# Patient Record
Sex: FEMALE | Race: WHITE | NOT HISPANIC OR LATINO | Employment: OTHER | URBAN - METROPOLITAN AREA
[De-identification: names, ages, dates, MRNs, and addresses within clinical notes are randomized per-mention and may not be internally consistent; named-entity substitution may affect disease eponyms.]

---

## 2018-01-01 ENCOUNTER — ANESTHESIA EVENT (OUTPATIENT)
Dept: SURGERY | Facility: HOSPITAL | Age: 58
DRG: 463 | End: 2018-01-01
Payer: MEDICARE

## 2018-01-01 ENCOUNTER — OFFICE VISIT (OUTPATIENT)
Dept: SURGERY | Facility: CLINIC | Age: 58
End: 2018-01-01
Payer: MEDICARE

## 2018-01-01 ENCOUNTER — ANESTHESIA (OUTPATIENT)
Dept: SURGERY | Facility: HOSPITAL | Age: 58
End: 2018-01-01
Payer: MEDICARE

## 2018-01-01 ENCOUNTER — LAB VISIT (OUTPATIENT)
Dept: LAB | Facility: HOSPITAL | Age: 58
End: 2018-01-01
Attending: NURSE PRACTITIONER
Payer: MEDICARE

## 2018-01-01 ENCOUNTER — OFFICE VISIT (OUTPATIENT)
Dept: PODIATRY | Facility: CLINIC | Age: 58
End: 2018-01-01
Payer: MEDICARE

## 2018-01-01 ENCOUNTER — TELEPHONE (OUTPATIENT)
Dept: FAMILY MEDICINE | Facility: CLINIC | Age: 58
End: 2018-01-01

## 2018-01-01 ENCOUNTER — OFFICE VISIT (OUTPATIENT)
Dept: FAMILY MEDICINE | Facility: CLINIC | Age: 58
End: 2018-01-01
Attending: NURSE PRACTITIONER
Payer: MEDICARE

## 2018-01-01 ENCOUNTER — HOSPITAL ENCOUNTER (EMERGENCY)
Facility: HOSPITAL | Age: 58
Discharge: HOME | End: 2018-06-19
Attending: FAMILY MEDICINE
Payer: MEDICARE

## 2018-01-01 ENCOUNTER — HOSPITAL ENCOUNTER (EMERGENCY)
Facility: HOSPITAL | Age: 58
Discharge: HOME | End: 2018-12-07
Attending: EMERGENCY MEDICINE
Payer: MEDICARE

## 2018-01-01 ENCOUNTER — HOSPITAL ENCOUNTER (OUTPATIENT)
Dept: RADIOLOGY | Facility: HOSPITAL | Age: 58
Discharge: HOME | End: 2018-09-10
Attending: NURSE PRACTITIONER
Payer: MEDICARE

## 2018-01-01 ENCOUNTER — HOSPITAL ENCOUNTER (OUTPATIENT)
Facility: HOSPITAL | Age: 58
Discharge: HOME | End: 2018-07-16
Attending: SURGERY | Admitting: SURGERY
Payer: MEDICARE

## 2018-01-01 ENCOUNTER — SURGERY (OUTPATIENT)
Age: 58
End: 2018-01-01

## 2018-01-01 ENCOUNTER — ANESTHESIA (OUTPATIENT)
Dept: SURGERY | Facility: HOSPITAL | Age: 58
DRG: 463 | End: 2018-01-01
Payer: MEDICARE

## 2018-01-01 ENCOUNTER — LAB VISIT (OUTPATIENT)
Dept: LAB | Facility: HOSPITAL | Age: 58
End: 2018-01-01
Attending: SURGERY
Payer: MEDICARE

## 2018-01-01 ENCOUNTER — HOSPITAL ENCOUNTER (EMERGENCY)
Facility: HOSPITAL | Age: 58
Discharge: HOME | End: 2018-12-16
Attending: FAMILY MEDICINE
Payer: MEDICARE

## 2018-01-01 ENCOUNTER — HOSPITAL ENCOUNTER (INPATIENT)
Facility: HOSPITAL | Age: 58
LOS: 17 days | DRG: 463 | End: 2019-01-06
Attending: SURGERY | Admitting: SURGERY
Payer: MEDICARE

## 2018-01-01 ENCOUNTER — TELEPHONE (OUTPATIENT)
Dept: PODIATRY | Facility: CLINIC | Age: 58
End: 2018-01-01

## 2018-01-01 ENCOUNTER — HOSPITAL ENCOUNTER (OUTPATIENT)
Dept: RADIOLOGY | Facility: HOSPITAL | Age: 58
Discharge: HOME | End: 2018-09-25
Attending: SURGERY
Payer: MEDICARE

## 2018-01-01 ENCOUNTER — HOSPITAL ENCOUNTER (OUTPATIENT)
Dept: CARDIOLOGY | Facility: HOSPITAL | Age: 58
Discharge: HOME | End: 2018-07-13
Attending: SURGERY
Payer: MEDICARE

## 2018-01-01 ENCOUNTER — ANESTHESIA EVENT (OUTPATIENT)
Dept: SURGERY | Facility: HOSPITAL | Age: 58
End: 2018-01-01
Payer: MEDICARE

## 2018-01-01 VITALS
HEART RATE: 61 BPM | OXYGEN SATURATION: 97 % | SYSTOLIC BLOOD PRESSURE: 140 MMHG | BODY MASS INDEX: 20.05 KG/M2 | DIASTOLIC BLOOD PRESSURE: 80 MMHG | TEMPERATURE: 98 F | WEIGHT: 128 LBS

## 2018-01-01 VITALS
OXYGEN SATURATION: 94 % | RESPIRATION RATE: 18 BRPM | DIASTOLIC BLOOD PRESSURE: 52 MMHG | WEIGHT: 133 LBS | BODY MASS INDEX: 20.88 KG/M2 | HEIGHT: 67 IN | SYSTOLIC BLOOD PRESSURE: 98 MMHG | TEMPERATURE: 98 F | HEART RATE: 76 BPM

## 2018-01-01 VITALS
SYSTOLIC BLOOD PRESSURE: 115 MMHG | HEIGHT: 66 IN | WEIGHT: 128 LBS | BODY MASS INDEX: 20.57 KG/M2 | OXYGEN SATURATION: 98 % | HEART RATE: 66 BPM | RESPIRATION RATE: 17 BRPM | TEMPERATURE: 98 F | DIASTOLIC BLOOD PRESSURE: 76 MMHG

## 2018-01-01 VITALS
DIASTOLIC BLOOD PRESSURE: 72 MMHG | WEIGHT: 125 LBS | TEMPERATURE: 98 F | BODY MASS INDEX: 20.09 KG/M2 | HEIGHT: 66 IN | SYSTOLIC BLOOD PRESSURE: 129 MMHG | HEART RATE: 82 BPM

## 2018-01-01 VITALS
BODY MASS INDEX: 20.09 KG/M2 | WEIGHT: 125 LBS | RESPIRATION RATE: 16 BRPM | HEIGHT: 66 IN | HEART RATE: 83 BPM | SYSTOLIC BLOOD PRESSURE: 118 MMHG | OXYGEN SATURATION: 100 % | DIASTOLIC BLOOD PRESSURE: 83 MMHG | TEMPERATURE: 98 F

## 2018-01-01 VITALS
TEMPERATURE: 98 F | RESPIRATION RATE: 20 BRPM | SYSTOLIC BLOOD PRESSURE: 135 MMHG | HEIGHT: 67 IN | DIASTOLIC BLOOD PRESSURE: 80 MMHG | WEIGHT: 137 LBS | HEART RATE: 76 BPM | BODY MASS INDEX: 21.5 KG/M2 | OXYGEN SATURATION: 89 %

## 2018-01-01 VITALS
TEMPERATURE: 98 F | BODY MASS INDEX: 20.09 KG/M2 | HEIGHT: 66 IN | DIASTOLIC BLOOD PRESSURE: 88 MMHG | WEIGHT: 125 LBS | RESPIRATION RATE: 26 BRPM | OXYGEN SATURATION: 99 % | SYSTOLIC BLOOD PRESSURE: 179 MMHG | HEART RATE: 79 BPM

## 2018-01-01 VITALS
OXYGEN SATURATION: 94 % | HEIGHT: 67 IN | BODY MASS INDEX: 20.25 KG/M2 | HEART RATE: 63 BPM | BODY MASS INDEX: 20.4 KG/M2 | RESPIRATION RATE: 18 BRPM | SYSTOLIC BLOOD PRESSURE: 112 MMHG | WEIGHT: 130 LBS | DIASTOLIC BLOOD PRESSURE: 62 MMHG | HEIGHT: 67 IN | WEIGHT: 129 LBS

## 2018-01-01 VITALS
TEMPERATURE: 100 F | BODY MASS INDEX: 20.88 KG/M2 | HEART RATE: 62 BPM | HEIGHT: 67 IN | SYSTOLIC BLOOD PRESSURE: 178 MMHG | WEIGHT: 133 LBS | RESPIRATION RATE: 18 BRPM | OXYGEN SATURATION: 95 % | DIASTOLIC BLOOD PRESSURE: 92 MMHG

## 2018-01-01 DIAGNOSIS — R63.4 WEIGHT LOSS: ICD-10-CM

## 2018-01-01 DIAGNOSIS — L08.9 SOFT TISSUE INFECTION: ICD-10-CM

## 2018-01-01 DIAGNOSIS — D64.9 ANEMIA, UNSPECIFIED TYPE: ICD-10-CM

## 2018-01-01 DIAGNOSIS — M00.061 STAPHYLOCOCCAL ARTHRITIS OF RIGHT KNEE: ICD-10-CM

## 2018-01-01 DIAGNOSIS — M06.9 RHEUMATOID ARTHRITIS, INVOLVING UNSPECIFIED SITE, UNSPECIFIED RHEUMATOID FACTOR PRESENCE: ICD-10-CM

## 2018-01-01 DIAGNOSIS — L97.512 SKIN ULCER OF TOE OF RIGHT FOOT WITH FAT LAYER EXPOSED: Primary | ICD-10-CM

## 2018-01-01 DIAGNOSIS — E78.5 HYPERLIPIDEMIA, UNSPECIFIED HYPERLIPIDEMIA TYPE: ICD-10-CM

## 2018-01-01 DIAGNOSIS — I47.10 SVT (SUPRAVENTRICULAR TACHYCARDIA): ICD-10-CM

## 2018-01-01 DIAGNOSIS — I10 HYPERTENSION, UNSPECIFIED TYPE: Primary | ICD-10-CM

## 2018-01-01 DIAGNOSIS — E87.20 METABOLIC ACIDOSIS: ICD-10-CM

## 2018-01-01 DIAGNOSIS — K62.5 HEMORRHAGE OF ANUS AND RECTUM: Primary | ICD-10-CM

## 2018-01-01 DIAGNOSIS — Z01.818 PRE-OP TESTING: ICD-10-CM

## 2018-01-01 DIAGNOSIS — L03.031 CELLULITIS OF THIRD TOE OF RIGHT FOOT: ICD-10-CM

## 2018-01-01 DIAGNOSIS — K92.1 HEMATOCHEZIA: ICD-10-CM

## 2018-01-01 DIAGNOSIS — K92.1 MELENA: ICD-10-CM

## 2018-01-01 DIAGNOSIS — S49.91XA INJURY OF RIGHT SHOULDER, INITIAL ENCOUNTER: Primary | ICD-10-CM

## 2018-01-01 DIAGNOSIS — R10.84 GENERALIZED ABDOMINAL PAIN: Primary | ICD-10-CM

## 2018-01-01 DIAGNOSIS — N17.9 AKI (ACUTE KIDNEY INJURY): Primary | ICD-10-CM

## 2018-01-01 DIAGNOSIS — N17.0 ATN (ACUTE TUBULAR NECROSIS): ICD-10-CM

## 2018-01-01 DIAGNOSIS — K92.1 HEMATOCHEZIA: Primary | ICD-10-CM

## 2018-01-01 DIAGNOSIS — D68.9 COAGULOPATHY: ICD-10-CM

## 2018-01-01 DIAGNOSIS — D50.0 IRON DEFICIENCY ANEMIA DUE TO CHRONIC BLOOD LOSS: Primary | ICD-10-CM

## 2018-01-01 DIAGNOSIS — N83.8 OVARIAN MASS, LEFT: ICD-10-CM

## 2018-01-01 DIAGNOSIS — M25.521 ELBOW PAIN, RIGHT: ICD-10-CM

## 2018-01-01 DIAGNOSIS — I49.9 ARRHYTHMIA: ICD-10-CM

## 2018-01-01 DIAGNOSIS — R92.8 ABNORMAL MAMMOGRAM: ICD-10-CM

## 2018-01-01 DIAGNOSIS — I10 HYPERTENSION, UNSPECIFIED TYPE: ICD-10-CM

## 2018-01-01 DIAGNOSIS — E87.5 HYPERKALEMIA: ICD-10-CM

## 2018-01-01 DIAGNOSIS — A41.9 SEPSIS AFFECTING SKIN: ICD-10-CM

## 2018-01-01 DIAGNOSIS — K62.5 RECTAL BLEEDING: ICD-10-CM

## 2018-01-01 DIAGNOSIS — M79.2 NEURITIS: ICD-10-CM

## 2018-01-01 DIAGNOSIS — M06.9 RHEUMATOID ARTHRITIS INVOLVING MULTIPLE SITES, UNSPECIFIED RHEUMATOID FACTOR PRESENCE: Primary | ICD-10-CM

## 2018-01-01 DIAGNOSIS — I73.9 PERIPHERAL VASCULAR DISEASE: ICD-10-CM

## 2018-01-01 DIAGNOSIS — I38 ENDOCARDITIS: ICD-10-CM

## 2018-01-01 DIAGNOSIS — F41.9 ANXIETY: ICD-10-CM

## 2018-01-01 DIAGNOSIS — R63.4 WEIGHT LOSS: Primary | ICD-10-CM

## 2018-01-01 DIAGNOSIS — R92.8 ABNORMAL MAMMOGRAM: Primary | ICD-10-CM

## 2018-01-01 LAB
ABO + RH BLD: NORMAL
ALBUMIN SERPL BCP-MCNC: 0.8 G/DL
ALBUMIN SERPL BCP-MCNC: 0.9 G/DL
ALBUMIN SERPL BCP-MCNC: 1 G/DL
ALBUMIN SERPL BCP-MCNC: 1.1 G/DL
ALBUMIN SERPL BCP-MCNC: 1.2 G/DL
ALBUMIN SERPL BCP-MCNC: 1.3 G/DL
ALBUMIN SERPL BCP-MCNC: 1.3 G/DL
ALBUMIN SERPL BCP-MCNC: 3 G/DL
ALP SERPL-CCNC: 101 U/L
ALP SERPL-CCNC: 102 U/L
ALP SERPL-CCNC: 103 U/L
ALP SERPL-CCNC: 104 U/L
ALP SERPL-CCNC: 105 U/L
ALP SERPL-CCNC: 123 U/L
ALP SERPL-CCNC: 128 U/L
ALP SERPL-CCNC: 156 U/L
ALP SERPL-CCNC: 157 U/L
ALP SERPL-CCNC: 268 U/L
ALP SERPL-CCNC: 308 U/L
ALP SERPL-CCNC: 91 U/L
ALP SERPL-CCNC: 91 U/L
ALP SERPL-CCNC: 94 U/L
ALP SERPL-CCNC: 98 U/L
ALT SERPL W/O P-5'-P-CCNC: 11 U/L
ALT SERPL W/O P-5'-P-CCNC: <5 U/L
AMORPH CRY UR QL COMP ASSIST: ABNORMAL
ANA SER QL IF: POSITIVE
ANA TITR SER IF: NORMAL {TITER}
ANCA AB TITR SER IF: ABNORMAL TITER
ANION GAP SERPL CALC-SCNC: 11 MMOL/L
ANION GAP SERPL CALC-SCNC: 12 MMOL/L
ANION GAP SERPL CALC-SCNC: 13 MMOL/L
ANION GAP SERPL CALC-SCNC: 13 MMOL/L
ANION GAP SERPL CALC-SCNC: 5 MMOL/L
ANION GAP SERPL CALC-SCNC: 7 MMOL/L
ANION GAP SERPL CALC-SCNC: 8 MMOL/L
ANION GAP SERPL CALC-SCNC: 9 MMOL/L
ANISOCYTOSIS BLD QL SMEAR: SLIGHT
ANISOCYTOSIS BLD QL SMEAR: SLIGHT
ANTI SM ANTIBODY: 0.85 EU
ANTI SM/RNP ANTIBODY: 0 EU
ANTI-SM INTERPRETATION: NEGATIVE
ANTI-SM/RNP INTERPRETATION: NEGATIVE
ANTI-SSA ANTIBODY: 6.29 EU
ANTI-SSA INTERPRETATION: NEGATIVE
ANTI-SSB ANTIBODY: 0 EU
ANTI-SSB INTERPRETATION: NEGATIVE
APPEARANCE FLD: NORMAL
APTT BLDCRRT: 24.8 SEC
APTT BLDCRRT: 52.1 SEC
APTT BLDCRRT: 53.7 SEC
ASCENDING AORTA: 3.1 CM
ASO AB SERPL-ACNC: <14 IU/ML
AST SERPL-CCNC: 10 U/L
AST SERPL-CCNC: 10 U/L
AST SERPL-CCNC: 11 U/L
AST SERPL-CCNC: 14 U/L
AST SERPL-CCNC: 15 U/L
AST SERPL-CCNC: 20 U/L
AST SERPL-CCNC: 22 U/L
AST SERPL-CCNC: 24 U/L
AST SERPL-CCNC: 8 U/L
AST SERPL-CCNC: 9 U/L
AV INDEX (PROSTH): 0.89
AV MEAN GRADIENT: 3.96 MMHG
AV PEAK GRADIENT: 7.73 MMHG
AV VALVE AREA: 2.91 CM2
BACTERIA #/AREA URNS AUTO: ABNORMAL /HPF
BACTERIA #/AREA URNS HPF: ABNORMAL /HPF
BACTERIA BLD CULT: NORMAL
BACTERIA SPEC AEROBE CULT: NORMAL
BACTERIA SPEC ANAEROBE CULT: NORMAL
BACTERIA UR CULT: NO GROWTH
BACTERIA UR CULT: NORMAL
BASO STIPL BLD QL SMEAR: ABNORMAL
BASOPHILS # BLD AUTO: 0 K/UL
BASOPHILS # BLD AUTO: 0 K/UL
BASOPHILS # BLD AUTO: 0.01 K/UL
BASOPHILS # BLD AUTO: 0.02 K/UL
BASOPHILS # BLD AUTO: 0.03 K/UL
BASOPHILS # BLD AUTO: 0.03 K/UL
BASOPHILS # BLD AUTO: 0.04 K/UL
BASOPHILS NFR BLD: 0 %
BASOPHILS NFR BLD: 0 %
BASOPHILS NFR BLD: 0.1 %
BASOPHILS NFR BLD: 0.2 %
BASOPHILS NFR BLD: 0.3 %
BASOPHILS NFR BLD: 0.3 %
BASOPHILS NFR BLD: 0.4 %
BASOPHILS NFR BLD: 0.7 %
BILIRUB SERPL-MCNC: 0.1 MG/DL
BILIRUB SERPL-MCNC: 0.2 MG/DL
BILIRUB SERPL-MCNC: 0.3 MG/DL
BILIRUB SERPL-MCNC: 0.4 MG/DL
BILIRUB SERPL-MCNC: 0.7 MG/DL
BILIRUB SERPL-MCNC: 0.7 MG/DL
BILIRUB SERPL-MCNC: 0.8 MG/DL
BILIRUB UR QL STRIP: NEGATIVE
BLD GP AB SCN CELLS X3 SERPL QL: NORMAL
BLD PROD TYP BPU: NORMAL
BLOOD UNIT EXPIRATION DATE: NORMAL
BLOOD UNIT TYPE CODE: 5100
BLOOD UNIT TYPE: NORMAL
BODY FLD TYPE: NORMAL
BODY FLD TYPE: NORMAL
BSA FOR ECHO PROCEDURE: 1.82 M2
BUN SERPL-MCNC: 23 MG/DL
BUN SERPL-MCNC: 39 MG/DL
BUN SERPL-MCNC: 50 MG/DL
BUN SERPL-MCNC: 51 MG/DL
BUN SERPL-MCNC: 52 MG/DL
BUN SERPL-MCNC: 52 MG/DL
BUN SERPL-MCNC: 53 MG/DL
BUN SERPL-MCNC: 54 MG/DL
BUN SERPL-MCNC: 56 MG/DL
BUN SERPL-MCNC: 57 MG/DL
BUN SERPL-MCNC: 59 MG/DL
BUN SERPL-MCNC: 64 MG/DL
BUN SERPL-MCNC: 66 MG/DL
BUN SERPL-MCNC: 66 MG/DL
BUN SERPL-MCNC: 69 MG/DL
BUN SERPL-MCNC: 72 MG/DL
BUN SERPL-MCNC: 73 MG/DL
BUN SERPL-MCNC: 78 MG/DL
BUN SERPL-MCNC: 83 MG/DL
BUN SERPL-MCNC: 84 MG/DL
BUN SERPL-MCNC: 95 MG/DL
BURR CELLS BLD QL SMEAR: ABNORMAL
BURR CELLS BLD QL SMEAR: ABNORMAL
C3 SERPL-MCNC: 66 MG/DL
C4 SERPL-MCNC: 9 MG/DL
CALCIUM SERPL-MCNC: 7 MG/DL
CALCIUM SERPL-MCNC: 7 MG/DL
CALCIUM SERPL-MCNC: 7.1 MG/DL
CALCIUM SERPL-MCNC: 7.2 MG/DL
CALCIUM SERPL-MCNC: 7.2 MG/DL
CALCIUM SERPL-MCNC: 7.3 MG/DL
CALCIUM SERPL-MCNC: 7.4 MG/DL
CALCIUM SERPL-MCNC: 7.5 MG/DL
CALCIUM SERPL-MCNC: 7.8 MG/DL
CALCIUM SERPL-MCNC: 7.8 MG/DL
CALCIUM SERPL-MCNC: 8.1 MG/DL
CALCIUM SERPL-MCNC: 8.3 MG/DL
CHLORIDE SERPL-SCNC: 105 MMOL/L
CHLORIDE SERPL-SCNC: 107 MMOL/L
CHLORIDE SERPL-SCNC: 108 MMOL/L
CHLORIDE SERPL-SCNC: 109 MMOL/L
CHLORIDE SERPL-SCNC: 110 MMOL/L
CHLORIDE SERPL-SCNC: 111 MMOL/L
CK SERPL-CCNC: <7 U/L
CLARITY UR REFRACT.AUTO: ABNORMAL
CLARITY UR: CLEAR
CO2 SERPL-SCNC: 17 MMOL/L
CO2 SERPL-SCNC: 17 MMOL/L
CO2 SERPL-SCNC: 18 MMOL/L
CO2 SERPL-SCNC: 19 MMOL/L
CO2 SERPL-SCNC: 20 MMOL/L
CO2 SERPL-SCNC: 21 MMOL/L
CO2 SERPL-SCNC: 22 MMOL/L
CODING SYSTEM: NORMAL
COLOR FLD: NORMAL
COLOR UR AUTO: ABNORMAL
COLOR UR AUTO: YELLOW
COLOR UR: YELLOW
CREAT SERPL-MCNC: 0.9 MG/DL
CREAT SERPL-MCNC: 1 MG/DL
CREAT SERPL-MCNC: 1.7 MG/DL
CREAT SERPL-MCNC: 1.8 MG/DL
CREAT SERPL-MCNC: 1.9 MG/DL
CREAT SERPL-MCNC: 1.9 MG/DL
CREAT SERPL-MCNC: 2 MG/DL
CREAT SERPL-MCNC: 2 MG/DL
CREAT SERPL-MCNC: 2.3 MG/DL
CREAT SERPL-MCNC: 2.4 MG/DL
CREAT SERPL-MCNC: 2.6 MG/DL
CREAT SERPL-MCNC: 2.7 MG/DL
CREAT SERPL-MCNC: 3.1 MG/DL
CREAT SERPL-MCNC: 3.3 MG/DL
CREAT SERPL-MCNC: 3.4 MG/DL
CREAT SERPL-MCNC: 3.5 MG/DL
CREAT UR-MCNC: 56 MG/DL
CREAT UR-MCNC: 66 MG/DL
CREAT UR-MCNC: 68 MG/DL
CREAT UR-MCNC: 69 MG/DL
CREAT UR-MCNC: 95 MG/DL
CRP SERPL-MCNC: 106.2 MG/L
CRYSTALS FLD MICRO: NEGATIVE
CV ECHO LV RWT: 0.48 CM
D DIMER PPP IA.FEU-MCNC: 2.23 MG/L FEU
DIFFERENTIAL METHOD: ABNORMAL
DISPENSE STATUS: NORMAL
DOP CALC AO PEAK VEL: 1.39 M/S
DOP CALC AO VTI: 25.92 CM
DOP CALC LVOT AREA: 3.27 CM2
DOP CALC LVOT DIAMETER: 2.04 CM
DOP CALC LVOT STROKE VOLUME: 75.3 CM3
DOP CALCLVOT PEAK VEL VTI: 23.05 CM
DSDNA AB SER-ACNC: NORMAL [IU]/ML
DSDNA AB SER-ACNC: NORMAL [IU]/ML
E WAVE DECELERATION TIME: 208.47 MSEC
E/A RATIO: 1.16
E/E' RATIO: 10.9
ECHO LV POSTERIOR WALL: 0.96 CM (ref 0.6–1.1)
EOSINOPHIL # BLD AUTO: 0 K/UL
EOSINOPHIL # BLD AUTO: 0.1 K/UL
EOSINOPHIL # BLD AUTO: 0.4 K/UL
EOSINOPHIL NFR BLD: 0 %
EOSINOPHIL NFR BLD: 0.1 %
EOSINOPHIL NFR BLD: 0.2 %
EOSINOPHIL NFR BLD: 0.2 %
EOSINOPHIL NFR BLD: 0.3 %
EOSINOPHIL NFR BLD: 0.4 %
EOSINOPHIL NFR BLD: 0.5 %
EOSINOPHIL NFR BLD: 0.8 %
EOSINOPHIL NFR BLD: 0.9 %
EOSINOPHIL NFR BLD: 1.1 %
EOSINOPHIL NFR BLD: 1.3 %
EOSINOPHIL NFR BLD: 1.4 %
EOSINOPHIL NFR BLD: 1.5 %
EOSINOPHIL NFR BLD: 1.7 %
EOSINOPHIL NFR BLD: 7.8 %
ERYTHROCYTE [DISTWIDTH] IN BLOOD BY AUTOMATED COUNT: 15.6 %
ERYTHROCYTE [DISTWIDTH] IN BLOOD BY AUTOMATED COUNT: 15.9 %
ERYTHROCYTE [DISTWIDTH] IN BLOOD BY AUTOMATED COUNT: 18.1 %
ERYTHROCYTE [DISTWIDTH] IN BLOOD BY AUTOMATED COUNT: 18.5 %
ERYTHROCYTE [DISTWIDTH] IN BLOOD BY AUTOMATED COUNT: 18.7 %
ERYTHROCYTE [DISTWIDTH] IN BLOOD BY AUTOMATED COUNT: 18.7 %
ERYTHROCYTE [DISTWIDTH] IN BLOOD BY AUTOMATED COUNT: 18.8 %
ERYTHROCYTE [DISTWIDTH] IN BLOOD BY AUTOMATED COUNT: 18.9 %
ERYTHROCYTE [DISTWIDTH] IN BLOOD BY AUTOMATED COUNT: 19.1 %
ERYTHROCYTE [DISTWIDTH] IN BLOOD BY AUTOMATED COUNT: 19.2 %
ERYTHROCYTE [DISTWIDTH] IN BLOOD BY AUTOMATED COUNT: 19.3 %
ERYTHROCYTE [DISTWIDTH] IN BLOOD BY AUTOMATED COUNT: 19.7 %
ERYTHROCYTE [DISTWIDTH] IN BLOOD BY AUTOMATED COUNT: 19.9 %
ERYTHROCYTE [DISTWIDTH] IN BLOOD BY AUTOMATED COUNT: 20.2 %
ERYTHROCYTE [SEDIMENTATION RATE] IN BLOOD BY WESTERGREN METHOD: 77 MM/HR
EST. GFR  (AFRICAN AMERICAN): 15.8 ML/MIN/1.73 M^2
EST. GFR  (AFRICAN AMERICAN): 16.3 ML/MIN/1.73 M^2
EST. GFR  (AFRICAN AMERICAN): 16.9 ML/MIN/1.73 M^2
EST. GFR  (AFRICAN AMERICAN): 18.3 ML/MIN/1.73 M^2
EST. GFR  (AFRICAN AMERICAN): 21.6 ML/MIN/1.73 M^2
EST. GFR  (AFRICAN AMERICAN): 22.6 ML/MIN/1.73 M^2
EST. GFR  (AFRICAN AMERICAN): 24.9 ML/MIN/1.73 M^2
EST. GFR  (AFRICAN AMERICAN): 26.2 ML/MIN/1.73 M^2
EST. GFR  (AFRICAN AMERICAN): 31 ML/MIN/1.73 M^2
EST. GFR  (AFRICAN AMERICAN): 31 ML/MIN/1.73 M^2
EST. GFR  (AFRICAN AMERICAN): 33 ML/MIN/1.73 M^2
EST. GFR  (AFRICAN AMERICAN): 33 ML/MIN/1.73 M^2
EST. GFR  (AFRICAN AMERICAN): 35.3 ML/MIN/1.73 M^2
EST. GFR  (AFRICAN AMERICAN): 37.8 ML/MIN/1.73 M^2
EST. GFR  (AFRICAN AMERICAN): >60 ML/MIN/1.73 M^2
EST. GFR  (AFRICAN AMERICAN): >60 ML/MIN/1.73 M^2
EST. GFR  (NON AFRICAN AMERICAN): 13.7 ML/MIN/1.73 M^2
EST. GFR  (NON AFRICAN AMERICAN): 14.2 ML/MIN/1.73 M^2
EST. GFR  (NON AFRICAN AMERICAN): 14.7 ML/MIN/1.73 M^2
EST. GFR  (NON AFRICAN AMERICAN): 15.9 ML/MIN/1.73 M^2
EST. GFR  (NON AFRICAN AMERICAN): 18.7 ML/MIN/1.73 M^2
EST. GFR  (NON AFRICAN AMERICAN): 19.6 ML/MIN/1.73 M^2
EST. GFR  (NON AFRICAN AMERICAN): 21.6 ML/MIN/1.73 M^2
EST. GFR  (NON AFRICAN AMERICAN): 22.7 ML/MIN/1.73 M^2
EST. GFR  (NON AFRICAN AMERICAN): 26.9 ML/MIN/1.73 M^2
EST. GFR  (NON AFRICAN AMERICAN): 26.9 ML/MIN/1.73 M^2
EST. GFR  (NON AFRICAN AMERICAN): 28.7 ML/MIN/1.73 M^2
EST. GFR  (NON AFRICAN AMERICAN): 28.7 ML/MIN/1.73 M^2
EST. GFR  (NON AFRICAN AMERICAN): 30.6 ML/MIN/1.73 M^2
EST. GFR  (NON AFRICAN AMERICAN): 32.8 ML/MIN/1.73 M^2
EST. GFR  (NON AFRICAN AMERICAN): >60 ML/MIN/1.73 M^2
EST. GFR  (NON AFRICAN AMERICAN): >60 ML/MIN/1.73 M^2
ESTIMATED AVG GLUCOSE: 100 MG/DL
FACT V ACT/NOR PPP: 80 %
FACT VIII ACT/NOR PPP: 241 %
FIBRINOGEN PPP-MCNC: 445 MG/DL
FRACTIONAL SHORTENING: 28 % (ref 28–44)
GLUCOSE SERPL-MCNC: 102 MG/DL
GLUCOSE SERPL-MCNC: 119 MG/DL
GLUCOSE SERPL-MCNC: 120 MG/DL
GLUCOSE SERPL-MCNC: 134 MG/DL (ref 70–110)
GLUCOSE SERPL-MCNC: 62 MG/DL
GLUCOSE SERPL-MCNC: 65 MG/DL
GLUCOSE SERPL-MCNC: 72 MG/DL
GLUCOSE SERPL-MCNC: 73 MG/DL
GLUCOSE SERPL-MCNC: 74 MG/DL
GLUCOSE SERPL-MCNC: 75 MG/DL
GLUCOSE SERPL-MCNC: 77 MG/DL
GLUCOSE SERPL-MCNC: 77 MG/DL
GLUCOSE SERPL-MCNC: 78 MG/DL
GLUCOSE SERPL-MCNC: 79 MG/DL
GLUCOSE SERPL-MCNC: 81 MG/DL
GLUCOSE SERPL-MCNC: 81 MG/DL
GLUCOSE SERPL-MCNC: 83 MG/DL
GLUCOSE SERPL-MCNC: 83 MG/DL
GLUCOSE SERPL-MCNC: 87 MG/DL
GLUCOSE SERPL-MCNC: 90 MG/DL
GLUCOSE SERPL-MCNC: 90 MG/DL
GLUCOSE UR QL STRIP: NEGATIVE
GRAM STN SPEC: NORMAL
GRAN CASTS UR QL COMP ASSIST: 5 /LPF
GRAN CASTS UR QL COMP ASSIST: <1 /LPF
HAV IGM SERPL QL IA: NEGATIVE
HBA1C MFR BLD HPLC: 5.1 %
HBV CORE IGM SERPL QL IA: NEGATIVE
HBV SURFACE AG SERPL QL IA: NEGATIVE
HCO3 UR-SCNC: 19 MMOL/L (ref 24–28)
HCT VFR BLD AUTO: 19 %
HCT VFR BLD AUTO: 20.2 %
HCT VFR BLD AUTO: 20.5 %
HCT VFR BLD AUTO: 22.2 %
HCT VFR BLD AUTO: 23.4 %
HCT VFR BLD AUTO: 23.7 %
HCT VFR BLD AUTO: 24.3 %
HCT VFR BLD AUTO: 24.5 %
HCT VFR BLD AUTO: 24.5 %
HCT VFR BLD AUTO: 25.5 %
HCT VFR BLD AUTO: 25.8 %
HCT VFR BLD AUTO: 26.2 %
HCT VFR BLD AUTO: 26.5 %
HCT VFR BLD AUTO: 26.7 %
HCT VFR BLD AUTO: 28 %
HCT VFR BLD AUTO: 28.1 %
HCT VFR BLD AUTO: 29.1 %
HCT VFR BLD AUTO: 29.5 %
HCT VFR BLD CALC: 18 %PCV (ref 36–54)
HCV AB SERPL QL IA: NEGATIVE
HGB BLD-MCNC: 6.1 G/DL
HGB BLD-MCNC: 6.3 G/DL
HGB BLD-MCNC: 6.5 G/DL
HGB BLD-MCNC: 6.8 G/DL
HGB BLD-MCNC: 7.3 G/DL
HGB BLD-MCNC: 7.4 G/DL
HGB BLD-MCNC: 7.6 G/DL
HGB BLD-MCNC: 7.6 G/DL
HGB BLD-MCNC: 7.9 G/DL
HGB BLD-MCNC: 8.1 G/DL
HGB BLD-MCNC: 8.2 G/DL
HGB BLD-MCNC: 8.4 G/DL
HGB BLD-MCNC: 8.5 G/DL
HGB BLD-MCNC: 8.6 G/DL
HGB BLD-MCNC: 8.9 G/DL
HGB BLD-MCNC: 9.7 G/DL
HGB UR QL STRIP: ABNORMAL
HGB UR QL STRIP: NEGATIVE
HIV 1+2 AB+HIV1 P24 AG SERPL QL IA: NEGATIVE
HYALINE CASTS #/AREA URNS LPF: 0 /LPF
HYALINE CASTS UR QL AUTO: 0 /LPF
HYALINE CASTS UR QL AUTO: 1 /LPF
HYALINE CASTS UR QL AUTO: 260 /LPF
HYPOCHROMIA BLD QL SMEAR: ABNORMAL
HYPOCHROMIA BLD QL SMEAR: ABNORMAL
IMM GRANULOCYTES # BLD AUTO: 0.01 K/UL
IMM GRANULOCYTES # BLD AUTO: 0.1 K/UL
IMM GRANULOCYTES # BLD AUTO: 0.15 K/UL
IMM GRANULOCYTES # BLD AUTO: 0.17 K/UL
IMM GRANULOCYTES # BLD AUTO: 0.17 K/UL
IMM GRANULOCYTES # BLD AUTO: 0.22 K/UL
IMM GRANULOCYTES # BLD AUTO: 0.23 K/UL
IMM GRANULOCYTES # BLD AUTO: 0.27 K/UL
IMM GRANULOCYTES # BLD AUTO: 0.27 K/UL
IMM GRANULOCYTES # BLD AUTO: 0.29 K/UL
IMM GRANULOCYTES # BLD AUTO: 0.36 K/UL
IMM GRANULOCYTES # BLD AUTO: 0.36 K/UL
IMM GRANULOCYTES # BLD AUTO: 0.39 K/UL
IMM GRANULOCYTES NFR BLD AUTO: 0.2 %
IMM GRANULOCYTES NFR BLD AUTO: 1.2 %
IMM GRANULOCYTES NFR BLD AUTO: 1.4 %
IMM GRANULOCYTES NFR BLD AUTO: 1.7 %
IMM GRANULOCYTES NFR BLD AUTO: 1.7 %
IMM GRANULOCYTES NFR BLD AUTO: 1.8 %
IMM GRANULOCYTES NFR BLD AUTO: 1.9 %
IMM GRANULOCYTES NFR BLD AUTO: 1.9 %
IMM GRANULOCYTES NFR BLD AUTO: 2 %
IMM GRANULOCYTES NFR BLD AUTO: 3 %
IMM GRANULOCYTES NFR BLD AUTO: 3.4 %
IMM GRANULOCYTES NFR BLD AUTO: 3.4 %
IMM GRANULOCYTES NFR BLD AUTO: 3.6 %
IMM GRANULOCYTES NFR BLD AUTO: 3.9 %
IMM GRANULOCYTES NFR BLD AUTO: 4.1 %
IMM GRANULOCYTES NFR BLD AUTO: 4.2 %
IMM GRANULOCYTES NFR BLD AUTO: 4.7 %
INR PPP: 1
INR PPP: 3.9
INR PPP: 5.6
INR PPP: 5.8
INTERPRETATION SERPL IFE-IMP: NORMAL
INTERVENTRICULAR SEPTUM: 0.78 CM (ref 0.6–1.1)
KAPPA LC SER QL IA: 13.41 MG/DL
KAPPA LC/LAMBDA SER IA: 1.48
KETONES UR QL STRIP: ABNORMAL
KETONES UR QL STRIP: ABNORMAL
KETONES UR QL STRIP: NEGATIVE
LA MAJOR: 4.58 CM
LA MINOR: 4.38 CM
LA WIDTH: 3.44 CM
LACTATE SERPL-SCNC: 0.6 MMOL/L
LACTATE SERPL-SCNC: 0.7 MMOL/L
LACTATE SERPL-SCNC: 0.8 MMOL/L
LACTATE SERPL-SCNC: 1.1 MMOL/L
LACTATE SERPL-SCNC: 1.3 MMOL/L
LAMBDA LC SER QL IA: 9.06 MG/DL
LEFT ATRIUM SIZE: 3.53 CM
LEFT ATRIUM VOLUME INDEX: 25.5 ML/M2
LEFT ATRIUM VOLUME: 46.22 CM3
LEFT INTERNAL DIMENSION IN SYSTOLE: 2.87 CM (ref 2.1–4)
LEFT VENTRICLE DIASTOLIC VOLUME INDEX: 37.7 ML/M2
LEFT VENTRICLE DIASTOLIC VOLUME: 68.21 ML
LEFT VENTRICLE MASS INDEX: 56.9 G/M2
LEFT VENTRICLE SYSTOLIC VOLUME INDEX: 17.4 ML/M2
LEFT VENTRICLE SYSTOLIC VOLUME: 31.41 ML
LEFT VENTRICULAR INTERNAL DIMENSION IN DIASTOLE: 3.96 CM (ref 3.5–6)
LEFT VENTRICULAR MASS: 103.01 G
LEUKOCYTE ESTERASE UR QL STRIP: ABNORMAL
LEUKOCYTE ESTERASE UR QL STRIP: NEGATIVE
LV LATERAL E/E' RATIO: 9.08
LV SEPTAL E/E' RATIO: 13.63
LYMPHOCYTES # BLD AUTO: 0.2 K/UL
LYMPHOCYTES # BLD AUTO: 0.2 K/UL
LYMPHOCYTES # BLD AUTO: 0.3 K/UL
LYMPHOCYTES # BLD AUTO: 0.4 K/UL
LYMPHOCYTES # BLD AUTO: 0.5 K/UL
LYMPHOCYTES # BLD AUTO: 0.7 K/UL
LYMPHOCYTES NFR BLD: 14.5 %
LYMPHOCYTES NFR BLD: 2.3 %
LYMPHOCYTES NFR BLD: 2.3 %
LYMPHOCYTES NFR BLD: 2.5 %
LYMPHOCYTES NFR BLD: 2.9 %
LYMPHOCYTES NFR BLD: 3 %
LYMPHOCYTES NFR BLD: 3 %
LYMPHOCYTES NFR BLD: 3.2 %
LYMPHOCYTES NFR BLD: 3.6 %
LYMPHOCYTES NFR BLD: 4 %
LYMPHOCYTES NFR BLD: 4.4 %
LYMPHOCYTES NFR BLD: 4.8 %
LYMPHOCYTES NFR BLD: 4.9 %
LYMPHOCYTES NFR BLD: 5 %
LYMPHOCYTES NFR BLD: 5 %
LYMPHOCYTES NFR BLD: 5.4 %
LYMPHOCYTES NFR BLD: 5.6 %
LYMPHOCYTES NFR FLD MANUAL: 3 %
MAGNESIUM SERPL-MCNC: 1.6 MG/DL
MAGNESIUM SERPL-MCNC: 1.7 MG/DL
MAGNESIUM SERPL-MCNC: 1.8 MG/DL
MAGNESIUM SERPL-MCNC: 1.8 MG/DL
MAGNESIUM SERPL-MCNC: 1.9 MG/DL
MAGNESIUM SERPL-MCNC: 2 MG/DL
MAGNESIUM SERPL-MCNC: 2.5 MG/DL
MAGNESIUM SERPL-MCNC: 2.7 MG/DL
MCH RBC QN AUTO: 25.3 PG
MCH RBC QN AUTO: 25.9 PG
MCH RBC QN AUTO: 26.4 PG
MCH RBC QN AUTO: 26.5 PG
MCH RBC QN AUTO: 26.7 PG
MCH RBC QN AUTO: 26.7 PG
MCH RBC QN AUTO: 26.8 PG
MCH RBC QN AUTO: 27 PG
MCH RBC QN AUTO: 27.1 PG
MCH RBC QN AUTO: 27.5 PG
MCH RBC QN AUTO: 27.7 PG
MCH RBC QN AUTO: 27.7 PG
MCH RBC QN AUTO: 27.9 PG
MCHC RBC AUTO-ENTMCNC: 29.9 G/DL
MCHC RBC AUTO-ENTMCNC: 30.6 G/DL
MCHC RBC AUTO-ENTMCNC: 30.6 G/DL
MCHC RBC AUTO-ENTMCNC: 30.7 G/DL
MCHC RBC AUTO-ENTMCNC: 30.7 G/DL
MCHC RBC AUTO-ENTMCNC: 31 G/DL
MCHC RBC AUTO-ENTMCNC: 31 G/DL
MCHC RBC AUTO-ENTMCNC: 31.2 G/DL
MCHC RBC AUTO-ENTMCNC: 31.2 G/DL
MCHC RBC AUTO-ENTMCNC: 31.3 G/DL
MCHC RBC AUTO-ENTMCNC: 31.7 G/DL
MCHC RBC AUTO-ENTMCNC: 31.8 G/DL
MCHC RBC AUTO-ENTMCNC: 31.8 G/DL
MCHC RBC AUTO-ENTMCNC: 32.1 G/DL
MCHC RBC AUTO-ENTMCNC: 32.2 G/DL
MCHC RBC AUTO-ENTMCNC: 32.5 G/DL
MCHC RBC AUTO-ENTMCNC: 32.6 G/DL
MCHC RBC AUTO-ENTMCNC: 32.9 G/DL
MCV RBC AUTO: 80 FL
MCV RBC AUTO: 82 FL
MCV RBC AUTO: 83 FL
MCV RBC AUTO: 84 FL
MCV RBC AUTO: 85 FL
MCV RBC AUTO: 86 FL
MCV RBC AUTO: 86 FL
MCV RBC AUTO: 87 FL
MCV RBC AUTO: 91 FL
MCV RBC AUTO: 93 FL
MICROSCOPIC COMMENT: ABNORMAL
MONOCYTES # BLD AUTO: 0.2 K/UL
MONOCYTES # BLD AUTO: 0.3 K/UL
MONOCYTES # BLD AUTO: 0.4 K/UL
MONOCYTES # BLD AUTO: 0.5 K/UL
MONOCYTES # BLD AUTO: 0.7 K/UL
MONOCYTES # BLD AUTO: 0.7 K/UL
MONOCYTES NFR BLD: 2.2 %
MONOCYTES NFR BLD: 2.3 %
MONOCYTES NFR BLD: 2.3 %
MONOCYTES NFR BLD: 2.5 %
MONOCYTES NFR BLD: 2.6 %
MONOCYTES NFR BLD: 2.7 %
MONOCYTES NFR BLD: 2.7 %
MONOCYTES NFR BLD: 2.8 %
MONOCYTES NFR BLD: 3 %
MONOCYTES NFR BLD: 3.2 %
MONOCYTES NFR BLD: 3.3 %
MONOCYTES NFR BLD: 3.3 %
MONOCYTES NFR BLD: 3.6 %
MONOCYTES NFR BLD: 3.7 %
MONOCYTES NFR BLD: 4 %
MONOCYTES NFR BLD: 4.2 %
MONOCYTES NFR BLD: 5.2 %
MONOS+MACROS NFR FLD MANUAL: 13 %
MV PEAK A VEL: 0.94 M/S
MV PEAK E VEL: 1.09 M/S
NEUTROPHILS # BLD AUTO: 11.3 K/UL
NEUTROPHILS # BLD AUTO: 14.1 K/UL
NEUTROPHILS # BLD AUTO: 14.9 K/UL
NEUTROPHILS # BLD AUTO: 15.4 K/UL
NEUTROPHILS # BLD AUTO: 3.3 K/UL
NEUTROPHILS # BLD AUTO: 4.8 K/UL
NEUTROPHILS # BLD AUTO: 6.5 K/UL
NEUTROPHILS # BLD AUTO: 6.7 K/UL
NEUTROPHILS # BLD AUTO: 6.7 K/UL
NEUTROPHILS # BLD AUTO: 7.2 K/UL
NEUTROPHILS # BLD AUTO: 7.5 K/UL
NEUTROPHILS # BLD AUTO: 7.5 K/UL
NEUTROPHILS # BLD AUTO: 7.7 K/UL
NEUTROPHILS # BLD AUTO: 8 K/UL
NEUTROPHILS # BLD AUTO: 8.2 K/UL
NEUTROPHILS # BLD AUTO: 8.3 K/UL
NEUTROPHILS # BLD AUTO: 9.5 K/UL
NEUTROPHILS NFR BLD: 71.6 %
NEUTROPHILS NFR BLD: 85.7 %
NEUTROPHILS NFR BLD: 86 %
NEUTROPHILS NFR BLD: 87.1 %
NEUTROPHILS NFR BLD: 87.4 %
NEUTROPHILS NFR BLD: 87.6 %
NEUTROPHILS NFR BLD: 88.1 %
NEUTROPHILS NFR BLD: 88.1 %
NEUTROPHILS NFR BLD: 89.4 %
NEUTROPHILS NFR BLD: 89.9 %
NEUTROPHILS NFR BLD: 90.9 %
NEUTROPHILS NFR BLD: 91.2 %
NEUTROPHILS NFR BLD: 91.3 %
NEUTROPHILS NFR BLD: 92 %
NEUTROPHILS NFR BLD: 92.1 %
NEUTROPHILS NFR BLD: 92.8 %
NEUTROPHILS NFR BLD: 93.2 %
NEUTROPHILS NFR FLD MANUAL: 84 %
NITRITE UR QL STRIP: NEGATIVE
NRBC BLD-RTO: 0 /100 WBC
OB PNL STL: POSITIVE
OVALOCYTES BLD QL SMEAR: ABNORMAL
P-ANCA TITR SER IF: ABNORMAL TITER
PATH INTERP FLD-IMP: NORMAL
PATHOLOGIST INTERPRETATION IFE: NORMAL
PCO2 BLDA: 40.4 MMHG (ref 35–45)
PH SMN: 7.28 [PH] (ref 7.35–7.45)
PH UR STRIP: 5 [PH] (ref 5–8)
PH UR STRIP: 6 [PH] (ref 5–8)
PHOSPHATE SERPL-MCNC: 3.9 MG/DL
PHOSPHATE SERPL-MCNC: 4.5 MG/DL
PHOSPHATE SERPL-MCNC: 4.6 MG/DL
PHOSPHATE SERPL-MCNC: 4.7 MG/DL
PHOSPHATE SERPL-MCNC: 5 MG/DL
PHOSPHATE SERPL-MCNC: 5.3 MG/DL
PHOSPHATE SERPL-MCNC: 6 MG/DL
PHOSPHATE SERPL-MCNC: 6 MG/DL
PHOSPHATE SERPL-MCNC: 6.2 MG/DL
PHOSPHATE SERPL-MCNC: 6.5 MG/DL
PHOSPHATE SERPL-MCNC: 6.6 MG/DL
PHOSPHATE SERPL-MCNC: 6.7 MG/DL
PHOSPHATE SERPL-MCNC: 6.7 MG/DL
PHOSPHATE SERPL-MCNC: 6.8 MG/DL
PHOSPHATE SERPL-MCNC: 7 MG/DL
PHOSPHATE SERPL-MCNC: 7.3 MG/DL
PHOSPHATE SERPL-MCNC: 7.3 MG/DL
PHOSPHATE SERPL-MCNC: 7.8 MG/DL
PLATELET # BLD AUTO: 165 K/UL
PLATELET # BLD AUTO: 166 K/UL
PLATELET # BLD AUTO: 167 K/UL
PLATELET # BLD AUTO: 168 K/UL
PLATELET # BLD AUTO: 169 K/UL
PLATELET # BLD AUTO: 175 K/UL
PLATELET # BLD AUTO: 178 K/UL
PLATELET # BLD AUTO: 178 K/UL
PLATELET # BLD AUTO: 180 K/UL
PLATELET # BLD AUTO: 181 K/UL
PLATELET # BLD AUTO: 183 K/UL
PLATELET # BLD AUTO: 191 K/UL
PLATELET # BLD AUTO: 193 K/UL
PLATELET # BLD AUTO: 197 K/UL
PLATELET # BLD AUTO: 199 K/UL
PLATELET # BLD AUTO: 216 K/UL
PLATELET # BLD AUTO: 246 K/UL
PLATELET # BLD AUTO: 284 K/UL
PLATELET BLD QL SMEAR: ABNORMAL
PLATELET BLD QL SMEAR: ABNORMAL
PMV BLD AUTO: 10.6 FL
PMV BLD AUTO: 10.7 FL
PMV BLD AUTO: 10.8 FL
PMV BLD AUTO: 11 FL
PMV BLD AUTO: 11.3 FL
PMV BLD AUTO: 11.6 FL
PMV BLD AUTO: 11.8 FL
PMV BLD AUTO: 12 FL
PMV BLD AUTO: 12 FL
PMV BLD AUTO: 12.1 FL
PMV BLD AUTO: 12.2 FL
PMV BLD AUTO: 12.2 FL
PMV BLD AUTO: 12.3 FL
PMV BLD AUTO: 12.6 FL
PMV BLD AUTO: 13 FL
PMV BLD AUTO: 13.1 FL
PMV BLD AUTO: 13.2 FL
PMV BLD AUTO: 13.4 FL
PO2 BLDA: 105 MMHG (ref 40–60)
POC BE: -8 MMOL/L
POC IONIZED CALCIUM: 0.99 MMOL/L (ref 1.06–1.42)
POC SATURATED O2: 97 % (ref 95–100)
POC TCO2: 20 MMOL/L (ref 24–29)
POCT GLUCOSE: 103 MG/DL (ref 70–110)
POCT GLUCOSE: 103 MG/DL (ref 70–110)
POCT GLUCOSE: 104 MG/DL (ref 70–110)
POCT GLUCOSE: 106 MG/DL (ref 70–110)
POCT GLUCOSE: 112 MG/DL (ref 70–110)
POCT GLUCOSE: 127 MG/DL (ref 70–110)
POCT GLUCOSE: 131 MG/DL (ref 70–110)
POCT GLUCOSE: 170 MG/DL (ref 70–110)
POCT GLUCOSE: 50 MG/DL (ref 70–110)
POCT GLUCOSE: 59 MG/DL (ref 70–110)
POCT GLUCOSE: 63 MG/DL (ref 70–110)
POCT GLUCOSE: 68 MG/DL (ref 70–110)
POCT GLUCOSE: 73 MG/DL (ref 70–110)
POCT GLUCOSE: 73 MG/DL (ref 70–110)
POCT GLUCOSE: 74 MG/DL (ref 70–110)
POCT GLUCOSE: 75 MG/DL (ref 70–110)
POCT GLUCOSE: 78 MG/DL (ref 70–110)
POCT GLUCOSE: 79 MG/DL (ref 70–110)
POCT GLUCOSE: 79 MG/DL (ref 70–110)
POCT GLUCOSE: 80 MG/DL (ref 70–110)
POCT GLUCOSE: 81 MG/DL (ref 70–110)
POCT GLUCOSE: 83 MG/DL (ref 70–110)
POCT GLUCOSE: 84 MG/DL (ref 70–110)
POCT GLUCOSE: 85 MG/DL (ref 70–110)
POCT GLUCOSE: 87 MG/DL (ref 70–110)
POCT GLUCOSE: 87 MG/DL (ref 70–110)
POCT GLUCOSE: 88 MG/DL (ref 70–110)
POCT GLUCOSE: 89 MG/DL (ref 70–110)
POCT GLUCOSE: 93 MG/DL (ref 70–110)
POCT GLUCOSE: 94 MG/DL (ref 70–110)
POIKILOCYTOSIS BLD QL SMEAR: SLIGHT
POIKILOCYTOSIS BLD QL SMEAR: SLIGHT
POLYCHROMASIA BLD QL SMEAR: ABNORMAL
POLYCHROMASIA BLD QL SMEAR: ABNORMAL
POTASSIUM BLD-SCNC: 4.4 MMOL/L (ref 3.5–5.1)
POTASSIUM SERPL-SCNC: 4 MMOL/L
POTASSIUM SERPL-SCNC: 4.1 MMOL/L
POTASSIUM SERPL-SCNC: 4.2 MMOL/L
POTASSIUM SERPL-SCNC: 4.3 MMOL/L
POTASSIUM SERPL-SCNC: 4.3 MMOL/L
POTASSIUM SERPL-SCNC: 4.4 MMOL/L
POTASSIUM SERPL-SCNC: 4.4 MMOL/L
POTASSIUM SERPL-SCNC: 4.5 MMOL/L
POTASSIUM SERPL-SCNC: 4.9 MMOL/L
POTASSIUM SERPL-SCNC: 4.9 MMOL/L
POTASSIUM SERPL-SCNC: 5 MMOL/L
POTASSIUM SERPL-SCNC: 5.1 MMOL/L
POTASSIUM SERPL-SCNC: 5.2 MMOL/L
POTASSIUM SERPL-SCNC: 5.2 MMOL/L
POTASSIUM SERPL-SCNC: 5.3 MMOL/L
POTASSIUM SERPL-SCNC: 5.7 MMOL/L
PREALB SERPL-MCNC: 10 MG/DL
PREALB SERPL-MCNC: 13 MG/DL
PREALB SERPL-MCNC: 8 MG/DL
PROT SERPL-MCNC: 3.7 G/DL
PROT SERPL-MCNC: 3.8 G/DL
PROT SERPL-MCNC: 3.8 G/DL
PROT SERPL-MCNC: 3.9 G/DL
PROT SERPL-MCNC: 3.9 G/DL
PROT SERPL-MCNC: 4 G/DL
PROT SERPL-MCNC: 4.2 G/DL
PROT SERPL-MCNC: 4.2 G/DL
PROT SERPL-MCNC: 4.3 G/DL
PROT SERPL-MCNC: 4.6 G/DL
PROT SERPL-MCNC: 4.7 G/DL
PROT SERPL-MCNC: 5.2 G/DL
PROT SERPL-MCNC: 6.6 G/DL
PROT UR QL STRIP: ABNORMAL
PROT UR-MCNC: 141 MG/DL
PROT UR-MCNC: 396 MG/DL
PROT UR-MCNC: 443 MG/DL
PROT/CREAT UR: 1.48 MG/G{CREAT}
PROT/CREAT UR: 5.82 MG/G{CREAT}
PROT/CREAT UR: 6.42 MG/G{CREAT}
PROTHROMBIN TIME: 10.7 SEC
PROTHROMBIN TIME: 38.1 SEC
PROTHROMBIN TIME: 55 SEC
PROTHROMBIN TIME: 57.6 SEC
RA MAJOR: 3.1 CM
RA PRESSURE: 8 MMHG
RA WIDTH: 3.33 CM
RBC # BLD AUTO: 2.3 M/UL
RBC # BLD AUTO: 2.35 M/UL
RBC # BLD AUTO: 2.36 M/UL
RBC # BLD AUTO: 2.69 M/UL
RBC # BLD AUTO: 2.77 M/UL
RBC # BLD AUTO: 2.8 M/UL
RBC # BLD AUTO: 2.87 M/UL
RBC # BLD AUTO: 2.93 M/UL
RBC # BLD AUTO: 2.93 M/UL
RBC # BLD AUTO: 3 M/UL
RBC # BLD AUTO: 3.03 M/UL
RBC # BLD AUTO: 3.06 M/UL
RBC # BLD AUTO: 3.14 M/UL
RBC # BLD AUTO: 3.15 M/UL
RBC # BLD AUTO: 3.18 M/UL
RBC # BLD AUTO: 3.19 M/UL
RBC # BLD AUTO: 3.32 M/UL
RBC # BLD AUTO: 3.67 M/UL
RBC #/AREA URNS AUTO: 10 /HPF (ref 0–4)
RBC #/AREA URNS AUTO: 24 /HPF (ref 0–4)
RBC #/AREA URNS AUTO: 32 /HPF (ref 0–4)
RBC #/AREA URNS AUTO: 6 /HPF (ref 0–4)
RBC #/AREA URNS AUTO: >100 /HPF (ref 0–4)
RBC #/AREA URNS AUTO: >100 /HPF (ref 0–4)
RBC #/AREA URNS HPF: 2 /HPF (ref 0–4)
RHEUMATOID FACT SERPL-ACNC: <10 IU/ML
RIGHT VENTRICULAR END-DIASTOLIC DIMENSION: 3.12 CM
SAMPLE: ABNORMAL
SINUS: 3.36 CM
SODIUM BLD-SCNC: 137 MMOL/L (ref 136–145)
SODIUM SERPL-SCNC: 135 MMOL/L
SODIUM SERPL-SCNC: 136 MMOL/L
SODIUM SERPL-SCNC: 137 MMOL/L
SODIUM SERPL-SCNC: 138 MMOL/L
SODIUM SERPL-SCNC: 139 MMOL/L
SODIUM UR-SCNC: 24 MMOL/L
SODIUM UR-SCNC: 38 MMOL/L
SODIUM UR-SCNC: 38 MMOL/L
SP GR UR STRIP: 1 (ref 1–1.03)
SP GR UR STRIP: 1.01 (ref 1–1.03)
SP GR UR STRIP: 1.02 (ref 1–1.03)
SP GR UR STRIP: 1.03 (ref 1–1.03)
SP GR UR STRIP: 1.03 (ref 1–1.03)
SQUAMOUS #/AREA URNS AUTO: 0 /HPF
SQUAMOUS #/AREA URNS AUTO: 1 /HPF
SQUAMOUS #/AREA URNS AUTO: 15 /HPF
SQUAMOUS #/AREA URNS AUTO: 5 /HPF
STJ: 3.03 CM
TDI LATERAL: 0.12
TDI SEPTAL: 0.08
TDI: 0.1
TOXIC GRANULES BLD QL SMEAR: ABNORMAL
TRANS ERYTHROCYTES VOL PATIENT: NORMAL ML
TRANSFERRIN SERPL-MCNC: 107 MG/DL
TRICUSPID ANNULAR PLANE SYSTOLIC EXCURSION: 1.84 CM
URN SPEC COLLECT METH UR: ABNORMAL
UROBILINOGEN UR STRIP-ACNC: NEGATIVE EU/DL
UUN UR-MCNC: 413 MG/DL
VANCOMYCIN SERPL-MCNC: 23.9 UG/ML
VANCOMYCIN SERPL-MCNC: 5.7 UG/ML
WAXY CASTS UR QL COMP ASSIST: <1 /LPF
WBC # BLD AUTO: 10.2 K/UL
WBC # BLD AUTO: 12.39 K/UL
WBC # BLD AUTO: 15.18 K/UL
WBC # BLD AUTO: 16.24 K/UL
WBC # BLD AUTO: 16.95 K/UL
WBC # BLD AUTO: 3.76 K/UL
WBC # BLD AUTO: 4.61 K/UL
WBC # BLD AUTO: 5.54 K/UL
WBC # BLD AUTO: 7.39 K/UL
WBC # BLD AUTO: 7.57 K/UL
WBC # BLD AUTO: 7.7 K/UL
WBC # BLD AUTO: 8 K/UL
WBC # BLD AUTO: 8.46 K/UL
WBC # BLD AUTO: 8.46 K/UL
WBC # BLD AUTO: 8.61 K/UL
WBC # BLD AUTO: 8.68 K/UL
WBC # BLD AUTO: 9.32 K/UL
WBC # BLD AUTO: 9.58 K/UL
WBC # FLD: NORMAL /CU MM
WBC #/AREA URNS AUTO: 10 /HPF (ref 0–5)
WBC #/AREA URNS AUTO: 11 /HPF (ref 0–5)
WBC #/AREA URNS AUTO: 29 /HPF (ref 0–5)
WBC #/AREA URNS AUTO: 3 /HPF (ref 0–5)
WBC #/AREA URNS AUTO: >100 /HPF (ref 0–5)
WBC #/AREA URNS AUTO: >100 /HPF (ref 0–5)
WBC #/AREA URNS HPF: 2 /HPF (ref 0–5)
WBC CLUMPS UR QL AUTO: ABNORMAL
YEAST UR QL AUTO: ABNORMAL
YEAST UR QL AUTO: ABNORMAL

## 2018-01-01 PROCEDURE — 80074 ACUTE HEPATITIS PANEL: CPT

## 2018-01-01 PROCEDURE — 87076 CULTURE ANAEROBE IDENT EACH: CPT

## 2018-01-01 PROCEDURE — 63600175 PHARM REV CODE 636 W HCPCS: Performed by: NURSE ANESTHETIST, CERTIFIED REGISTERED

## 2018-01-01 PROCEDURE — 99900035 HC TECH TIME PER 15 MIN (STAT)

## 2018-01-01 PROCEDURE — 25000003 PHARM REV CODE 250: Performed by: SURGERY

## 2018-01-01 PROCEDURE — 94761 N-INVAS EAR/PLS OXIMETRY MLT: CPT

## 2018-01-01 PROCEDURE — 86038 ANTINUCLEAR ANTIBODIES: CPT

## 2018-01-01 PROCEDURE — 25000003 PHARM REV CODE 250: Performed by: STUDENT IN AN ORGANIZED HEALTH CARE EDUCATION/TRAINING PROGRAM

## 2018-01-01 PROCEDURE — 85025 COMPLETE CBC W/AUTO DIFF WBC: CPT

## 2018-01-01 PROCEDURE — 63600175 PHARM REV CODE 636 W HCPCS: Performed by: SURGERY

## 2018-01-01 PROCEDURE — 11043 DBRDMT MUSC&/FSCA 1ST 20/<: CPT | Mod: 58,GC,, | Performed by: SURGERY

## 2018-01-01 PROCEDURE — 25000003 PHARM REV CODE 250: Performed by: NURSE ANESTHETIST, CERTIFIED REGISTERED

## 2018-01-01 PROCEDURE — 99233 PR SUBSEQUENT HOSPITAL CARE,LEVL III: ICD-10-PCS | Mod: ,,, | Performed by: INTERNAL MEDICINE

## 2018-01-01 PROCEDURE — 20000000 HC ICU ROOM

## 2018-01-01 PROCEDURE — 84100 ASSAY OF PHOSPHORUS: CPT

## 2018-01-01 PROCEDURE — 84466 ASSAY OF TRANSFERRIN: CPT

## 2018-01-01 PROCEDURE — 80048 BASIC METABOLIC PNL TOTAL CA: CPT

## 2018-01-01 PROCEDURE — 36558 PR INSERT TUNNELED CV CATH W/O PORT OR PUMP: ICD-10-PCS | Mod: 58,RT,, | Performed by: SURGERY

## 2018-01-01 PROCEDURE — 85379 FIBRIN DEGRADATION QUANT: CPT

## 2018-01-01 PROCEDURE — S4991 NICOTINE PATCH NONLEGEND: HCPCS | Performed by: STUDENT IN AN ORGANIZED HEALTH CARE EDUCATION/TRAINING PROGRAM

## 2018-01-01 PROCEDURE — 82570 ASSAY OF URINE CREATININE: CPT

## 2018-01-01 PROCEDURE — 86920 COMPATIBILITY TEST SPIN: CPT

## 2018-01-01 PROCEDURE — 87205 SMEAR GRAM STAIN: CPT | Mod: 59

## 2018-01-01 PROCEDURE — 83605 ASSAY OF LACTIC ACID: CPT

## 2018-01-01 PROCEDURE — 84300 ASSAY OF URINE SODIUM: CPT

## 2018-01-01 PROCEDURE — 37000008 HC ANESTHESIA 1ST 15 MINUTES: Performed by: SURGERY

## 2018-01-01 PROCEDURE — D9220A PRA ANESTHESIA: ICD-10-PCS | Mod: CRNA,,, | Performed by: NURSE ANESTHETIST, CERTIFIED REGISTERED

## 2018-01-01 PROCEDURE — 80202 ASSAY OF VANCOMYCIN: CPT

## 2018-01-01 PROCEDURE — 77001 CHG FLUOROGUIDE CNTRL VEN ACCESS,PLACE,REPLACE,REMOVE: ICD-10-PCS | Mod: 26,58,59, | Performed by: SURGERY

## 2018-01-01 PROCEDURE — 36415 COLL VENOUS BLD VENIPUNCTURE: CPT

## 2018-01-01 PROCEDURE — 63600175 PHARM REV CODE 636 W HCPCS: Performed by: STUDENT IN AN ORGANIZED HEALTH CARE EDUCATION/TRAINING PROGRAM

## 2018-01-01 PROCEDURE — 27000221 HC OXYGEN, UP TO 24 HOURS

## 2018-01-01 PROCEDURE — 87116 MYCOBACTERIA CULTURE: CPT

## 2018-01-01 PROCEDURE — 82595 ASSAY OF CRYOGLOBULIN: CPT

## 2018-01-01 PROCEDURE — 87075 CULTR BACTERIA EXCEPT BLOOD: CPT

## 2018-01-01 PROCEDURE — 99233 SBSQ HOSP IP/OBS HIGH 50: CPT | Mod: GC,,, | Performed by: INTERNAL MEDICINE

## 2018-01-01 PROCEDURE — 99213 OFFICE O/P EST LOW 20 MIN: CPT | Mod: PBBFAC | Performed by: PODIATRIST

## 2018-01-01 PROCEDURE — 85610 PROTHROMBIN TIME: CPT

## 2018-01-01 PROCEDURE — 85027 COMPLETE CBC AUTOMATED: CPT

## 2018-01-01 PROCEDURE — 36000706: Performed by: SURGERY

## 2018-01-01 PROCEDURE — 45378 DIAGNOSTIC COLONOSCOPY: CPT | Mod: ,,, | Performed by: SURGERY

## 2018-01-01 PROCEDURE — 80053 COMPREHEN METABOLIC PANEL: CPT

## 2018-01-01 PROCEDURE — 93005 ELECTROCARDIOGRAM TRACING: CPT

## 2018-01-01 PROCEDURE — 99233 PR SUBSEQUENT HOSPITAL CARE,LEVL III: ICD-10-PCS | Mod: GC,,, | Performed by: INTERNAL MEDICINE

## 2018-01-01 PROCEDURE — S0077 INJECTION, CLINDAMYCIN PHOSP: HCPCS | Performed by: STUDENT IN AN ORGANIZED HEALTH CARE EDUCATION/TRAINING PROGRAM

## 2018-01-01 PROCEDURE — C9113 INJ PANTOPRAZOLE SODIUM, VIA: HCPCS | Performed by: STUDENT IN AN ORGANIZED HEALTH CARE EDUCATION/TRAINING PROGRAM

## 2018-01-01 PROCEDURE — P9045 ALBUMIN (HUMAN), 5%, 250 ML: HCPCS | Mod: JG

## 2018-01-01 PROCEDURE — 63600175 PHARM REV CODE 636 W HCPCS: Performed by: INTERNAL MEDICINE

## 2018-01-01 PROCEDURE — 63600175 PHARM REV CODE 636 W HCPCS: Performed by: ANESTHESIOLOGY

## 2018-01-01 PROCEDURE — 36000704 HC OR TIME LEV I 1ST 15 MIN: Performed by: SURGERY

## 2018-01-01 PROCEDURE — 99223 1ST HOSP IP/OBS HIGH 75: CPT | Mod: 57,GC,, | Performed by: ORTHOPAEDIC SURGERY

## 2018-01-01 PROCEDURE — 87176 TISSUE HOMOGENIZATION CULTR: CPT

## 2018-01-01 PROCEDURE — 97605 NEG PRS WND THER DME<=50SQCM: CPT | Mod: GC,,, | Performed by: SURGERY

## 2018-01-01 PROCEDURE — 43239 EGD BIOPSY SINGLE/MULTIPLE: CPT | Mod: 51,,, | Performed by: SURGERY

## 2018-01-01 PROCEDURE — 94799 UNLISTED PULMONARY SVC/PX: CPT

## 2018-01-01 PROCEDURE — 86334 IMMUNOFIX E-PHORESIS SERUM: CPT | Mod: 26,,, | Performed by: PATHOLOGY

## 2018-01-01 PROCEDURE — 63600175 PHARM REV CODE 636 W HCPCS: Performed by: ORTHOPAEDIC SURGERY

## 2018-01-01 PROCEDURE — 73030 X-RAY EXAM OF SHOULDER: CPT | Mod: 26,LT,, | Performed by: RADIOLOGY

## 2018-01-01 PROCEDURE — 83036 HEMOGLOBIN GLYCOSYLATED A1C: CPT

## 2018-01-01 PROCEDURE — 99223 1ST HOSP IP/OBS HIGH 75: CPT | Mod: AI,,, | Performed by: SURGERY

## 2018-01-01 PROCEDURE — 99204 OFFICE O/P NEW MOD 45 MIN: CPT | Mod: S$GLB,,, | Performed by: SURGERY

## 2018-01-01 PROCEDURE — 25000242 PHARM REV CODE 250 ALT 637 W/ HCPCS: Performed by: STUDENT IN AN ORGANIZED HEALTH CARE EDUCATION/TRAINING PROGRAM

## 2018-01-01 PROCEDURE — 83735 ASSAY OF MAGNESIUM: CPT

## 2018-01-01 PROCEDURE — 99223 PR INITIAL HOSPITAL CARE,LEVL III: ICD-10-PCS | Mod: ,,, | Performed by: INTERNAL MEDICINE

## 2018-01-01 PROCEDURE — 84134 ASSAY OF PREALBUMIN: CPT

## 2018-01-01 PROCEDURE — 97168 OT RE-EVAL EST PLAN CARE: CPT

## 2018-01-01 PROCEDURE — 88305 TISSUE EXAM BY PATHOLOGIST: CPT | Mod: 26,,, | Performed by: PATHOLOGY

## 2018-01-01 PROCEDURE — 71000033 HC RECOVERY, INTIAL HOUR: Performed by: SURGERY

## 2018-01-01 PROCEDURE — D9220A PRA ANESTHESIA: Mod: ANES,,, | Performed by: ANESTHESIOLOGY

## 2018-01-01 PROCEDURE — S0020 INJECTION, BUPIVICAINE HYDRO: HCPCS | Performed by: SURGERY

## 2018-01-01 PROCEDURE — 80048 BASIC METABOLIC PNL TOTAL CA: CPT | Mod: 91

## 2018-01-01 PROCEDURE — 86255 FLUORESCENT ANTIBODY SCREEN: CPT

## 2018-01-01 PROCEDURE — 84540 ASSAY OF URINE/UREA-N: CPT

## 2018-01-01 PROCEDURE — 71000039 HC RECOVERY, EACH ADD'L HOUR: Performed by: SURGERY

## 2018-01-01 PROCEDURE — 85730 THROMBOPLASTIN TIME PARTIAL: CPT

## 2018-01-01 PROCEDURE — 85652 RBC SED RATE AUTOMATED: CPT

## 2018-01-01 PROCEDURE — 87086 URINE CULTURE/COLONY COUNT: CPT

## 2018-01-01 PROCEDURE — 87186 SC STD MICRODIL/AGAR DIL: CPT

## 2018-01-01 PROCEDURE — P9017 PLASMA 1 DONOR FRZ W/IN 8 HR: HCPCS

## 2018-01-01 PROCEDURE — 20600001 HC STEP DOWN PRIVATE ROOM

## 2018-01-01 PROCEDURE — 77065 DX MAMMO INCL CAD UNI: CPT | Mod: TC,RT

## 2018-01-01 PROCEDURE — 82272 OCCULT BLD FECES 1-3 TESTS: CPT

## 2018-01-01 PROCEDURE — 84520 ASSAY OF UREA NITROGEN: CPT

## 2018-01-01 PROCEDURE — 97605 NEG PRS WND THER DME<=50SQCM: CPT | Mod: ,,, | Performed by: SURGERY

## 2018-01-01 PROCEDURE — 87102 FUNGUS ISOLATION CULTURE: CPT

## 2018-01-01 PROCEDURE — D9220A PRA ANESTHESIA: ICD-10-PCS | Mod: ANES,,, | Performed by: ANESTHESIOLOGY

## 2018-01-01 PROCEDURE — 88342 IMHCHEM/IMCYTCHM 1ST ANTB: CPT | Mod: 26,,, | Performed by: PATHOLOGY

## 2018-01-01 PROCEDURE — 86850 RBC ANTIBODY SCREEN: CPT

## 2018-01-01 PROCEDURE — 87205 SMEAR GRAM STAIN: CPT

## 2018-01-01 PROCEDURE — 74177 CT ABD & PELVIS W/CONTRAST: CPT | Mod: TC

## 2018-01-01 PROCEDURE — 86901 BLOOD TYPING SEROLOGIC RH(D): CPT

## 2018-01-01 PROCEDURE — 83520 IMMUNOASSAY QUANT NOS NONAB: CPT | Mod: 59

## 2018-01-01 PROCEDURE — 71260 CT THORAX DX C+: CPT | Mod: 26,,, | Performed by: RADIOLOGY

## 2018-01-01 PROCEDURE — 87077 CULTURE AEROBIC IDENTIFY: CPT

## 2018-01-01 PROCEDURE — 73080 X-RAY EXAM OF ELBOW: CPT | Mod: TC,FY,RT

## 2018-01-01 PROCEDURE — 85240 CLOT FACTOR VIII AHG 1 STAGE: CPT

## 2018-01-01 PROCEDURE — 87040 BLOOD CULTURE FOR BACTERIA: CPT

## 2018-01-01 PROCEDURE — 87075 CULTR BACTERIA EXCEPT BLOOD: CPT | Mod: 59

## 2018-01-01 PROCEDURE — 99284 EMERGENCY DEPT VISIT MOD MDM: CPT

## 2018-01-01 PROCEDURE — 87070 CULTURE OTHR SPECIMN AEROBIC: CPT

## 2018-01-01 PROCEDURE — 25000003 PHARM REV CODE 250: Performed by: INTERNAL MEDICINE

## 2018-01-01 PROCEDURE — 87102 FUNGUS ISOLATION CULTURE: CPT | Mod: 59

## 2018-01-01 PROCEDURE — 37000009 HC ANESTHESIA EA ADD 15 MINS: Performed by: SURGERY

## 2018-01-01 PROCEDURE — 36000710: Performed by: SURGERY

## 2018-01-01 PROCEDURE — S0077 INJECTION, CLINDAMYCIN PHOSP: HCPCS | Performed by: SURGERY

## 2018-01-01 PROCEDURE — G8987 SELF CARE CURRENT STATUS: HCPCS | Mod: CM

## 2018-01-01 PROCEDURE — 25101 PR ARTHROTOMY WRIST W/WO EXPLORE, DRAIN, FB REMOVAL: ICD-10-PCS | Mod: 51,RT,GC, | Performed by: ORTHOPAEDIC SURGERY

## 2018-01-01 PROCEDURE — 63600175 PHARM REV CODE 636 W HCPCS

## 2018-01-01 PROCEDURE — 81001 URINALYSIS AUTO W/SCOPE: CPT

## 2018-01-01 PROCEDURE — 86225 DNA ANTIBODY NATIVE: CPT

## 2018-01-01 PROCEDURE — 99233 SBSQ HOSP IP/OBS HIGH 50: CPT | Mod: ,,, | Performed by: INTERNAL MEDICINE

## 2018-01-01 PROCEDURE — 87186 SC STD MICRODIL/AGAR DIL: CPT | Mod: 59

## 2018-01-01 PROCEDURE — 11043 PR DEBRIDEMENT, SKIN, SUB-Q TISSUE,MUSCLE,=<20 SQ CM: ICD-10-PCS | Mod: 58,GC,, | Performed by: SURGERY

## 2018-01-01 PROCEDURE — 63600175 PHARM REV CODE 636 W HCPCS: Mod: JG

## 2018-01-01 PROCEDURE — 94640 AIRWAY INHALATION TREATMENT: CPT

## 2018-01-01 PROCEDURE — 82550 ASSAY OF CK (CPK): CPT

## 2018-01-01 PROCEDURE — 99284 EMERGENCY DEPT VISIT MOD MDM: CPT | Mod: 25

## 2018-01-01 PROCEDURE — 73080 X-RAY EXAM OF ELBOW: CPT | Mod: 26,RT,, | Performed by: RADIOLOGY

## 2018-01-01 PROCEDURE — 87015 SPECIMEN INFECT AGNT CONCNTJ: CPT

## 2018-01-01 PROCEDURE — D9220A PRA ANESTHESIA: Mod: CRNA,,, | Performed by: NURSE ANESTHETIST, CERTIFIED REGISTERED

## 2018-01-01 PROCEDURE — 85730 THROMBOPLASTIN TIME PARTIAL: CPT | Mod: 91

## 2018-01-01 PROCEDURE — 86140 C-REACTIVE PROTEIN: CPT

## 2018-01-01 PROCEDURE — 81001 URINALYSIS AUTO W/SCOPE: CPT | Mod: 91

## 2018-01-01 PROCEDURE — 94664 DEMO&/EVAL PT USE INHALER: CPT

## 2018-01-01 PROCEDURE — 23040 ARTHRT GH JT EXPL/DRG/RMV FB: CPT | Mod: 58,LT,GC, | Performed by: ORTHOPAEDIC SURGERY

## 2018-01-01 PROCEDURE — P9045 ALBUMIN (HUMAN), 5%, 250 ML: HCPCS | Mod: JG | Performed by: NURSE ANESTHETIST, CERTIFIED REGISTERED

## 2018-01-01 PROCEDURE — 25000003 PHARM REV CODE 250: Performed by: FAMILY MEDICINE

## 2018-01-01 PROCEDURE — 86039 ANTINUCLEAR ANTIBODIES (ANA): CPT

## 2018-01-01 PROCEDURE — 73030 X-RAY EXAM OF SHOULDER: CPT | Mod: TC,FY,LT

## 2018-01-01 PROCEDURE — 88342 IMHCHEM/IMCYTCHM 1ST ANTB: CPT | Performed by: PATHOLOGY

## 2018-01-01 PROCEDURE — 36000707: Performed by: SURGERY

## 2018-01-01 PROCEDURE — 87070 CULTURE OTHR SPECIMN AEROBIC: CPT | Mod: 59

## 2018-01-01 PROCEDURE — 82565 ASSAY OF CREATININE: CPT | Mod: 91

## 2018-01-01 PROCEDURE — P9021 RED BLOOD CELLS UNIT: HCPCS

## 2018-01-01 PROCEDURE — 86334 IMMUNOFIX E-PHORESIS SERUM: CPT

## 2018-01-01 PROCEDURE — 29876 ARTHRS KNEE SURG SYNVCT MAJ: CPT | Mod: RT,GC,, | Performed by: ORTHOPAEDIC SURGERY

## 2018-01-01 PROCEDURE — 36000711: Performed by: SURGERY

## 2018-01-01 PROCEDURE — 84156 ASSAY OF PROTEIN URINE: CPT

## 2018-01-01 PROCEDURE — 97530 THERAPEUTIC ACTIVITIES: CPT

## 2018-01-01 PROCEDURE — 99214 OFFICE O/P EST MOD 30 MIN: CPT | Mod: S$GLB,,, | Performed by: NURSE PRACTITIONER

## 2018-01-01 PROCEDURE — 99999 PR PBB SHADOW E&M-EST. PATIENT-LVL III: CPT | Mod: PBBFAC,,, | Performed by: PODIATRIST

## 2018-01-01 PROCEDURE — 93010 ELECTROCARDIOGRAM REPORT: CPT | Mod: ,,, | Performed by: INTERNAL MEDICINE

## 2018-01-01 PROCEDURE — 99214 OFFICE O/P EST MOD 30 MIN: CPT | Mod: S$GLB,,, | Performed by: SURGERY

## 2018-01-01 PROCEDURE — 84100 ASSAY OF PHOSPHORUS: CPT | Mod: 91

## 2018-01-01 PROCEDURE — 86160 COMPLEMENT ANTIGEN: CPT | Mod: 59

## 2018-01-01 PROCEDURE — 85220 BLOOC CLOT FACTOR V TEST: CPT

## 2018-01-01 PROCEDURE — 36558 INSERT TUNNELED CV CATH: CPT | Mod: 58,RT,, | Performed by: SURGERY

## 2018-01-01 PROCEDURE — 85384 FIBRINOGEN ACTIVITY: CPT

## 2018-01-01 PROCEDURE — 36000705 HC OR TIME LEV I EA ADD 15 MIN: Performed by: SURGERY

## 2018-01-01 PROCEDURE — 99203 OFFICE O/P NEW LOW 30 MIN: CPT | Mod: S$PBB,,, | Performed by: PODIATRIST

## 2018-01-01 PROCEDURE — 11043 PR DEBRIDEMENT, SKIN, SUB-Q TISSUE,MUSCLE,=<20 SQ CM: ICD-10-PCS | Mod: GC,,, | Performed by: SURGERY

## 2018-01-01 PROCEDURE — 83605 ASSAY OF LACTIC ACID: CPT | Mod: 91

## 2018-01-01 PROCEDURE — 83735 ASSAY OF MAGNESIUM: CPT | Mod: 91

## 2018-01-01 PROCEDURE — 27100025 HC TUBING, SET FLUID WARMER: Performed by: NURSE ANESTHETIST, CERTIFIED REGISTERED

## 2018-01-01 PROCEDURE — 97605 PR NEG PRESS WOUND THERAPY (NPWT) W/NON-DISPOSABLE WOUND VAC DEVICE (DME), <=50 CM: ICD-10-PCS | Mod: GC,,, | Performed by: SURGERY

## 2018-01-01 PROCEDURE — 51798 US URINE CAPACITY MEASURE: CPT

## 2018-01-01 PROCEDURE — 11043 DBRDMT MUSC&/FSCA 1ST 20/<: CPT | Mod: GC,,, | Performed by: SURGERY

## 2018-01-01 PROCEDURE — 25500020 PHARM REV CODE 255: Performed by: SURGERY

## 2018-01-01 PROCEDURE — 99223 1ST HOSP IP/OBS HIGH 75: CPT | Mod: ,,, | Performed by: INTERNAL MEDICINE

## 2018-01-01 PROCEDURE — 29876 PR KNEE SCOPE,FULL SYNOVECT: ICD-10-PCS | Mod: RT,GC,, | Performed by: ORTHOPAEDIC SURGERY

## 2018-01-01 PROCEDURE — 11043 DBRDMT MUSC&/FSCA 1ST 20/<: CPT | Mod: 58,59,, | Performed by: SURGERY

## 2018-01-01 PROCEDURE — 86235 NUCLEAR ANTIGEN ANTIBODY: CPT | Mod: 59

## 2018-01-01 PROCEDURE — 25000242 PHARM REV CODE 250 ALT 637 W/ HCPCS: Performed by: ANESTHESIOLOGY

## 2018-01-01 PROCEDURE — 45378 DIAGNOSTIC COLONOSCOPY: CPT | Performed by: SURGERY

## 2018-01-01 PROCEDURE — 97166 OT EVAL MOD COMPLEX 45 MIN: CPT

## 2018-01-01 PROCEDURE — 96372 THER/PROPH/DIAG INJ SC/IM: CPT

## 2018-01-01 PROCEDURE — 86256 FLUORESCENT ANTIBODY TITER: CPT

## 2018-01-01 PROCEDURE — 23040 PR ARTHROTOMY, GLENOHUMERAL JT, EXPL/DRAINAGE/RMVL FB: ICD-10-PCS | Mod: 58,LT,GC, | Performed by: ORTHOPAEDIC SURGERY

## 2018-01-01 PROCEDURE — 11042 DBRDMT SUBQ TIS 1ST 20SQCM/<: CPT | Mod: 59,58,GC, | Performed by: SURGERY

## 2018-01-01 PROCEDURE — C1729 CATH, DRAINAGE: HCPCS | Performed by: SURGERY

## 2018-01-01 PROCEDURE — 88305 TISSUE EXAM BY PATHOLOGIST: CPT | Performed by: PATHOLOGY

## 2018-01-01 PROCEDURE — 99223 PR INITIAL HOSPITAL CARE,LEVL III: ICD-10-PCS | Mod: 57,GC,, | Performed by: ORTHOPAEDIC SURGERY

## 2018-01-01 PROCEDURE — 36430 TRANSFUSION BLD/BLD COMPNT: CPT

## 2018-01-01 PROCEDURE — 87206 SMEAR FLUORESCENT/ACID STAI: CPT

## 2018-01-01 PROCEDURE — 27201423 OPTIME MED/SURG SUP & DEVICES STERILE SUPPLY: Performed by: SURGERY

## 2018-01-01 PROCEDURE — 27201012 HC FORCEPS, HOT/COLD, DISP: Performed by: SURGERY

## 2018-01-01 PROCEDURE — 86160 COMPLEMENT ANTIGEN: CPT

## 2018-01-01 PROCEDURE — 43239 EGD BIOPSY SINGLE/MULTIPLE: CPT | Performed by: SURGERY

## 2018-01-01 PROCEDURE — 87116 MYCOBACTERIA CULTURE: CPT | Mod: 59

## 2018-01-01 PROCEDURE — 89060 EXAM SYNOVIAL FLUID CRYSTALS: CPT

## 2018-01-01 PROCEDURE — 87206 SMEAR FLUORESCENT/ACID STAI: CPT | Mod: 91

## 2018-01-01 PROCEDURE — 97605 PR NEG PRESS WOUND THERAPY (NPWT) W/NON-DISPOSABLE WOUND VAC DEVICE (DME), <=50 CM: ICD-10-PCS | Mod: ,,, | Performed by: SURGERY

## 2018-01-01 PROCEDURE — 20005 PR I&D SOFT TISSUE ABSCESS SUBFASCIAL: CPT | Mod: 58,59,RT,GC | Performed by: ORTHOPAEDIC SURGERY

## 2018-01-01 PROCEDURE — 99223 1ST HOSP IP/OBS HIGH 75: CPT | Mod: GC,,, | Performed by: INTERNAL MEDICINE

## 2018-01-01 PROCEDURE — 77065 DX MAMMO INCL CAD UNI: CPT | Mod: 26,RT,, | Performed by: RADIOLOGY

## 2018-01-01 PROCEDURE — 77001 FLUOROGUIDE FOR VEIN DEVICE: CPT | Mod: 26,58,59, | Performed by: SURGERY

## 2018-01-01 PROCEDURE — 11043 PR DEBRIDEMENT, SKIN, SUB-Q TISSUE,MUSCLE,=<20 SQ CM: ICD-10-PCS | Mod: 58,59,, | Performed by: SURGERY

## 2018-01-01 PROCEDURE — 86334 PATHOLOGIST INTERPRETATION IFE: ICD-10-PCS | Mod: 26,,, | Performed by: PATHOLOGY

## 2018-01-01 PROCEDURE — 11042 PR DEBRIDEMENT, SKIN, SUB-Q TISSUE,=<20 SQ CM: ICD-10-PCS | Mod: 59,58,GC, | Performed by: SURGERY

## 2018-01-01 PROCEDURE — G8988 SELF CARE GOAL STATUS: HCPCS | Mod: CL

## 2018-01-01 PROCEDURE — C1751 CATH, INF, PER/CENT/MIDLINE: HCPCS | Performed by: SURGERY

## 2018-01-01 PROCEDURE — 81000 URINALYSIS NONAUTO W/SCOPE: CPT

## 2018-01-01 PROCEDURE — 25101 EXPLORE/TREAT WRIST JOINT: CPT | Mod: 51,RT,GC, | Performed by: ORTHOPAEDIC SURGERY

## 2018-01-01 PROCEDURE — 74177 CT ABD & PELVIS W/CONTRAST: CPT | Mod: 26,,, | Performed by: RADIOLOGY

## 2018-01-01 PROCEDURE — 99223 PR INITIAL HOSPITAL CARE,LEVL III: ICD-10-PCS | Mod: GC,,, | Performed by: INTERNAL MEDICINE

## 2018-01-01 PROCEDURE — 93010 EKG 12-LEAD: ICD-10-PCS | Mod: ,,, | Performed by: INTERNAL MEDICINE

## 2018-01-01 PROCEDURE — 86060 ANTISTREPTOLYSIN O TITER: CPT

## 2018-01-01 PROCEDURE — 80053 COMPREHEN METABOLIC PANEL: CPT | Mod: 91

## 2018-01-01 PROCEDURE — 51702 INSERT TEMP BLADDER CATH: CPT

## 2018-01-01 PROCEDURE — 63600175 PHARM REV CODE 636 W HCPCS: Performed by: EMERGENCY MEDICINE

## 2018-01-01 PROCEDURE — 20005 PR I&D SOFT TISSUE ABSCESS SUBFASCIAL: ICD-10-PCS | Mod: 58,59,RT,GC | Performed by: ORTHOPAEDIC SURGERY

## 2018-01-01 PROCEDURE — 86431 RHEUMATOID FACTOR QUANT: CPT

## 2018-01-01 PROCEDURE — 86703 HIV-1/HIV-2 1 RESULT ANTBDY: CPT

## 2018-01-01 PROCEDURE — 89051 BODY FLUID CELL COUNT: CPT

## 2018-01-01 PROCEDURE — 97161 PT EVAL LOW COMPLEX 20 MIN: CPT

## 2018-01-01 PROCEDURE — 85610 PROTHROMBIN TIME: CPT | Mod: 91

## 2018-01-01 PROCEDURE — 99223 PR INITIAL HOSPITAL CARE,LEVL III: ICD-10-PCS | Mod: AI,,, | Performed by: SURGERY

## 2018-01-01 RX ORDER — PROPOFOL 10 MG/ML
VIAL (ML) INTRAVENOUS
Status: DISCONTINUED | OUTPATIENT
Start: 2018-01-01 | End: 2018-01-01

## 2018-01-01 RX ORDER — ROCURONIUM BROMIDE 10 MG/ML
INJECTION, SOLUTION INTRAVENOUS
Status: DISCONTINUED | OUTPATIENT
Start: 2018-01-01 | End: 2018-01-01

## 2018-01-01 RX ORDER — GLYCOPYRROLATE 0.2 MG/ML
INJECTION INTRAMUSCULAR; INTRAVENOUS
Status: DISCONTINUED | OUTPATIENT
Start: 2018-01-01 | End: 2018-01-01

## 2018-01-01 RX ORDER — BUPIVACAINE HYDROCHLORIDE 5 MG/ML
INJECTION, SOLUTION EPIDURAL; INTRACAUDAL
Status: DISCONTINUED | OUTPATIENT
Start: 2018-01-01 | End: 2018-01-01 | Stop reason: HOSPADM

## 2018-01-01 RX ORDER — ALPRAZOLAM 1 MG/1
1 TABLET ORAL 2 TIMES DAILY PRN
Status: DISCONTINUED | OUTPATIENT
Start: 2018-01-01 | End: 2019-01-01

## 2018-01-01 RX ORDER — QUINAPRIL 40 MG/1
40 TABLET ORAL NIGHTLY
COMMUNITY
End: 2018-01-01 | Stop reason: SDUPTHER

## 2018-01-01 RX ORDER — EZETIMIBE 10 MG/1
10 TABLET ORAL DAILY
COMMUNITY
End: 2018-01-01 | Stop reason: SDUPTHER

## 2018-01-01 RX ORDER — SODIUM CHLORIDE 9 MG/ML
INJECTION, SOLUTION INTRAVENOUS CONTINUOUS PRN
Status: DISCONTINUED | OUTPATIENT
Start: 2018-01-01 | End: 2018-01-01

## 2018-01-01 RX ORDER — LIDOCAINE HCL/PF 100 MG/5ML
SYRINGE (ML) INTRAVENOUS
Status: DISCONTINUED | OUTPATIENT
Start: 2018-01-01 | End: 2018-01-01

## 2018-01-01 RX ORDER — HYDRALAZINE HYDROCHLORIDE 20 MG/ML
5 INJECTION INTRAMUSCULAR; INTRAVENOUS EVERY 8 HOURS PRN
Status: DISCONTINUED | OUTPATIENT
Start: 2018-01-01 | End: 2018-01-01

## 2018-01-01 RX ORDER — HYDRALAZINE HYDROCHLORIDE 20 MG/ML
10 INJECTION INTRAMUSCULAR; INTRAVENOUS ONCE
Status: COMPLETED | OUTPATIENT
Start: 2018-01-01 | End: 2018-01-01

## 2018-01-01 RX ORDER — OXYCODONE AND ACETAMINOPHEN 5; 325 MG/1; MG/1
1-2 TABLET ORAL EVERY 4 HOURS PRN
Qty: 30 TABLET | Refills: 0 | Status: ON HOLD | OUTPATIENT
Start: 2018-01-01 | End: 2019-01-01 | Stop reason: HOSPADM

## 2018-01-01 RX ORDER — KETAMINE HYDROCHLORIDE 10 MG/ML
INJECTION, SOLUTION INTRAMUSCULAR; INTRAVENOUS
Status: DISCONTINUED | OUTPATIENT
Start: 2018-01-01 | End: 2018-01-01

## 2018-01-01 RX ORDER — ALBUMIN HUMAN 50 G/1000ML
25 SOLUTION INTRAVENOUS ONCE
Status: COMPLETED | OUTPATIENT
Start: 2018-01-01 | End: 2018-01-01

## 2018-01-01 RX ORDER — DIPHENHYDRAMINE HYDROCHLORIDE 50 MG/ML
25 INJECTION INTRAMUSCULAR; INTRAVENOUS EVERY 4 HOURS PRN
Status: DISCONTINUED | OUTPATIENT
Start: 2018-01-01 | End: 2018-01-01

## 2018-01-01 RX ORDER — EPINEPHRINE 1 MG/ML
INJECTION, SOLUTION INTRACARDIAC; INTRAMUSCULAR; INTRAVENOUS; SUBCUTANEOUS
Status: DISCONTINUED | OUTPATIENT
Start: 2018-01-01 | End: 2018-01-01 | Stop reason: HOSPADM

## 2018-01-01 RX ORDER — HYDRALAZINE HYDROCHLORIDE 20 MG/ML
10 INJECTION INTRAMUSCULAR; INTRAVENOUS EVERY 6 HOURS PRN
Status: DISCONTINUED | OUTPATIENT
Start: 2018-01-01 | End: 2018-01-01

## 2018-01-01 RX ORDER — ACETAMINOPHEN 325 MG/1
650 TABLET ORAL EVERY 8 HOURS
Status: DISCONTINUED | OUTPATIENT
Start: 2018-01-01 | End: 2018-01-01

## 2018-01-01 RX ORDER — ALBUMIN HUMAN 50 G/1000ML
25 SOLUTION INTRAVENOUS ONCE
Status: DISCONTINUED | OUTPATIENT
Start: 2018-01-01 | End: 2018-01-01

## 2018-01-01 RX ORDER — SODIUM CHLORIDE, SODIUM LACTATE, POTASSIUM CHLORIDE, CALCIUM CHLORIDE 600; 310; 30; 20 MG/100ML; MG/100ML; MG/100ML; MG/100ML
INJECTION, SOLUTION INTRAVENOUS CONTINUOUS
Status: DISCONTINUED | OUTPATIENT
Start: 2018-01-01 | End: 2018-01-01

## 2018-01-01 RX ORDER — FENTANYL CITRATE 50 UG/ML
INJECTION, SOLUTION INTRAMUSCULAR; INTRAVENOUS
Status: DISCONTINUED | OUTPATIENT
Start: 2018-01-01 | End: 2018-01-01

## 2018-01-01 RX ORDER — HYDRALAZINE HYDROCHLORIDE 100 MG/1
100 TABLET, FILM COATED ORAL 2 TIMES DAILY
COMMUNITY
End: 2018-01-01 | Stop reason: SDUPTHER

## 2018-01-01 RX ORDER — SILVER NITRATE 38.21; 12.74 MG/1; MG/1
5 STICK TOPICAL ONCE
Status: DISCONTINUED | OUTPATIENT
Start: 2018-01-01 | End: 2019-01-01

## 2018-01-01 RX ORDER — SODIUM CHLORIDE 9 MG/ML
INJECTION, SOLUTION INTRAVENOUS CONTINUOUS
Status: CANCELLED | OUTPATIENT
Start: 2018-01-01

## 2018-01-01 RX ORDER — DIPHENHYDRAMINE HYDROCHLORIDE 50 MG/ML
12.5 INJECTION INTRAMUSCULAR; INTRAVENOUS
Status: DISCONTINUED | OUTPATIENT
Start: 2018-01-01 | End: 2018-01-01 | Stop reason: HOSPADM

## 2018-01-01 RX ORDER — VASOPRESSIN 20 [USP'U]/ML
INJECTION, SOLUTION INTRAMUSCULAR; SUBCUTANEOUS
Status: DISCONTINUED | OUTPATIENT
Start: 2018-01-01 | End: 2018-01-01

## 2018-01-01 RX ORDER — LIDOCAINE HYDROCHLORIDE 10 MG/ML
1 INJECTION, SOLUTION EPIDURAL; INFILTRATION; INTRACAUDAL; PERINEURAL ONCE
Status: DISCONTINUED | OUTPATIENT
Start: 2018-01-01 | End: 2018-01-01

## 2018-01-01 RX ORDER — ONDANSETRON 2 MG/ML
INJECTION INTRAMUSCULAR; INTRAVENOUS
Status: DISCONTINUED | OUTPATIENT
Start: 2018-01-01 | End: 2018-01-01

## 2018-01-01 RX ORDER — NEOSTIGMINE METHYLSULFATE 1 MG/ML
INJECTION, SOLUTION INTRAVENOUS
Status: DISCONTINUED | OUTPATIENT
Start: 2018-01-01 | End: 2018-01-01

## 2018-01-01 RX ORDER — SODIUM BICARBONATE 650 MG/1
1300 TABLET ORAL 2 TIMES DAILY
Status: DISCONTINUED | OUTPATIENT
Start: 2018-01-01 | End: 2019-01-01

## 2018-01-01 RX ORDER — MAGNESIUM SULFATE HEPTAHYDRATE 40 MG/ML
2 INJECTION, SOLUTION INTRAVENOUS
Status: COMPLETED | OUTPATIENT
Start: 2018-01-01 | End: 2018-01-01

## 2018-01-01 RX ORDER — EPHEDRINE SULFATE 50 MG/ML
INJECTION, SOLUTION INTRAVENOUS
Status: DISCONTINUED | OUTPATIENT
Start: 2018-01-01 | End: 2018-01-01

## 2018-01-01 RX ORDER — HYDROCODONE BITARTRATE AND ACETAMINOPHEN 7.5; 325 MG/1; MG/1
1 TABLET ORAL 3 TIMES DAILY PRN
Qty: 21 TABLET | Refills: 0 | Status: SHIPPED | OUTPATIENT
Start: 2018-01-01 | End: 2018-01-01

## 2018-01-01 RX ORDER — OXYCODONE AND ACETAMINOPHEN 10; 325 MG/1; MG/1
1 TABLET ORAL EVERY 4 HOURS PRN
Status: DISCONTINUED | OUTPATIENT
Start: 2018-01-01 | End: 2019-01-01

## 2018-01-01 RX ORDER — METOPROLOL TARTRATE 50 MG/1
50 TABLET ORAL 2 TIMES DAILY
COMMUNITY
End: 2018-01-01 | Stop reason: SDUPTHER

## 2018-01-01 RX ORDER — PANTOPRAZOLE SODIUM 40 MG/1
40 TABLET, DELAYED RELEASE ORAL DAILY
Qty: 30 TABLET | Refills: 6 | Status: SHIPPED | OUTPATIENT
Start: 2018-01-01 | End: 2018-01-01

## 2018-01-01 RX ORDER — HYDROCODONE BITARTRATE AND ACETAMINOPHEN 500; 5 MG/1; MG/1
TABLET ORAL
Status: DISCONTINUED | OUTPATIENT
Start: 2018-01-01 | End: 2019-01-01

## 2018-01-01 RX ORDER — HYDROMORPHONE HYDROCHLORIDE 1 MG/ML
0.2 INJECTION, SOLUTION INTRAMUSCULAR; INTRAVENOUS; SUBCUTANEOUS EVERY 5 MIN PRN
Status: DISCONTINUED | OUTPATIENT
Start: 2018-01-01 | End: 2019-01-01

## 2018-01-01 RX ORDER — FUROSEMIDE 10 MG/ML
120 INJECTION INTRAMUSCULAR; INTRAVENOUS 2 TIMES DAILY
Status: DISCONTINUED | OUTPATIENT
Start: 2018-01-01 | End: 2019-01-01

## 2018-01-01 RX ORDER — SODIUM CHLORIDE 0.9 % (FLUSH) 0.9 %
3 SYRINGE (ML) INJECTION
Status: DISCONTINUED | OUTPATIENT
Start: 2018-01-01 | End: 2019-01-01 | Stop reason: HOSPADM

## 2018-01-01 RX ORDER — KETAMINE HCL IN 0.9 % NACL 50 MG/5 ML
SYRINGE (ML) INTRAVENOUS
Status: DISCONTINUED | OUTPATIENT
Start: 2018-01-01 | End: 2018-01-01

## 2018-01-01 RX ORDER — CEFAZOLIN SODIUM 1 G/3ML
2 INJECTION, POWDER, FOR SOLUTION INTRAMUSCULAR; INTRAVENOUS
Status: DISCONTINUED | OUTPATIENT
Start: 2018-01-01 | End: 2019-01-01

## 2018-01-01 RX ORDER — CLINDAMYCIN PHOSPHATE 150 MG/ML
900 INJECTION, SOLUTION INTRAVENOUS
Status: DISCONTINUED | OUTPATIENT
Start: 2018-01-01 | End: 2018-01-01

## 2018-01-01 RX ORDER — FUROSEMIDE 10 MG/ML
80 INJECTION INTRAMUSCULAR; INTRAVENOUS ONCE
Status: COMPLETED | OUTPATIENT
Start: 2018-01-01 | End: 2018-01-01

## 2018-01-01 RX ORDER — FENTANYL CITRATE-0.9 % NACL/PF 10 MCG/ML
25 PLASTIC BAG, INJECTION (ML) INTRAVENOUS CONTINUOUS
Status: DISCONTINUED | OUTPATIENT
Start: 2018-01-01 | End: 2018-01-01

## 2018-01-01 RX ORDER — QUINAPRIL 40 MG/1
TABLET ORAL
Qty: 30 TABLET | Refills: 2 | OUTPATIENT
Start: 2018-01-01

## 2018-01-01 RX ORDER — HYDRALAZINE HYDROCHLORIDE 100 MG/1
100 TABLET, FILM COATED ORAL 2 TIMES DAILY
Qty: 60 TABLET | Refills: 2 | Status: SHIPPED | OUTPATIENT
Start: 2018-01-01 | End: 2018-01-01 | Stop reason: SDUPTHER

## 2018-01-01 RX ORDER — INSULIN ASPART 100 [IU]/ML
0-5 INJECTION, SOLUTION INTRAVENOUS; SUBCUTANEOUS EVERY 6 HOURS PRN
Status: DISCONTINUED | OUTPATIENT
Start: 2018-01-01 | End: 2019-01-01 | Stop reason: HOSPADM

## 2018-01-01 RX ORDER — MIDAZOLAM HYDROCHLORIDE 1 MG/ML
INJECTION, SOLUTION INTRAMUSCULAR; INTRAVENOUS
Status: DISCONTINUED | OUTPATIENT
Start: 2018-01-01 | End: 2018-01-01

## 2018-01-01 RX ORDER — METOPROLOL TARTRATE 1 MG/ML
5 INJECTION, SOLUTION INTRAVENOUS EVERY 6 HOURS
Status: DISCONTINUED | OUTPATIENT
Start: 2018-01-01 | End: 2018-01-01

## 2018-01-01 RX ORDER — NICARDIPINE HYDROCHLORIDE 0.2 MG/ML
1 INJECTION INTRAVENOUS CONTINUOUS
Status: DISCONTINUED | OUTPATIENT
Start: 2018-01-01 | End: 2018-01-01

## 2018-01-01 RX ORDER — BUSPIRONE HYDROCHLORIDE 15 MG/1
15 TABLET ORAL 2 TIMES DAILY
Qty: 60 TABLET | Refills: 2 | Status: ON HOLD | OUTPATIENT
Start: 2018-01-01 | End: 2019-01-01 | Stop reason: HOSPADM

## 2018-01-01 RX ORDER — SUCRALFATE 1 G/1
1 TABLET ORAL 4 TIMES DAILY
Qty: 120 TABLET | Refills: 0 | Status: SHIPPED | OUTPATIENT
Start: 2018-01-01 | End: 2018-01-01

## 2018-01-01 RX ORDER — PROPOFOL 10 MG/ML
VIAL (ML) INTRAVENOUS CONTINUOUS PRN
Status: DISCONTINUED | OUTPATIENT
Start: 2018-01-01 | End: 2018-01-01

## 2018-01-01 RX ORDER — HYDROMORPHONE HYDROCHLORIDE 1 MG/ML
0.5 INJECTION, SOLUTION INTRAMUSCULAR; INTRAVENOUS; SUBCUTANEOUS ONCE
Status: COMPLETED | OUTPATIENT
Start: 2018-01-01 | End: 2018-01-01

## 2018-01-01 RX ORDER — PHENYLEPHRINE HYDROCHLORIDE 10 MG/ML
INJECTION INTRAVENOUS
Status: DISCONTINUED | OUTPATIENT
Start: 2018-01-01 | End: 2018-01-01

## 2018-01-01 RX ORDER — ACETAMINOPHEN 325 MG/1
650 TABLET ORAL EVERY 8 HOURS PRN
Status: DISCONTINUED | OUTPATIENT
Start: 2018-01-01 | End: 2018-01-01

## 2018-01-01 RX ORDER — ALPRAZOLAM 1 MG/1
1 TABLET ORAL 2 TIMES DAILY
Status: ON HOLD | COMMUNITY
End: 2019-01-01 | Stop reason: HOSPADM

## 2018-01-01 RX ORDER — DEXAMETHASONE SODIUM PHOSPHATE 4 MG/ML
8 INJECTION, SOLUTION INTRA-ARTICULAR; INTRALESIONAL; INTRAMUSCULAR; INTRAVENOUS; SOFT TISSUE
Status: COMPLETED | OUTPATIENT
Start: 2018-01-01 | End: 2018-01-01

## 2018-01-01 RX ORDER — SODIUM CHLORIDE, SODIUM LACTATE, POTASSIUM CHLORIDE, CALCIUM CHLORIDE 600; 310; 30; 20 MG/100ML; MG/100ML; MG/100ML; MG/100ML
125 INJECTION, SOLUTION INTRAVENOUS CONTINUOUS
Status: DISCONTINUED | OUTPATIENT
Start: 2018-01-01 | End: 2018-01-01 | Stop reason: HOSPADM

## 2018-01-01 RX ORDER — ALBUTEROL SULFATE 0.83 MG/ML
2.5 SOLUTION RESPIRATORY (INHALATION) DAILY PRN
Qty: 1 BOX | Refills: 2 | Status: ON HOLD | OUTPATIENT
Start: 2018-01-01 | End: 2019-01-01 | Stop reason: HOSPADM

## 2018-01-01 RX ORDER — ALBUTEROL SULFATE 2.5 MG/.5ML
2.5 SOLUTION RESPIRATORY (INHALATION)
Status: DISCONTINUED | OUTPATIENT
Start: 2018-01-01 | End: 2019-01-01 | Stop reason: HOSPADM

## 2018-01-01 RX ORDER — GLUCAGON 1 MG
1 KIT INJECTION
Status: DISCONTINUED | OUTPATIENT
Start: 2018-01-01 | End: 2019-01-01 | Stop reason: HOSPADM

## 2018-01-01 RX ORDER — ALBUTEROL SULFATE 2.5 MG/.5ML
2.5 SOLUTION RESPIRATORY (INHALATION) EVERY 4 HOURS PRN
Status: DISCONTINUED | OUTPATIENT
Start: 2018-01-01 | End: 2018-01-01

## 2018-01-01 RX ORDER — MEPERIDINE HYDROCHLORIDE 50 MG/ML
INJECTION INTRAMUSCULAR; INTRAVENOUS; SUBCUTANEOUS
Status: DISCONTINUED | OUTPATIENT
Start: 2018-01-01 | End: 2018-01-01

## 2018-01-01 RX ORDER — SODIUM CHLORIDE, SODIUM LACTATE, POTASSIUM CHLORIDE, CALCIUM CHLORIDE 600; 310; 30; 20 MG/100ML; MG/100ML; MG/100ML; MG/100ML
INJECTION, SOLUTION INTRAVENOUS CONTINUOUS PRN
Status: DISCONTINUED | OUTPATIENT
Start: 2018-01-01 | End: 2018-01-01

## 2018-01-01 RX ORDER — ONDANSETRON HYDROCHLORIDE 2 MG/ML
INJECTION, SOLUTION INTRAMUSCULAR; INTRAVENOUS
Status: DISCONTINUED | OUTPATIENT
Start: 2018-01-01 | End: 2018-01-01

## 2018-01-01 RX ORDER — HYDROMORPHONE HYDROCHLORIDE 2 MG/ML
1 INJECTION, SOLUTION INTRAMUSCULAR; INTRAVENOUS; SUBCUTANEOUS
Status: COMPLETED | OUTPATIENT
Start: 2018-01-01 | End: 2018-01-01

## 2018-01-01 RX ORDER — METOPROLOL TARTRATE 50 MG/1
50 TABLET ORAL 2 TIMES DAILY
Qty: 60 TABLET | Refills: 2 | Status: ON HOLD | OUTPATIENT
Start: 2018-01-01 | End: 2019-01-01 | Stop reason: HOSPADM

## 2018-01-01 RX ORDER — IBUPROFEN 200 MG
1 TABLET ORAL DAILY
Status: DISCONTINUED | OUTPATIENT
Start: 2018-01-01 | End: 2019-01-01

## 2018-01-01 RX ORDER — MORPHINE SULFATE 4 MG/ML
2 INJECTION, SOLUTION INTRAMUSCULAR; INTRAVENOUS EVERY 5 MIN PRN
Status: DISCONTINUED | OUTPATIENT
Start: 2018-01-01 | End: 2018-01-01 | Stop reason: HOSPADM

## 2018-01-01 RX ORDER — ZOLPIDEM TARTRATE 10 MG/1
5 TABLET ORAL NIGHTLY PRN
Status: ON HOLD | COMMUNITY
End: 2019-01-01 | Stop reason: HOSPADM

## 2018-01-01 RX ORDER — HYDROMORPHONE HYDROCHLORIDE 1 MG/ML
0.5 INJECTION, SOLUTION INTRAMUSCULAR; INTRAVENOUS; SUBCUTANEOUS EVERY 4 HOURS PRN
Status: DISCONTINUED | OUTPATIENT
Start: 2018-01-01 | End: 2018-01-01

## 2018-01-01 RX ORDER — BUSPIRONE HYDROCHLORIDE 15 MG/1
15 TABLET ORAL 2 TIMES DAILY
COMMUNITY
End: 2018-01-01 | Stop reason: SDUPTHER

## 2018-01-01 RX ORDER — FUROSEMIDE 10 MG/ML
120 INJECTION INTRAMUSCULAR; INTRAVENOUS 2 TIMES DAILY
Status: DISCONTINUED | OUTPATIENT
Start: 2018-01-01 | End: 2018-01-01

## 2018-01-01 RX ORDER — OXYCODONE HYDROCHLORIDE 5 MG/1
5 TABLET ORAL ONCE
Status: COMPLETED | OUTPATIENT
Start: 2018-01-01 | End: 2018-01-01

## 2018-01-01 RX ORDER — FERROUS SULFATE 325(65) MG
325 TABLET ORAL
Qty: 90 TABLET | Refills: 0 | Status: SHIPPED | OUTPATIENT
Start: 2018-01-01 | End: 2018-01-01

## 2018-01-01 RX ORDER — VANCOMYCIN HCL IN 5 % DEXTROSE 1G/250ML
15 PLASTIC BAG, INJECTION (ML) INTRAVENOUS
Status: COMPLETED | OUTPATIENT
Start: 2018-01-01 | End: 2018-01-01

## 2018-01-01 RX ORDER — ONDANSETRON 2 MG/ML
4 INJECTION INTRAMUSCULAR; INTRAVENOUS DAILY PRN
Status: DISCONTINUED | OUTPATIENT
Start: 2018-01-01 | End: 2018-01-01 | Stop reason: HOSPADM

## 2018-01-01 RX ORDER — HYDROMORPHONE HYDROCHLORIDE 1 MG/ML
INJECTION, SOLUTION INTRAMUSCULAR; INTRAVENOUS; SUBCUTANEOUS
Status: COMPLETED
Start: 2018-01-01 | End: 2018-01-01

## 2018-01-01 RX ORDER — GLUCAGON 1 MG
KIT INJECTION
Status: DISCONTINUED | OUTPATIENT
Start: 2018-01-01 | End: 2018-01-01

## 2018-01-01 RX ORDER — SODIUM CHLORIDE 9 MG/ML
INJECTION, SOLUTION INTRAVENOUS CONTINUOUS
Status: DISCONTINUED | OUTPATIENT
Start: 2018-01-01 | End: 2018-01-01 | Stop reason: HOSPADM

## 2018-01-01 RX ORDER — HYDRALAZINE HYDROCHLORIDE 100 MG/1
TABLET, FILM COATED ORAL
Qty: 60 TABLET | Refills: 2 | Status: ON HOLD | OUTPATIENT
Start: 2018-01-01 | End: 2019-01-01 | Stop reason: HOSPADM

## 2018-01-01 RX ORDER — EZETIMIBE 10 MG/1
10 TABLET ORAL DAILY
Qty: 30 TABLET | Refills: 2 | Status: SHIPPED | OUTPATIENT
Start: 2018-01-01 | End: 2018-01-01 | Stop reason: SDUPTHER

## 2018-01-01 RX ORDER — MIDAZOLAM HYDROCHLORIDE 5 MG/ML
INJECTION INTRAMUSCULAR; INTRAVENOUS
Status: DISCONTINUED | OUTPATIENT
Start: 2018-01-01 | End: 2018-01-01

## 2018-01-01 RX ORDER — PREDNISONE 10 MG/1
TABLET ORAL
Qty: 42 TABLET | Refills: 0 | Status: ON HOLD | OUTPATIENT
Start: 2018-01-01 | End: 2018-01-01

## 2018-01-01 RX ORDER — MAGNESIUM SULFATE HEPTAHYDRATE 40 MG/ML
2 INJECTION, SOLUTION INTRAVENOUS ONCE
Status: COMPLETED | OUTPATIENT
Start: 2018-01-01 | End: 2018-01-01

## 2018-01-01 RX ORDER — HYDROMORPHONE HYDROCHLORIDE 1 MG/ML
1 INJECTION, SOLUTION INTRAMUSCULAR; INTRAVENOUS; SUBCUTANEOUS EVERY 4 HOURS PRN
Status: DISCONTINUED | OUTPATIENT
Start: 2018-01-01 | End: 2018-01-01

## 2018-01-01 RX ORDER — ALBUTEROL SULFATE 0.83 MG/ML
2.5 SOLUTION RESPIRATORY (INHALATION) EVERY 4 HOURS PRN
Status: DISCONTINUED | OUTPATIENT
Start: 2018-01-01 | End: 2018-01-01 | Stop reason: HOSPADM

## 2018-01-01 RX ORDER — VANCOMYCIN HCL IN 5 % DEXTROSE 1G/250ML
1000 PLASTIC BAG, INJECTION (ML) INTRAVENOUS
Status: DISCONTINUED | OUTPATIENT
Start: 2018-01-01 | End: 2018-01-01

## 2018-01-01 RX ORDER — PANTOPRAZOLE SODIUM 40 MG/10ML
40 INJECTION, POWDER, LYOPHILIZED, FOR SOLUTION INTRAVENOUS DAILY
Status: DISCONTINUED | OUTPATIENT
Start: 2018-01-01 | End: 2018-01-01

## 2018-01-01 RX ORDER — QUINAPRIL 40 MG/1
40 TABLET ORAL NIGHTLY
Qty: 30 TABLET | Refills: 2 | Status: ON HOLD | OUTPATIENT
Start: 2018-01-01 | End: 2019-01-01 | Stop reason: HOSPADM

## 2018-01-01 RX ORDER — ONDANSETRON 2 MG/ML
4 INJECTION INTRAMUSCULAR; INTRAVENOUS EVERY 12 HOURS PRN
Status: DISCONTINUED | OUTPATIENT
Start: 2018-01-01 | End: 2019-01-01 | Stop reason: HOSPADM

## 2018-01-01 RX ORDER — LIDOCAINE HYDROCHLORIDE 10 MG/ML
1 INJECTION, SOLUTION EPIDURAL; INFILTRATION; INTRACAUDAL; PERINEURAL ONCE
Status: DISCONTINUED | OUTPATIENT
Start: 2018-01-01 | End: 2019-01-01 | Stop reason: HOSPADM

## 2018-01-01 RX ORDER — SULFAMETHOXAZOLE AND TRIMETHOPRIM 400; 80 MG/1; MG/1
2 TABLET ORAL 2 TIMES DAILY
Qty: 56 TABLET | Refills: 0 | Status: SHIPPED | OUTPATIENT
Start: 2018-01-01 | End: 2018-01-01

## 2018-01-01 RX ORDER — CEFAZOLIN SODIUM 1 G/3ML
2 INJECTION, POWDER, FOR SOLUTION INTRAMUSCULAR; INTRAVENOUS
Status: DISCONTINUED | OUTPATIENT
Start: 2018-01-01 | End: 2018-01-01

## 2018-01-01 RX ORDER — OXYCODONE AND ACETAMINOPHEN 5; 325 MG/1; MG/1
1 TABLET ORAL
Status: COMPLETED | OUTPATIENT
Start: 2018-01-01 | End: 2018-01-01

## 2018-01-01 RX ORDER — PANTOPRAZOLE SODIUM 40 MG/10ML
40 INJECTION, POWDER, LYOPHILIZED, FOR SOLUTION INTRAVENOUS 2 TIMES DAILY
Status: DISCONTINUED | OUTPATIENT
Start: 2018-01-01 | End: 2018-01-01

## 2018-01-01 RX ORDER — ACETAMINOPHEN 325 MG/1
650 TABLET ORAL EVERY 8 HOURS
Status: DISPENSED | OUTPATIENT
Start: 2018-01-01 | End: 2018-01-01

## 2018-01-01 RX ORDER — ALBUMIN HUMAN 50 G/1000ML
SOLUTION INTRAVENOUS
Status: COMPLETED
Start: 2018-01-01 | End: 2018-01-01

## 2018-01-01 RX ORDER — ZOLPIDEM TARTRATE 5 MG/1
5 TABLET ORAL NIGHTLY PRN
Status: DISCONTINUED | OUTPATIENT
Start: 2018-01-01 | End: 2019-01-01 | Stop reason: HOSPADM

## 2018-01-01 RX ORDER — HYDROMORPHONE HYDROCHLORIDE 2 MG/ML
2 INJECTION, SOLUTION INTRAMUSCULAR; INTRAVENOUS; SUBCUTANEOUS ONCE
Status: COMPLETED | OUTPATIENT
Start: 2018-01-01 | End: 2018-01-01

## 2018-01-01 RX ORDER — NYSTATIN 100000 [USP'U]/ML
500000 SUSPENSION ORAL 4 TIMES DAILY
Status: DISCONTINUED | OUTPATIENT
Start: 2018-01-01 | End: 2019-01-01 | Stop reason: HOSPADM

## 2018-01-01 RX ORDER — SODIUM CHLORIDE 9 MG/ML
INJECTION, SOLUTION INTRAVENOUS CONTINUOUS
Status: DISCONTINUED | OUTPATIENT
Start: 2018-01-01 | End: 2018-01-01

## 2018-01-01 RX ORDER — HYDROCODONE BITARTRATE AND ACETAMINOPHEN 500; 5 MG/1; MG/1
TABLET ORAL
Status: DISCONTINUED | OUTPATIENT
Start: 2018-01-01 | End: 2018-01-01

## 2018-01-01 RX ORDER — CLONIDINE 0.3 MG/24H
1 PATCH, EXTENDED RELEASE TRANSDERMAL
Status: ON HOLD | COMMUNITY
End: 2019-01-01 | Stop reason: HOSPADM

## 2018-01-01 RX ORDER — HYDROMORPHONE HYDROCHLORIDE 1 MG/ML
1 INJECTION, SOLUTION INTRAMUSCULAR; INTRAVENOUS; SUBCUTANEOUS
Status: DISCONTINUED | OUTPATIENT
Start: 2018-01-01 | End: 2019-01-01

## 2018-01-01 RX ORDER — ONDANSETRON 2 MG/ML
4 INJECTION INTRAMUSCULAR; INTRAVENOUS ONCE AS NEEDED
Status: DISCONTINUED | OUTPATIENT
Start: 2018-01-01 | End: 2019-01-01 | Stop reason: HOSPADM

## 2018-01-01 RX ORDER — METOPROLOL TARTRATE 50 MG/1
50 TABLET ORAL 2 TIMES DAILY
Status: DISCONTINUED | OUTPATIENT
Start: 2018-01-01 | End: 2019-01-01

## 2018-01-01 RX ORDER — DOXAZOSIN 1 MG/1
2 TABLET ORAL DAILY
Qty: 60 TABLET | Refills: 2 | Status: ON HOLD | OUTPATIENT
Start: 2018-01-01 | End: 2019-01-01 | Stop reason: HOSPADM

## 2018-01-01 RX ORDER — ALBUTEROL SULFATE 0.83 MG/ML
2.5 SOLUTION RESPIRATORY (INHALATION) DAILY PRN
COMMUNITY
End: 2018-01-01 | Stop reason: SDUPTHER

## 2018-01-01 RX ORDER — DOXAZOSIN 1 MG/1
2 TABLET ORAL DAILY
COMMUNITY
End: 2018-01-01 | Stop reason: SDUPTHER

## 2018-01-01 RX ORDER — POLYETHYLENE GLYCOL 3350, SODIUM SULFATE ANHYDROUS, SODIUM BICARBONATE, SODIUM CHLORIDE, POTASSIUM CHLORIDE 236; 22.74; 6.74; 5.86; 2.97 G/4L; G/4L; G/4L; G/4L; G/4L
4 POWDER, FOR SOLUTION ORAL ONCE
Qty: 4000 ML | Refills: 0 | Status: SHIPPED | OUTPATIENT
Start: 2018-01-01 | End: 2018-01-01

## 2018-01-01 RX ORDER — HEPARIN SODIUM 5000 [USP'U]/ML
5000 INJECTION, SOLUTION INTRAVENOUS; SUBCUTANEOUS EVERY 8 HOURS
Status: DISCONTINUED | OUTPATIENT
Start: 2018-01-01 | End: 2018-01-01

## 2018-01-01 RX ORDER — DOXAZOSIN 2 MG/1
2 TABLET ORAL DAILY
Status: DISCONTINUED | OUTPATIENT
Start: 2018-01-01 | End: 2019-01-01

## 2018-01-01 RX ORDER — BUSPIRONE HYDROCHLORIDE 5 MG/1
15 TABLET ORAL 2 TIMES DAILY
Status: DISCONTINUED | OUTPATIENT
Start: 2018-01-01 | End: 2019-01-01 | Stop reason: HOSPADM

## 2018-01-01 RX ORDER — HYDROMORPHONE HYDROCHLORIDE 1 MG/ML
0.5 INJECTION, SOLUTION INTRAMUSCULAR; INTRAVENOUS; SUBCUTANEOUS EVERY 5 MIN PRN
Status: COMPLETED | OUTPATIENT
Start: 2018-01-01 | End: 2018-01-01

## 2018-01-01 RX ORDER — VANCOMYCIN HYDROCHLORIDE 1 G/20ML
INJECTION, POWDER, LYOPHILIZED, FOR SOLUTION INTRAVENOUS
Status: DISCONTINUED | OUTPATIENT
Start: 2018-01-01 | End: 2018-01-01 | Stop reason: HOSPADM

## 2018-01-01 RX ORDER — EZETIMIBE 10 MG/1
10 TABLET ORAL DAILY
Qty: 30 TABLET | Refills: 2 | Status: ON HOLD | OUTPATIENT
Start: 2018-01-01 | End: 2019-01-01 | Stop reason: HOSPADM

## 2018-01-01 RX ORDER — ALBUMIN HUMAN 50 G/1000ML
SOLUTION INTRAVENOUS CONTINUOUS PRN
Status: DISCONTINUED | OUTPATIENT
Start: 2018-01-01 | End: 2018-01-01

## 2018-01-01 RX ADMIN — OXYCODONE HYDROCHLORIDE AND ACETAMINOPHEN 1 TABLET: 10; 325 TABLET ORAL at 02:12

## 2018-01-01 RX ADMIN — HYDROMORPHONE HYDROCHLORIDE 1 MG: 1 INJECTION, SOLUTION INTRAMUSCULAR; INTRAVENOUS; SUBCUTANEOUS at 10:12

## 2018-01-01 RX ADMIN — OXYCODONE HYDROCHLORIDE AND ACETAMINOPHEN 1 TABLET: 10; 325 TABLET ORAL at 12:12

## 2018-01-01 RX ADMIN — PHENYLEPHRINE HYDROCHLORIDE 200 MCG: 10 INJECTION INTRAVENOUS at 09:12

## 2018-01-01 RX ADMIN — ZOLPIDEM TARTRATE 5 MG: 5 TABLET ORAL at 12:12

## 2018-01-01 RX ADMIN — HEPARIN SODIUM 5000 UNITS: 5000 INJECTION, SOLUTION INTRAVENOUS; SUBCUTANEOUS at 05:12

## 2018-01-01 RX ADMIN — SODIUM CHLORIDE: 900 INJECTION, SOLUTION INTRAVENOUS at 08:07

## 2018-01-01 RX ADMIN — SODIUM CHLORIDE, SODIUM LACTATE, POTASSIUM CHLORIDE, AND CALCIUM CHLORIDE 1000 ML: .6; .31; .03; .02 INJECTION, SOLUTION INTRAVENOUS at 04:12

## 2018-01-01 RX ADMIN — ACETAMINOPHEN 650 MG: 325 TABLET, FILM COATED ORAL at 09:12

## 2018-01-01 RX ADMIN — SODIUM BICARBONATE 650 MG TABLET 1300 MG: at 09:12

## 2018-01-01 RX ADMIN — LIDOCAINE HYDROCHLORIDE 60 MG: 20 INJECTION, SOLUTION INTRAVENOUS at 07:12

## 2018-01-01 RX ADMIN — PHENYLEPHRINE HYDROCHLORIDE 200 MCG: 10 INJECTION INTRAVENOUS at 03:12

## 2018-01-01 RX ADMIN — PROPOFOL 25 MG: 10 INJECTION, EMULSION INTRAVENOUS at 11:07

## 2018-01-01 RX ADMIN — NICOTINE 1 PATCH: 14 PATCH, EXTENDED RELEASE TRANSDERMAL at 08:12

## 2018-01-01 RX ADMIN — ROCURONIUM BROMIDE 40 MG: 10 INJECTION, SOLUTION INTRAVENOUS at 02:12

## 2018-01-01 RX ADMIN — DOXAZOSIN MESYLATE 2 MG: 2 TABLET ORAL at 08:12

## 2018-01-01 RX ADMIN — ACETAMINOPHEN 650 MG: 325 TABLET, FILM COATED ORAL at 11:12

## 2018-01-01 RX ADMIN — HYDROMORPHONE HYDROCHLORIDE 1 MG: 1 INJECTION, SOLUTION INTRAMUSCULAR; INTRAVENOUS; SUBCUTANEOUS at 08:12

## 2018-01-01 RX ADMIN — PROPOFOL 25 MG: 10 INJECTION, EMULSION INTRAVENOUS at 10:07

## 2018-01-01 RX ADMIN — OXYCODONE HYDROCHLORIDE AND ACETAMINOPHEN 1 TABLET: 10; 325 TABLET ORAL at 07:12

## 2018-01-01 RX ADMIN — METOPROLOL TARTRATE 5 MG: 5 INJECTION, SOLUTION INTRAVENOUS at 11:12

## 2018-01-01 RX ADMIN — HYDRALAZINE HYDROCHLORIDE 5 MG: 20 INJECTION INTRAMUSCULAR; INTRAVENOUS at 11:12

## 2018-01-01 RX ADMIN — MIDAZOLAM HYDROCHLORIDE 2 MG: 1 INJECTION, SOLUTION INTRAMUSCULAR; INTRAVENOUS at 08:12

## 2018-01-01 RX ADMIN — SODIUM CHLORIDE, SODIUM GLUCONATE, SODIUM ACETATE, POTASSIUM CHLORIDE, MAGNESIUM CHLORIDE, SODIUM PHOSPHATE, DIBASIC, AND POTASSIUM PHOSPHATE: .53; .5; .37; .037; .03; .012; .00082 INJECTION, SOLUTION INTRAVENOUS at 04:12

## 2018-01-01 RX ADMIN — HYDROMORPHONE HYDROCHLORIDE 1 MG: 1 INJECTION, SOLUTION INTRAMUSCULAR; INTRAVENOUS; SUBCUTANEOUS at 07:12

## 2018-01-01 RX ADMIN — OXYCODONE HYDROCHLORIDE AND ACETAMINOPHEN 1 TABLET: 10; 325 TABLET ORAL at 09:12

## 2018-01-01 RX ADMIN — HYDRALAZINE HYDROCHLORIDE 5 MG: 20 INJECTION INTRAMUSCULAR; INTRAVENOUS at 10:12

## 2018-01-01 RX ADMIN — PHENYLEPHRINE HYDROCHLORIDE 0.25 MCG/KG/MIN: 10 INJECTION INTRAVENOUS at 08:12

## 2018-01-01 RX ADMIN — ALBUTEROL SULFATE 2.5 MG: 2.5 SOLUTION RESPIRATORY (INHALATION) at 07:12

## 2018-01-01 RX ADMIN — CEFAZOLIN 2 G: 1 INJECTION, POWDER, FOR SOLUTION INTRAMUSCULAR; INTRAVENOUS at 04:12

## 2018-01-01 RX ADMIN — METOPROLOL TARTRATE 50 MG: 50 TABLET ORAL at 08:12

## 2018-01-01 RX ADMIN — DOXAZOSIN MESYLATE 2 MG: 2 TABLET ORAL at 09:12

## 2018-01-01 RX ADMIN — Medication 25 MCG/HR: at 01:12

## 2018-01-01 RX ADMIN — HEPARIN SODIUM 5000 UNITS: 5000 INJECTION, SOLUTION INTRAVENOUS; SUBCUTANEOUS at 06:12

## 2018-01-01 RX ADMIN — CEFAZOLIN 2 G: 1 INJECTION, POWDER, FOR SOLUTION INTRAMUSCULAR; INTRAVENOUS at 08:12

## 2018-01-01 RX ADMIN — SODIUM BICARBONATE 650 MG TABLET 1300 MG: at 01:12

## 2018-01-01 RX ADMIN — HYDROMORPHONE HYDROCHLORIDE 1 MG: 1 INJECTION, SOLUTION INTRAMUSCULAR; INTRAVENOUS; SUBCUTANEOUS at 11:12

## 2018-01-01 RX ADMIN — FENTANYL CITRATE 100 MCG: 50 INJECTION, SOLUTION INTRAMUSCULAR; INTRAVENOUS at 07:12

## 2018-01-01 RX ADMIN — HYDROMORPHONE HYDROCHLORIDE 1 MG: 1 INJECTION, SOLUTION INTRAMUSCULAR; INTRAVENOUS; SUBCUTANEOUS at 04:12

## 2018-01-01 RX ADMIN — HYDROMORPHONE HYDROCHLORIDE 1 MG: 1 INJECTION, SOLUTION INTRAMUSCULAR; INTRAVENOUS; SUBCUTANEOUS at 03:12

## 2018-01-01 RX ADMIN — BUSPIRONE HYDROCHLORIDE 15 MG: 5 TABLET ORAL at 08:12

## 2018-01-01 RX ADMIN — PROPOFOL 200 MG: 10 INJECTION, EMULSION INTRAVENOUS at 08:12

## 2018-01-01 RX ADMIN — HYDRALAZINE HYDROCHLORIDE 10 MG: 20 INJECTION INTRAMUSCULAR; INTRAVENOUS at 01:12

## 2018-01-01 RX ADMIN — HYDROMORPHONE HYDROCHLORIDE 0.2 MG: 1 INJECTION, SOLUTION INTRAMUSCULAR; INTRAVENOUS; SUBCUTANEOUS at 11:12

## 2018-01-01 RX ADMIN — HYDROMORPHONE HYDROCHLORIDE 1 MG: 1 INJECTION, SOLUTION INTRAMUSCULAR; INTRAVENOUS; SUBCUTANEOUS at 06:12

## 2018-01-01 RX ADMIN — ALBUTEROL SULFATE 2.5 MG: 2.5 SOLUTION RESPIRATORY (INHALATION) at 08:07

## 2018-01-01 RX ADMIN — HEPARIN SODIUM 5000 UNITS: 5000 INJECTION, SOLUTION INTRAVENOUS; SUBCUTANEOUS at 02:12

## 2018-01-01 RX ADMIN — METOPROLOL TARTRATE 50 MG: 50 TABLET ORAL at 09:12

## 2018-01-01 RX ADMIN — PROPOFOL 120 MG: 10 INJECTION, EMULSION INTRAVENOUS at 07:12

## 2018-01-01 RX ADMIN — BUSPIRONE HYDROCHLORIDE 15 MG: 5 TABLET ORAL at 10:12

## 2018-01-01 RX ADMIN — PROPOFOL 70 MG: 10 INJECTION, EMULSION INTRAVENOUS at 02:12

## 2018-01-01 RX ADMIN — HYDROMORPHONE HYDROCHLORIDE 1 MG: 1 INJECTION, SOLUTION INTRAMUSCULAR; INTRAVENOUS; SUBCUTANEOUS at 05:12

## 2018-01-01 RX ADMIN — MIDAZOLAM 1 MG: 5 INJECTION INTRAMUSCULAR; INTRAVENOUS at 02:12

## 2018-01-01 RX ADMIN — SODIUM CHLORIDE, SODIUM LACTATE, POTASSIUM CHLORIDE, AND CALCIUM CHLORIDE 1000 ML: .6; .31; .03; .02 INJECTION, SOLUTION INTRAVENOUS at 11:12

## 2018-01-01 RX ADMIN — ACETAMINOPHEN 650 MG: 325 TABLET, FILM COATED ORAL at 05:12

## 2018-01-01 RX ADMIN — BUSPIRONE HYDROCHLORIDE 15 MG: 5 TABLET ORAL at 09:12

## 2018-01-01 RX ADMIN — DEXAMETHASONE SODIUM PHOSPHATE 8 MG: 4 INJECTION, SOLUTION INTRAMUSCULAR; INTRAVENOUS at 12:12

## 2018-01-01 RX ADMIN — ALBUTEROL SULFATE 2.5 MG: 2.5 SOLUTION RESPIRATORY (INHALATION) at 01:12

## 2018-01-01 RX ADMIN — PHENYLEPHRINE HYDROCHLORIDE 100 MCG: 10 INJECTION INTRAVENOUS at 07:12

## 2018-01-01 RX ADMIN — NEOSTIGMINE METHYLSULFATE 3 MG: 1 INJECTION INTRAVENOUS at 08:12

## 2018-01-01 RX ADMIN — SODIUM CHLORIDE, POTASSIUM CHLORIDE, SODIUM LACTATE AND CALCIUM CHLORIDE: 600; 310; 30; 20 INJECTION, SOLUTION INTRAVENOUS at 10:07

## 2018-01-01 RX ADMIN — PHENYLEPHRINE HYDROCHLORIDE 200 MCG: 10 INJECTION INTRAVENOUS at 10:12

## 2018-01-01 RX ADMIN — ALPRAZOLAM 1 MG: 1 TABLET ORAL at 09:12

## 2018-01-01 RX ADMIN — ROCURONIUM BROMIDE 30 MG: 10 INJECTION, SOLUTION INTRAVENOUS at 07:12

## 2018-01-01 RX ADMIN — MAGNESIUM SULFATE IN WATER 2 G: 40 INJECTION, SOLUTION INTRAVENOUS at 10:12

## 2018-01-01 RX ADMIN — HYDROMORPHONE HYDROCHLORIDE 1 MG: 1 INJECTION, SOLUTION INTRAMUSCULAR; INTRAVENOUS; SUBCUTANEOUS at 01:12

## 2018-01-01 RX ADMIN — CEFAZOLIN 2 G: 1 INJECTION, POWDER, FOR SOLUTION INTRAMUSCULAR; INTRAVENOUS at 11:12

## 2018-01-01 RX ADMIN — HEPARIN SODIUM 5000 UNITS: 5000 INJECTION, SOLUTION INTRAVENOUS; SUBCUTANEOUS at 10:12

## 2018-01-01 RX ADMIN — CEFAZOLIN 2 G: 1 INJECTION, POWDER, FOR SOLUTION INTRAMUSCULAR; INTRAVENOUS at 12:12

## 2018-01-01 RX ADMIN — OXYCODONE HYDROCHLORIDE AND ACETAMINOPHEN 1 TABLET: 10; 325 TABLET ORAL at 04:12

## 2018-01-01 RX ADMIN — GLYCOPYRROLATE 0.4 MG: 0.2 INJECTION, SOLUTION INTRAMUSCULAR; INTRAVENOUS at 08:12

## 2018-01-01 RX ADMIN — ALBUTEROL SULFATE 2.5 MG: 2.5 SOLUTION RESPIRATORY (INHALATION) at 08:12

## 2018-01-01 RX ADMIN — CEFAZOLIN 2 G: 1 INJECTION, POWDER, FOR SOLUTION INTRAMUSCULAR; INTRAVENOUS at 02:12

## 2018-01-01 RX ADMIN — PHENYLEPHRINE HYDROCHLORIDE 200 MCG: 10 INJECTION INTRAVENOUS at 04:12

## 2018-01-01 RX ADMIN — DEXTROSE MONOHYDRATE 12.5 G: 25 INJECTION, SOLUTION INTRAVENOUS at 07:12

## 2018-01-01 RX ADMIN — NICOTINE 1 PATCH: 14 PATCH, EXTENDED RELEASE TRANSDERMAL at 11:12

## 2018-01-01 RX ADMIN — ALPRAZOLAM 1 MG: 1 TABLET ORAL at 08:12

## 2018-01-01 RX ADMIN — PHENYLEPHRINE HYDROCHLORIDE 200 MCG: 10 INJECTION INTRAVENOUS at 05:12

## 2018-01-01 RX ADMIN — VASOPRESSIN 1 UNITS: 20 INJECTION INTRAVENOUS at 08:12

## 2018-01-01 RX ADMIN — KETAMINE HYDROCHLORIDE 20 MG: 10 INJECTION, SOLUTION INTRAMUSCULAR; INTRAVENOUS at 08:12

## 2018-01-01 RX ADMIN — SODIUM CHLORIDE: 0.9 INJECTION, SOLUTION INTRAVENOUS at 01:12

## 2018-01-01 RX ADMIN — ALBUTEROL SULFATE 2.5 MG: 2.5 SOLUTION RESPIRATORY (INHALATION) at 02:12

## 2018-01-01 RX ADMIN — MAGNESIUM SULFATE IN WATER 2 G: 40 INJECTION, SOLUTION INTRAVENOUS at 07:12

## 2018-01-01 RX ADMIN — PROPOFOL 50 MG: 10 INJECTION, EMULSION INTRAVENOUS at 10:07

## 2018-01-01 RX ADMIN — SODIUM CHLORIDE, SODIUM GLUCONATE, SODIUM ACETATE, POTASSIUM CHLORIDE, MAGNESIUM CHLORIDE, SODIUM PHOSPHATE, DIBASIC, AND POTASSIUM PHOSPHATE: .53; .5; .37; .037; .03; .012; .00082 INJECTION, SOLUTION INTRAVENOUS at 07:12

## 2018-01-01 RX ADMIN — Medication 30 MG: at 08:12

## 2018-01-01 RX ADMIN — OXYCODONE HYDROCHLORIDE AND ACETAMINOPHEN 1 TABLET: 10; 325 TABLET ORAL at 03:12

## 2018-01-01 RX ADMIN — HYDROMORPHONE HYDROCHLORIDE 1 MG: 1 INJECTION, SOLUTION INTRAMUSCULAR; INTRAVENOUS; SUBCUTANEOUS at 09:12

## 2018-01-01 RX ADMIN — FENTANYL CITRATE 50 MCG: 50 INJECTION, SOLUTION INTRAMUSCULAR; INTRAVENOUS at 07:12

## 2018-01-01 RX ADMIN — ONDANSETRON 4 MG: 2 INJECTION, SOLUTION INTRAMUSCULAR; INTRAVENOUS at 05:12

## 2018-01-01 RX ADMIN — HEPARIN SODIUM 5000 UNITS: 5000 INJECTION, SOLUTION INTRAVENOUS; SUBCUTANEOUS at 01:12

## 2018-01-01 RX ADMIN — FENTANYL CITRATE 50 MCG: 50 INJECTION, SOLUTION INTRAMUSCULAR; INTRAVENOUS at 02:12

## 2018-01-01 RX ADMIN — GLYCOPYRROLATE 0.8 MG: 0.2 INJECTION, SOLUTION INTRAMUSCULAR; INTRAVENOUS at 08:12

## 2018-01-01 RX ADMIN — NICOTINE 1 PATCH: 14 PATCH, EXTENDED RELEASE TRANSDERMAL at 09:12

## 2018-01-01 RX ADMIN — ONDANSETRON 4 MG: 2 INJECTION INTRAMUSCULAR; INTRAVENOUS at 09:12

## 2018-01-01 RX ADMIN — EPHEDRINE SULFATE 10 MG: 50 INJECTION, SOLUTION INTRAMUSCULAR; INTRAVENOUS; SUBCUTANEOUS at 09:12

## 2018-01-01 RX ADMIN — PANTOPRAZOLE SODIUM 40 MG: 40 INJECTION, POWDER, FOR SOLUTION INTRAVENOUS at 11:12

## 2018-01-01 RX ADMIN — HEPARIN SODIUM 5000 UNITS: 5000 INJECTION, SOLUTION INTRAVENOUS; SUBCUTANEOUS at 03:12

## 2018-01-01 RX ADMIN — HYDROMORPHONE HYDROCHLORIDE 1 MG: 1 INJECTION, SOLUTION INTRAMUSCULAR; INTRAVENOUS; SUBCUTANEOUS at 02:12

## 2018-01-01 RX ADMIN — PHENYLEPHRINE HYDROCHLORIDE 100 MCG: 10 INJECTION INTRAVENOUS at 08:12

## 2018-01-01 RX ADMIN — ACETAMINOPHEN 650 MG: 325 TABLET, FILM COATED ORAL at 01:12

## 2018-01-01 RX ADMIN — ALPRAZOLAM 1 MG: 1 TABLET ORAL at 03:12

## 2018-01-01 RX ADMIN — SODIUM CHLORIDE, SODIUM LACTATE, POTASSIUM CHLORIDE, AND CALCIUM CHLORIDE: .6; .31; .03; .02 INJECTION, SOLUTION INTRAVENOUS at 09:12

## 2018-01-01 RX ADMIN — HEPARIN SODIUM 5000 UNITS: 5000 INJECTION, SOLUTION INTRAVENOUS; SUBCUTANEOUS at 11:12

## 2018-01-01 RX ADMIN — SODIUM CHLORIDE, SODIUM LACTATE, POTASSIUM CHLORIDE, AND CALCIUM CHLORIDE: .6; .31; .03; .02 INJECTION, SOLUTION INTRAVENOUS at 08:12

## 2018-01-01 RX ADMIN — CEFAZOLIN 2 G: 1 INJECTION, POWDER, FOR SOLUTION INTRAMUSCULAR; INTRAVENOUS at 03:12

## 2018-01-01 RX ADMIN — OXYCODONE HYDROCHLORIDE AND ACETAMINOPHEN 1 TABLET: 10; 325 TABLET ORAL at 05:12

## 2018-01-01 RX ADMIN — MIDAZOLAM HYDROCHLORIDE 2 MG: 1 INJECTION, SOLUTION INTRAMUSCULAR; INTRAVENOUS at 10:07

## 2018-01-01 RX ADMIN — FENTANYL CITRATE 100 MCG: 50 INJECTION, SOLUTION INTRAMUSCULAR; INTRAVENOUS at 09:12

## 2018-01-01 RX ADMIN — NICARDIPINE HYDROCHLORIDE 2.5 MG/HR: 0.2 INJECTION, SOLUTION INTRAVENOUS at 01:12

## 2018-01-01 RX ADMIN — ZOLPIDEM TARTRATE 5 MG: 5 TABLET ORAL at 10:12

## 2018-01-01 RX ADMIN — HEPARIN SODIUM 5000 UNITS: 5000 INJECTION, SOLUTION INTRAVENOUS; SUBCUTANEOUS at 09:12

## 2018-01-01 RX ADMIN — PIPERACILLIN AND TAZOBACTAM 4.5 G: 4; .5 INJECTION, POWDER, LYOPHILIZED, FOR SOLUTION INTRAVENOUS; PARENTERAL at 06:12

## 2018-01-01 RX ADMIN — FENTANYL CITRATE 100 MCG: 50 INJECTION, SOLUTION INTRAMUSCULAR; INTRAVENOUS at 08:12

## 2018-01-01 RX ADMIN — PIPERACILLIN AND TAZOBACTAM 4.5 G: 4; .5 INJECTION, POWDER, LYOPHILIZED, FOR SOLUTION INTRAVENOUS; PARENTERAL at 07:12

## 2018-01-01 RX ADMIN — MIDAZOLAM HYDROCHLORIDE 2 MG: 1 INJECTION, SOLUTION INTRAMUSCULAR; INTRAVENOUS at 07:12

## 2018-01-01 RX ADMIN — SODIUM CHLORIDE, SODIUM LACTATE, POTASSIUM CHLORIDE, AND CALCIUM CHLORIDE 1000 ML: .6; .31; .03; .02 INJECTION, SOLUTION INTRAVENOUS at 10:12

## 2018-01-01 RX ADMIN — ACETAMINOPHEN 650 MG: 325 TABLET, FILM COATED ORAL at 03:12

## 2018-01-01 RX ADMIN — EPHEDRINE SULFATE 10 MG: 50 INJECTION INTRAMUSCULAR; INTRAVENOUS; SUBCUTANEOUS at 10:07

## 2018-01-01 RX ADMIN — SODIUM CHLORIDE: 0.9 INJECTION, SOLUTION INTRAVENOUS at 10:12

## 2018-01-01 RX ADMIN — NEOSTIGMINE METHYLSULFATE 4 MG: 1 INJECTION INTRAVENOUS at 05:12

## 2018-01-01 RX ADMIN — CEFAZOLIN 2 G: 1 INJECTION, POWDER, FOR SOLUTION INTRAMUSCULAR; INTRAVENOUS at 09:12

## 2018-01-01 RX ADMIN — HYDROMORPHONE HYDROCHLORIDE 1 MG: 1 INJECTION, SOLUTION INTRAMUSCULAR; INTRAVENOUS; SUBCUTANEOUS at 12:12

## 2018-01-01 RX ADMIN — Medication 20 MG: at 08:12

## 2018-01-01 RX ADMIN — CEFAZOLIN 2 G: 1 INJECTION, POWDER, FOR SOLUTION INTRAMUSCULAR; INTRAVENOUS at 01:12

## 2018-01-01 RX ADMIN — Medication 10 MG: at 04:12

## 2018-01-01 RX ADMIN — HYDROMORPHONE HYDROCHLORIDE 1 MG: 2 INJECTION, SOLUTION INTRAMUSCULAR; INTRAVENOUS; SUBCUTANEOUS at 12:12

## 2018-01-01 RX ADMIN — Medication 20 MG: at 03:12

## 2018-01-01 RX ADMIN — CLINDAMYCIN IN 5 PERCENT DEXTROSE 900 MG: 18 INJECTION, SOLUTION INTRAVENOUS at 10:12

## 2018-01-01 RX ADMIN — ONDANSETRON 4 MG: 2 INJECTION INTRAMUSCULAR; INTRAVENOUS at 08:12

## 2018-01-01 RX ADMIN — PROPOFOL 80 MG: 10 INJECTION, EMULSION INTRAVENOUS at 07:12

## 2018-01-01 RX ADMIN — VANCOMYCIN HYDROCHLORIDE 1000 MG: 1 INJECTION, POWDER, LYOPHILIZED, FOR SOLUTION INTRAVENOUS at 11:12

## 2018-01-01 RX ADMIN — ALPRAZOLAM 1 MG: 1 TABLET ORAL at 01:12

## 2018-01-01 RX ADMIN — HYDROMORPHONE HYDROCHLORIDE 0.5 MG: 1 INJECTION, SOLUTION INTRAMUSCULAR; INTRAVENOUS; SUBCUTANEOUS at 09:12

## 2018-01-01 RX ADMIN — SODIUM CHLORIDE, SODIUM LACTATE, POTASSIUM CHLORIDE, AND CALCIUM CHLORIDE: .6; .31; .03; .02 INJECTION, SOLUTION INTRAVENOUS at 03:12

## 2018-01-01 RX ADMIN — GLUCAGON HYDROCHLORIDE 1 MG: KIT at 11:07

## 2018-01-01 RX ADMIN — ACETAMINOPHEN 650 MG: 325 TABLET ORAL at 02:12

## 2018-01-01 RX ADMIN — KETAMINE HYDROCHLORIDE 10 MG: 10 INJECTION, SOLUTION INTRAMUSCULAR; INTRAVENOUS at 08:12

## 2018-01-01 RX ADMIN — NEOSTIGMINE METHYLSULFATE 5 MG: 1 INJECTION INTRAVENOUS at 08:12

## 2018-01-01 RX ADMIN — OXYCODONE HYDROCHLORIDE AND ACETAMINOPHEN 1 TABLET: 10; 325 TABLET ORAL at 11:12

## 2018-01-01 RX ADMIN — CEFAZOLIN 2 G: 1 INJECTION, POWDER, FOR SOLUTION INTRAMUSCULAR; INTRAVENOUS at 07:12

## 2018-01-01 RX ADMIN — ALBUMIN HUMAN 25 G: 0.05 INJECTION, SOLUTION INTRAVENOUS at 11:12

## 2018-01-01 RX ADMIN — EPHEDRINE SULFATE 15 MG: 50 INJECTION, SOLUTION INTRAMUSCULAR; INTRAVENOUS; SUBCUTANEOUS at 09:12

## 2018-01-01 RX ADMIN — LIDOCAINE HYDROCHLORIDE 100 MG: 20 INJECTION, SOLUTION INTRAVENOUS at 08:12

## 2018-01-01 RX ADMIN — OXYCODONE HYDROCHLORIDE AND ACETAMINOPHEN 1 TABLET: 10; 325 TABLET ORAL at 08:12

## 2018-01-01 RX ADMIN — NICOTINE 1 PATCH: 14 PATCH, EXTENDED RELEASE TRANSDERMAL at 01:12

## 2018-01-01 RX ADMIN — ACETAMINOPHEN 650 MG: 325 TABLET, FILM COATED ORAL at 06:12

## 2018-01-01 RX ADMIN — ALPRAZOLAM 1 MG: 1 TABLET ORAL at 11:12

## 2018-01-01 RX ADMIN — PANTOPRAZOLE SODIUM 40 MG: 40 INJECTION, POWDER, FOR SOLUTION INTRAVENOUS at 08:12

## 2018-01-01 RX ADMIN — ZOLPIDEM TARTRATE 5 MG: 5 TABLET ORAL at 09:12

## 2018-01-01 RX ADMIN — MAGNESIUM SULFATE IN WATER 2 G: 40 INJECTION, SOLUTION INTRAVENOUS at 06:12

## 2018-01-01 RX ADMIN — FENTANYL CITRATE 50 MCG: 50 INJECTION, SOLUTION INTRAMUSCULAR; INTRAVENOUS at 08:12

## 2018-01-01 RX ADMIN — HYDROCORTISONE SODIUM SUCCINATE 50 MG: 100 INJECTION, POWDER, FOR SOLUTION INTRAMUSCULAR; INTRAVENOUS at 05:12

## 2018-01-01 RX ADMIN — SODIUM CHLORIDE, SODIUM LACTATE, POTASSIUM CHLORIDE, AND CALCIUM CHLORIDE: .6; .31; .03; .02 INJECTION, SOLUTION INTRAVENOUS at 12:12

## 2018-01-01 RX ADMIN — PROPOFOL 50 MG: 10 INJECTION, EMULSION INTRAVENOUS at 11:07

## 2018-01-01 RX ADMIN — HYDROMORPHONE HYDROCHLORIDE 0.5 MG: 1 INJECTION, SOLUTION INTRAMUSCULAR; INTRAVENOUS; SUBCUTANEOUS at 05:12

## 2018-01-01 RX ADMIN — ALPRAZOLAM 1 MG: 1 TABLET ORAL at 10:12

## 2018-01-01 RX ADMIN — BUSPIRONE HYDROCHLORIDE 15 MG: 5 TABLET ORAL at 01:12

## 2018-01-01 RX ADMIN — HYDRALAZINE HYDROCHLORIDE 10 MG: 20 INJECTION INTRAMUSCULAR; INTRAVENOUS at 07:12

## 2018-01-01 RX ADMIN — HYDROMORPHONE HYDROCHLORIDE 0.2 MG: 1 INJECTION, SOLUTION INTRAMUSCULAR; INTRAVENOUS; SUBCUTANEOUS at 10:12

## 2018-01-01 RX ADMIN — SODIUM CHLORIDE, SODIUM LACTATE, POTASSIUM CHLORIDE, AND CALCIUM CHLORIDE 1000 ML: .6; .31; .03; .02 INJECTION, SOLUTION INTRAVENOUS at 12:12

## 2018-01-01 RX ADMIN — PHENYLEPHRINE HYDROCHLORIDE 100 MCG: 10 INJECTION INTRAVENOUS at 04:12

## 2018-01-01 RX ADMIN — HYDROMORPHONE HYDROCHLORIDE 0.5 MG: 1 INJECTION, SOLUTION INTRAMUSCULAR; INTRAVENOUS; SUBCUTANEOUS at 11:12

## 2018-01-01 RX ADMIN — SODIUM CHLORIDE, SODIUM LACTATE, POTASSIUM CHLORIDE, AND CALCIUM CHLORIDE: .6; .31; .03; .02 INJECTION, SOLUTION INTRAVENOUS at 05:12

## 2018-01-01 RX ADMIN — ACETAMINOPHEN 650 MG: 325 TABLET, FILM COATED ORAL at 02:12

## 2018-01-01 RX ADMIN — OXYCODONE HYDROCHLORIDE AND ACETAMINOPHEN 1 TABLET: 5; 325 TABLET ORAL at 02:12

## 2018-01-01 RX ADMIN — NYSTATIN 500000 UNITS: 500000 SUSPENSION ORAL at 06:12

## 2018-01-01 RX ADMIN — METOPROLOL TARTRATE 50 MG: 50 TABLET ORAL at 10:12

## 2018-01-01 RX ADMIN — CALCIUM CHLORIDE 500 MG: 100 INJECTION, SOLUTION INTRAVENOUS at 05:12

## 2018-01-01 RX ADMIN — MEPERIDINE HYDROCHLORIDE 50 MG: 50 INJECTION INTRAMUSCULAR; INTRAVENOUS; SUBCUTANEOUS at 10:07

## 2018-01-01 RX ADMIN — ALPRAZOLAM 1 MG: 1 TABLET ORAL at 12:12

## 2018-01-01 RX ADMIN — SODIUM CHLORIDE, SODIUM LACTATE, POTASSIUM CHLORIDE, AND CALCIUM CHLORIDE: .6; .31; .03; .02 INJECTION, SOLUTION INTRAVENOUS at 01:12

## 2018-01-01 RX ADMIN — OXYCODONE HYDROCHLORIDE 5 MG: 5 TABLET ORAL at 10:12

## 2018-01-01 RX ADMIN — PROPOFOL 100 MCG/KG/MIN: 10 INJECTION, EMULSION INTRAVENOUS at 08:12

## 2018-01-01 RX ADMIN — GLYCOPYRROLATE 0.4 MG: 0.2 INJECTION, SOLUTION INTRAMUSCULAR; INTRAVENOUS at 05:12

## 2018-01-01 RX ADMIN — DOXAZOSIN MESYLATE 2 MG: 2 TABLET ORAL at 11:12

## 2018-01-01 RX ADMIN — MAGNESIUM SULFATE HEPTAHYDRATE 1 G: 500 INJECTION, SOLUTION INTRAMUSCULAR; INTRAVENOUS at 07:12

## 2018-01-01 RX ADMIN — IOHEXOL 30 ML: 300 INJECTION, SOLUTION INTRAVENOUS at 09:09

## 2018-01-01 RX ADMIN — PANTOPRAZOLE SODIUM 40 MG: 40 INJECTION, POWDER, FOR SOLUTION INTRAVENOUS at 09:12

## 2018-01-01 RX ADMIN — METOPROLOL TARTRATE 5 MG: 5 INJECTION, SOLUTION INTRAVENOUS at 06:12

## 2018-01-01 RX ADMIN — BUSPIRONE HYDROCHLORIDE 15 MG: 5 TABLET ORAL at 11:12

## 2018-01-01 RX ADMIN — ALPRAZOLAM 1 MG: 1 TABLET ORAL at 02:12

## 2018-01-01 RX ADMIN — ALBUMIN HUMAN 25 G: 50 SOLUTION INTRAVENOUS at 11:12

## 2018-01-01 RX ADMIN — INSULIN HUMAN 10 UNITS: 100 INJECTION, SOLUTION PARENTERAL at 03:12

## 2018-01-01 RX ADMIN — FUROSEMIDE 80 MG: 10 INJECTION, SOLUTION INTRAMUSCULAR; INTRAVENOUS at 03:12

## 2018-01-01 RX ADMIN — OXYCODONE HYDROCHLORIDE AND ACETAMINOPHEN 1 TABLET: 10; 325 TABLET ORAL at 06:12

## 2018-01-01 RX ADMIN — SODIUM CHLORIDE, SODIUM GLUCONATE, SODIUM ACETATE, POTASSIUM CHLORIDE, MAGNESIUM CHLORIDE, SODIUM PHOSPHATE, DIBASIC, AND POTASSIUM PHOSPHATE: .53; .5; .37; .037; .03; .012; .00082 INJECTION, SOLUTION INTRAVENOUS at 02:12

## 2018-01-01 RX ADMIN — SODIUM CHLORIDE, SODIUM LACTATE, POTASSIUM CHLORIDE, AND CALCIUM CHLORIDE 1000 ML: .6; .31; .03; .02 INJECTION, SOLUTION INTRAVENOUS at 03:12

## 2018-01-01 RX ADMIN — IOHEXOL 75 ML: 350 INJECTION, SOLUTION INTRAVENOUS at 10:09

## 2018-01-01 RX ADMIN — HYDROMORPHONE HYDROCHLORIDE 2 MG: 2 INJECTION INTRAMUSCULAR; INTRAVENOUS; SUBCUTANEOUS at 06:12

## 2018-01-01 RX ADMIN — HYDROMORPHONE HYDROCHLORIDE 0.5 MG: 1 INJECTION, SOLUTION INTRAMUSCULAR; INTRAVENOUS; SUBCUTANEOUS at 10:12

## 2018-01-01 RX ADMIN — ALBUMIN (HUMAN): 12.5 SOLUTION INTRAVENOUS at 05:12

## 2018-01-01 RX ADMIN — METOPROLOL TARTRATE 50 MG: 50 TABLET ORAL at 01:12

## 2018-01-01 RX ADMIN — GLYCOPYRROLATE 0.2 MG: 0.2 INJECTION, SOLUTION INTRAMUSCULAR; INTRAVENOUS at 08:12

## 2018-01-01 RX ADMIN — FENTANYL CITRATE 50 MCG: 50 INJECTION, SOLUTION INTRAMUSCULAR; INTRAVENOUS at 09:12

## 2018-01-01 RX ADMIN — PIPERACILLIN AND TAZOBACTAM 4.5 G: 4; .5 INJECTION, POWDER, LYOPHILIZED, FOR SOLUTION INTRAVENOUS; PARENTERAL at 12:12

## 2018-01-01 RX ADMIN — DEXTROSE MONOHYDRATE 25 G: 25 INJECTION, SOLUTION INTRAVENOUS at 03:12

## 2018-01-01 RX ADMIN — SODIUM CHLORIDE, SODIUM LACTATE, POTASSIUM CHLORIDE, AND CALCIUM CHLORIDE: .6; .31; .03; .02 INJECTION, SOLUTION INTRAVENOUS at 11:12

## 2018-01-01 RX ADMIN — FUROSEMIDE 120 MG: 10 INJECTION, SOLUTION INTRAMUSCULAR; INTRAVENOUS at 04:12

## 2018-01-01 RX ADMIN — CLINDAMYCIN IN 5 PERCENT DEXTROSE 900 MG: 18 INJECTION, SOLUTION INTRAVENOUS at 02:12

## 2018-01-01 RX ADMIN — SODIUM CHLORIDE, SODIUM LACTATE, POTASSIUM CHLORIDE, AND CALCIUM CHLORIDE: .6; .31; .03; .02 INJECTION, SOLUTION INTRAVENOUS at 10:12

## 2018-01-01 RX ADMIN — CLINDAMYCIN IN 5 PERCENT DEXTROSE 900 MG: 18 INJECTION, SOLUTION INTRAVENOUS at 08:12

## 2018-01-01 RX ADMIN — SODIUM CHLORIDE: 0.9 INJECTION, SOLUTION INTRAVENOUS at 08:12

## 2018-01-01 RX ADMIN — LIDOCAINE HYDROCHLORIDE 50 MG: 20 INJECTION, SOLUTION INTRAVENOUS at 07:12

## 2018-06-19 NOTE — DISCHARGE INSTRUCTIONS
Return to the Er if any of the follow develops:  Dizziness upon standing  Chest pain  Shortness of breath  Worsening of rectal bleeding  Any other symptoms that are concerning to you    Please follow up with Dr. Shaver tomorrow for evaluation for endoscopy.

## 2018-06-19 NOTE — ED PROVIDER NOTES
Encounter Date: 2018       History     Chief Complaint   Patient presents with    GI Bleeding     rectal bleeding, H&H 8.4 and 28, lab drawn yesterday     Pt complains of bright red rectal bleeding off and on for the past year, worse the past couple days. States that she was told by her PCP to come to ER for admission yesterday but she did not do this. She states her PCP told her she needed a transfusion. Her only complaint is weakness and fatigue. Denies any chest pain or sob. States her last colonoscopy was 3 years ago and was normal.           Review of patient's allergies indicates:  No Known Allergies  Past Medical History:   Diagnosis Date    Anxiety     Depression     GI bleed     Hypertension      Past Surgical History:   Procedure Laterality Date    BACK SURGERY       SECTION      HYSTERECTOMY       History reviewed. No pertinent family history.  Social History   Substance Use Topics    Smoking status: Current Every Day Smoker    Smokeless tobacco: Not on file    Alcohol use Yes      Comment: occ     Review of Systems   HENT: Negative.    Eyes: Negative.    Respiratory: Negative.    Cardiovascular: Negative.    Gastrointestinal: Positive for blood in stool.   Endocrine: Negative.    Genitourinary: Negative.    Musculoskeletal: Negative.    Allergic/Immunologic: Negative.    Neurological: Positive for weakness.   Hematological: Negative.    Psychiatric/Behavioral: Negative.        Physical Exam     Initial Vitals [18 1353]   BP Pulse Resp Temp SpO2   (!) 122/98 78 20 98.1 °F (36.7 °C) (!) 94 %      MAP       --         Physical Exam    Nursing note and vitals reviewed.  Constitutional: She appears well-developed and well-nourished. She is not diaphoretic. No distress.   HENT:   Head: Normocephalic and atraumatic.   Eyes: Conjunctivae and EOM are normal. Pupils are equal, round, and reactive to light.   Neck: Normal range of motion. Neck supple.   Cardiovascular: Normal rate,  regular rhythm, normal heart sounds and intact distal pulses. Exam reveals no gallop and no friction rub.    No murmur heard.  Pulmonary/Chest: Breath sounds normal. No respiratory distress. She has no wheezes. She has no rales.   Abdominal: Soft. Bowel sounds are normal. She exhibits no distension. There is no tenderness.   Genitourinary:   Genitourinary Comments: Rectal exam shows mild external hemorrhoids that do not appear inflammed. Arian blood noted on exam. No clots seen. No masses palpable.    Musculoskeletal: Normal range of motion. She exhibits no edema.   Neurological: She is alert and oriented to person, place, and time. She has normal strength.   Skin: Skin is warm and dry. Capillary refill takes less than 2 seconds. No rash noted. No erythema.   Psychiatric: She has a normal mood and affect. Her behavior is normal. Judgment and thought content normal.         ED Course   Procedures  Labs Reviewed   URINALYSIS, REFLEX TO URINE CULTURE - Abnormal; Notable for the following:        Result Value    Protein, UA 2+ (*)     All other components within normal limits    Narrative:     Preferred Collection Type->Urine, Clean Catch   URINALYSIS MICROSCOPIC - Abnormal; Notable for the following:     Bacteria, UA Few (*)     All other components within normal limits    Narrative:     Preferred Collection Type->Urine, Clean Catch          Imaging Results    None                            ED Course as of Jun 19 1640   Tue Jun 19, 2018   1628 Spoke with Dr. Heredia, discussed case. He agreed with me that patient is stable, needs scope but will arrange for outpatient surgical consult tomorrow.   Pt is currently asymptomatic, no dizziness or hypotension.   [MD]   1634 Appt made for tomorrow at 4pm with Dr. Shaver, long discussion had with patient and family member, explained to return to Er if any dizziness, near syncope, chest pain, sob or worsening in rectal bleeding occurs.   [MD]      ED Course User  Index  [MD] Sasha Sagastume MD     Clinical Impression:   The primary encounter diagnosis was Iron deficiency anemia due to chronic blood loss. A diagnosis of Rectal bleeding was also pertinent to this visit.                             Sasha Sagastume MD  06/19/18 1640

## 2018-06-21 PROBLEM — K92.1 HEMATOCHEZIA: Status: ACTIVE | Noted: 2018-01-01

## 2018-06-21 NOTE — H&P
[facility] H&P Note    Subjective:       Patient ID: Misty Khalil is a 58 y.o. female.    Chief Complaint: Consult (ER-Rectal bleed)    HPI:  Misty Khalil is a 58 y.o. female with a history of anxiety depression and hypertension presents today as a referral from the emergency room for evaluation of blood per rectum.  Patient stated that the bleeding has been going on for over a year.  It has been coming and going.  She presented to the ER last night with blood per rectum both bright and dark.  No blood clots.  She has no orthostatics symptoms, normal color urine.  She in the ER was evaluated and determined to have a significant anemia and sent here to surgery for evaluation and treatment for endoscopy.  The patient endorses a significant weight loss within the last year to 2 years.  She previously underwent a colonoscopy 3 years ago which showed no polyps.  She presents today for evaluation as a new patient.    Past Medical History:   Diagnosis Date    Anxiety     Depression     GI bleed     Hypertension      Past Surgical History:   Procedure Laterality Date    BACK SURGERY       SECTION      HYSTERECTOMY       Family History   Problem Relation Age of Onset    Heart disease Mother     Diabetes Mother     Heart attack Mother     Hypertension Father     Heart attack Father     Ovarian cancer Sister     Breast cancer Paternal Grandmother      Social History     Social History    Marital status:      Spouse name: N/A    Number of children: N/A    Years of education: N/A     Social History Main Topics    Smoking status: Current Every Day Smoker    Smokeless tobacco: None    Alcohol use Yes      Comment: occ    Drug use: No    Sexual activity: No     Other Topics Concern    None     Social History Narrative    None       Current Outpatient Prescriptions   Medication Sig Dispense Refill    ALPRAZolam (XANAX) 1 MG tablet Take 1 mg by mouth 2 (two) times daily.       "busPIRone (BUSPAR) 15 MG tablet Take 15 mg by mouth 3 (three) times daily.      hydrALAZINE (APRESOLINE) 100 MG tablet Take 100 mg by mouth 2 (two) times daily.      metoprolol tartrate (LOPRESSOR) 50 MG tablet Take 50 mg by mouth 2 (two) times daily.      quinapril (ACCUPRIL) 40 MG tablet Take 40 mg by mouth every evening.      zolpidem (AMBIEN) 10 mg Tab Take 5 mg by mouth nightly as needed.      ferrous sulfate 325 mg (65 mg iron) Tab tablet Take 1 tablet (325 mg total) by mouth 3 (three) times daily with meals. 90 tablet 0     No current facility-administered medications for this visit.      Review of patient's allergies indicates:  No Known Allergies    Review of Systems   Constitutional: Positive for unexpected weight change. Negative for activity change, appetite change, chills and fever.   HENT: Negative for congestion, dental problem and ear discharge.    Eyes: Negative for discharge and itching.   Respiratory: Negative for apnea, choking and chest tightness.    Cardiovascular: Negative for chest pain and leg swelling.   Gastrointestinal: Positive for anal bleeding. Negative for abdominal distention, abdominal pain, constipation, diarrhea and nausea.   Endocrine: Negative for cold intolerance and heat intolerance.   Genitourinary: Negative for difficulty urinating and dyspareunia.   Musculoskeletal: Negative for arthralgias and back pain.   Skin: Negative for color change and pallor.   Neurological: Negative for dizziness and facial asymmetry.   Hematological: Negative for adenopathy. Does not bruise/bleed easily.   Psychiatric/Behavioral: Negative for agitation and behavioral problems.       Objective:      Vitals:    06/20/18 1326   BP: (!) 98/52   Pulse: 76   Resp: 18   Temp: 97.6 °F (36.4 °C)   TempSrc: Tympanic   SpO2: (!) 94%   Weight: 60.3 kg (133 lb)   Height: 5' 7" (1.702 m)     Physical Exam   Constitutional: She is oriented to person, place, and time. She appears well-developed and " well-nourished. No distress.   HENT:   Head: Normocephalic and atraumatic.   Eyes: EOM are normal. Pupils are equal, round, and reactive to light.   Neck: Normal range of motion. Neck supple. No thyromegaly present.   Cardiovascular: Normal rate and regular rhythm.    Pulmonary/Chest: Effort normal and breath sounds normal.   Abdominal: Soft. Bowel sounds are normal. She exhibits no distension. There is no tenderness.   Musculoskeletal: Normal range of motion. She exhibits no edema or deformity.   Neurological: She is alert and oriented to person, place, and time. No cranial nerve deficit.   Skin: Skin is warm. Capillary refill takes less than 2 seconds. No erythema.   Psychiatric: She has a normal mood and affect. Her behavior is normal.       Lab Review:   CBC:   Lab Results   Component Value Date    WBC 4.61 06/19/2018    RBC 3.19 (L) 06/19/2018    HGB 8.9 (L) 06/19/2018    HCT 29.1 (L) 06/19/2018     06/19/2018     BMP:   Lab Results   Component Value Date    GLU 90 06/19/2018     06/19/2018    K 5.0 06/19/2018     06/19/2018    CO2 22 (L) 06/19/2018    BUN 39 (H) 06/19/2018    CREATININE 1.0 06/19/2018    CALCIUM 8.3 (L) 06/19/2018     Diagnostics Review: No new     Assessment:       1. Hematochezia    2. Melena    3. Anemia, unspecified type    4. Weight loss        Plan:   Hematochezia  -     Case Request Operating Room: COLONOSCOPY, ESOPHAGOGASTRODUODENOSCOPY (EGD)  -     Place in Outpatient; Standing  -     Vital signs; Standing  -     Up ad suraj; Standing  -     Insert peripheral IV; Standing  -     Diet NPO; Standing  -     Place CLARE hose; Standing  -     Place sequential compression device; Standing  -     Basic metabolic panel; Future; Expected date: 06/21/2018  -     CBC auto differential; Future; Expected date: 06/21/2018  -     Pulse Oximetry Q4H; Standing  -     EKG 12-lead; Future    Melena  -     Case Request Operating Room: COLONOSCOPY, ESOPHAGOGASTRODUODENOSCOPY (EGD)  -      Place in Outpatient; Standing  -     Vital signs; Standing  -     Up ad suraj; Standing  -     Insert peripheral IV; Standing  -     Diet NPO; Standing  -     Place CLARE hose; Standing  -     Place sequential compression device; Standing  -     Basic metabolic panel; Future; Expected date: 06/21/2018  -     CBC auto differential; Future; Expected date: 06/21/2018  -     Pulse Oximetry Q4H; Standing  -     EKG 12-lead; Future    Anemia, unspecified type  -     Case Request Operating Room: COLONOSCOPY, ESOPHAGOGASTRODUODENOSCOPY (EGD)  -     Place in Outpatient; Standing  -     Vital signs; Standing  -     Up ad suraj; Standing  -     Insert peripheral IV; Standing  -     Diet NPO; Standing  -     Place CLARE hose; Standing  -     Place sequential compression device; Standing  -     Basic metabolic panel; Future; Expected date: 06/21/2018  -     CBC auto differential; Future; Expected date: 06/21/2018  -     Pulse Oximetry Q4H; Standing  -     EKG 12-lead; Future    Weight loss    Other orders  -     0.9%  NaCl infusion; Inject into the vein continuous.  -     IP VTE LOW RISK PATIENT; Standing        Medical Decision Making/Counseling:  Patient has 1+ years of hematochezia with melena at times.  She has had a significant weight loss of 50-60 lb per her account over the last year.  No family history of colon cancer.  Last colonoscopy 3 years ago.  Her presenting symptoms are very suspicious for neoplastic processes of the GI tract.  I recommend EGD and colonoscopy to rule out neoplastic process as.  Patient on laboratory assessment was also determined to have a significant anemia.  She since her ER visit yesterday has stopped bleeding per rectum.  I discussed with the patient that we can do her endoscopy in the elective fashion, however if she Re bleeds significantly she needs to present through the ER for urgent endoscopy.  She voiced understanding.    Risk and benefits of EGD were discussed in clinic in depth.  Risk of EGD  were discussed to include bleeding as well as perforation.  Patient understands that if the above procedural risks were to occur, he could need further intervention to include but not exclude a blood transfusion, repeat procedure, admission to the hospital, or even surgery which would likely require transfer to a higher level of care.   Risks and benefits of Colonoscopy were discussed in depth in clinic as well.  From a procedural standpoint, we discuss the benefits of colonoscopy to be finding colon cancers at early stages, including polyps which can be endoscopically resectable, to finding early stage colon cancers which can be better treated with current medical and surgical therapies in order to give patients a longer survival, if found in these early stages.  From a standpoint of risks, the risk of bleeding and perforation of the colon were discussed.  I personally discussed that if complications of bleeding or perforation were to occur, the patient could need as little as a blood transfusion and as much as possible hospital admission, repeat procedure, or even surgery.  During today's discussion of the procedure of colonoscopy with the patient, I personally kaci the patient a picture to assist with counseling.  Total clinic time spent today 45 minutes face-to-face, with greater than half of the time spent in face to face counseling.

## 2018-07-13 NOTE — ANESTHESIA PREPROCEDURE EVALUATION
07/13/2018  Misty Khalil is a 58 y.o., female.    Anesthesia Evaluation    I have reviewed the Patient Summary Reports.    I have reviewed the Nursing Notes.   I have reviewed the Medications.   Steroids Taken In Past Year:     Review of Systems  Anesthesia Hx:  No problems with previous Anesthesia  Neg history of prior surgery. Denies Family Hx of Anesthesia complications.   Denies Personal Hx of Anesthesia complications.   Social:  Smoker    Hematology/Oncology:  Hematology Normal   Oncology Normal     EENT/Dental:EENT/Dental Normal   Cardiovascular:   Hypertension, well controlled    Pulmonary:   COPD, moderate    Renal/:  Renal/ Normal     Hepatic/GI:  Hepatic/GI Normal    Musculoskeletal:  Musculoskeletal Normal    Neurological:  Neurology Normal    Endocrine:  Endocrine Normal    Dermatological:  Skin Normal    Psych:   depression          Physical Exam  General:  Well nourished    Airway/Jaw/Neck:  AIRWAY FINDINGS: Normal      Eyes/Ears/Nose:  EYES/EARS/NOSE FINDINGS: Normal   Dental:  DENTAL FINDINGS: Normal   Chest/Lungs:  Chest/Lungs Findings: Expiratory Wheezes, Mild, Rhonchi     Heart/Vascular:  Heart Findings: Normal Heart murmur: negative Vascular Findings: Normal     Musculoskeletal:  Musculoskeletal Findings: Normal   Skin:  Skin Findings: Normal    Mental Status:  Mental Status Findings: Normal        Anesthesia Plan  Type of Anesthesia, risks & benefits discussed:  Anesthesia Type:  MAC  Patient's Preference:   Intra-op Monitoring Plan: standard ASA monitors  Intra-op Monitoring Plan Comments:   Post Op Pain Control Plan:   Post Op Pain Control Plan Comments:   Induction:   IV  Beta Blocker:  Patient is on a Beta-Blocker and has received one dose within the past 24 hours (No further documentation required).       Informed Consent: Patient understands risks and agrees with Anesthesia  plan.  Questions answered. Anesthesia consent signed with patient.  ASA Score: 3     Day of Surgery Review of History & Physical:    H&P update referred to the provider.         Ready For Surgery From Anesthesia Perspective.

## 2018-07-16 NOTE — OR NURSING
"Leaving floor per w/c with RN and sister. AAO x3. Resp even and unlabored room air. No distress noted. Reports voiding without difficulty. States she is having "bleeding from her behind". States, "i have something hanging out my butt". Reports having hemorrhoids and that"it was like this when i came in". Asked pt if she wanted ti speak with doctor before she leave. Denies. Instructed pt to report any worsening symptoms to doctor or come to ER. V/u. All belongings returned to pt.   "

## 2018-07-16 NOTE — INTERVAL H&P NOTE
The patient has been examined and the H&P has been reviewed:    I concur with the findings and no changes have occurred since H&P was written.    Surgery risks, benefits and alternative options discussed and understood by patient/family.          Active Hospital Problems    Diagnosis  POA    Hematochezia [K92.1]  Yes      Resolved Hospital Problems    Diagnosis Date Resolved POA   No resolved problems to display.

## 2018-07-16 NOTE — TRANSFER OF CARE
"Anesthesia Transfer of Care Note    Patient: Misty Khalil    Procedure(s) Performed: Procedure(s) (LRB):  COLONOSCOPY (N/A)  ESOPHAGOGASTRODUODENOSCOPY (EGD) (N/A)    Patient location: PACU    Anesthesia Type: MAC    Transport from OR: Transported from OR on room air with adequate spontaneous ventilation    Post pain: adequate analgesia    Post assessment: no apparent anesthetic complications and tolerated procedure well    Post vital signs: stable    Level of consciousness: awake, alert and oriented    Nausea/Vomiting: no nausea/vomiting    Complications: none    Transfer of care protocol was followed      Last vitals:   Visit Vitals  /65 (BP Location: Right arm, Patient Position: Lying)   Pulse 62   Temp 36.6 °C (97.8 °F) (Oral)   Resp 18   Ht 5' 6" (1.676 m)   Wt 58.1 kg (128 lb)   SpO2 (!) 94%   Breastfeeding? No   BMI 20.66 kg/m²     "

## 2018-07-16 NOTE — H&P (VIEW-ONLY)
[facility] H&P Note    Subjective:       Patient ID: Misty Khalil is a 58 y.o. female.    Chief Complaint: Consult (ER-Rectal bleed)    HPI:  Misty Khalil is a 58 y.o. female with a history of anxiety depression and hypertension presents today as a referral from the emergency room for evaluation of blood per rectum.  Patient stated that the bleeding has been going on for over a year.  It has been coming and going.  She presented to the ER last night with blood per rectum both bright and dark.  No blood clots.  She has no orthostatics symptoms, normal color urine.  She in the ER was evaluated and determined to have a significant anemia and sent here to surgery for evaluation and treatment for endoscopy.  The patient endorses a significant weight loss within the last year to 2 years.  She previously underwent a colonoscopy 3 years ago which showed no polyps.  She presents today for evaluation as a new patient.    Past Medical History:   Diagnosis Date    Anxiety     Depression     GI bleed     Hypertension      Past Surgical History:   Procedure Laterality Date    BACK SURGERY       SECTION      HYSTERECTOMY       Family History   Problem Relation Age of Onset    Heart disease Mother     Diabetes Mother     Heart attack Mother     Hypertension Father     Heart attack Father     Ovarian cancer Sister     Breast cancer Paternal Grandmother      Social History     Social History    Marital status:      Spouse name: N/A    Number of children: N/A    Years of education: N/A     Social History Main Topics    Smoking status: Current Every Day Smoker    Smokeless tobacco: None    Alcohol use Yes      Comment: occ    Drug use: No    Sexual activity: No     Other Topics Concern    None     Social History Narrative    None       Current Outpatient Prescriptions   Medication Sig Dispense Refill    ALPRAZolam (XANAX) 1 MG tablet Take 1 mg by mouth 2 (two) times daily.       "busPIRone (BUSPAR) 15 MG tablet Take 15 mg by mouth 3 (three) times daily.      hydrALAZINE (APRESOLINE) 100 MG tablet Take 100 mg by mouth 2 (two) times daily.      metoprolol tartrate (LOPRESSOR) 50 MG tablet Take 50 mg by mouth 2 (two) times daily.      quinapril (ACCUPRIL) 40 MG tablet Take 40 mg by mouth every evening.      zolpidem (AMBIEN) 10 mg Tab Take 5 mg by mouth nightly as needed.      ferrous sulfate 325 mg (65 mg iron) Tab tablet Take 1 tablet (325 mg total) by mouth 3 (three) times daily with meals. 90 tablet 0     No current facility-administered medications for this visit.      Review of patient's allergies indicates:  No Known Allergies    Review of Systems   Constitutional: Positive for unexpected weight change. Negative for activity change, appetite change, chills and fever.   HENT: Negative for congestion, dental problem and ear discharge.    Eyes: Negative for discharge and itching.   Respiratory: Negative for apnea, choking and chest tightness.    Cardiovascular: Negative for chest pain and leg swelling.   Gastrointestinal: Positive for anal bleeding. Negative for abdominal distention, abdominal pain, constipation, diarrhea and nausea.   Endocrine: Negative for cold intolerance and heat intolerance.   Genitourinary: Negative for difficulty urinating and dyspareunia.   Musculoskeletal: Negative for arthralgias and back pain.   Skin: Negative for color change and pallor.   Neurological: Negative for dizziness and facial asymmetry.   Hematological: Negative for adenopathy. Does not bruise/bleed easily.   Psychiatric/Behavioral: Negative for agitation and behavioral problems.       Objective:      Vitals:    06/20/18 1326   BP: (!) 98/52   Pulse: 76   Resp: 18   Temp: 97.6 °F (36.4 °C)   TempSrc: Tympanic   SpO2: (!) 94%   Weight: 60.3 kg (133 lb)   Height: 5' 7" (1.702 m)     Physical Exam   Constitutional: She is oriented to person, place, and time. She appears well-developed and " well-nourished. No distress.   HENT:   Head: Normocephalic and atraumatic.   Eyes: EOM are normal. Pupils are equal, round, and reactive to light.   Neck: Normal range of motion. Neck supple. No thyromegaly present.   Cardiovascular: Normal rate and regular rhythm.    Pulmonary/Chest: Effort normal and breath sounds normal.   Abdominal: Soft. Bowel sounds are normal. She exhibits no distension. There is no tenderness.   Musculoskeletal: Normal range of motion. She exhibits no edema or deformity.   Neurological: She is alert and oriented to person, place, and time. No cranial nerve deficit.   Skin: Skin is warm. Capillary refill takes less than 2 seconds. No erythema.   Psychiatric: She has a normal mood and affect. Her behavior is normal.       Lab Review:   CBC:   Lab Results   Component Value Date    WBC 4.61 06/19/2018    RBC 3.19 (L) 06/19/2018    HGB 8.9 (L) 06/19/2018    HCT 29.1 (L) 06/19/2018     06/19/2018     BMP:   Lab Results   Component Value Date    GLU 90 06/19/2018     06/19/2018    K 5.0 06/19/2018     06/19/2018    CO2 22 (L) 06/19/2018    BUN 39 (H) 06/19/2018    CREATININE 1.0 06/19/2018    CALCIUM 8.3 (L) 06/19/2018     Diagnostics Review: No new     Assessment:       1. Hematochezia    2. Melena    3. Anemia, unspecified type    4. Weight loss        Plan:   Hematochezia  -     Case Request Operating Room: COLONOSCOPY, ESOPHAGOGASTRODUODENOSCOPY (EGD)  -     Place in Outpatient; Standing  -     Vital signs; Standing  -     Up ad suraj; Standing  -     Insert peripheral IV; Standing  -     Diet NPO; Standing  -     Place CLARE hose; Standing  -     Place sequential compression device; Standing  -     Basic metabolic panel; Future; Expected date: 06/21/2018  -     CBC auto differential; Future; Expected date: 06/21/2018  -     Pulse Oximetry Q4H; Standing  -     EKG 12-lead; Future    Melena  -     Case Request Operating Room: COLONOSCOPY, ESOPHAGOGASTRODUODENOSCOPY (EGD)  -      Place in Outpatient; Standing  -     Vital signs; Standing  -     Up ad suraj; Standing  -     Insert peripheral IV; Standing  -     Diet NPO; Standing  -     Place CLARE hose; Standing  -     Place sequential compression device; Standing  -     Basic metabolic panel; Future; Expected date: 06/21/2018  -     CBC auto differential; Future; Expected date: 06/21/2018  -     Pulse Oximetry Q4H; Standing  -     EKG 12-lead; Future    Anemia, unspecified type  -     Case Request Operating Room: COLONOSCOPY, ESOPHAGOGASTRODUODENOSCOPY (EGD)  -     Place in Outpatient; Standing  -     Vital signs; Standing  -     Up ad suraj; Standing  -     Insert peripheral IV; Standing  -     Diet NPO; Standing  -     Place CLARE hose; Standing  -     Place sequential compression device; Standing  -     Basic metabolic panel; Future; Expected date: 06/21/2018  -     CBC auto differential; Future; Expected date: 06/21/2018  -     Pulse Oximetry Q4H; Standing  -     EKG 12-lead; Future    Weight loss    Other orders  -     0.9%  NaCl infusion; Inject into the vein continuous.  -     IP VTE LOW RISK PATIENT; Standing        Medical Decision Making/Counseling:  Patient has 1+ years of hematochezia with melena at times.  She has had a significant weight loss of 50-60 lb per her account over the last year.  No family history of colon cancer.  Last colonoscopy 3 years ago.  Her presenting symptoms are very suspicious for neoplastic processes of the GI tract.  I recommend EGD and colonoscopy to rule out neoplastic process as.  Patient on laboratory assessment was also determined to have a significant anemia.  She since her ER visit yesterday has stopped bleeding per rectum.  I discussed with the patient that we can do her endoscopy in the elective fashion, however if she Re bleeds significantly she needs to present through the ER for urgent endoscopy.  She voiced understanding.    Risk and benefits of EGD were discussed in clinic in depth.  Risk of EGD  were discussed to include bleeding as well as perforation.  Patient understands that if the above procedural risks were to occur, he could need further intervention to include but not exclude a blood transfusion, repeat procedure, admission to the hospital, or even surgery which would likely require transfer to a higher level of care.   Risks and benefits of Colonoscopy were discussed in depth in clinic as well.  From a procedural standpoint, we discuss the benefits of colonoscopy to be finding colon cancers at early stages, including polyps which can be endoscopically resectable, to finding early stage colon cancers which can be better treated with current medical and surgical therapies in order to give patients a longer survival, if found in these early stages.  From a standpoint of risks, the risk of bleeding and perforation of the colon were discussed.  I personally discussed that if complications of bleeding or perforation were to occur, the patient could need as little as a blood transfusion and as much as possible hospital admission, repeat procedure, or even surgery.  During today's discussion of the procedure of colonoscopy with the patient, I personally kaci the patient a picture to assist with counseling.  Total clinic time spent today 45 minutes face-to-face, with greater than half of the time spent in face to face counseling.

## 2018-07-16 NOTE — OR NURSING
AAO x3. Tolerating ice chips without c/o nausea, vomiting or abdominal pain. PACU monitors removed. States she has to void. Ambulating to bathroom to void and change clothing.

## 2018-07-16 NOTE — ANESTHESIA POSTPROCEDURE EVALUATION
"Anesthesia Post Evaluation    Patient: Misty Khalil    Procedure(s) Performed: Procedure(s) (LRB):  COLONOSCOPY (N/A)  ESOPHAGOGASTRODUODENOSCOPY (EGD) (N/A)    Final Anesthesia Type: MAC  Patient location during evaluation: PACU  Patient participation: Yes- Able to Participate  Level of consciousness: awake and alert  Post-procedure vital signs: reviewed and stable  Pain management: adequate  Airway patency: patent  PONV status at discharge: No PONV  Anesthetic complications: no      Cardiovascular status: blood pressure returned to baseline  Respiratory status: unassisted  Hydration status: euvolemic  Follow-up not needed.        Visit Vitals  /76   Pulse 66   Temp 36.6 °C (97.8 °F) (Oral)   Resp 17   Ht 5' 6" (1.676 m)   Wt 58.1 kg (128 lb)   SpO2 98%   Breastfeeding? No   BMI 20.66 kg/m²       Pain/Jimmy Score: Pain Assessment Performed: Yes (7/16/2018  7:31 AM)  Presence of Pain: denies (7/16/2018 11:30 AM)  Jimmy Score: 7 (7/16/2018  1:10 PM)      "

## 2018-07-16 NOTE — OR NURSING
Dr. Shaver at bedside speaking with pt and visitor. MD explaining to pt bowel prep was poor and the colonoscopy needs to be repeated with a 2 day prep next time. Treatment plan from here discussed. RX x2 sent to pts pharmacy per MD.

## 2018-07-16 NOTE — [employer] PATIENT INSTRUCTIONS
Discharge Summary/Instructions after an Endoscopic Procedure  Patient Name: Misty Khalil  Patient MRN: [id number]  Patient YOB: 1960  Monday, July 16, 2018  Brian Shaver MD  RESTRICTIONS:  During your procedure today, you received medications for sedation.  These   medications may affect your judgment, balance and coordination.  Therefore,   for 24 hours, you have the following restrictions:   - DO NOT drive a car, operate machinery, make legal/financial decisions,   sign important papers or drink alcohol.    ACTIVITY:  Today: no heavy lifting, straining or running due to procedural   sedation/anesthesia.  The following day: return to full activity including work.  DIET:  Eat and drink normally unless instructed otherwise.     TREATMENT FOR COMMON SIDE EFFECTS:  - Mild abdominal pain, nausea, belching, bloating or excessive gas:  rest,   eat lightly and use a heating pad.  - Sore Throat: treat with throat lozenges and/or gargle with warm salt   water.  - Because air was used during the procedure, expelling large amounts of air   from your rectum or belching is normal.  - If a bowel prep was taken, you may not have a bowel movement for 1-3 days.    This is normal.  SYMPTOMS TO WATCH FOR AND REPORT TO YOUR PHYSICIAN:  1. Abdominal pain or bloating, other than gas cramps.  2. Chest pain.  3. Back pain.  4. Signs of infection such as: chills or fever occurring within 24 hours   after the procedure.  5. Rectal bleeding, which would show as bright red, maroon, or black stools.   (A tablespoon of blood from the rectum is not serious, especially if   hemorrhoids are present.)  6. Vomiting.  7. Weakness or dizziness.  GO DIRECTLY TO THE NEAREST EMERGENCY ROOM IF YOU HAVE ANY OF THE FOLLOWING:      Difficulty breathing              Chills and/or fever over 101 F   Persistent vomiting and/or vomiting blood   Severe abdominal pain   Severe chest pain   Black, tarry stools   Bleeding- more than one  tablespoon   Any other symptom or condition that you feel may need urgent attention  Your doctor recommends these additional instructions:  If any biopsies were taken, your doctors clinic will contact you in 1 to 2   weeks with any results.  - Discharge patient to home (ambulatory).   - Resume previous diet.   - Repeat colonoscopy in 2 weeks for surveillance with 2 day prep due to poor   prep this time.  - Return to my office in 2 weeks.  For questions, problems or results please call your physician - Brian Shaver MD at Work:  [phone].  [facility] EMERGENCY ROOM PHONE NUMBER: [phone]  IF A COMPLICATION OR EMERGENCY SITUATION ARISES AND YOU ARE UNABLE TO REACH   YOUR PHYSICIAN - GO DIRECTLY TO THE EMERGENCY ROOM.  MD Brian Guardado MD  7/16/2018 11:58:04 AM  This report has been verified and signed electronically.  [employer]

## 2018-07-16 NOTE — DISCHARGE SUMMARY
Discharge Note        SUMMARY     Admit Date: 7/16/2018    Attending Physician: Brian Shaver MD     Discharge Physician: Brian Shaver MD    Discharge Date: 7/16/2018 11:20 AM      Hospital Course: Patient tolerated procedure well.     Disposition: Home or Self Care    Patient Instructions:   Current Discharge Medication List      START taking these medications    Details   pantoprazole (PROTONIX) 40 MG tablet Take 1 tablet (40 mg total) by mouth once daily.  Qty: 30 tablet, Refills: 6      sucralfate (CARAFATE) 1 gram tablet Take 1 tablet (1 g total) by mouth 4 (four) times daily.  Qty: 120 tablet, Refills: 0         CONTINUE these medications which have NOT CHANGED    Details   albuterol (PROVENTIL) 2.5 mg /3 mL (0.083 %) nebulizer solution Take 2.5 mg by nebulization daily as needed for Wheezing. Rescue      ALPRAZolam (XANAX) 1 MG tablet Take 1 mg by mouth 2 (two) times daily.      busPIRone (BUSPAR) 15 MG tablet Take 15 mg by mouth 2 (two) times daily.       doxazosin (CARDURA) 1 MG tablet Take 2 mg by mouth once daily.      ferrous sulfate 325 mg (65 mg iron) Tab tablet Take 1 tablet (325 mg total) by mouth 3 (three) times daily with meals.  Qty: 90 tablet, Refills: 0    Associated Diagnoses: Iron deficiency anemia due to chronic blood loss; Rectal bleeding      hydrALAZINE (APRESOLINE) 100 MG tablet Take 100 mg by mouth 2 (two) times daily.      metoprolol tartrate (LOPRESSOR) 50 MG tablet Take 50 mg by mouth 2 (two) times daily.      quinapril (ACCUPRIL) 40 MG tablet Take 40 mg by mouth every evening.      zolpidem (AMBIEN) 10 mg Tab Take 5 mg by mouth nightly as needed.             Discharge Procedure Orders (must include Diet, Follow-up, Activity):    Discharge Procedure Orders (must include Diet, Follow-up, Activity)  Diet general     Call MD for:  temperature >100.4     Call MD for:  persistent nausea and vomiting     Call MD for:  severe uncontrolled pain     Call MD for:  difficulty  breathing, headache or visual disturbances     Call MD for:  redness, tenderness, or signs of infection (pain, swelling, redness, odor or green/yellow discharge around incision site)     Call MD for:  persistent dizziness or light-headedness          Follow Up:  Follow up as scheduled.  Resume routine diet.  Activity as tolerated.    No driving day of procedure.

## 2018-08-22 NOTE — TELEPHONE ENCOUNTER
LVM for pt that Dr. Bauer first available is 10/10.  She may call and schedule appt with Daisha Mccormick, who does have openings sooner.  Licha

## 2018-08-22 NOTE — TELEPHONE ENCOUNTER
----- Message from Leyla Lieberman sent at 8/22/2018  9:24 AM CDT -----  Type:  Patient Returning Call    Who Called: pt Who Left Message for Patient: nurse Best Call Back Number: [phone]  / placed a call to pod

## 2018-08-22 NOTE — TELEPHONE ENCOUNTER
----- Message from Francesca Mcdonald sent at 8/22/2018  9:09 AM CDT -----  Contact: self  Patient need to speak with nurse regarding scheduling appointment to establish care    Epic earliest is 10/10  Patient states she need to be seen earlier due to she is out of certain medication/ need refills     Please call to advice [phone] (home)   Patient states if no answer leave message/ or let phone ring a little longer due to sometimes she can not get to phone fast per patient.

## 2018-08-24 NOTE — TELEPHONE ENCOUNTER
Per request, attempted to call pt to reschedule appt on 08/28/18 for Dr. Mccormick. Pt did not answer, left message to return call.

## 2018-09-03 NOTE — PROGRESS NOTES
"[facility]  Follow-up    Subjective:       Patient ID: Misty Khalil is a 58 y.o. female.    Chief Complaint: Follow-up (EGD/Colonoscopy (7/16/18))      HPI:  Misty Khalil is a 58 y.o. female presents today for follow-up examination after EGD and colonoscopy.  Patient on EGD found to have chronic gastritis.  No evidence of Helicobacter pylori infection.  Patient on colonoscopy found to have very poor prep, unable to evaluate colonic mucosa completely.  Recommendations were for repeat colonoscopy.  Patient since EGD and colonoscopy has not noticed any blood per rectum.  She does have a significant weight loss of 50-60 lb as described in the previous history and physical note.  No new problems since last visit.    Review of Systems   Constitutional: Positive for unexpected weight change. Negative for activity change, appetite change, chills and fever.   HENT: Negative for congestion, dental problem and ear discharge.    Eyes: Negative for discharge and itching.   Respiratory: Negative for apnea, choking and chest tightness.    Cardiovascular: Negative for chest pain and leg swelling.   Gastrointestinal: Negative for abdominal distention, abdominal pain, anal bleeding, constipation, diarrhea and nausea.   Endocrine: Negative for cold intolerance and heat intolerance.   Genitourinary: Negative for difficulty urinating and dyspareunia.   Musculoskeletal: Negative for arthralgias and back pain.   Skin: Negative for color change and pallor.   Neurological: Negative for dizziness and facial asymmetry.   Hematological: Negative for adenopathy. Does not bruise/bleed easily.   Psychiatric/Behavioral: Negative for agitation and behavioral problems.       Objective:      Vitals:    08/30/18 1402   BP: (!) 178/92   BP Location: Right arm   Patient Position: Sitting   Pulse: 62   Resp: 18   Temp: 99.8 °F (37.7 °C)   TempSrc: Oral   SpO2: 95%   Weight: 60.3 kg (133 lb)   Height: 5' 7" (1.702 m)     Physical Exam "   Constitutional: She is oriented to person, place, and time. She appears well-developed and well-nourished. No distress.   HENT:   Head: Normocephalic and atraumatic.   Eyes: EOM are normal. Pupils are equal, round, and reactive to light.   Neck: Normal range of motion. Neck supple. No thyromegaly present.   Cardiovascular: Normal rate and regular rhythm.   Pulmonary/Chest: Effort normal and breath sounds normal.   Abdominal: Soft. Bowel sounds are normal. She exhibits no distension. There is no tenderness.   Musculoskeletal: Normal range of motion. She exhibits no edema or deformity.   Neurological: She is alert and oriented to person, place, and time. No cranial nerve deficit.   Skin: Skin is warm. Capillary refill takes less than 2 seconds. No erythema.   Psychiatric: She has a normal mood and affect. Her behavior is normal.       Lab Review:   CBC:   Lab Results   Component Value Date    WBC 6.38 07/13/2018    RBC 3.47 (L) 07/13/2018    HGB 9.5 (L) 07/13/2018    HCT 31.7 (L) 07/13/2018     07/13/2018     BMP:   Lab Results   Component Value Date    GLU 90 07/13/2018     07/13/2018    K 4.0 07/13/2018     07/13/2018    CO2 26 07/13/2018    BUN 26 (H) 07/13/2018    CREATININE 0.8 07/13/2018    CALCIUM 8.3 (L) 07/13/2018     Diagnostics Review: Pathology reviewed from EGD and colonoscopy.  Chronic gastritis no evidence of Helicobacter pylori infection.     Assessment:       1. Weight loss        Plan:   Weight loss  -     CT Chest Abdoment Pelvis With Contrast; Future; Expected date: 08/30/2018  -     BUN; Future; Expected date: 08/30/2018  -     Creatinine, serum; Future; Expected date: 08/30/2018        Medical Decision Making/Counseling:  Patient with significant weight loss which is concerning.  50-60 lb per her account.  She also underwent colonoscopy which had incomplete prep and recommendations will be for 2 day prep prior to repeat colonoscopy within the next month to 2.  Will plan to  proceed given patient's wishes with CT scan chest abdomen pelvis for significant weight loss and have the patient follow up in 2-3 weeks.  At that time we will review the patient's CT scan and plan to reschedule the patient for colonoscopy with 2 day magnesium citrate prep.

## 2018-09-11 PROBLEM — E78.5 HYPERLIPIDEMIA: Status: ACTIVE | Noted: 2018-01-01

## 2018-09-11 PROBLEM — I10 HYPERTENSION: Status: ACTIVE | Noted: 2018-01-01

## 2018-09-11 PROBLEM — F41.9 ANXIETY: Status: ACTIVE | Noted: 2018-01-01

## 2018-09-30 PROBLEM — R10.84 GENERALIZED ABDOMINAL PAIN: Status: ACTIVE | Noted: 2018-01-01

## 2018-09-30 NOTE — H&P
[facility] Surgery H&P Note    Subjective:       Patient ID: Misty Khalil is a 58 y.o. female.    Chief Complaint: Follow-up (2 week f/u CT Abd/Pelvis (18))    HPI:  Misty Khalil is a 58 y.o. female with a history of anxiety depression previous GI bleeding hypertension presents today for follow-up examination of weight loss abdominal pain. Patient underwent CT scan since last evaluation.  CT scan shows mild biliary ductal dilatation.  LFTs within normal limits.  CT scan also shows a left adnexal mass versus cyst.  Patient underwent colonoscopy within the last couple months however had a very poor prep.  Unable to visualize the colon entirety.  She is in need of repeat colonoscopy.  No issues of recurrent bleeding. She presents today as established patient for follow-up.    Past Medical History:   Diagnosis Date    Anxiety     Depression     GI bleed     Hypertension      Past Surgical History:   Procedure Laterality Date    BACK SURGERY       SECTION      COLONOSCOPY N/A 2018    Procedure: COLONOSCOPY;  Surgeon: Brian Shaver MD;  Location: [facility];  Service: Endoscopy;  Laterality: N/A;    COLONOSCOPY N/A 2018    Performed by Brian Shaver MD at [facility] ENDO    ESOPHAGOGASTRODUODENOSCOPY N/A 2018    Procedure: ESOPHAGOGASTRODUODENOSCOPY (EGD);  Surgeon: Brian Shaver MD;  Location: [facility];  Service: Endoscopy;  Laterality: N/A;    ESOPHAGOGASTRODUODENOSCOPY (EGD) N/A 2018    Performed by Brian Shaver MD at [facility] ENDO    HYSTERECTOMY      OOPHORECTOMY       Family History   Problem Relation Age of Onset    Heart disease Mother     Diabetes Mother     Heart attack Mother     Hypertension Father     Heart attack Father     Ovarian cancer Sister     Breast cancer Paternal Grandmother      Social History     Socioeconomic History    Marital status:      Spouse name: None    Number of children: None    Years of  education: None    Highest education level: None   Social Needs    Financial resource strain: None    Food insecurity - worry: None    Food insecurity - inability: None    Transportation needs - medical: None    Transportation needs - non-medical: None   Occupational History    None   Tobacco Use    Smoking status: Current Every Day Smoker    Smokeless tobacco: Never Used   Substance and Sexual Activity    Alcohol use: Yes     Comment: occasionally    Drug use: No    Sexual activity: No   Other Topics Concern    None   Social History Narrative    None       Current Outpatient Medications   Medication Sig Dispense Refill    albuterol (PROVENTIL) 2.5 mg /3 mL (0.083 %) nebulizer solution Take 3 mLs (2.5 mg total) by nebulization daily as needed for Wheezing. Rescue 1 Box 2    ALPRAZolam (XANAX) 1 MG tablet Take 1 mg by mouth 2 (two) times daily.      busPIRone (BUSPAR) 15 MG tablet Take 1 tablet (15 mg total) by mouth 2 (two) times daily. 60 tablet 2    cloNIDine 0.3 mg/24 hr td ptwk (CATAPRES) 0.3 mg/24 hr Place 1 patch onto the skin every 7 days.      doxazosin (CARDURA) 1 MG tablet Take 2 tablets (2 mg total) by mouth once daily. 60 tablet 2    ezetimibe (ZETIA) 10 mg tablet Take 1 tablet (10 mg total) by mouth once daily. 30 tablet 2    hydrALAZINE (APRESOLINE) 100 MG tablet Take 1 tablet (100 mg total) by mouth 2 (two) times daily. 60 tablet 2    metoprolol tartrate (LOPRESSOR) 50 MG tablet Take 1 tablet (50 mg total) by mouth 2 (two) times daily. 60 tablet 2    pantoprazole (PROTONIX) 40 MG tablet Take 1 tablet (40 mg total) by mouth once daily. 30 tablet 6    quinapril (ACCUPRIL) 40 MG tablet Take 1 tablet (40 mg total) by mouth every evening. 30 tablet 2    sucralfate (CARAFATE) 1 gram tablet Take 1 tablet (1 g total) by mouth 4 (four) times daily. 120 tablet 0    zolpidem (AMBIEN) 10 mg Tab Take 5 mg by mouth nightly as needed.       Current Facility-Administered Medications  "  Medication Dose Route Frequency Provider Last Rate Last Dose    lidocaine (PF) 10 mg/ml (1%) injection 10 mg  1 mL Intradermal Once Brian Shaver MD         Review of patient's allergies indicates:  No Known Allergies    Review of Systems   Constitutional: Negative for activity change, appetite change, chills and fever.   HENT: Negative for congestion, dental problem and ear discharge.    Eyes: Negative for discharge and itching.   Respiratory: Negative for apnea, choking and chest tightness.    Cardiovascular: Negative for chest pain and leg swelling.   Gastrointestinal: Positive for abdominal pain. Negative for abdominal distention, anal bleeding, constipation, diarrhea and nausea.   Endocrine: Negative for cold intolerance and heat intolerance.   Genitourinary: Negative for difficulty urinating and dyspareunia.   Musculoskeletal: Negative for arthralgias and back pain.   Skin: Negative for color change and pallor.   Neurological: Negative for dizziness and facial asymmetry.   Hematological: Negative for adenopathy. Does not bruise/bleed easily.   Psychiatric/Behavioral: Negative for agitation and behavioral problems.       Objective:      Vitals:    09/27/18 1441   BP: 112/62   Pulse: 63   Resp: 18   SpO2: (!) 94%   Weight: 58.5 kg (129 lb)   Height: 5' 7" (1.702 m)     Physical Exam   Constitutional: She is oriented to person, place, and time. She appears well-developed and well-nourished. No distress.   HENT:   Head: Normocephalic and atraumatic.   Eyes: EOM are normal. Pupils are equal, round, and reactive to light.   Neck: Normal range of motion. Neck supple. No thyromegaly present.   Cardiovascular: Normal rate and regular rhythm.   Pulmonary/Chest: Effort normal and breath sounds normal.   Abdominal: Soft. Bowel sounds are normal. She exhibits no distension. There is no tenderness.   Musculoskeletal: Normal range of motion. She exhibits no edema or deformity.   Neurological: She is alert and " oriented to person, place, and time. No cranial nerve deficit.   Skin: Skin is warm. Capillary refill takes less than 2 seconds. No erythema.   Psychiatric: She has a normal mood and affect. Her behavior is normal.       Lab Review:   CBC:   Lab Results   Component Value Date    WBC 2.66 (L) 09/24/2018    RBC 3.67 (L) 09/24/2018    HGB 8.8 (L) 09/24/2018    HCT 30.6 (L) 09/24/2018     09/24/2018     BMP:   Lab Results   Component Value Date    GLU 83 09/24/2018     09/24/2018    K 4.3 09/24/2018     09/24/2018    CO2 28 09/24/2018    BUN 23 (H) 09/24/2018    CREATININE 0.9 09/24/2018    CALCIUM 8.4 (L) 09/24/2018     Diagnostics Review: CT: Reviewed CT scan shows mild biliary ductal dilatation.  CT scan also shows a concerning left adnexal mass versus cyst.     Assessment:       1. Generalized abdominal pain    2. Ovarian mass, left    3. Pre-op testing        Plan:   Generalized abdominal pain  -     US Abdomen Limited; Future; Expected date: 09/27/2018  -     Case Request Operating Room: COLONOSCOPY  -     Place in Outpatient; Standing  -     Vital signs; Standing  -     Insert peripheral IV; Standing  -     Notify Physician; Standing  -     Diet NPO; Standing  -     Place CLARE hose; Standing  -     Place sequential compression device; Standing  -     Full code; Standing    Ovarian mass, left  -     Ambulatory Referral to Obstetrics / Gynecology    Pre-op testing  -     EKG 12-lead; Future; Expected date: 10/27/2018  -     CBC auto differential; Future; Expected date: 09/27/2018  -     Comprehensive metabolic panel; Future; Expected date: 09/27/2018    Other orders  -     lidocaine (PF) 10 mg/ml (1%) injection 10 mg; Inject 1 mL (10 mg total) into the skin once.  -     0.9%  NaCl infusion; Inject into the vein continuous.        Medical Decision Making/Counseling:    Patient with history of colonoscopy however incomplete prep.  Will plan for repeat colonoscopy in the near future.  Patient also  with concerning finding of left adnexal cyst versus mass.  Will refer the patient to OBGYN for further characterization and delineation of this mass especially in light of patient's weight loss.  From a standpoint of the biliary etiology, will follow this up with abdominal ultrasound for further characterization and delineation.  Risk and benefits colonoscopy were discussed in detail in clinic today.  Patient wishes to proceed near future.    Risks and benefits of endoscopy were discussed in depth in clinic.  From a procedural standpoint, we discuss the benefits of colonoscopy to be finding colon cancers at early stages, including polyps which can be endoscopically resectable, to finding early stage colon cancers which can be better treated with current medical and surgical therapies in order to give patients a longer survival, if found in these early stages.  From a standpoint of risks, the risk of bleeding and perforation of the colon were discussed.  I personally discussed that if complications of bleeding or perforation were to occur, the patient could need as little as a blood transfusion and as much as possible hospital admission, repeat procedure, or even surgery.  During today's discussion of the procedure of colonoscopy with the patient, I personally kaci the patient a picture to assist with counseling.  Total clinic time spent today 45 minutes with greater than half of the time spent in face to face counseling.

## 2018-10-22 PROBLEM — M79.2 NEURITIS: Status: ACTIVE | Noted: 2018-01-01

## 2018-10-22 PROBLEM — L97.512 SKIN ULCER OF TOE OF RIGHT FOOT WITH FAT LAYER EXPOSED: Status: ACTIVE | Noted: 2018-01-01

## 2018-10-22 PROBLEM — I73.9 PERIPHERAL VASCULAR DISEASE: Status: ACTIVE | Noted: 2018-01-01

## 2018-10-22 PROBLEM — L03.031 CELLULITIS OF THIRD TOE OF RIGHT FOOT: Status: ACTIVE | Noted: 2018-01-01

## 2018-10-26 NOTE — TELEPHONE ENCOUNTER
----- Message from Gonzales Miranda DPM sent at 10/26/2018 11:22 AM CDT -----  Please call the patient regarding her abnormal result.Call and advise C&S + staph she needs to continue bactrim as rx.  TY

## 2018-10-27 NOTE — PROGRESS NOTES
Subjective:       Patient ID: Misty Khalil is a 58 y.o. female.    Chief Complaint: Foot Pain; Foot Problem; and Callouses  Patient presents today she was last seen over 3 years ago she states that she noticed blistering and breakdown on the 2nd toe on her foot this has subsequently gotten better however the 3rd toe on the right has started to breakdown  And has become significantly painful patient states that she did not traumatized or injure these areas however she does wear socks all of the time in the house and frequently goes without shoes.  Patient has been applying antibiotic ointment peroxide cream and lotion to this toe she states she thinks it has gotten worse.  HPI  Review of Systems   Musculoskeletal: Positive for gait problem and joint swelling.   Skin: Positive for color change and wound.   All other systems reviewed and are negative.      Objective:      Physical Exam   Constitutional: She appears well-developed and well-nourished.   Cardiovascular:   Pulses:       Dorsalis pedis pulses are 0 on the right side, and 0 on the left side.        Posterior tibial pulses are 0 on the right side, and 0 on the left side.   Musculoskeletal: She exhibits edema, tenderness and deformity.        Right foot: There is decreased range of motion and deformity.   Feet:   Right Foot:   Protective Sensation: 4 sites tested. 3 sites sensed.   Skin Integrity: Positive for skin breakdown, erythema and warmth.   Left Foot:   Protective Sensation: 4 sites tested. 3 sites sensed.   Skin Integrity: Positive for skin breakdown.   Neurological: She is alert.   Skin: Capillary refill takes more than 3 seconds. Rash noted. There is erythema. There is pallor.   Psychiatric: She has a normal mood and affect. Her behavior is normal. Judgment and thought content normal.   Nursing note and vitals reviewed.      On evaluation patient has abrasions on the 2nd digit left these are well resolving at this time and displayed no significant  signs of infection the 3rd digit on the right has significant vascular compromise there is erythema edema and drainage noted on the digit patient has significant peripheral vascular disease bilateral severe pain is noted upon palpation 3rd digit right.  Assessment:       1. Skin ulcer of toe of right foot with fat layer exposed    2. Peripheral vascular disease    3. Cellulitis of third toe of right foot    4. Neuritis        Plan:       Following extensive new patient evaluation a complete examination was performed today patient has significant peripheral vascular disease pulses are nonpalpable bilateral patient has ischemia of the 3rd digit right with obvious signs of infection culture and sensitivity was performed on the area patient has been applying antibiotic ointment to the area as well as peroxide she is going to discontinue this immediately there is concern about underlying both bacterial and fungal infection in addition to ischemia patient has significant pain on evaluation.  Culture and sensitivity was performed on the digit patient has been started on Bactrim she has been advised to stop putting anything on her toe other than pain in the area with Betadine applying a light dressing to the area she is also going to start taking daily aspirin to help blood flow plan it will be to follow up with patient in 1 week she is not to get the area wet not to scrub her clean it I also gave her prescription for pain medication today to help with her ischemic pain.  Subsequent culture and sensitivity positive for Staph patient has been instructed to continue to attend continue taking Bactrim as prescribed.  Total face-to-face time including discussion evaluation new patient exam equaled 35 min this includes wound care.

## 2018-12-07 NOTE — DISCHARGE INSTRUCTIONS
Right shoulder injury    Obtain repeat XR with Ortho   Percocet 1-2 every 6 hours --narcotic precautions  Prednisone taper    Follow-up with Dr. hinton as planned

## 2018-12-07 NOTE — ED PROVIDER NOTES
The Encounter Date: 2018       History     Chief Complaint   Patient presents with    Shoulder Pain     right shoulder pain and swelling x 2 weeks-seen at Urgent Care twice for same-referred to ortho-appt on 18-hx of RA    Elbow Pain     left elbow pain and swelling     50-year-old female with history of RA complains of traumatic right shoulder injury 1 week to 10 days ago with negative x-rays at urgent care - the continued pain and swelling, and a nontraumatic left elbow pain.    She has follow-up with Dr. Moulton at [facility] in the following days     She has now ran out of a short course of pain medications as prescribed by urgent care    She is interviewed and examined in bed 5              Review of patient's allergies indicates:  No Known Allergies  Past Medical History:   Diagnosis Date    Anxiety     Depression     GI bleed     Hypertension     Rheumatoid arthritis      Past Surgical History:   Procedure Laterality Date    BACK SURGERY       SECTION      COLONOSCOPY N/A 2018    Procedure: COLONOSCOPY;  Surgeon: Brian Shaver MD;  Location: [facility];  Service: Endoscopy;  Laterality: N/A;    COLONOSCOPY N/A 2018    Performed by [facility]aver [facility] at [facility] ENDO    ESOPHAGOGASTRODUODENOSCOPY N/A 2018    Procedure: ESOPHAGOGASTRODUODENOSCOPY (EGD);  Surgeon: Brian hSaver MD;  Location: [facility];  Service: Endoscopy;  Laterality: N/A;    ESOPHAGOGASTRODUODENOSCOPY (EGD) N/A 2018    Performed by Brian [facility]aver MD at [facility] ENDO    HYSTERECTOMY      OOPHORECTOMY       Family History   Problem Relation Age of Onset    Heart disease Mother     Diabetes Mother     Heart attack Mother     Hypertension Father     Heart attack Father     Ovarian cancer Sister     Breast cancer Paternal Grandmother      Social History     Tobacco Use    Smoking status: Current Every Day Smoker     Packs/day: 1.00     Types: Cigarettes     Smokeless tobacco: Never Used   Substance Use Topics    Alcohol use: Yes     Comment: occasionally    Drug use: No     Review of Systems   Constitutional: Negative.    Respiratory: Negative.    Cardiovascular: Negative.    Gastrointestinal: Negative.    Musculoskeletal: Positive for arthralgias and joint swelling. Negative for neck pain and neck stiffness.   Skin: Negative.    Neurological: Negative for weakness and numbness.   Hematological: Negative.    All other systems reviewed and are negative.      Physical Exam     Initial Vitals [12/06/18 2303]   BP Pulse Resp Temp SpO2   (!) 179/88 79 (!) 26 97.7 °F (36.5 °C) 99 %      MAP       --         Physical Exam    Nursing note and vitals reviewed.  Constitutional: She appears well-developed and well-nourished. She is not diaphoretic. No distress.   HENT:   Head: Normocephalic and atraumatic.   Nose: Nose normal.   Mouth/Throat: Oropharynx is clear and moist. No oropharyngeal exudate.   Eyes: Conjunctivae and EOM are normal. Pupils are equal, round, and reactive to light. Right eye exhibits no discharge. Left eye exhibits no discharge. No scleral icterus.   Neck: Normal range of motion. Neck supple.   Cardiovascular: Normal rate, regular rhythm, normal heart sounds and intact distal pulses. Exam reveals no gallop and no friction rub.    No murmur heard.  Pulmonary/Chest: Breath sounds normal. No respiratory distress. She has no wheezes. She has no rhonchi. She has no rales.   Abdominal: Soft. Bowel sounds are normal. She exhibits no distension and no mass. There is no tenderness. There is no rebound and no guarding.   Musculoskeletal: She exhibits edema and tenderness.        Right shoulder: She exhibits decreased range of motion, tenderness, bony tenderness, swelling, effusion and pain. She exhibits no crepitus, no deformity, no laceration, normal pulse and normal strength.        Left elbow: She exhibits swelling. She exhibits normal range of motion, no  effusion, no deformity and no laceration. Tenderness found. Olecranon process tenderness noted.   Right shoulder is with appearance of anterior contusion it appears there is a tense effusion present and soft tissue swelling    She is neurovascular intact distally the right upper extremity    Left elbow has appearance of olecranon bursitis - and is without acute joint effusion or inflamed appearance   Lymphadenopathy:     She has no cervical adenopathy.   Neurological: She is alert and oriented to person, place, and time. She has normal strength. No cranial nerve deficit or sensory deficit.   Skin: Skin is warm and dry. Capillary refill takes less than 2 seconds. No rash noted. No erythema. No pallor.   Psychiatric: She has a normal mood and affect. Her behavior is normal. Judgment and thought content normal.         ED Course   Procedures  Labs Reviewed - No data to display       Imaging Results    None          Medical Decision Making:   Initial Assessment:   Patient has obvious painful injury and/or inflammatory process at the right glenohumeral joint    She has prior negative x-rays in the past week 2 weeks    She has scheduled orthopedic follow-up in the following days    Discussed repeating x-rays here versus obtain them in clinic and she deferred to clinic    At this time we will provide analgesia and a course of steroids should this be a flare of her underlying a reported RA                          Clinical Impression:   The primary encounter diagnosis was Injury of right shoulder, initial encounter. A diagnosis of Rheumatoid arthritis, involving unspecified site, unspecified rheumatoid factor presence was also pertinent to this visit.      Disposition:   Disposition: Discharged  Condition: Stable                        Gene Monzon MD  12/07/18 0128

## 2018-12-11 NOTE — TELEPHONE ENCOUNTER
+Pt requesting order for Potty.  Please advise, thank you.      ----- Message from Diann Juarez sent at 12/11/2018  2:17 PM CST -----  Type: Needs Medical Advice    Who Called:  Patient  Best Call Back Number: [phone]  Additional Information: Patient requesting to speak with nurse concerning ordering a comode chair for toilet/please call patient back to advise.

## 2018-12-16 NOTE — ED NOTES
Attempted to call ride home for patient, patient unable to recall any contact information or phone numbers.

## 2018-12-16 NOTE — DISCHARGE INSTRUCTIONS
Continue medications asDirected.  Follow-up with orthopedist as previously scheduled.  Please make an appointment and follow up with primary care provider in the next 2-3 days for further care and management of your chronic pain condition.  Follow-up with primary care to arrange outpatient physical therapy as well.  Return to the ER if condition changes or worsens.  Please adjust her environment to decrease her risk of falls and decrease her risk of further injuries.  May use over-the-counter topical treatments such as Arnav-Maldonado or Aspercreme to help relieve pain. May use warm moist compresses or ice packs intermittently to relieve pain and discomfort.

## 2018-12-16 NOTE — ED NOTES
Bed: Exam 05  Expected date: 12/16/18  Expected time: 1:27 AM  Means of arrival: Ambulance Service  Comments:

## 2018-12-16 NOTE — ED NOTES
Pt presents to the ER with complaints of shoulder pain that has been worsening over the last 8 months.

## 2018-12-16 NOTE — ED PROVIDER NOTES
Encounter Date: 2018       History     Chief Complaint   Patient presents with    Shoulder Pain     all her joints are hurting , dx with RA     Patient presents to ER via EMS complaining pain from her neck down.  Patient has diagnosis of rheumatoid arthritis, complaining of several falls recently, last seen 2018 here.  Patient also states that she has been times at local urgent care, states she is out of pain medication having taken her last dose several hours ago.  Patient states she has not seen her primary care provider in over 6 months.  Relates that she has an appointment upcoming with orthopedist in 2 days.  Patient states she fell again approximately 4-5 days ago re-injuring her left shoulder and her right elbow.  Patient states that she did not seek care at that time because she thought that it would mellow out.          Review of patient's allergies indicates:  No Known Allergies  Past Medical History:   Diagnosis Date    Anxiety     Depression     GI bleed     Hypertension     Rheumatoid arthritis      Past Surgical History:   Procedure Laterality Date    BACK SURGERY       SECTION      COLONOSCOPY N/A 2018    Procedure: COLONOSCOPY;  Surgeon: Brian Shaver MD;  Location: [facility];  Service: Endoscopy;  Laterality: N/A;    COLONOSCOPY N/A 2018    Performed by Brian Shaver MD at [facility] ENDO    ESOPHAGOGASTRODUODENOSCOPY N/A 2018    Procedure: ESOPHAGOGASTRODUODENOSCOPY (EGD);  Surgeon: Brian Shaver MD;  Location: [facility];  Service: Endoscopy;  Laterality: N/A;    ESOPHAGOGASTRODUODENOSCOPY (EGD) N/A 2018    Performed by Brian Shaver MD at [facility] ENDO    HYSTERECTOMY      OOPHORECTOMY       Family History   Problem Relation Age of Onset    Heart disease Mother     Diabetes Mother     Heart attack Mother     Hypertension Father     Heart attack Father     Ovarian cancer Sister     Breast cancer Paternal  Grandmother      Social History     Tobacco Use    Smoking status: Current Every Day Smoker     Packs/day: 1.00     Types: Cigarettes    Smokeless tobacco: Never Used   Substance Use Topics    Alcohol use: Yes     Comment: occasionally    Drug use: No     Review of Systems   Constitutional: Negative.    HENT: Negative.    Eyes: Negative.    Respiratory: Negative.    Cardiovascular: Negative.    Gastrointestinal: Negative.    Endocrine: Negative.    Genitourinary: Negative.    Musculoskeletal: Positive for arthralgias, back pain and neck pain.   Allergic/Immunologic: Negative.    Neurological: Negative.    Hematological: Negative.    Psychiatric/Behavioral: Negative.        Physical Exam     Initial Vitals [12/16/18 0129]   BP Pulse Resp Temp SpO2   118/83 83 16 97.9 °F (36.6 °C) 100 %      MAP       --         Physical Exam    Nursing note and vitals reviewed.  Constitutional: She appears well-developed and well-nourished. She is not diaphoretic. No distress.   Thin ill-appearing white female appearing older than stated age.   HENT:   Head: Normocephalic and atraumatic.   Eyes: Conjunctivae and EOM are normal. Pupils are equal, round, and reactive to light.   Neck: Normal range of motion. Neck supple.   Cardiovascular: Normal rate, regular rhythm, normal heart sounds and intact distal pulses. Exam reveals no gallop and no friction rub.    No murmur heard.  Pulmonary/Chest: Breath sounds normal. No respiratory distress. She has no wheezes. She has no rales.   Abdominal: Soft. Bowel sounds are normal. She exhibits no distension. There is no tenderness.   Musculoskeletal: She exhibits edema and tenderness.   Patient with obvious effusion to right elbow, no redness or heat noted. Mild ecchymosis noted to left anterior shoulder.  No deformity appreciated.  Multiple scattered abrasions and ecchymoses on both upper and lower extremities.  Patient states these are from her numerous falls recently.   Neurological: She is  alert and oriented to person, place, and time. She has normal strength.   Skin: Skin is warm and dry. Capillary refill takes less than 2 seconds. No rash noted. No erythema.   Psychiatric: She has a normal mood and affect. Her behavior is normal. Judgment and thought content normal.         ED Course   Procedures  Labs Reviewed - No data to display       Imaging Results    None          Medical Decision Making:   ED Management:  Long discussion had with patient about her diagnosis,  likelihood of discomfort related to diagnoses, and her need for routine continued primary care provider.  Patient strongly advised to find a primary care provider that she trusts and likes and to see them very frequently, more often than every 6 months.  I explained to patient that she will likely need chronic pain medication, whether this is narcotics or otherwise, and that visits to the emergency department an urgent cares are probably not the best way to manage her chronic medical issues.  Patient voiced understanding.                   ED Course as of Dec 16 0305   Sun Dec 16, 2018   0302 X-rays left shoulder and right elbow showed no evidence of fracture or dislocation.  [MD]      ED Course User Index  [MD] Sasha Sagastume MD     Clinical Impression:   The primary encounter diagnosis was Rheumatoid arthritis involving multiple sites, unspecified rheumatoid factor presence. A diagnosis of Elbow pain, right was also pertinent to this visit.                             Sasha Sagastume MD  12/16/18 0305

## 2018-12-20 PROBLEM — L08.9 SOFT TISSUE INFECTION: Status: ACTIVE | Noted: 2018-01-01

## 2018-12-21 PROBLEM — M00.9 SEPTIC ARTHRITIS OF KNEE, RIGHT: Status: ACTIVE | Noted: 2018-01-01

## 2018-12-21 NOTE — ASSESSMENT & PLAN NOTE
Cardiology was consulted for concern of endocarditis due to bilateral upper extremity abscesses resulting from septic emboli. BHARAT done at outside hospital showed normal systolic function with no vegetation on any valves. She was treated for MSSA bacteremia and started on vancomycin, clindamycin, and zosyn. Blood cultures on this hospital admission are in process. Due to normal BHARAT, low suspicion of endocarditis at this time. Continue care per primary team.     ID consulted, agree with them to perform imaging of spine to evaluate spinal hardware, will defer to primary team if suitable to use contrast to assess for possibility of etiology of infection.

## 2018-12-21 NOTE — PROGRESS NOTES
Pt transferred to OR via RN. Pt attached to portable monitor. Chart and consents with patient. Care assumed by OR RN.

## 2018-12-21 NOTE — HPI
Ms. Khalil is a 58F with medical hx of HTN, anemia, RA who was transferred from [facility] overnight for evaluation fo necrotizing fascitis. She initially presented on 12/17 with right shoulder pain, swelling, and fevers and found to have bilateral upper extremity abscesses: right arm 14.3x4.3x2.7 collection, left axilla 8.3x5.4x7.7 fluid collection. She is s/p I&D in ED and initiation of vancomycin and zosyn.  She was taken to OR for incision and washout 12/18 of right hand, RUE, and LUE abscess by Dr. Falk (orthopedic surgery) and again on 12/19 for repeat washout. She was additionally found to have left olecranon abscess, left hand abscess. Cultures grew MSSA and she was transitioned to Ancef at some point during her course. Of note, she has history of back surgery with implanted hardware.      Her course was complicated by anemia (s/p 2u pRBC) and concern for mitral valve vegetation on TTE 12/18. BHARAT performed at OSH was negative for any vegetation on valves. She had normal systolic function.     Cardiology was consulted for concern of endocarditis.

## 2018-12-21 NOTE — TRANSFER OF CARE
"Anesthesia Transfer of Care Note    Patient: Misty Khalil    Procedure(s) Performed: Procedure(s) (LRB):  DEBRIDEMENT, WOUND (Bilateral)  ARTHROSCOPY, KNEE (Right)  ARTHROSCOPY, KNEE, septic (Right)    Patient location: ICU    Anesthesia Type: general    Transport from OR: Transported from OR on 6-10 L/min O2 by face mask with adequate spontaneous ventilation. Continuous ECG monitoring in transport. Continuous SpO2 monitoring in transport    Post pain: adequate analgesia    Post assessment: no apparent anesthetic complications and tolerated procedure well    Post vital signs: stable    Level of consciousness: alert and awake    Nausea/Vomiting: no nausea/vomiting    Complications: none    Transfer of care protocol was followed      Last vitals:   Visit Vitals  /64   Pulse 80   Temp 36.7 °C (98 °F) (Oral)   Resp 20   Ht 5' 7" (1.702 m)   Wt 69.9 kg (154 lb)   SpO2 100%   BMI 24.12 kg/m²     "

## 2018-12-21 NOTE — PLAN OF CARE
Extended Emergency Contact Information  Primary Emergency Contact: Jimmy Logan   [place]  Mobile Phone: [phone]  Relation: Friend    Lissette E Scarlett, FNP  [address] [facility] [place] [address]    No future appointments.    Payor: MEDICARE / Plan: MEDICARE PART A & B / Product Type: Government /       [facility] - [facility], [place] - [address]  [address]  [facility] [place] [address]  Phone: [phone] Fax: [phone]       12/21/18 1700   Discharge Assessment   Assessment Type Discharge Planning Assessment   Confirmed/corrected address and phone number on facesheet? Yes   Assessment information obtained from? Caregiver;Medical Record   Expected Length of Stay (days) 5   Communicated expected length of stay with patient/caregiver yes   Prior to hospitilization cognitive status: Alert/Oriented   Prior to hospitalization functional status: Assistive Equipment   Current cognitive status: Alert/Oriented   Current Functional Status: Completely Dependent   Lives With child(charlie), adult   Able to Return to Prior Arrangements yes   Is patient able to care for self after discharge? Unable to determine at this time (comments)   Readmission Within the Last 30 Days no previous admission in last 30 days   Patient currently being followed by outpatient case management? No   Patient currently receives any other outside agency services? No   Equipment Currently Used at Home cane, straight;walker, rolling   Do you have any problems affording any of your prescribed medications? No   Is the patient taking medications as prescribed? yes   Does the patient have transportation home? Yes   Transportation Anticipated family or friend will provide   Discharge Plan A Home with family;Home Health   Discharge Plan B Skilled Nursing Facility   Patient/Family in Agreement with Plan yes

## 2018-12-21 NOTE — ANESTHESIA PREPROCEDURE EVALUATION
12/21/2018  [facility]  Anesthesia Pre-Operative Evaluation         Patient Name: Misty Khalil  YOB: 1960  MRN: [id number]    SUBJECTIVE:     Pre-operative evaluation for Procedure(s) (LRB):  DEBRIDEMENT, WOUND (Bilateral)  ARTHROSCOPY, KNEE (Right)     12/21/2018    Misty Khalil is a 58 y.o. female w/ a significant PMHx of COPD (intermittently wears home O2), HTN, HLD, PVD, Anemia, RA, GIB transferred from [facility] for necrotizing fascitis eval. Initial presentation 12/17 with Right shoulder pain and found to have 14.3x4.3x2.7 collection RUE and Left axilla 8.3x5.4x7.7 fluid collection. Pt is now s/p I&D in [facility] ED and initiation of vancomycin and zosyn.  She was taken to OR for washout 12/18 of right hand, RUE, and LUE abscess and again on 12/19 for  Repeat washout and additionally found to have left olecranon abscess, left hand abscess. Cultures grew MSSA and she was transitioned to Ancef at some point during her course. Of note she has history of back surgery with implanted hardware.      Her course was c/b anemia (s/p 2u pRBC) and concern for mitral valve vegetation on TTE 12/18. BHARAT performed at OSH was negative for any pathology. She underwent EGD 12/19 revealing erosive esophagitis.      Of note, pt recently completed prednisone taper for shoulder pain in 12/15. Desats to mid-upper 80s while taking; Sats mid to low 90s at rest. On room air presently.        Patient now presents for the above procedure(s).      LDA:        Percutaneous Central Line Insertion/Assessment - triple lumen  right internal jugular (Active)   Dressing biopatch in place;dressing dry and intact 12/21/2018 11:00 AM   Securement secured w/ sutures 12/21/2018 11:00 AM   Additional Site Signs no erythema;no warmth;no edema;no pain;no palpable cord;no streak formation;no drainage 12/21/2018 11:00  "AM   Distal Patency/Care infusing 12/21/2018 11:00 AM   Medial Patency/Care infusing 12/21/2018 11:00 AM   Proximal Patency/Care infusing 12/21/2018 11:00 AM   Waveform other (see comments) 12/21/2018 11:00 AM   Line Interventions line leveled/zeroed 12/21/2018 11:00 AM   Daily Line Review Performed 12/21/2018 11:00 AM   Number of days:             Urethral Catheter 12/20/18 2335 Latex 16 Fr. (Active)   Site Assessment Clean;Intact 12/21/2018 11:00 AM   Collection Container Urimeter 12/21/2018 11:00 AM   Securement Method secured to top of thigh w/ adhesive device 12/21/2018 11:00 AM   Catheter Care Performed yes 12/21/2018 11:00 AM   Reason for Continuing Urinary Catheterization Critically ill in ICU requiring intensive monitoring 12/21/2018 11:00 AM   CAUTI Prevention Bundle StatLock in place w 1" slack;Intact seal between catheter & drainage tubing;Drainage bag off the floor;Green sheeting clip in use;No dependent loops or kinks;Drainage bag not overfilled (<2/3 full);Drainage bag below bladder 12/21/2018 11:00 AM   Output (mL) 25 mL 12/21/2018 11:00 AM   Number of days: 0       Prev airway: None documented.    Drips:    sodium chloride 0.9% 125 mL/hr at 12/21/18 1100    nicardipine Stopped (12/21/18 0500)       Patient Active Problem List   Diagnosis    Hematochezia    Hypertension    Hyperlipidemia    Anxiety    Generalized abdominal pain    Skin ulcer of toe of right foot with fat layer exposed    Peripheral vascular disease    Cellulitis of third toe of right foot    Neuritis    Soft tissue infection    Septic arthritis of knee, right       Review of patient's allergies indicates:  No Known Allergies    Current Inpatient Medications:   acetaminophen  650 mg Oral Q8H    clindamycin (CLEOCIN) IVPB  900 mg Intravenous Q8H    metoprolol  5 mg Intravenous Q6H    nicotine  1 patch Transdermal Daily    pantoprazole  40 mg Intravenous BID    piperacillin-tazobactam (ZOSYN) IVPB  4.5 g Intravenous " Q8H    vancomycin (VANCOCIN) IVPB  1,000 mg Intravenous Q24H       No current facility-administered medications on file prior to encounter.      Current Outpatient Medications on File Prior to Encounter   Medication Sig Dispense Refill    albuterol (PROVENTIL) 2.5 mg /3 mL (0.083 %) nebulizer solution Take 3 mLs (2.5 mg total) by nebulization daily as needed for Wheezing. Rescue 1 Box 2    ALPRAZolam (XANAX) 1 MG tablet Take 1 mg by mouth 2 (two) times daily.      busPIRone (BUSPAR) 15 MG tablet Take 1 tablet (15 mg total) by mouth 2 (two) times daily. 60 tablet 2    cloNIDine 0.3 mg/24 hr td ptwk (CATAPRES) 0.3 mg/24 hr Place 1 patch onto the skin every 7 days.      doxazosin (CARDURA) 1 MG tablet Take 2 tablets (2 mg total) by mouth once daily. 60 tablet 2    ezetimibe (ZETIA) 10 mg tablet TAKE 1 TABLET (10 MG TOTAL) BY MOUTH ONCE DAILY. 30 tablet 2    hydrALAZINE (APRESOLINE) 100 MG tablet TAKE 1 TABLET BY MOUTH TWICE DAILY 60 tablet 2    metoprolol tartrate (LOPRESSOR) 50 MG tablet Take 1 tablet (50 mg total) by mouth 2 (two) times daily. 60 tablet 2    oxyCODONE-acetaminophen (PERCOCET) 5-325 mg per tablet Take 1-2 tablets by mouth every 4 (four) hours as needed for Pain. 30 tablet 0    predniSONE (DELTASONE) 10 MG tablet Day 1-2: Take 6 tabs PO x 2 days  Day 3-4: 5 tabs po x 2 days   Day 5-6: 4 po x 2 days   Day 7-8: 3 po x 2 days   Day 9-10: 2 po x 2 days   Day 11-12: 1 po x 2 days 42 tablet 0    quinapril (ACCUPRIL) 40 MG tablet Take 1 tablet (40 mg total) by mouth every evening. 30 tablet 2    zolpidem (AMBIEN) 10 mg Tab Take 5 mg by mouth nightly as needed.         Past Surgical History:   Procedure Laterality Date    BACK SURGERY       SECTION      COLONOSCOPY N/A 2018    Performed by Brian [facility]aver MD at [facility] ENDO    ESOPHAGOGASTRODUODENOSCOPY (EGD) N/A 2018    Performed by Brian Shaver MD at [facility] ENDO    HYSTERECTOMY      OOPHORECTOMY         Social  History     Socioeconomic History    Marital status:      Spouse name: Not on file    Number of children: Not on file    Years of education: Not on file    Highest education level: Not on file   Social Needs    Financial resource strain: Not on file    Food insecurity - worry: Not on file    Food insecurity - inability: Not on file    Transportation needs - medical: Not on file    Transportation needs - non-medical: Not on file   Occupational History    Not on file   Tobacco Use    Smoking status: Current Every Day Smoker     Packs/day: 1.00     Types: Cigarettes    Smokeless tobacco: Never Used   Substance and Sexual Activity    Alcohol use: Yes     Comment: occasionally    Drug use: No    Sexual activity: No   Other Topics Concern    Not on file   Social History Narrative    Not on file       OBJECTIVE:     Vital Signs Range (Last 24H):  Temp:  [36.4 °C (97.6 °F)-37.4 °C (99.3 °F)]   Pulse:  [66-94]   Resp:  [9-26]   BP: (121-220)/(62-93)   SpO2:  [97 %-100 %]       Significant Labs:  Lab Results   Component Value Date    WBC 16.24 (H) 12/21/2018    HGB 8.1 (L) 12/21/2018    HCT 25.5 (L) 12/21/2018     12/21/2018    ALT <5 (L) 12/21/2018    AST 22 12/21/2018     12/21/2018    K 4.2 12/21/2018     12/21/2018    CREATININE 1.7 (H) 12/21/2018    BUN 51 (H) 12/21/2018    CO2 18 (L) 12/21/2018    INR 1.0 12/20/2018    HGBA1C 5.1 12/21/2018       Diagnostic Studies: No relevant studies.    EKG:   Vent. Rate : 083 BPM     Atrial Rate : 083 BPM     P-R Int : 114 ms          QRS Dur : 080 ms      QT Int : 390 ms       P-R-T Axes : 079 066 085 degrees     QTc Int : 458 ms    Sinus rhythm with Premature atrial complexes  Otherwise normal ECG  When compared with ECG of 13-JUL-2018 13:25,  Premature atrial complexes are now Present  Confirmed by [place] MCKEE, SURESH ([id number]) on 12/21/2018 9:44:07 AM    2D ECHO:  No results found for this or any previous visit.    TTE Conclusion    · Concentric left ventricular remodeling.  · Normal left ventricular systolic function. The estimated ejection fraction is 65%  · Normal LV diastolic function.  · No wall motion abnormalities.  · Normal right ventricular systolic function.  · Intermediate central venous pressure (8 mm Hg).         ASSESSMENT/PLAN:         Anesthesia Evaluation    I have reviewed the Patient Summary Reports.     I have reviewed the Medications.   Steroids Taken In Past Year: Prednisone    Review of Systems  Anesthesia Hx:  No problems with previous Anesthesia  History of prior surgery of interest to airway management or planning: Previous anesthesia: General Denies Family Hx of Anesthesia complications.   Denies Personal Hx of Anesthesia complications.   Social:  Smoker, Social Alcohol Use Active smoker   Hematology/Oncology:     Oncology Normal    -- Anemia:   EENT/Dental:EENT/Dental Normal   Cardiovascular:   Hypertension Denies MI.    Denies Angina. PVD hyperlipidemia    Pulmonary:   COPD, moderate Denies Asthma.  Denies Recent URI. Intermittently uses home O2   Renal/:  Renal/ Normal     Hepatic/GI:   GERD, well controlled Erosive esophagitis   Musculoskeletal:   RUE and LUE abscess Spine Disorders: (LUMBAR FUSION) lumbar    Neurological:  Neurology Normal  Denies CVA. Denies Seizures.    Endocrine:  Endocrine Normal Denies Diabetes.    Psych:   anxiety depression          Physical Exam  General:  Well nourished    Airway/Jaw/Neck:  Airway Findings: Mouth Opening: Small, but > 3cm Tongue: Normal  General Airway Assessment: Adult  Mallampati: III  Improves to II with phonation.  TM Distance: Normal, at least 6 cm  Jaw/Neck Findings:  Neck ROM: Normal ROM  Neck Findings: Normal    Eyes/Ears/Nose:  Eyes/Ears/Nose Findings:    Dental:  Dental Findings: Edentulous   Chest/Lungs:  Chest/Lungs Findings: Clear to auscultation, Normal Respiratory Rate  Desats to mid-upper 80s while taking; Sats mid to low 90s at rest      Heart/Vascular:  Heart Findings: Rate: Normal  Rhythm: Regular Rhythm     Abdomen:  Abdomen Findings: Normal      Mental Status:  Mental Status Findings:  Cooperative, Alert and Oriented         Anesthesia Plan  Type of Anesthesia, risks & benefits discussed:  Anesthesia Type:  MAC, general  Patient's Preference:   Intra-op Monitoring Plan: standard ASA monitors  Intra-op Monitoring Plan Comments:   Post Op Pain Control Plan: per primary service following discharge from PACU, IV/PO Opioids PRN and multimodal analgesia  Post Op Pain Control Plan Comments:   Induction:   IV  Beta Blocker:  Patient is on a Beta-Blocker and has received one dose within the past 24 hours (No further documentation required).       Informed Consent: Patient representative understands risks and agrees with Anesthesia plan.  Questions answered. Anesthesia consent signed with patient representative.  ASA Score: 3     Day of Surgery Review of History & Physical:    H&P update referred to the provider.     Anesthesia Plan Notes: Pt asked that she go to sleep on her stretcher prior to being moved to the OR table due to BUE pain.         Ready For Surgery From Anesthesia Perspective.

## 2018-12-21 NOTE — HPI
Misty Khalil is a 58 year old F w/ MHx of HTN, RA, GI bleeds that was transferred from [facility] on 12/20.  She originally presented to [facility] on 12/17 w/ primary complaints of R shoulder pain, swelling, and fevers in the setting of multiple recent falls.  Workup there discovered bilateral abscesses in the RUE and L axilla.  She underwent bilateral arm I&D in the ED on 12/17 and was started on empiric abx coverage with vanc/zosyn.  Formal incision and washout was performed on 12/18 of the R hand, LUE, and RUE per orthopedic surgery.  Abscesses were packed and repeat washouts occurred 12/19.  At this time she was found to have additional L olecranon abscess and a L hand abscess.  Also at this time her BCx grew MSSA and she was switched to cefazolin.  TTE on 12/18 showed possible mitral valve vegetation, but BHARAT was negative for acute pathology.  Of note, she was treated w/ bactrim for MSSA infection of R 3rd toe in Sept 2018.  She was sent to [facility] on 12/20.      ID was consulted for possible endocarditis in setting of MSSA bacteremia.  Repeat TTE scheduled for today.  She is currently on vanc/zosyn/clindamycin (covering potential necrotizing fasciitis).  BCx and UCx in process, as well as knee fluid cultures (aerobic/anaerobic, fungus, AFB, gram stain).  Synovial fluid of R knee consistent w/ septic arthritis (146k WBCs).  Plan is for repeat washouts today w/ possible wound vac placement per general surgery, as well as arthroscopic I&D of R knee per orthopedic surgery.

## 2018-12-21 NOTE — SUBJECTIVE & OBJECTIVE
Past Medical History:   Diagnosis Date    Anxiety     Depression     GI bleed     Hypertension     Rheumatoid arthritis        Past Surgical History:   Procedure Laterality Date    BACK SURGERY       SECTION      COLONOSCOPY N/A 2018    [facility] ENDO    ESOPHAGOGASTRODUODENOSCOPY (EGD) N/A 2018    [facility]formed by [facility] at [facility] ENDO    HYSTERECTOMY      OOPHORECTOMY         Review of patient's allergies indicates:  No Known Allergies    Medications:  Facility-Administered Medications Prior to Admission   Medication    lidocaine (PF) 10 mg/ml (1%) injection 10 mg     Medications Prior to Admission   Medication Sig    albuterol (PROVENTIL) 2.5 mg /3 mL (0.083 %) nebulizer solution Take 3 mLs (2.5 mg total) by nebulization daily as needed for Wheezing. Rescue    ALPRAZolam (XANAX) 1 MG tablet Take 1 mg by mouth 2 (two) times daily.    busPIRone (BUSPAR) 15 MG tablet Take 1 tablet (15 mg total) by mouth 2 (two) times daily.    cloNIDine 0.3 mg/24 hr td ptwk (CATAPRES) 0.3 mg/24 hr Place 1 patch onto the skin every 7 days.    doxazosin (CARDURA) 1 MG tablet Take 2 tablets (2 mg total) by mouth once daily.    ezetimibe (ZETIA) 10 mg tablet TAKE 1 TABLET (10 MG TOTAL) BY MOUTH ONCE DAILY.    hydrALAZINE (APRESOLINE) 100 MG tablet TAKE 1 TABLET BY MOUTH TWICE DAILY    metoprolol tartrate (LOPRESSOR) 50 MG tablet Take 1 tablet (50 mg total) by mouth 2 (two) times daily.    oxyCODONE-acetaminophen (PERCOCET) 5-325 mg per tablet Take 1-2 tablets by mouth every 4 (four) hours as needed for Pain.    predniSONE (DELTASONE) 10 MG tablet Day 1-2: Take 6 tabs PO x 2 days  Day 3-4: 5 tabs po x 2 days   Day 5-6: 4 po x 2 days   Day 7-8: 3 po x 2 days   Day 9-10: 2 po x 2 days   Day 11-12: 1 po x 2 days    quinapril (ACCUPRIL) 40 MG tablet Take 1 tablet (40 mg total) by mouth every evening.    zolpidem (AMBIEN) 10 mg Tab Take 5 mg by mouth  nightly as needed.     Antibiotics (From admission, onward)    Start     Stop Route Frequency Ordered    12/21/18 2300  vancomycin in dextrose 5 % 1 gram/250 mL IVPB 1,000 mg  (Vancomycin IVPB with levels panel)      -- IV Every 24 hours (non-standard times) 12/21/18 0055    12/21/18 0830  clindamycin (CLEOCIN) 900 mg in dextrose 5 % 50 mL IVPB      -- IV Every 8 hours 12/21/18 0210    12/21/18 0000  piperacillin-tazobactam 4.5 g in sodium chloride 0.9% 100 mL IVPB (ready to mix system)      -- IV Every 8 hours (non-standard times) 12/20/18 2231        Antifungals (From admission, onward)    None        Antivirals (From admission, onward)    None             There is no immunization history on file for this patient.    Family History     Problem Relation (Age of Onset)    Breast cancer Paternal Grandmother    Diabetes Mother    Heart attack Mother, Father    Heart disease Mother    Hypertension Father    Ovarian cancer Sister        Social History     Socioeconomic History    Marital status:      Spouse name: Not on file    Number of children: Not on file    Years of education: Not on file    Highest education level: Not on file   Social Needs    Financial resource strain: Not on file    Food insecurity - worry: Not on file    Food insecurity - inability: Not on file    Transportation needs - medical: Not on file    Transportation needs - non-medical: Not on file   Occupational History    Not on file   Tobacco Use    Smoking status: Current Every Day Smoker     Packs/day: 1.00     Types: Cigarettes    Smokeless tobacco: Never Used   Substance and Sexual Activity    Alcohol use: Yes     Comment: occasionally    Drug use: No    Sexual activity: No   Other Topics Concern    Not on file   Social History Narrative    Not on file     Review of Systems   Constitutional: Positive for fever and unexpected weight change. Negative for chills and diaphoresis.   HENT: Positive for mouth sores. Negative  for congestion and sore throat.    Eyes: Negative for visual disturbance.   Respiratory: Positive for shortness of breath (When being moved around). Negative for cough, wheezing and stridor.    Cardiovascular: Negative for chest pain, palpitations and leg swelling.   Gastrointestinal: Positive for blood in stool (FOBT reportedly + at OSH) and constipation. Negative for abdominal distention, abdominal pain, diarrhea, nausea and vomiting.   Genitourinary: Positive for hematuria. Negative for dysuria, frequency and pelvic pain.   Musculoskeletal: Positive for arthralgias, back pain and myalgias.   Skin: Positive for wound. Negative for color change and rash.   Neurological: Positive for weakness and headaches. Negative for dizziness, tremors and facial asymmetry.   Psychiatric/Behavioral: Positive for dysphoric mood. Negative for agitation and confusion. The patient is nervous/anxious.      Objective:     Vital Signs (Most Recent):  Temp: 98.5 °F (36.9 °C) (12/21/18 0700)  Pulse: 76 (12/21/18 1000)  Resp: 11 (12/21/18 1000)  BP: 125/74 (12/21/18 1000)  SpO2: 100 % (12/21/18 1000) Vital Signs (24h Range):  Temp:  [97.6 °F (36.4 °C)-99.3 °F (37.4 °C)] 98.5 °F (36.9 °C)  Pulse:  [68-94] 76  Resp:  [10-26] 11  SpO2:  [97 %-100 %] 100 %  BP: (121-220)/(62-93) 125/74     Weight: 70.2 kg (154 lb 12.2 oz)  Body mass index is 24.98 kg/m².    Estimated Creatinine Clearance: 33.8 mL/min (A) (based on SCr of 1.7 mg/dL (H)).    Physical Exam   Constitutional: She is oriented to person, place, and time. She appears cachectic. She is cooperative. She is easily aroused. She has a sickly appearance. No distress.   HENT:   Head: Normocephalic and atraumatic.   Ulcer noted on tongue   Eyes: Conjunctivae and EOM are normal. No scleral icterus.   Neck: Normal range of motion. Neck supple.   Cardiovascular: Normal rate and regular rhythm.   Murmur (Systolic) heard.  Pulmonary/Chest: Effort normal and breath sounds normal. No respiratory  distress. She has no wheezes. She has no rales.   Abdominal: Soft. Bowel sounds are normal. She exhibits no distension. There is tenderness (Describes sensation of diffuse pressure when palpated). There is no rebound and no guarding.   Musculoskeletal: She exhibits tenderness. She exhibits no edema.   Neurological: She is alert, oriented to person, place, and time and easily aroused. No cranial nerve deficit.   Skin: Skin is warm and dry. No pallor.   Surgical wounds to bilateral UE's wrapped, visible blood   Psychiatric: She has a normal mood and affect.       Significant Labs:   Blood Culture: No results for input(s): LABBLOO in the last 4320 hours.  CBC:   Recent Labs   Lab 12/20/18 2243 12/21/18  0400   WBC 16.95* 16.24*   HGB 9.7* 8.1*   HCT 29.5* 25.5*    246     CMP:   Recent Labs   Lab 12/20/18 2243 12/21/18  0400   * 136   K 4.5 4.2    107   CO2 19* 18*   * 120*   BUN 51* 51*   CREATININE 1.8* 1.7*   CALCIUM 8.1* 7.5*   PROT 5.2* 4.7*   ALBUMIN 1.2* 1.1*   BILITOT 0.7 0.8   ALKPHOS 308* 268*   AST 24 22   ALT <5* <5*   ANIONGAP 11 11   EGFRNONAA 30.6* 32.8*     Lactic Acid:   Recent Labs   Lab 12/20/18 2243 12/21/18  0400   LACTATE 1.3 1.1     Wound Culture:   Recent Labs   Lab 10/22/18  1416   LABAERO STAPHYLOCOCCUS AUREUS  Moderate         Significant Imaging: I have reviewed all pertinent imaging results/findings within the past 24 hours.

## 2018-12-21 NOTE — HPI
Misty Khalil is a 58 y.o. female who presents as a transfer from [facility] for evaluation of BLE abscesses s/p debridement. Pt reports appx 1 month ago she fell and noticed subsequent swelling and ecchymosis to her bilateral arms. She was seen at several urgent cares and started on steroid taper for suspected RA flare.She presented to [facility] on 12/17 with persistent swelling, pain, and new onset fever. She was found to have abscess on BUE. She was started on vanc and zosyn and taken to the OR for I&D of BUE by Dr. Falk 12/18, wounds were left packed and left open.  Repeat washout on 12/19 she was found to have olecranon bursitis and left hand abscess, both of which were drained. BCx on 12/18 positive for MSSA bacteremia, TTE with possible anterior mitral valve vegetation, BHARAT negative for acute pathology.   Pt reports severe pain and weakness in bilateral upper extremities. She also c/o right knee pain and swelling, worse with any ROM of the right knee and worsening back pain over the past several weeks (s/p multiple unknown spine surgeries). Denies numbness/paresthesias to BUE and BLE. She has been unable to ambulate 2/2 pain. Denies hx of chronic steroid use or IV drug use.

## 2018-12-21 NOTE — H&P
[facility]  Critical Care - Surgery  History & Physical    Patient Name: Misty Khalil  MRN: [id number]  Admission Date: 2018  Code Status: Full Code  Attending Physician: Javier Kirk MD   Primary Care Provider: JOEL Mccauley   Principal Problem: Cellulitis    Subjective:     HPI: 58yoF w/ hx of HTN, anemia, RA, GiB transferred from [facility] overnight for evaluation fo necrotizing fascitis. Initial presentation  with Right shoulder pain, swelling, and fevers. Found to have bilateral upper extremity ultrasound (Right arm 14.3x4.3x2.7 collection, Left axilla 8.3x5.4x7.7 fluid collection s/p I&D in ED and initiation of vancomycin and zosyn.  She was taken to OR for incision and washout  of right hand, RUE, and LUE abscess by Dr. Falk (orthopedic surgery) and again on  for  Repeat washout and additionally found to have left olecranon abscess, left hand abscess. Cultures grew MSSA and she was transitioned to Ancef at some point during her course. Of note she has history of back surgery with implanted hardware.     Her course was complicated by anemia (s/p 2u pRBC) and concern for mitral valve vegetation on TTE . BHARAT performed at OSH was negative for any pathology. She underwent EGD  revealing erosive esophagitis, GI recommendations at that time were TPN, PPI BID.     Follow-up For: Procedure(s) (LRB):  DEBRIDEMENT, WOUND (Bilateral)  ARTHROSCOPY, KNEE (Right)    Post-Operative Day:       Past Medical History:   Diagnosis Date    Anxiety     Depression     GI bleed     Hypertension     Rheumatoid arthritis        Past Surgical History:   Procedure Laterality Date    BACK SURGERY       SECTION      COLONOSCOPY N/A 2018    [facility] ENDO    ESOPHAGOGASTRODUODENOSCOPY (EGD) N/A 2018    [facility]formed [facility] ENDO    HYSTERECTOMY      OOPHORECTOMY         Review of  patient's allergies indicates:  No Known Allergies    Family History     Problem Relation (Age of Onset)    Breast cancer Paternal Grandmother    Diabetes Mother    Heart attack Mother, Father    Heart disease Mother    Hypertension Father    Ovarian cancer Sister        Tobacco Use    Smoking status: Current Every Day Smoker     Packs/day: 1.00     Types: Cigarettes    Smokeless tobacco: Never Used   Substance and Sexual Activity    Alcohol use: Yes     Comment: occasionally    Drug use: No    Sexual activity: No      Review of Systems  Objective:     Vital Signs (Most Recent):  Temp: 98 °F (36.7 °C) (12/21/18 1100)  Pulse: 66 (12/21/18 1145)  Resp: (!) 9 (12/21/18 1145)  BP: (!) 140/77 (12/21/18 1130)  SpO2: 100 % (12/21/18 1145) Vital Signs (24h Range):  Temp:  [97.6 °F (36.4 °C)-99.3 °F (37.4 °C)] 98 °F (36.7 °C)  Pulse:  [66-94] 66  Resp:  [9-26] 9  SpO2:  [97 %-100 %] 100 %  BP: (121-220)/(62-93) 140/77     Weight: 69.9 kg (154 lb)  Body mass index is 24.12 kg/m².      Intake/Output Summary (Last 24 hours) at 12/21/2018 1214  Last data filed at 12/21/2018 1100  Gross per 24 hour   Intake 1131.9 ml   Output 565 ml   Net 566.9 ml       Physical Exam   Constitutional: She is oriented to person, place, and time. She appears well-developed.   Thin lady who appears older than stated age.   HENT:   Head: Normocephalic and atraumatic.   Eyes: EOM are normal. Pupils are equal, round, and reactive to light.   Neck: No JVD present. No tracheal deviation present.   Cardiovascular: Normal rate and regular rhythm.   Pulmonary/Chest: Breath sounds normal. No respiratory distress. She has no wheezes.   Abdominal: Soft. She exhibits no distension.   Genitourinary:   Genitourinary Comments: +Caro   Musculoskeletal:   B/L UE with bandages, scant serosanguinous discoloration. DP pulses intact in all extremities. Able to spontaneously move all extremities.    Neurological: She is alert and oriented to person, place, and  time.   Nursing note and vitals reviewed.      Vents:       Lines/Drains/Airways     Central Venous Catheter Line                 Percutaneous Central Line Insertion/Assessment - triple lumen  right internal jugular -- days          Drain                 Urethral Catheter 12/20/18 2335 Latex 16 Fr. less than 1 day                Significant Labs:    CBC/Anemia Profile:  Recent Labs   Lab 12/20/18  2243 12/21/18  0400   WBC 16.95* 16.24*   HGB 9.7* 8.1*   HCT 29.5* 25.5*    246   MCV 80* 85   RDW 18.5* 18.5*        Chemistries:  Recent Labs   Lab 12/20/18  2243 12/21/18  0400 12/21/18  1133   * 136  --    K 4.5 4.2  --     107  --    CO2 19* 18*  --    BUN 51* 51*  --    CREATININE 1.8* 1.7*  --    CALCIUM 8.1* 7.5*  --    ALBUMIN 1.2* 1.1*  --    PROT 5.2* 4.7*  --    BILITOT 0.7 0.8  --    ALKPHOS 308* 268*  --    ALT <5* <5*  --    AST 24 22  --    MG 1.7 1.6 2.7*   PHOS 4.5 3.9  --        All pertinent labs within the past 24 hours have been reviewed.    Significant Imaging: I have reviewed all pertinent imaging results/findings within the past 24 hours.    Assessment/Plan:     Active Diagnoses:    Diagnosis Date Noted POA    Septic arthritis of knee, right [M00.9] 12/21/2018 Yes    Soft tissue infection [L08.9] 12/20/2018 Yes      Problems Resolved During this Admission:       59yo F w/ hx of Anemia, HTN, RA (on prior prednisone taper) with extensive bilateral upper extremity abscesses now s/p two drainage/washouts at OSH.     Neuro: No history of seizures/strokes.   Pain: Dilaudid , Tylenol    CVS: Hemodynamically stable without inotrope/pressor requirement. BHARAT @ OSH negative for vegetation.   History of HTN: Continue cardene gtt    Pulm: Chronic smoker  Comfortable on room air,   - Continuous pulse oximetry, incentive spirometry.    Renal:   - VIDYA , Cr now 1.7, some improvement with volume resuscitation  - Daily BMP, Strict I/O (+Caro)    FEN/GI  Erosive Esophagitis: Continue PPI BID,  TPN  NPO for procedure 12/21, start TPN post-op  IVF: LR @ 150cc/hr    MSK/ ID:   Staph bacteremia manifesting as necrotizing fascitis of bilateral upper extremity, now septic arthritis of right knee. BHARAT @OSH negative for endocarditis. Presentation concerning for seeding from back hardware. Orthopedics consulted for septic knee already, appreciate input ; would like to obtain MRI imaging if helpful.  Infectious disease consulted for antibiotic duration management.  - Continue Vanc/Zosyn/Clindamycin  - Repeat cultures obtained  - Will likely go to OR for washout, wound vac    Heme/Onc. H/H stable, continue to monitor    PPX:   SCD, restart SQH after OR  Protonix BID  PT/OT    Dispo: Continue ICU care for now  Primary: ACS     Critical care was time spent personally by me on the following activities: development of treatment plan with patient or surrogate and bedside caregivers, discussions with consultants, evaluation of patient's response to treatment, examination of patient, ordering and performing treatments and interventions, ordering and review of laboratory studies, ordering and review of radiographic studies, pulse oximetry, re-evaluation of patient's condition.  This critical care time did not overlap with that of any other provider or involve time for any procedures.     Alton Florez MD  Critical Care - Surgery  [facility]

## 2018-12-21 NOTE — CONSULTS
Consult received. Full note to follow.    Please call for questions.    Thanks,  Venkat Beltran MD  Infectious Disease Fellow, PGY-5  Pager: [phone] or ext: [phone]  [facility]

## 2018-12-21 NOTE — CONSULTS
Consult Note  General Surgery  HPI: 57 yo female with hx of HTN, microcytic anemia, transaminitis, RA, hx of GI bleed presents as transfer from [facility] for evaluation for necrotizing fascitis. Initially fell a month ago and presented to [facility] on 12/17 with right shoulder pain, swelling and fevers. She was found to have bilateral abscesses -  Initially ultrasound of Right arm showed 14.3 x 4.3 x 2.7 superficial complex fluid collection. Left axilla showing 8.3 x 5.4 x 7.7 fluid collection. She underwent I&D of bilateral arms in the ED on 12/17 and was started on vancomycin and Zosyn. She was transfused 2 units of pRBC for Hg of 5.3.  Her Zosyn and vanc were eventually switched to ancef due to speciation from the cultures. She was taken for formal incision and washout on 12/18 of right hand, right upper extremity and left upper extremity abscesses by orthopaedic surgeon, Dr Falk. The abscesses were packed. She underwent repeat washout of abscesses on 12/19 where she was found to have an additional left olecranon abscess and left hand abscess. General surgery was consulted intraoperatively.      Her course at [facility] revealed MSSA bacteremia and question of mitral valve endocarditis. TTE on 12/18 showed possible anterior mitral valve vegetation. However BHARAT there was negative for any acute pathology. She did have episode of SVT which was treated with metoprolol.     She also underwent EGD on 12/19 showing Class B erosive esophagitis. GI recommended TPN at that time for severe protein malnutrition, in addition to PPI BID.   Underwent Cscope and EGD 6 months prior but Cscope was incomplete to poor prep.     She was treated for MSSA infection of her right third toe in September 2018 by podiatry on the [facility] with Bactrim.     Everyday smoker. Denies any IV drug use or skin popping.     PMH:   Past Medical History:   Diagnosis Date    Anxiety     Depression     GI bleed      Hypertension     Rheumatoid arthritis        PSH:   Past Surgical History:   Procedure Laterality Date    BACK SURGERY       SECTION      COLONOSCOPY N/A 2018    [facility] ENDO    ESOPHAGOGASTRODUODENOSCOPY (EGD) N/A 2018    [facility] ENDO    HYSTERECTOMY      OOPHORECTOMY         Allergies: Review of patient's allergies indicates:  No Known Allergies  Meds:      Medication List      Notice    Cannot display discharge medications because the patient has not yet been admitted.         Family History   Problem Relation Age of Onset    Heart disease Mother     Diabetes Mother     Heart attack Mother     Hypertension Father     Heart attack Father     Ovarian cancer Sister     Breast cancer Paternal Grandmother        Review of Systems - For pertinent positives please see HPI   Constitutional: +fever.   HENT: Negative for congestion and ear pain.   Respiratory: Negative for cough and shortness of breath.   Cardiovascular: Negative for chest pain and palpitations.   Gastrointestinal: +abdominal pain. + blood in stool. Negative for nausea, vomiting, diarrhea, constipation and abdominal distention.   Genitourinary: + for dysuria and hematuria.   Musculoskeletal: + joint pain   Skin:  See HPI   Neurological: Negative for weakness and numbness.   Psychiatric/Behavioral: Negative for confusion and dysphoric mood    OBJECTIVE:     Vital Signs (Most Recent)       Vital Signs Range (Last 24H):  Temp:  [97.4 °F (36.3 °C)-97.6 °F (36.4 °C)]   Pulse:  [78-87]   Resp:  [14-15]   BP: (147-148)/(76)   SpO2:  [99 %-100 %]     I & O (Last 24H):No intake or output data in the 24 hours ending 18 9550    Physical Exam:  General: well developed, well nourished, no distress. Appears older than stated age.   Lungs:  clear to auscultation bilaterally and normal respiratory effort  Heart: regular rate and rhythm, S1, S2 normal, no  murmur, rub or gallop  Abdomen: soft, non-tender non-distented; bowel sounds normal; no masses,  no organomegaly  Neuro: A&O x3, no focal deficits   Ext: 2+ Radial pulses bilaterally. 2+ DP pulses bilaterally  R knee is swollen.   1. Right dorsum of hand   2. Right medial arm - approximately 30 cm x 4cm     3. Left medial arm     4. Left axilla, medial upper extremity - 12 cm x 2cm     5. Left dorsum of hand     6. Left olecranon         Wound/Incision:  See Above    Laboratory:  CBC:   Recent Labs   Lab 12/20/18 2243   WBC 16.95*   RBC 3.67*   HGB 9.7*   HCT 29.5*      MCV 80*   MCH 26.4*   MCHC 32.9     CMP:   Recent Labs   Lab 12/20/18 2243   *   CALCIUM 8.1*   ALBUMIN 1.2*   PROT 5.2*   *   K 4.5   CO2 19*      BUN 51*   CREATININE 1.8*   ALKPHOS 308*   ALT <5*   AST 24   BILITOT 0.7     Coagulation:   Recent Labs   Lab 12/20/18 2243   INR 1.0   APTT 24.8     Labs within the past 24 hours have been reviewed.    ASSESSMENT/PLAN:     59 yo female with hx of anemia, HTN, RA (previously on prednisone taper), aphthous ulcers presents with bilateral UE abscesses concerning for septic emboli from possible endocarditis     Neuro  - fentanyl gtt for pain   - tylenol scheduled    CV:  - lactate wnl  - possible endocarditis.   - cardiology consulted. Will consult ID as well   - repeat TTE tomorrow   - cardene gtt for hypertension. Holding home meds due to sepsis - hydralazine PO, clonidine patch, quinapril, cardura   - metoprolol for hx of SVT. Currently in NSR     Resp:  - on room air  - IS   - CXR reviewed. No acute process.     FENGI:  - NPO. IVF @ 150   - replace lytes PRN   - albumin low - 1.2. Consider TPN   - esophagitis - PPI BID     Renal:  - VIDYA - cr 1.8  - howard placed, UA sent to evaluate for hematuria   - strict I/o    Heme/ID:  - H/H stable at 9.7/29.5  - type and screen sent. Blood consent obtained. No transfusions needed   - wbc elevated at 16  - will consult ID for help  management of antibiotics. MSSA bacteremia at OSH. Will repeat blood cultures. On vanc/zosyn/clindamycin for coverage for possible necrotizing fasciitis     Endocrine:  - hx of RA. Consider rheumatology consult   - recent hx of prednisone taper. No stress steroids needed right now   - poct glucose. SSI     Extremities   - bilateral upper extremities wounds - 5 in total are clean, tendon and muscle exposed  - CT scan bilateral UE and right knee due to effusion to evaluate for further undrained fluid collections   - will plan for washout and possible wound vac placement later today. Consent obtained  - may require plastics coverage  - will consult ortho for left elbow joint space involvement.     Ppx:  SCDs, hold SQH due to hx of GI bleed. Will restart once stable   protonix BID   PT/OT    Gavin York, PGY-4  General Surgery  [phone]

## 2018-12-21 NOTE — SUBJECTIVE & OBJECTIVE
Past Medical History:   Diagnosis Date    Anxiety     Depression     GI bleed     Hypertension     Rheumatoid arthritis        Past Surgical History:   Procedure Laterality Date    BACK SURGERY       SECTION      COLONOSCOPY N/A 2018    [facility]    ESOPHAGOGASTRODUODENOSCOPY (EGD) N/A 2018    [facility]    HYSTERECTOMY      OOPHORECTOMY         Review of patient's allergies indicates:  No Known Allergies    No current facility-administered medications on file prior to encounter.      Current Outpatient Medications on File Prior to Encounter   Medication Sig    albuterol (PROVENTIL) 2.5 mg /3 mL (0.083 %) nebulizer solution Take 3 mLs (2.5 mg total) by nebulization daily as needed for Wheezing. Rescue    ALPRAZolam (XANAX) 1 MG tablet Take 1 mg by mouth 2 (two) times daily.    busPIRone (BUSPAR) 15 MG tablet Take 1 tablet (15 mg total) by mouth 2 (two) times daily.    cloNIDine 0.3 mg/24 hr td ptwk (CATAPRES) 0.3 mg/24 hr Place 1 patch onto the skin every 7 days.    doxazosin (CARDURA) 1 MG tablet Take 2 tablets (2 mg total) by mouth once daily.    ezetimibe (ZETIA) 10 mg tablet TAKE 1 TABLET (10 MG TOTAL) BY MOUTH ONCE DAILY.    hydrALAZINE (APRESOLINE) 100 MG tablet TAKE 1 TABLET BY MOUTH TWICE DAILY    metoprolol tartrate (LOPRESSOR) 50 MG tablet Take 1 tablet (50 mg total) by mouth 2 (two) times daily.    oxyCODONE-acetaminophen (PERCOCET) 5-325 mg per tablet Take 1-2 tablets by mouth every 4 (four) hours as needed for Pain.    predniSONE (DELTASONE) 10 MG tablet Day 1-2: Take 6 tabs PO x 2 days  Day 3-4: 5 tabs po x 2 days   Day 5-6: 4 po x 2 days   Day 7-8: 3 po x 2 days   Day 9-10: 2 po x 2 days   Day 11-12: 1 po x 2 days    quinapril (ACCUPRIL) 40 MG tablet Take 1 tablet (40 mg total) by mouth every evening.    zolpidem (AMBIEN) 10 mg Tab Take 5 mg by mouth nightly as needed.     Family  History     Problem Relation (Age of Onset)    Breast cancer Paternal Grandmother    Diabetes Mother    Heart attack Mother, Father    Heart disease Mother    Hypertension Father    Ovarian cancer Sister        Tobacco Use    Smoking status: Current Every Day Smoker     Packs/day: 1.00     Types: Cigarettes    Smokeless tobacco: Never Used   Substance and Sexual Activity    Alcohol use: Yes     Comment: occasionally    Drug use: No    Sexual activity: No     Review of Systems   Constitution: Negative for chills and fever.   Cardiovascular: Negative for chest pain, leg swelling and palpitations.   Respiratory: Negative for cough and shortness of breath.    Skin: Positive for poor wound healing. Negative for nail changes.   Musculoskeletal: Positive for back pain, joint pain and joint swelling.   Gastrointestinal: Negative for abdominal pain, nausea and vomiting.   Neurological: Negative for headaches and light-headedness.   Psychiatric/Behavioral: Negative for altered mental status.     Objective:     Vital Signs (Most Recent):  Temp: 98 °F (36.7 °C) (12/21/18 1100)  Pulse: 80 (12/21/18 1430)  Resp: 20 (12/21/18 1430)  BP: 139/64 (12/21/18 1430)  SpO2: 100 % (12/21/18 1430) Vital Signs (24h Range):  Temp:  [98 °F (36.7 °C)-99.3 °F (37.4 °C)] 98 °F (36.7 °C)  Pulse:  [66-94] 80  Resp:  [9-26] 20  SpO2:  [77 %-100 %] 100 %  BP: (121-220)/(60-93) 139/64     Weight: 69.9 kg (154 lb)  Body mass index is 24.12 kg/m².    SpO2: 100 %  O2 Device (Oxygen Therapy): room air      Intake/Output Summary (Last 24 hours) at 12/21/2018 1715  Last data filed at 12/21/2018 1630  Gross per 24 hour   Intake 2131.9 ml   Output 1040 ml   Net 1091.9 ml       Lines/Drains/Airways     Central Venous Catheter Line                 Percutaneous Central Line Insertion/Assessment - triple lumen  right internal jugular -- days          Drain                 Urethral Catheter 12/20/18 2335 Latex 16 Fr. less than 1 day          Airway                  Airway - Non-Surgical 12/21/18 1458 Endotracheal Tube less than 1 day                Physical Exam   Constitutional: She is oriented to person, place, and time. She appears well-developed. No distress.   HENT:   Head: Normocephalic and atraumatic.   Eyes: EOM are normal.   Neck: Neck supple. No JVD present.   Cardiovascular: Normal rate and regular rhythm. Exam reveals no gallop and no friction rub.   No murmur heard.  Pulmonary/Chest: Effort normal and breath sounds normal. No respiratory distress. She has no wheezes.   Abdominal: Soft. Bowel sounds are normal. She exhibits no distension. There is no tenderness.   Musculoskeletal: She exhibits no edema.   Neurological: She is alert and oriented to person, place, and time.   Skin: Skin is warm and dry.   Bilateral upper extremities wrapped (see general surgery consult note for pictures of wounds)     Significant Labs:   CMP   Recent Labs   Lab 12/20/18  2243 12/21/18  0400   * 136   K 4.5 4.2    107   CO2 19* 18*   * 120*   BUN 51* 51*   CREATININE 1.8* 1.7*   CALCIUM 8.1* 7.5*   PROT 5.2* 4.7*   ALBUMIN 1.2* 1.1*   BILITOT 0.7 0.8   ALKPHOS 308* 268*   AST 24 22   ALT <5* <5*   ANIONGAP 11 11   ESTGFRAFRICA 35.3* 37.8*   EGFRNONAA 30.6* 32.8*    and CBC   Recent Labs   Lab 12/20/18  2243 12/21/18  0400   WBC 16.95* 16.24*   HGB 9.7* 8.1*   HCT 29.5* 25.5*    246       Significant Imaging:  TTE complete (12/21/18):  · Concentric left ventricular remodeling.  · Normal left ventricular systolic function. The estimated ejection fraction is 65%  · Normal LV diastolic function.  · No wall motion abnormalities.  · Normal right ventricular systolic function.  · Intermediate central venous pressure (8 mm Hg).    EKG (12/20/18):   Sinus rhythm with Premature atrial complexes  Otherwise normal ECG

## 2018-12-21 NOTE — PLAN OF CARE
Problem: Adult Inpatient Plan of Care  Goal: Patient-Specific Goal (Individualization)  Plan of care reviewed with pt. Pt AAOx3. Maintaining sats >95% on room air. Fentanyl gtt @ 25 mcg/hr, maintenance fluid @ 150 cc/hr. Cardene gtt on throughout the night to maintain MAP <180, currently turned off. UO this shift 20-60 cc/hr, MD aware. MD to bedside upon admit to assess bilateral arm debridement sites. Site cleaned, wrapped in gauze. Plans to go to the OR today for debridement. Pt to CT overnight, no complications noted. VSS. Will continue to monitor.

## 2018-12-21 NOTE — NURSING
Pt admit to SICU from outside facility. Pt oriented to room, connected to monitor. MD at bedside to assess. Will continue to monitor.

## 2018-12-21 NOTE — ASSESSMENT & PLAN NOTE
58 year old F w/ MHx of HTN, RA, GI bleeds that was transferred from [facility] on 12/20.  Found to have abscesses in RUE and LUE  Started on empiric abx coverage with vanc/zosyn.  BCx grew MSSA and she was switched to cefazolin.  TTE on 12/18 showed possible mitral valve vegetation, but BHARAT was negative for acute pathology.  Of note, she was treated w/ bactrim for MSSA infection of R 3rd toe in Sept 2018.    ID was consulted for possible endocarditis in setting of MSSA bacteremia.  Repeat TTE scheduled for today.  She is currently on vanc/zosyn/clindamycin (covering potential necrotizing fasciitis).  BCx and UCx in process, as well as knee fluid cultures (aerobic/anaerobic, fungus, AFB, gram stain).  Synovial fluid of R knee consistent w/ septic arthritis (146k WBCs).  Plan is for repeat washouts today w/ possible wound vac placement per general surgery, as well as arthroscopic I&D of R knee per orthopedic surgery.    Recommendations  -Recommend stopping vanc, zosyn and clindamycin in setting of MSSA (confirmed w/ OSH who are [place] [facility] [place] [facility]  -Recommend starting cefazolin 2g q8h  -Recommend spine Xray to identify location of spine hardware, followed by appropriate MRI w/ contrast per primary team if suitable to use contrast to assess for possibility of hardware as nidus for infection   -Will follow up

## 2018-12-21 NOTE — CONSULTS
[facility]  Infectious Disease  Consult Note    Patient Name: Misty Khalil  MRN: [id number]  Admission Date: 12/20/2018  Hospital Length of Stay: 1 days  Attending Physician: Javier Kirk MD  Primary Care Provider: JOEL Mccauley     Isolation Status: No active isolations    Patient information was obtained from patient, past medical records and ER records.      Consults  Assessment/Plan:     Septic arthritis of knee, right    -See soft tissue infection     Soft tissue infection    58 year old F w/ MHx of HTN, RA, GI bleeds that was transferred from [facility] on 12/20.  Found to have abscesses in RUE and LUE  Started on empiric abx coverage with vanc/zosyn.  BCx grew MSSA and she was switched to cefazolin.  TTE on 12/18 showed possible mitral valve vegetation, but BHARAT was negative for acute pathology.  Of note, she was treated w/ bactrim for MSSA infection of R 3rd toe in Sept 2018.    ID was consulted for possible endocarditis in setting of MSSA bacteremia.  Repeat TTE scheduled for today.  She is currently on vanc/zosyn/clindamycin (covering potential necrotizing fasciitis).  BCx and UCx in process, as well as knee fluid cultures (aerobic/anaerobic, fungus, AFB, gram stain).  Synovial fluid of R knee consistent w/ septic arthritis (146k WBCs).  Plan is for repeat washouts today w/ possible wound vac placement per general surgery, as well as arthroscopic I&D of R knee per orthopedic surgery.    Recommendations  -Recommend stopping vanc, zosyn and clindamycin in setting of MSSA (confirmed w/ OSH who are faxing blood cultures to [facility] [place] [place] ID clinic)  -Recommend starting cefazolin 2g q8h  -Recommend spine Xray to identify location of spine hardware, followed by appropriate MRI w/ contrast per primary team if suitable to use contrast to assess for possibility of hardware as nidus for infection   -Will follow up         Thank you for your consult. I will follow-up with patient. Please  contact us if you have any additional questions.    Yusuf Cazares MD  Infectious Disease  [facility]    Subjective:     Principal Problem: <principal problem not specified>    HPI: Misty Khalil is a 58 year old F w/ MHx of HTN, RA, GI bleeds that was transferred from [facility] on .  She originally presented to [facility] on  w/ primary complaints of R shoulder pain, swelling, and fevers in the setting of multiple recent falls.  Workup there discovered bilateral abscesses in the RUE and L axilla.  She underwent bilateral arm I&D in the ED on  and was started on empiric abx coverage with vanc/zosyn.  Formal incision and washout was performed on  of the R hand, LUE, and RUE per orthopedic surgery.  Abscesses were packed and repeat washouts occurred .  At this time she was found to have additional L olecranon abscess and a L hand abscess.  Also at this time her BCx grew MSSA and she was switched to cefazolin.  TTE on  showed possible mitral valve vegetation, but BHARAT was negative for acute pathology.  Of note, she was treated w/ bactrim for MSSA infection of R 3rd toe in 2018.  She was sent to [facility] [place] [facility] on .      ID was consulted for possible endocarditis in setting of MSSA bacteremia.  Repeat TTE scheduled for today.  She is currently on vanc/zosyn/clindamycin (covering potential necrotizing fasciitis).  BCx and UCx in process, as well as knee fluid cultures (aerobic/anaerobic, fungus, AFB, gram stain).  Synovial fluid of R knee consistent w/ septic arthritis (146k WBCs).  Plan is for repeat washouts today w/ possible wound vac placement per general surgery, as well as arthroscopic I&D of R knee per orthopedic surgery.    Past Medical History:   Diagnosis Date    Anxiety     Depression     GI bleed     Hypertension     Rheumatoid arthritis        Past Surgical History:   Procedure Laterality Date    BACK SURGERY       SECTION       COLONOSCOPY N/A 7/16/2018    [facility]    ESOPHAGOGASTRODUODENOSCOPY (EGD) N/A 7/16/2018    [facility]    HYSTERECTOMY      OOPHORECTOMY         Review of patient's allergies indicates:  No Known Allergies    Medications:  Facility-Administered Medications Prior to Admission   Medication    lidocaine (PF) 10 mg/ml (1%) injection 10 mg     Medications Prior to Admission   Medication Sig    albuterol (PROVENTIL) 2.5 mg /3 mL (0.083 %) nebulizer solution Take 3 mLs (2.5 mg total) by nebulization daily as needed for Wheezing. Rescue    ALPRAZolam (XANAX) 1 MG tablet Take 1 mg by mouth 2 (two) times daily.    busPIRone (BUSPAR) 15 MG tablet Take 1 tablet (15 mg total) by mouth 2 (two) times daily.    cloNIDine 0.3 mg/24 hr td ptwk (CATAPRES) 0.3 mg/24 hr Place 1 patch onto the skin every 7 days.    doxazosin (CARDURA) 1 MG tablet Take 2 tablets (2 mg total) by mouth once daily.    ezetimibe (ZETIA) 10 mg tablet TAKE 1 TABLET (10 MG TOTAL) BY MOUTH ONCE DAILY.    hydrALAZINE (APRESOLINE) 100 MG tablet TAKE 1 TABLET BY MOUTH TWICE DAILY    metoprolol tartrate (LOPRESSOR) 50 MG tablet Take 1 tablet (50 mg total) by mouth 2 (two) times daily.    oxyCODONE-acetaminophen (PERCOCET) 5-325 mg per tablet Take 1-2 tablets by mouth every 4 (four) hours as needed for Pain.    predniSONE (DELTASONE) 10 MG tablet Day 1-2: Take 6 tabs PO x 2 days  Day 3-4: 5 tabs po x 2 days   Day 5-6: 4 po x 2 days   Day 7-8: 3 po x 2 days   Day 9-10: 2 po x 2 days   Day 11-12: 1 po x 2 days    quinapril (ACCUPRIL) 40 MG tablet Take 1 tablet (40 mg total) by mouth every evening.    zolpidem (AMBIEN) 10 mg Tab Take 5 mg by mouth nightly as needed.     Antibiotics (From admission, onward)    Start     Stop Route Frequency Ordered    12/21/18 2300  vancomycin in dextrose 5 % 1 gram/250 mL IVPB 1,000 mg  (Vancomycin IVPB with levels panel)      -- IV Every  24 hours (non-standard times) 12/21/18 0055    12/21/18 0830  clindamycin (CLEOCIN) 900 mg in dextrose 5 % 50 mL IVPB      -- IV Every 8 hours 12/21/18 0210    12/21/18 0000  piperacillin-tazobactam 4.5 g in sodium chloride 0.9% 100 mL IVPB (ready to mix system)      -- IV Every 8 hours (non-standard times) 12/20/18 2231        Antifungals (From admission, onward)    None        Antivirals (From admission, onward)    None             There is no immunization history on file for this patient.    Family History     Problem Relation (Age of Onset)    Breast cancer Paternal Grandmother    Diabetes Mother    Heart attack Mother, Father    Heart disease Mother    Hypertension Father    Ovarian cancer Sister        Social History     Socioeconomic History    Marital status:      Spouse name: Not on file    Number of children: Not on file    Years of education: Not on file    Highest education level: Not on file   Social Needs    Financial resource strain: Not on file    Food insecurity - worry: Not on file    Food insecurity - inability: Not on file    Transportation needs - medical: Not on file    Transportation needs - non-medical: Not on file   Occupational History    Not on file   Tobacco Use    Smoking status: Current Every Day Smoker     Packs/day: 1.00     Types: Cigarettes    Smokeless tobacco: Never Used   Substance and Sexual Activity    Alcohol use: Yes     Comment: occasionally    Drug use: No    Sexual activity: No   Other Topics Concern    Not on file   Social History Narrative    Not on file     Review of Systems   Constitutional: Positive for fever and unexpected weight change. Negative for chills and diaphoresis.   HENT: Positive for mouth sores. Negative for congestion and sore throat.    Eyes: Negative for visual disturbance.   Respiratory: Positive for shortness of breath (When being moved around). Negative for cough, wheezing and stridor.    Cardiovascular: Negative for chest  pain, palpitations and leg swelling.   Gastrointestinal: Positive for blood in stool (FOBT reportedly + at OSH) and constipation. Negative for abdominal distention, abdominal pain, diarrhea, nausea and vomiting.   Genitourinary: Positive for hematuria. Negative for dysuria, frequency and pelvic pain.   Musculoskeletal: Positive for arthralgias, back pain and myalgias.   Skin: Positive for wound. Negative for color change and rash.   Neurological: Positive for weakness and headaches. Negative for dizziness, tremors and facial asymmetry.   Psychiatric/Behavioral: Positive for dysphoric mood. Negative for agitation and confusion. The patient is nervous/anxious.      Objective:     Vital Signs (Most Recent):  Temp: 98.5 °F (36.9 °C) (12/21/18 0700)  Pulse: 76 (12/21/18 1000)  Resp: 11 (12/21/18 1000)  BP: 125/74 (12/21/18 1000)  SpO2: 100 % (12/21/18 1000) Vital Signs (24h Range):  Temp:  [97.6 °F (36.4 °C)-99.3 °F (37.4 °C)] 98.5 °F (36.9 °C)  Pulse:  [68-94] 76  Resp:  [10-26] 11  SpO2:  [97 %-100 %] 100 %  BP: (121-220)/(62-93) 125/74     Weight: 70.2 kg (154 lb 12.2 oz)  Body mass index is 24.98 kg/m².    Estimated Creatinine Clearance: 33.8 mL/min (A) (based on SCr of 1.7 mg/dL (H)).    Physical Exam   Constitutional: She is oriented to person, place, and time. She appears cachectic. She is cooperative. She is easily aroused. She has a sickly appearance. No distress.   HENT:   Head: Normocephalic and atraumatic.   Ulcer noted on tongue   Eyes: Conjunctivae and EOM are normal. No scleral icterus.   Neck: Normal range of motion. Neck supple.   Cardiovascular: Normal rate and regular rhythm.   Murmur (Systolic) heard.  Pulmonary/Chest: Effort normal and breath sounds normal. No respiratory distress. She has no wheezes. She has no rales.   Abdominal: Soft. Bowel sounds are normal. She exhibits no distension. There is tenderness (Describes sensation of diffuse pressure when palpated). There is no rebound and no  guarding.   Musculoskeletal: She exhibits tenderness. She exhibits no edema.   Neurological: She is alert, oriented to person, place, and time and easily aroused. No cranial nerve deficit.   Skin: Skin is warm and dry. No pallor.   Surgical wounds to bilateral UE's wrapped, visible blood   Psychiatric: She has a normal mood and affect.       Significant Labs:   Blood Culture: No results for input(s): LABBLOO in the last 4320 hours.  CBC:   Recent Labs   Lab 12/20/18 2243 12/21/18  0400   WBC 16.95* 16.24*   HGB 9.7* 8.1*   HCT 29.5* 25.5*    246     CMP:   Recent Labs   Lab 12/20/18 2243 12/21/18  0400   * 136   K 4.5 4.2    107   CO2 19* 18*   * 120*   BUN 51* 51*   CREATININE 1.8* 1.7*   CALCIUM 8.1* 7.5*   PROT 5.2* 4.7*   ALBUMIN 1.2* 1.1*   BILITOT 0.7 0.8   ALKPHOS 308* 268*   AST 24 22   ALT <5* <5*   ANIONGAP 11 11   EGFRNONAA 30.6* 32.8*     Lactic Acid:   Recent Labs   Lab 12/20/18 2243 12/21/18  0400   LACTATE 1.3 1.1     Wound Culture:   Recent Labs   Lab 10/22/18  1416   LABAERO STAPHYLOCOCCUS AUREUS  Moderate         Significant Imaging: I have reviewed all pertinent imaging results/findings within the past 24 hours.

## 2018-12-21 NOTE — CONSULTS
[facility]  Cardiology  Consult Note    Patient Name: Misty Khalil  MRN: [id number]  Admission Date: 2018  Hospital Length of Stay: 1 days  Code Status: Full Code   Attending Provider: Javier Kirk MD   Consulting Provider: Talia Dinh MD  Primary Care Physician: JOEL Mccauley  Principal Problem:<principal problem not specified>    Patient information was obtained from patient and past medical records.     Inpatient consult to Cardiology  Consult performed by: Talia Dinh MD  Consult ordered by: Gavin York MD        Subjective:     Chief Complaint:  Necrotizing fascitis     HPI:   Ms. Khalil is a 58F with medical hx of HTN, anemia, RA who was transferred from [facility] overnight for evaluation fo necrotizing fascitis. She initially presented on  with right shoulder pain, swelling, and fevers and found to have bilateral upper extremity abscesses: right arm 14.3x4.3x2.7 collection, left axilla 8.3x5.4x7.7 fluid collection. She is s/p I&D in ED and initiation of vancomycin and zosyn.  She was taken to OR for incision and washout  of right hand, RUE, and LUE abscess by Dr. Falk (orthopedic surgery) and again on  for repeat washout. She was additionally found to have left olecranon abscess, left hand abscess. Cultures grew MSSA and she was transitioned to Ancef at some point during her course. Of note, she has history of back surgery with implanted hardware.      Her course was complicated by anemia (s/p 2u pRBC) and concern for mitral valve vegetation on TTE . BHARAT performed at OSH was negative for any vegetation on valves. She had normal systolic function.     Cardiology was consulted for concern of endocarditis.       Past Medical History:   Diagnosis Date    Anxiety     Depression     GI bleed     Hypertension     Rheumatoid arthritis        Past Surgical History:   Procedure Laterality Date    BACK SURGERY       SECTION       COLONOSCOPY N/A 7/16/2018    [facility]    ESOPHAGOGASTRODUODENOSCOPY (EGD) N/A 7/16/2018    [facility] ENDO    HYSTERECTOMY      OOPHORECTOMY         Review of patient's allergies indicates:  No Known Allergies    No current facility-administered medications on file prior to encounter.      Current Outpatient Medications on File Prior to Encounter   Medication Sig    albuterol (PROVENTIL) 2.5 mg /3 mL (0.083 %) nebulizer solution Take 3 mLs (2.5 mg total) by nebulization daily as needed for Wheezing. Rescue    ALPRAZolam (XANAX) 1 MG tablet Take 1 mg by mouth 2 (two) times daily.    busPIRone (BUSPAR) 15 MG tablet Take 1 tablet (15 mg total) by mouth 2 (two) times daily.    cloNIDine 0.3 mg/24 hr td ptwk (CATAPRES) 0.3 mg/24 hr Place 1 patch onto the skin every 7 days.    doxazosin (CARDURA) 1 MG tablet Take 2 tablets (2 mg total) by mouth once daily.    ezetimibe (ZETIA) 10 mg tablet TAKE 1 TABLET (10 MG TOTAL) BY MOUTH ONCE DAILY.    hydrALAZINE (APRESOLINE) 100 MG tablet TAKE 1 TABLET BY MOUTH TWICE DAILY    metoprolol tartrate (LOPRESSOR) 50 MG tablet Take 1 tablet (50 mg total) by mouth 2 (two) times daily.    oxyCODONE-acetaminophen (PERCOCET) 5-325 mg per tablet Take 1-2 tablets by mouth every 4 (four) hours as needed for Pain.    predniSONE (DELTASONE) 10 MG tablet Day 1-2: Take 6 tabs PO x 2 days  Day 3-4: 5 tabs po x 2 days   Day 5-6: 4 po x 2 days   Day 7-8: 3 po x 2 days   Day 9-10: 2 po x 2 days   Day 11-12: 1 po x 2 days    quinapril (ACCUPRIL) 40 MG tablet Take 1 tablet (40 mg total) by mouth every evening.    zolpidem (AMBIEN) 10 mg Tab Take 5 mg by mouth nightly as needed.     Family History     Problem Relation (Age of Onset)    Breast cancer Paternal Grandmother    Diabetes Mother    Heart attack Mother, Father    Heart disease Mother    Hypertension Father    Ovarian cancer Sister        Tobacco Use     Smoking status: Current Every Day Smoker     Packs/day: 1.00     Types: Cigarettes    Smokeless tobacco: Never Used   Substance and Sexual Activity    Alcohol use: Yes     Comment: occasionally    Drug use: No    Sexual activity: No     Review of Systems   Constitution: Negative for chills and fever.   Cardiovascular: Negative for chest pain, leg swelling and palpitations.   Respiratory: Negative for cough and shortness of breath.    Skin: Positive for poor wound healing. Negative for nail changes.   Musculoskeletal: Positive for back pain, joint pain and joint swelling.   Gastrointestinal: Negative for abdominal pain, nausea and vomiting.   Neurological: Negative for headaches and light-headedness.   Psychiatric/Behavioral: Negative for altered mental status.     Objective:     Vital Signs (Most Recent):  Temp: 98 °F (36.7 °C) (12/21/18 1100)  Pulse: 80 (12/21/18 1430)  Resp: 20 (12/21/18 1430)  BP: 139/64 (12/21/18 1430)  SpO2: 100 % (12/21/18 1430) Vital Signs (24h Range):  Temp:  [98 °F (36.7 °C)-99.3 °F (37.4 °C)] 98 °F (36.7 °C)  Pulse:  [66-94] 80  Resp:  [9-26] 20  SpO2:  [77 %-100 %] 100 %  BP: (121-220)/(60-93) 139/64     Weight: 69.9 kg (154 lb)  Body mass index is 24.12 kg/m².    SpO2: 100 %  O2 Device (Oxygen Therapy): room air      Intake/Output Summary (Last 24 hours) at 12/21/2018 1715  Last data filed at 12/21/2018 1630  Gross per 24 hour   Intake 2131.9 ml   Output 1040 ml   Net 1091.9 ml       Lines/Drains/Airways     Central Venous Catheter Line                 Percutaneous Central Line Insertion/Assessment - triple lumen  right internal jugular -- days          Drain                 Urethral Catheter 12/20/18 2335 Latex 16 Fr. less than 1 day          Airway                 Airway - Non-Surgical 12/21/18 1458 Endotracheal Tube less than 1 day                Physical Exam   Constitutional: She is oriented to person, place, and time. She appears well-developed. No distress.   HENT:   Head:  Normocephalic and atraumatic.   Eyes: EOM are normal.   Neck: Neck supple. No JVD present.   Cardiovascular: Normal rate and regular rhythm. Exam reveals no gallop and no friction rub.   No murmur heard.  Pulmonary/Chest: Effort normal and breath sounds normal. No respiratory distress. She has no wheezes.   Abdominal: Soft. Bowel sounds are normal. She exhibits no distension. There is no tenderness.   Musculoskeletal: She exhibits no edema.   Neurological: She is alert and oriented to person, place, and time.   Skin: Skin is warm and dry.   Bilateral upper extremities wrapped (see general surgery consult note for pictures of wounds)     Significant Labs:   CMP   Recent Labs   Lab 12/20/18 2243 12/21/18  0400   * 136   K 4.5 4.2    107   CO2 19* 18*   * 120*   BUN 51* 51*   CREATININE 1.8* 1.7*   CALCIUM 8.1* 7.5*   PROT 5.2* 4.7*   ALBUMIN 1.2* 1.1*   BILITOT 0.7 0.8   ALKPHOS 308* 268*   AST 24 22   ALT <5* <5*   ANIONGAP 11 11   ESTGFRAFRICA 35.3* 37.8*   EGFRNONAA 30.6* 32.8*    and CBC   Recent Labs   Lab 12/20/18 2243 12/21/18  0400   WBC 16.95* 16.24*   HGB 9.7* 8.1*   HCT 29.5* 25.5*    246       Significant Imaging:  TTE complete (12/21/18):  · Concentric left ventricular remodeling.  · Normal left ventricular systolic function. The estimated ejection fraction is 65%  · Normal LV diastolic function.  · No wall motion abnormalities.  · Normal right ventricular systolic function.  · Intermediate central venous pressure (8 mm Hg).    EKG (12/20/18):   Sinus rhythm with Premature atrial complexes  Otherwise normal ECG      Assessment and Plan:     Soft tissue infection    Cardiology was consulted for concern of endocarditis due to bilateral upper extremity abscesses resulting from septic emboli. BHARAT done at outside hospital showed normal systolic function with no vegetation on any valves. She was treated for MSSA bacteremia and started on vancomycin, clindamycin, and zosyn. Blood  cultures on this hospital admission are in process. Due to normal BHARAT, low suspicion of endocarditis at this time. Continue care per primary team.     ID consulted, agree with them to perform imaging of spine to evaluate spinal hardware, will defer to primary team if suitable to use contrast to assess for possibility of etiology of infection.           VTE Risk Mitigation (From admission, onward)        Ordered     IP VTE LOW RISK PATIENT  Once      12/20/18 2231     Place CLARE hose  Until discontinued      12/20/18 2231          Case discussed with Cardiology fellow and Dr. Valentine. Thank you for your consult. I will sign off. Please contact us if you have any additional questions.    Talia Dinh MD PGY1  Cardiology   [facility] [address]

## 2018-12-21 NOTE — ASSESSMENT & PLAN NOTE
Misty Khalil is a 58 y.o. female s/p BUE I&D with open wounds and right septic knee   - - Arthrocentesis of right knee performed at the bedside, 15 cc of cloudy bloody fluid obtained and sent for cell count, gram stain, crystals, and cultures ( aerobic, anaerobic, fungal, AFB)  - synovial fluid consistent with septic arthritis-  Cell count 146,220, gram stain negative  - pt to OR today for arthroscopic irrigation and debridement of right knee with orthopedics and I&D of BUE with general surg  - discussed risks of surgery with patient, she understood all risks and benefits. Verbal consent was obtained and witnessed. Pt marked.   - IV clinda, zosyn, vanc per primary  - NPO for OR today

## 2018-12-21 NOTE — SUBJECTIVE & OBJECTIVE
Past Medical History:   Diagnosis Date    Anxiety     Depression     GI bleed     Hypertension     Rheumatoid arthritis        Past Surgical History:   Procedure Laterality Date    BACK SURGERY       SECTION      COLONOSCOPY N/A 2018    [facility]formed [facility] ENDO    ESOPHAGOGASTRODUODENOSCOPY (EGD) N/A 2018    Per[facility] by [facility] at [facility] ENDO    HYSTERECTOMY      OOPHORECTOMY         Review of patient's allergies indicates:  No Known Allergies    Current Facility-Administered Medications   Medication    0.9%  NaCl infusion    acetaminophen tablet 650 mg    albuterol sulfate nebulizer solution 2.5 mg    clindamycin (CLEOCIN) 900 mg in dextrose 5 % 50 mL IVPB    dextrose 50% injection 12.5 g    glucagon (human recombinant) injection 1 mg    hydrALAZINE injection 10 mg    HYDROmorphone injection 1 mg    insulin aspart U-100 pen 0-5 Units    magnesium sulfate 2g in water 50mL IVPB (premix)    metoprolol injection 5 mg    niCARdipine 40 mg/200 mL infusion    nicotine 14 mg/24 hr 1 patch    ondansetron injection 4 mg    pantoprazole injection 40 mg    piperacillin-tazobactam 4.5 g in sodium chloride 0.9% 100 mL IVPB (ready to mix system)    promethazine (PHENERGAN) 6.25 mg in dextrose 5 % 50 mL IVPB    sodium chloride 0.9% flush 3 mL    vancomycin in dextrose 5 % 1 gram/250 mL IVPB 1,000 mg     Family History     Problem Relation (Age of Onset)    Breast cancer Paternal Grandmother    Diabetes Mother    Heart attack Mother, Father    Heart disease Mother    Hypertension Father    Ovarian cancer Sister        Tobacco Use    Smoking status: Current Every Day Smoker     Packs/day: 1.00     Types: Cigarettes    Smokeless tobacco: Never Used   Substance and Sexual Activity    Alcohol use: Yes     Comment: occasionally    Drug use: No    Sexual activity: No     ROS  Objective:     Vital Signs (Most Recent):  Temp: 98.5 °F (36.9 °C)  "(12/21/18 0700)  Pulse: 76 (12/21/18 1000)  Resp: 11 (12/21/18 1000)  BP: 125/74 (12/21/18 1000)  SpO2: 100 % (12/21/18 1000) Vital Signs (24h Range):  Temp:  [97.6 °F (36.4 °C)-99.3 °F (37.4 °C)] 98.5 °F (36.9 °C)  Pulse:  [68-94] 76  Resp:  [10-26] 11  SpO2:  [97 %-100 %] 100 %  BP: (121-220)/(62-93) 125/74     Weight: 70.2 kg (154 lb 12.2 oz)  Height: 5' 6" (167.6 cm)  Body mass index is 24.98 kg/m².      Intake/Output Summary (Last 24 hours) at 12/21/2018 1031  Last data filed at 12/21/2018 1000  Gross per 24 hour   Intake 1131.9 ml   Output 540 ml   Net 591.9 ml     Gen: No acute distress  HEENT: normocephalic, atraumatic  CV: regular rate  Chest: symmetric, nonlabored respirations      Ortho/SPM Exam     RLE:  + effusion of right knee with   Skin intact  No edema/erythema/signs of infection  No TTP  Compartments soft  FROM  SILT Sa/Wu/DP/SP/T  Motor intact EHL/FHL/TA/Gastroc  2+ DP, 2+ PT        Significant Labs:   CBC:   Recent Labs   Lab 12/20/18 2243 12/21/18  0400   WBC 16.95* 16.24*   HGB 9.7* 8.1*   HCT 29.5* 25.5*    246     CMP:   Recent Labs   Lab 12/20/18 2243 12/21/18  0400   * 136   K 4.5 4.2    107   CO2 19* 18*   * 120*   BUN 51* 51*   CREATININE 1.8* 1.7*   CALCIUM 8.1* 7.5*   PROT 5.2* 4.7*   ALBUMIN 1.2* 1.1*   BILITOT 0.7 0.8   ALKPHOS 308* 268*   AST 24 22   ALT <5* <5*   ANIONGAP 11 11   EGFRNONAA 30.6* 32.8*     All pertinent labs within the past 24 hours have been reviewed.    Significant Imaging: I have reviewed and interpreted all pertinent imaging results/findings.  "

## 2018-12-21 NOTE — CONSULTS
[facility]  Orthopedics  Consult Note    Patient Name: Misty Khalil  MRN: [id number]  Admission Date: 12/20/2018  Hospital Length of Stay: 1 days  Attending Provider: Javier Kirk MD  Primary Care Provider: JOEL Mccauley    Patient information was obtained from patient and ER records.     Inpatient consult to Orthopedics  Consult performed by: Alma Delia Justice MD  Consult ordered by: Gavin York MD        Subjective:     Principal Problem:<principal problem not specified>    Chief Complaint: No chief complaint on file.       HPI: Misty Khalil is a 58 y.o. female who presents as a transfer from [facility] for evaluation of BLE abscesses s/p debridement. Pt reports appx 1 month ago she fell and noticed subsequent swelling and ecchymosis to her bilateral arms. She was seen at several urgent cares and started on steroid taper for suspected RA flare.She presented to [facility] on 12/17 with persistent swelling, pain, and new onset fever. She was found to have abscess on BUE. She was started on vanc and zosyn and taken to the OR for I&D of BUE by Dr. Falk 12/18, wounds were left packed and left open.  Repeat washout on 12/19 she was found to have olecranon bursitis and left hand abscess, both of which were drained. BCx on 12/18 positive for MSSA bacteremia, TTE with possible anterior mitral valve vegetation, BHARAT negative for acute pathology.   Pt reports severe pain and weakness in bilateral upper extremities. She also c/o right knee pain and swelling, worse with any ROM of the right knee and worsening back pain over the past several weeks (s/p multiple unknown spine surgeries). Denies numbness/paresthesias to BUE and BLE. She has been unable to ambulate 2/2 pain. Denies hx of chronic steroid use or IV drug use.        Past Medical History:   Diagnosis Date    Anxiety     Depression     GI bleed     Hypertension     Rheumatoid arthritis        Past Surgical  History:   Procedure Laterality Date    BACK SURGERY       SECTION      COLONOSCOPY N/A 2018    [facility]    ESOPHAGOGASTRODUODENOSCOPY (EGD) N/A 2018    [facility] END[facility]    HYSTERECTOMY      OOPHORECTOMY         Review of patient's allergies indicates:  No Known Allergies    Current Facility-Administered Medications   Medication    0.9%  NaCl infusion    acetaminophen tablet 650 mg    albuterol sulfate nebulizer solution 2.5 mg    clindamycin (CLEOCIN) 900 mg in dextrose 5 % 50 mL IVPB    dextrose 50% injection 12.5 g    glucagon (human recombinant) injection 1 mg    hydrALAZINE injection 10 mg    HYDROmorphone injection 1 mg    insulin aspart U-100 pen 0-5 Units    magnesium sulfate 2g in water 50mL IVPB (premix)    metoprolol injection 5 mg    niCARdipine 40 mg/200 mL infusion    nicotine 14 mg/24 hr 1 patch    ondansetron injection 4 mg    pantoprazole injection 40 mg    piperacillin-tazobactam 4.5 g in sodium chloride 0.9% 100 mL IVPB (ready to mix system)    promethazine (PHENERGAN) 6.25 mg in dextrose 5 % 50 mL IVPB    sodium chloride 0.9% flush 3 mL    vancomycin in dextrose 5 % 1 gram/250 mL IVPB 1,000 mg     Family History     Problem Relation (Age of Onset)    Breast cancer Paternal Grandmother    Diabetes Mother    Heart attack Mother, Father    Heart disease Mother    Hypertension Father    Ovarian cancer Sister        Tobacco Use    Smoking status: Current Every Day Smoker     Packs/day: 1.00     Types: Cigarettes    Smokeless tobacco: Never Used   Substance and Sexual Activity    Alcohol use: Yes     Comment: occasionally    Drug use: No    Sexual activity: No     ROS  Objective:     Vital Signs (Most Recent):  Temp: 98.5 °F (36.9 °C) (18 0700)  Pulse: 76 (18 1000)  Resp: 11 (18 1000)  BP: 125/74 (18 1000)  SpO2: 100 % (18 1000) Vital Signs (24h  "Range):  Temp:  [97.6 °F (36.4 °C)-99.3 °F (37.4 °C)] 98.5 °F (36.9 °C)  Pulse:  [68-94] 76  Resp:  [10-26] 11  SpO2:  [97 %-100 %] 100 %  BP: (121-220)/(62-93) 125/74     Weight: 70.2 kg (154 lb 12.2 oz)  Height: 5' 6" (167.6 cm)  Body mass index is 24.98 kg/m².      Intake/Output Summary (Last 24 hours) at 12/21/2018 1031  Last data filed at 12/21/2018 1000  Gross per 24 hour   Intake 1131.9 ml   Output 540 ml   Net 591.9 ml     Gen: No acute distress  HEENT: normocephalic, atraumatic  CV: regular rate  Chest: symmetric, nonlabored respirations      Ortho/SPM Exam   BUE:  BUE wrapped in kerlex with betadine dressing  BUE with multiple open incisions from I&D- no christi purulence or necrotic tissue  RUE with volar incision extending proximal from appx 5cm distal to glenohumeral jt to mid forearm, exposed muscle belly  LUE with multiple open wounds including 12cm incision along deltopec interval, 3cm incision to radial aspect of forearm, left dorsal hand, and left olecranon  SILT throughout BUE  Motor grossly intact but significant weakness throughout    RLE:  + effusion of right knee with mild erythema, significant pain with ROM and TTP  Skin intact- no opens wounds or abrasions  No edema/erythema/signs of infection  No TTP ankle or hip  Compartments soft  Painless ROM ankle, hip, toes  SILT Sa/Wu/DP/SP/T  Motor intact EHL/FHL/TA/Gastroc  2+ DP, 2+ PT    LLE:  Skin intact, no effusion or erythema   No TTP  Compartments soft  Painless range of motion knee, hip, ankle  SILT Sa/Wu/DP/SP/T  Motor intact EHL/FHL/TA/Gastroc  2+ DP, 2+ PT        Significant Labs:   CBC:   Recent Labs   Lab 12/20/18  2243 12/21/18  0400   WBC 16.95* 16.24*   HGB 9.7* 8.1*   HCT 29.5* 25.5*    246     CMP:   Recent Labs   Lab 12/20/18  2243 12/21/18  0400   * 136   K 4.5 4.2    107   CO2 19* 18*   * 120*   BUN 51* 51*   CREATININE 1.8* 1.7*   CALCIUM 8.1* 7.5*   PROT 5.2* 4.7*   ALBUMIN 1.2* 1.1*   BILITOT 0.7 0.8 "   ALKPHOS 308* 268*   AST 24 22   ALT <5* <5*   ANIONGAP 11 11   EGFRNONAA 30.6* 32.8*     All pertinent labs within the past 24 hours have been reviewed.    Significant Imaging: I have reviewed and interpreted all pertinent imaging results/findings.    Assessment/Plan:     Septic arthritis of knee, right    Misty Khalil is a 58 y.o. female s/p BUE I&D with open wounds and right septic knee   - - Arthrocentesis of right knee performed at the bedside, 15 cc of cloudy bloody fluid obtained and sent for cell count, gram stain, crystals, and cultures ( aerobic, anaerobic, fungal, AFB)  - synovial fluid consistent with septic arthritis-  Cell count 146,220, gram stain negative  - pt to OR today for arthroscopic irrigation and debridement of right knee with orthopedics and I&D of BUE with general surg  - discussed risks of surgery with patient, she understood all risks and benefits. Verbal consent was obtained and witnessed. Pt marked.   - IV clinda, zosyn, vanc per primary  - NPO for OR today         Thank you for your consult.      Alma Delia Justice MD  Orthopedics  [facility]

## 2018-12-22 NOTE — SUBJECTIVE & OBJECTIVE
Interval History/Significant Events: Received 2U pRBCs overnight, responded appropriately. LR mIVFs increased with the goal of improving kidney function so that L spine MRI with contrast can be done.     Follow-up For: Procedure(s) (LRB):  DEBRIDEMENT, WOUND (Bilateral)  ARTHROSCOPY, KNEE (Right)  ARTHROSCOPY, KNEE, septic (Right)    Post-Operative Day: 1 Day Post-Op    Objective:     Vital Signs (Most Recent):  Temp: 96.4 °F (35.8 °C) (12/22/18 1200)  Pulse: 61 (12/22/18 1200)  Resp: 12 (12/22/18 1200)  BP: 122/64 (12/22/18 1200)  SpO2: 98 % (12/22/18 1200) Vital Signs (24h Range):  Temp:  [95 °F (35 °C)-96.6 °F (35.9 °C)] 96.4 °F (35.8 °C)  Pulse:  [61-89] 61  Resp:  [9-20] 12  SpO2:  [77 %-100 %] 98 %  BP: (118-180)/() 122/64     Weight: 69.9 kg (154 lb)  Body mass index is 24.12 kg/m².      Intake/Output Summary (Last 24 hours) at 12/22/2018 1227  Last data filed at 12/22/2018 1200  Gross per 24 hour   Intake 3221.3 ml   Output 851 ml   Net 2370.3 ml       Physical Exam  Constitutional: She is oriented to person, place, and time. She appears well-developed.   Thin lady who appears older than stated age.   HENT:   Head: Normocephalic and atraumatic.   Eyes: EOM are normal. Pupils are equal, round, and reactive to light.   Neck: No JVD present. No tracheal deviation present.   Cardiovascular: Normal rate and regular rhythm.   Pulmonary/Chest: Breath sounds normal. No respiratory distress. She has no wheezes.   Abdominal: Soft. She exhibits no distension.   Genitourinary:   Genitourinary Comments: +Caro   Musculoskeletal:   B/L UE with bandages, scant serosanguinous discoloration. DP pulses intact in all extremities. Able to spontaneously move all extremities.    Neurological: She is alert and oriented to person, place, and time.   Nursing note and vitals reviewed.      Lines/Drains/Airways     Central Venous Catheter Line                 Percutaneous Central Line Insertion/Assessment - triple lumen  right  internal jugular -- days          Drain                 Urethral Catheter 12/20/18 2335 Latex 16 Fr. 1 day                Significant Labs:    CBC/Anemia Profile:  Recent Labs   Lab 12/21/18  0400 12/21/18  1807 12/22/18  0330   WBC 16.24* 12.39 15.18*   HGB 8.1* 6.1* 8.4*   HCT 25.5* 19.0* 25.8*    199 175   MCV 85 83 82   RDW 18.5* 18.7* 18.1*        Chemistries:  Recent Labs   Lab 12/20/18  2243 12/21/18  0400 12/21/18  1133 12/22/18  0330   * 136  --  138   K 4.5 4.2  --  4.4    107  --  109   CO2 19* 18*  --  18*   BUN 51* 51*  --  53*   CREATININE 1.8* 1.7*  --  1.8*   CALCIUM 8.1* 7.5*  --  7.8*   ALBUMIN 1.2* 1.1*  --  1.3*   PROT 5.2* 4.7*  --  4.6*   BILITOT 0.7 0.8  --  0.7   ALKPHOS 308* 268*  --  156*   ALT <5* <5*  --  <5*   AST 24 22  --  15   MG 1.7 1.6 2.7* 2.5   PHOS 4.5 3.9  --  5.3*     Significant Imaging:  I have reviewed all pertinent imaging results/findings within the past 24 hours.

## 2018-12-22 NOTE — ANESTHESIA PREPROCEDURE EVALUATION
[facility]  Anesthesia Pre-Operative Evaluation         Patient Name: Misty Khalil  YOB: 1960  MRN: [id number]    SUBJECTIVE:     Pre-operative evaluation for Procedure(s) (LRB):  INCISION AND DRAINAGE, UPPER EXTREMITY (Bilateral)     12/22/2018    Misty Khalil is a 58 y.o. female w/ a significant PMHx of Anemia, HTN, RA (on prior prednisone taper) with extensive bilateral upper extremity abscesses now s/p two drainage/washouts at OSH who presents for the above procedure.    Hemodynamically stable without inotrope/pressor requirement. BHARAT @ OSH negative for vegetation. Cardiology consulted - they reviewed BHARAT and agree there is low suspicion of endocarditis at this time    Pt received 2 units PRBCs overnight 12/22    Patient with swollen extremities and unable to physically sign consent form, so verbal consent obtained with two nurses as witnesses    LDA: R IJ CVC    Prev airway: grade I view with mac 3    Drips:    lactated ringers 50 mL/hr at 12/22/18 1200    nicardipine Stopped (12/21/18 0500)         Patient Active Problem List   Diagnosis    Hematochezia    Hypertension    Hyperlipidemia    Anxiety    Generalized abdominal pain    Skin ulcer of toe of right foot with fat layer exposed    Peripheral vascular disease    Cellulitis of third toe of right foot    Neuritis    Soft tissue infection    Septic arthritis of knee, right       Review of patient's allergies indicates:  No Known Allergies    Current Inpatient Medications:   acetaminophen  650 mg Oral Q8H    busPIRone  15 mg Oral BID    ceFAZolin (ANCEF) IVPB  2 g Intravenous Q8H    doxazosin  2 mg Oral Daily    heparin (porcine)  5,000 Units Subcutaneous Q8H    lactated ringers  1,000 mL Intravenous Once    metoprolol tartrate  50 mg Oral BID    nicotine  1 patch Transdermal Daily    pantoprazole  40 mg Intravenous BID       No current facility-administered medications on file prior to encounter.       Current Outpatient Medications on File Prior to Encounter   Medication Sig Dispense Refill    albuterol (PROVENTIL) 2.5 mg /3 mL (0.083 %) nebulizer solution Take 3 mLs (2.5 mg total) by nebulization daily as needed for Wheezing. Rescue 1 Box 2    ALPRAZolam (XANAX) 1 MG tablet Take 1 mg by mouth 2 (two) times daily.      busPIRone (BUSPAR) 15 MG tablet Take 1 tablet (15 mg total) by mouth 2 (two) times daily. 60 tablet 2    cloNIDine 0.3 mg/24 hr td ptwk (CATAPRES) 0.3 mg/24 hr Place 1 patch onto the skin every 7 days.      doxazosin (CARDURA) 1 MG tablet Take 2 tablets (2 mg total) by mouth once daily. 60 tablet 2    ezetimibe (ZETIA) 10 mg tablet TAKE 1 TABLET (10 MG TOTAL) BY MOUTH ONCE DAILY. 30 tablet 2    hydrALAZINE (APRESOLINE) 100 MG tablet TAKE 1 TABLET BY MOUTH TWICE DAILY 60 tablet 2    metoprolol tartrate (LOPRESSOR) 50 MG tablet Take 1 tablet (50 mg total) by mouth 2 (two) times daily. 60 tablet 2    oxyCODONE-acetaminophen (PERCOCET) 5-325 mg per tablet Take 1-2 tablets by mouth every 4 (four) hours as needed for Pain. 30 tablet 0    quinapril (ACCUPRIL) 40 MG tablet Take 1 tablet (40 mg total) by mouth every evening. 30 tablet 2    zolpidem (AMBIEN) 10 mg Tab Take 5 mg by mouth nightly as needed.         Past Surgical History:   Procedure Laterality Date    BACK SURGERY       SECTION      COLONOSCOPY N/A 2018    Performed by [facility]    ESOPHAGOGASTRODUODENOSCOPY (EGD) N/A 2018    Performed by [facility] at [facility] ENDO    HYSTERECTOMY      OOPHORECTOMY         Social History     Socioeconomic History    Marital status:      Spouse name: Not on file    Number of children: Not on file    Years of education: Not on file    Highest education level: Not on file   Social Needs    Financial resource strain: Not on file    Food insecurity - worry: Not on file    Food insecurity - inability: Not on file     Transportation needs - medical: Not on file    Transportation needs - non-medical: Not on file   Occupational History    Not on file   Tobacco Use    Smoking status: Current Every Day Smoker     Packs/day: 1.00     Types: Cigarettes    Smokeless tobacco: Never Used   Substance and Sexual Activity    Alcohol use: Yes     Comment: occasionally    Drug use: No    Sexual activity: No   Other Topics Concern    Not on file   Social History Narrative    Not on file       OBJECTIVE:     Vital Signs Range (Last 24H):  Temp:  [35 °C (95 °F)-35.9 °C (96.6 °F)]   Pulse:  [61-89]   Resp:  [9-18]   BP: (118-180)/()   SpO2:  [91 %-100 %]       CBC:   Recent Labs     12/21/18  1807 12/22/18  0330   WBC 12.39 15.18*   RBC 2.30* 3.15*   HGB 6.1* 8.4*   HCT 19.0* 25.8*    175   MCV 83 82   MCH 26.5* 26.7*   MCHC 32.1 32.6       CMP:   Recent Labs     12/21/18  0400 12/21/18  1133 12/22/18  0330     --  138   K 4.2  --  4.4     --  109   CO2 18*  --  18*   BUN 51*  --  53*   CREATININE 1.7*  --  1.8*   *  --  90   MG 1.6 2.7* 2.5   PHOS 3.9  --  5.3*   CALCIUM 7.5*  --  7.8*   ALBUMIN 1.1*  --  1.3*   PROT 4.7*  --  4.6*   ALKPHOS 268*  --  156*   ALT <5*  --  <5*   AST 22  --  15   BILITOT 0.8  --  0.7       INR:  Recent Labs     12/20/18  2243   INR 1.0   APTT 24.8       Diagnostic Studies: No relevant studies.    EKG: No recent studies available.    2D ECHO:  No results found for this or any previous visit.      ASSESSMENT/PLAN:         Anesthesia Evaluation    I have reviewed the Patient Summary Reports.     I have reviewed the Medications.   Steroids Taken In Past Year: Prednisone    Review of Systems  Anesthesia Hx:  No problems with previous Anesthesia  History of prior surgery of interest to airway management or planning: Previous anesthesia: General Denies Family Hx of Anesthesia complications.   Denies Personal Hx of Anesthesia complications.   Social:  Smoker, Social Alcohol Use  Active smoker   Hematology/Oncology:     Oncology Normal    -- Anemia:   EENT/Dental:EENT/Dental Normal   Cardiovascular:   Hypertension Denies MI.    Denies Angina. PVD hyperlipidemia    Pulmonary:   COPD, moderate Denies Asthma.  Denies Recent URI. Intermittently uses home O2   Renal/:  Renal/ Normal     Hepatic/GI:   GERD, well controlled Erosive esophagitis   Musculoskeletal:   RUE and LUE abscess Spine Disorders: (LUMBAR FUSION) lumbar    Neurological:  Neurology Normal  Denies CVA. Denies Seizures.    Endocrine:  Endocrine Normal Denies Diabetes.    Psych:   anxiety depression          Physical Exam  General:  Well nourished    Airway/Jaw/Neck:  Airway Findings: Mouth Opening: Small, but > 3cm Tongue: Normal  General Airway Assessment: Adult  Mallampati: III  Improves to II with phonation.  TM Distance: Normal, at least 6 cm  Jaw/Neck Findings:  Neck ROM: Normal ROM  Neck Findings: Normal    Eyes/Ears/Nose:  Eyes/Ears/Nose Findings:    Dental:  Dental Findings: Edentulous   Chest/Lungs:  Chest/Lungs Findings: Clear to auscultation, Normal Respiratory Rate  Desats to mid-upper 80s while taking; Sats mid to low 90s at rest     Heart/Vascular:  Heart Findings: Rate: Normal  Rhythm: Regular Rhythm     Abdomen:  Abdomen Findings: Normal      Mental Status:  Mental Status Findings:  Cooperative, Alert and Oriented         Anesthesia Plan  Type of Anesthesia, risks & benefits discussed:  Anesthesia Type:  general  Patient's Preference:   Intra-op Monitoring Plan: standard ASA monitors  Intra-op Monitoring Plan Comments:   Post Op Pain Control Plan: per primary service following discharge from PACU, IV/PO Opioids PRN and multimodal analgesia  Post Op Pain Control Plan Comments:   Induction:   IV  Beta Blocker:  Patient is on a Beta-Blocker and has received one dose within the past 24 hours (No further documentation required).       Informed Consent: Patient understands risks and agrees with Anesthesia plan.   Questions answered. Anesthesia consent signed with patient.  ASA Score: 3     Day of Surgery Review of History & Physical:  There are no significant changes.          Ready For Surgery From Anesthesia Perspective.

## 2018-12-22 NOTE — PLAN OF CARE
Problem: Adult Inpatient Plan of Care  Goal: Plan of Care Review  Hx:  HTN, microcytic anemia, transaminitis, RA, hx of GI bleed     Dx: necrotizing fascitis     12/21: transfer to SICU , CT of chest/arm/knee, cultures      Outcome: Ongoing (interventions implemented as appropriate)  Pt returned from OR for bilateral arm washout. Pt has 2 axillary penrose drains and both arms wrapped with xeroform and packed with kerlex. Pt on room air, o2 sats 100%. SBP maintained <180. Pain controlled with dilaudid PRN. Labs monitored and reported to Dr. Florez. Plan of care reviewed with pt and sibling. VSS at this time. Will continue to monitor. See flowsheet for full assessment details.

## 2018-12-22 NOTE — PLAN OF CARE
Problem: Adult Inpatient Plan of Care  Goal: Plan of Care Review  Hx:  HTN, microcytic anemia, transaminitis, RA, hx of GI bleed     Dx: necrotizing fascitis     12/21: transfer to SICU , CT of chest/arm/knee, cultures, LR bolus, Cont. LR infusion started, PRBC's x2 units  12/22: LR bolus          Patient AAOx4, following commands, and intermittently moving all extremities. SBP maintained <180; SpO2 >95% on room air; remains afebrile.  2 units PRBC's and LR bolus x2 L given during shift; urine output remains janet colored/concentrated and <25 mL/hr; MD aware. Continuous LR infusion now titrated to 200 mL/hr @ 0630.  Pain meds given PRN pain and for pt comfort. Dressings to bilateral upper extremities have been reinforced per SICU RN and currently draining moderate amount of serous/serosanguinous output. Plan is for pt to have MRI w/contrast of L-spine on this a.m., pending kidney function improvement/successful fluid response.  Family at bedside and updated on plan of care.

## 2018-12-22 NOTE — PROGRESS NOTES
"[facility]  Orthopedics  Progress Note    Patient Name: Misty Khalil  MRN: [id number]  Admission Date: 12/20/2018  Hospital Length of Stay: 2 days  Attending Provider: Javier Kirk MD  Primary Care Provider: JOEL Mccauley  Follow-up For: Procedure(s) (LRB):  DEBRIDEMENT, WOUND (Bilateral)  ARTHROSCOPY, KNEE (Right)  ARTHROSCOPY, KNEE, septic (Right)    Post-Operative Day: 1 Day Post-Op  Subjective:     Principal Problem: Sepsis    Principal Orthopedic Problem: As above s/p BUE I&D / Right wrist I&D / right knee I&D    Interval History: NAEO, dressings in place to BUE. Right knee pain that is controlled. ID following. HDS    Review of patient's allergies indicates:  No Known Allergies    Current Facility-Administered Medications   Medication    0.9%  NaCl infusion (for blood administration)    0.9%  NaCl infusion    acetaminophen tablet 650 mg    albuterol sulfate nebulizer solution 2.5 mg    ceFAZolin injection 2 g    dextrose 50% injection 12.5 g    glucagon (human recombinant) injection 1 mg    hydrALAZINE injection 10 mg    hydrALAZINE injection 5 mg    HYDROmorphone injection 1 mg    insulin aspart U-100 pen 0-5 Units    lactated ringers infusion    niCARdipine 40 mg/200 mL infusion    nicotine 14 mg/24 hr 1 patch    ondansetron injection 4 mg    pantoprazole injection 40 mg    promethazine (PHENERGAN) 6.25 mg in dextrose 5 % 50 mL IVPB    sodium chloride 0.9% flush 3 mL     Objective:     Vital Signs (Most Recent):  Temp: 96.3 °F (35.7 °C) (12/22/18 0630)  Pulse: 83 (12/22/18 0630)  Resp: 12 (12/22/18 0630)  BP: (!) 147/79 (12/22/18 0630)  SpO2: 98 % (12/22/18 0630) Vital Signs (24h Range):  Temp:  [95 °F (35 °C)-98 °F (36.7 °C)] 96.3 °F (35.7 °C)  Pulse:  [66-83] 83  Resp:  [9-20] 12  SpO2:  [77 %-100 %] 98 %  BP: (121-180)/() 147/79     Weight: 69.9 kg (154 lb)  Height: 5' 7" (170.2 cm)  Body mass index is 24.12 kg/m².      Intake/Output Summary (Last 24 " hours) at 12/22/2018 0716  Last data filed at 12/22/2018 0600  Gross per 24 hour   Intake 3221.3 ml   Output 865 ml   Net 2356.3 ml       Ortho/SPM Exam   PE:    AA&O x 4.  NAD  HEENT:  NCAT, sclera nonicteric  Lungs:  Respirations are equal and unlabored.  CV:  2+ bilateral upper and lower extremity pulses.  Skin:  Intact throughout.    MSK:    BUE in dressings, serosanguinous drainage present to RUE dressings.  ROM intact to left hand with intact sensation  Right hand with flexion of digits and decreased digit extension at the MCPJ. Sensation intact to right hand.    Dressings CDI to right knee.            Significant Labs: All pertinent labs within the past 24 hours have been reviewed.    Significant Imaging: I have reviewed all pertinent imaging results/findings.    Assessment/Plan:     Septic arthritis of knee, right    Misty Khalil is a 58 y.o. female s/p BUE I&D with open wounds and right septic knee / right septic wrist    Will follow with general surgery  Patient denies deep pain to any joint - low clinical suspicion for occult septic joint    Dispo: Please call with questions. Will defer management to general surgery. No plans for repeat I&D of right knee at this time, however will continue to follow / reevaluate.            Nixon Casanova MD  Orthopedics  [facility]

## 2018-12-22 NOTE — ASSESSMENT & PLAN NOTE
58 year old F w/ MHx of HTN, RA, GI bleeds that was transferred from [facility] on 12/20.  Found to have abscesses in RUE and LUE  Started on empiric abx coverage with vanc/zosyn.  BCx grew MSSA and she was switched to cefazolin.  TTE on 12/18 showed possible mitral valve vegetation, but BHARAT was negative for acute pathology.  Of note, she was treated w/ bactrim for MSSA infection of R 3rd toe in Sept 2018.  ID was consulted for possible endocarditis in setting of MSSA bacteremia.      Recommendations  -Started cefazolin 2g q8h on 12/21  -Awaiting improvement in renal function for MRI lumbar w/ contrast to evaluate hardware  -TTE yesterday negative for vegetation  -Cultures 12/20 NGTD  -Pt will need 4 weeks of antibiotic therapy from last clean culture  -Will continue to follow

## 2018-12-22 NOTE — PROGRESS NOTES
[facility]  Critical Care - Surgery  Progress Note    Patient Name: Misty Khalil  MRN: [id number]  Admission Date: 12/20/2018  Hospital Length of Stay: 2 days  Code Status: Full Code  Attending Provider: Javier Kirk MD  Primary Care Provider: JOEL Mccauley   Principal Problem: <principal problem not specified>    Subjective:     Hospital/ICU Course:  No notes on file    Interval History/Significant Events: Received 2U pRBCs overnight, responded appropriately. LR mIVFs increased with the goal of improving kidney function so that L spine MRI with contrast can be done.     Follow-up For: Procedure(s) (LRB):  DEBRIDEMENT, WOUND (Bilateral)  ARTHROSCOPY, KNEE (Right)  ARTHROSCOPY, KNEE, septic (Right)    Post-Operative Day: 1 Day Post-Op    Objective:     Vital Signs (Most Recent):  Temp: 96.4 °F (35.8 °C) (12/22/18 1200)  Pulse: 61 (12/22/18 1200)  Resp: 12 (12/22/18 1200)  BP: 122/64 (12/22/18 1200)  SpO2: 98 % (12/22/18 1200) Vital Signs (24h Range):  Temp:  [95 °F (35 °C)-96.6 °F (35.9 °C)] 96.4 °F (35.8 °C)  Pulse:  [61-89] 61  Resp:  [9-20] 12  SpO2:  [77 %-100 %] 98 %  BP: (118-180)/() 122/64     Weight: 69.9 kg (154 lb)  Body mass index is 24.12 kg/m².      Intake/Output Summary (Last 24 hours) at 12/22/2018 1227  Last data filed at 12/22/2018 1200  Gross per 24 hour   Intake 3221.3 ml   Output 851 ml   Net 2370.3 ml       Physical Exam  Constitutional: She is oriented to person, place, and time. She appears well-developed.   Thin lady who appears older than stated age.   HENT:   Head: Normocephalic and atraumatic.   Eyes: EOM are normal. Pupils are equal, round, and reactive to light.   Neck: No JVD present. No tracheal deviation present.   Cardiovascular: Normal rate and regular rhythm.   Pulmonary/Chest: Breath sounds normal. No respiratory distress. She has no wheezes.   Abdominal: Soft. She exhibits no distension.   Genitourinary:   Genitourinary Comments: +Caro    Musculoskeletal:   B/L UE with bandages, scant serosanguinous discoloration. DP pulses intact in all extremities. Able to spontaneously move all extremities.    Neurological: She is alert and oriented to person, place, and time.   Nursing note and vitals reviewed.      Lines/Drains/Airways     Central Venous Catheter Line                 Percutaneous Central Line Insertion/Assessment - triple lumen  right internal jugular -- days          Drain                 Urethral Catheter 12/20/18 2335 Latex 16 Fr. 1 day                Significant Labs:    CBC/Anemia Profile:  Recent Labs   Lab 12/21/18  0400 12/21/18  1807 12/22/18  0330   WBC 16.24* 12.39 15.18*   HGB 8.1* 6.1* 8.4*   HCT 25.5* 19.0* 25.8*    199 175   MCV 85 83 82   RDW 18.5* 18.7* 18.1*        Chemistries:  Recent Labs   Lab 12/20/18  2243 12/21/18  0400 12/21/18  1133 12/22/18  0330   * 136  --  138   K 4.5 4.2  --  4.4    107  --  109   CO2 19* 18*  --  18*   BUN 51* 51*  --  53*   CREATININE 1.8* 1.7*  --  1.8*   CALCIUM 8.1* 7.5*  --  7.8*   ALBUMIN 1.2* 1.1*  --  1.3*   PROT 5.2* 4.7*  --  4.6*   BILITOT 0.7 0.8  --  0.7   ALKPHOS 308* 268*  --  156*   ALT <5* <5*  --  <5*   AST 24 22  --  15   MG 1.7 1.6 2.7* 2.5   PHOS 4.5 3.9  --  5.3*     Significant Imaging:  I have reviewed all pertinent imaging results/findings within the past 24 hours.    Assessment/Plan:     Soft tissue infection    59yo F w/ hx of Anemia, HTN, RA (on prior prednisone taper) with extensive bilateral upper extremity abscesses now s/p two drainage/washouts at OSH.      Neuro: No history of seizures/strokes.   Pain: Dilaudid , Tylenol     CVS: Hemodynamically stable without inotrope/pressor requirement. BHARAT @ OSH negative for vegetation.   Cardiology consulted - they reviewed BHARAT and agree there is low suspicion of endocarditis at this time  History of HTN: Continue cardene gtt     Pulm: Chronic smoker  Comfortable on room air  - Continuous pulse oximetry,  incentive spirometry.     Renal:   - VIDYA , Cr now 1.8, continue volume resuscitation  - Daily BMP, Strict I/O (+Caro)     FEN/GI  Erosive Esophagitis: Continue PPI BID, TPN  Regular diet  IVF: LR @ 50cc/hr     MSK/ ID:   Staph bacteremia manifesting as necrotizing fascitis of bilateral upper extremity, now septic arthritis of right knee. BHARAT @OSH negative for endocarditis. Presentation concerning for seeding from back hardware. Orthopedics consulted for septic knee already, appreciate input ; would like to obtain spine MRI imaging if helpful.  Infectious disease consulted for antibiotic duration management.  - Continue high dose Ancef 2g q8h   - Repeat cultures obtained     Heme/Onc. H/H stable, continue to monitor     PPX:   SCD, restart SQH after OR  Protonix BID  PT/OT     Dispo: Continue ICU care for now  Primary: ACS         Critical care was time spent personally by me on the following activities: development of treatment plan with patient or surrogate and bedside caregivers, discussions with consultants, evaluation of patient's response to treatment, examination of patient, ordering and performing treatments and interventions, ordering and review of laboratory studies, ordering and review of radiographic studies, pulse oximetry, re-evaluation of patient's condition.  This critical care time did not overlap with that of any other provider or involve time for any procedures.     Sofiya Salamanca MD  Critical Care - Surgery  [facility]

## 2018-12-22 NOTE — PROGRESS NOTES
Pt's u/o 9cc/h for past 2 hours. Dr. Salamanca orders for 1L LR bolus over 3 hours. LR bolus started. VSS. Will continue to monitor.

## 2018-12-22 NOTE — PROGRESS NOTES
CHIEF COMPLAINT:    Interval Events:    REVIEW OF SYSTEMS:  Constitutional: No fevers or chills. No weight loss. No fatigue or generalized malaise.  Eyes: No itching or discharge from the eyes  ENT: No ear pain. No ear discharge. No nasal congestion. No post nasal drip. No epistaxis. No throat pain. No sore throat. No difficulty swallowing.   CV: No chest pain. No palpitations. No lightheadedness or dizziness.   Resp: No dyspnea at rest. No dyspnea on exertion. No orthopnea. No wheezing. No cough. No stridor. No sputum production. No chest pain with respiration.  GI: No nausea. No vomiting. No diarrhea.  MSK: No joint pain or pain in any extremities  Integumentary: No skin lesions. No pedal edema.  Neurological: No gross motor weakness. No sensory changes.  [ ] All other systems negative  [ ] Unable to assess ROS because ________    OBJECTIVE:  ICU Vital Signs Last 24 Hrs  T(C): 36.9 (05 May 2018 00:55), Max: 36.9 (05 May 2018 00:55)  T(F): 98.4 (05 May 2018 00:55), Max: 98.4 (05 May 2018 00:55)  HR: 80 (05 May 2018 00:55) (80 - 86)  BP: 122/68 (05 May 2018 00:55) (114/67 - 146/69)  BP(mean): --  ABP: --  ABP(mean): --  RR: 18 (05 May 2018 00:55) (18 - 18)  SpO2: 95% (05 May 2018 00:55) (94% - 95%)        05-03 @ 07:01  -  05-04 @ 07:00  --------------------------------------------------------  IN: 1250 mL / OUT: 500 mL / NET: 750 mL    05-04 @ 07:01  -  05-05 @ 04:57  --------------------------------------------------------  IN: 1070 mL / OUT: 0 mL / NET: 1070 mL      CAPILLARY BLOOD GLUCOSE          PHYSICAL EXAM:  General: Awake, alert, oriented X 3.   HEENT: Atraumatic, normocephalic.                 Mallampatti Grade                 No nasal congestion.                No tonsillar or pharyngeal exudates.  Lymph Nodes: No palpable lymphadenopathy  Neck: No JVD. No carotid bruit.   Respiratory: Normal chest expansion                         Normal percussion                         Normal and equal air entry                         No wheeze, rhonchi or rales.  Cardiovascular: S1 S2 normal. No murmurs, rubs or gallops.   Abdomen: Soft, non-tender, non-distended. No organomegaly.  Extremities: Warm to touch. Peripheral pulse palpable. No pedal edema.   Skin: No rashes or skin lesions  Neurological: Motor and sensory examination equal and normal in all four extremities.  Psychiatry: Appropriate mood and affect.    HOSPITAL MEDICATIONS:  MEDICATIONS  (STANDING):  ascorbic acid 500 milliGRAM(s) Oral daily  aspirin enteric coated 81 milliGRAM(s) Oral daily  buDESOnide 160 MICROgram(s)/formoterol 4.5 MICROgram(s) Inhaler 2 Puff(s) Inhalation two times a day  calcium carbonate 1250 mG + Vitamin D (OsCal 500 + D) 1 Tablet(s) Oral three times a day  Dakins Solution - 1/4 Strength 1 Application(s) Topical three times a day  DAPTOmycin IVPB 800 milliGRAM(s) IV Intermittent every 24 hours  docusate sodium 100 milliGRAM(s) Oral three times a day  ferrous    sulfate 325 milliGRAM(s) Oral daily  folic acid 1 milliGRAM(s) Oral daily  melatonin 3 milliGRAM(s) Oral at bedtime  multivitamin 1 Tablet(s) Oral daily  nystatin    Suspension 249785 Unit(s) Oral every 6 hours  oxyCODONE  ER Tablet 10 milliGRAM(s) Oral every 12 hours  pantoprazole    Tablet 40 milliGRAM(s) Oral before breakfast  polyethylene glycol 3350 17 Gram(s) Oral daily  povidone iodine 10% Solution 1 Application(s) Topical three times a day  sodium chloride 0.9%. 1000 milliLiter(s) (75 mL/Hr) IV Continuous <Continuous>  tiotropium 18 MICROgram(s) Capsule 1 Capsule(s) Inhalation daily  zinc oxide 20% Ointment 1 Application(s) Topical daily    MEDICATIONS  (PRN):  acetaminophen   Tablet 650 milliGRAM(s) Oral every 6 hours PRN For Temp greater than 38 C (100.4 F)  acetaminophen   Tablet. 650 milliGRAM(s) Oral every 6 hours PRN Mild Pain (1 - 3)  HYDROmorphone   Tablet 2 milliGRAM(s) Oral every 4 hours PRN Severe Pain (7 - 10)  lidocaine 2% Viscous 10 milliLiter(s) Swish and Spit every 6 hours PRN Mouth Sores  senna 2 Tablet(s) Oral at bedtime PRN Constipation      LABS:                        8.4    9.71  )-----------( 318      ( 03 May 2018 08:01 )             26.4     05-04    137  |  97  |  8   ----------------------------<  104<H>  3.6   |  29  |  0.61    Ca    8.6      04 May 2018 06:37      PT/INR - ( 04 May 2018 07:29 )   PT: 31.1 sec;   INR: 2.70 ratio                   MICROBIOLOGY:     RADIOLOGY:  [ ] Reviewed and interpreted by me    Point of Care Ultrasound Findings:    PFT:    EKG: [facility]  Cardiology  Progress Note    Patient Name: Misty Khalil  MRN: [id number]  Admission Date: 12/20/2018  Hospital Length of Stay: 2 days  Code Status: Full Code   Attending Physician: Javier Kirk MD   Primary Care Physician: JOEL Mccauley    Subjective:     Interval History: No acute overnight events. Pt in NSR on telemetry. This AM, pt complaining of shoulder/knee pain.     Review of Systems   Constitution: Positive for weakness and malaise/fatigue. Negative for chills and fever.   HENT: Negative for congestion.    Cardiovascular: Negative for chest pain, irregular heartbeat, near-syncope, orthopnea, palpitations, paroxysmal nocturnal dyspnea and syncope.   Respiratory: Negative for cough, shortness of breath, sputum production and wheezing.    Gastrointestinal: Negative for abdominal pain, nausea and vomiting.   Neurological: Negative for dizziness, headaches and light-headedness.     Objective:     Vital Signs (Most Recent):  Temp: 96.4 °F (35.8 °C) (12/22/18 0823)  Pulse: 84 (12/22/18 0823)  Resp: 12 (12/22/18 0823)  BP: (!) 156/87 (12/22/18 0800)  SpO2: 97 % (12/22/18 0823) Vital Signs (24h Range):  Temp:  [95 °F (35 °C)-98 °F (36.7 °C)] 96.4 °F (35.8 °C)  Pulse:  [66-87] 84  Resp:  [9-20] 12  SpO2:  [77 %-100 %] 97 %  BP: (121-180)/() 156/87     Weight: 69.9 kg (154 lb)  Body mass index is 24.12 kg/m².     SpO2: 97 %  O2 Device (Oxygen Therapy): nasal cannula      Intake/Output Summary (Last 24 hours) at 12/22/2018 0831  Last data filed at 12/22/2018 0800  Gross per 24 hour   Intake 3221.3 ml   Output 910 ml   Net 2311.3 ml       Lines/Drains/Airways     Central Venous Catheter Line                 Percutaneous Central Line Insertion/Assessment - triple lumen  right internal jugular -- days          Drain                 Urethral Catheter 12/20/18 2335 Latex 16 Fr. 1 day                Physical Exam   Constitutional: No distress.   HENT:   Mouth/Throat:  Oropharynx is clear and moist.   Eyes: Pupils are equal, round, and reactive to light.   Neck: No JVD present.   Cardiovascular: Normal rate and regular rhythm.   Pulmonary/Chest: Effort normal and breath sounds normal.   Abdominal: Soft.   Musculoskeletal: She exhibits no edema.   Skin: Skin is warm and dry.   Psychiatric: She has a normal mood and affect. Her behavior is normal.       Significant Labs: All pertinent lab results from the last 24 hours have been reviewed.    Significant Imaging: Echocardiogram:   Transthoracic echo (TTE) complete (Cupid Only):   Results for orders placed or performed during the hospital encounter of 12/20/18   Transthoracic echo (TTE) complete (Cupid Only)   Result Value Ref Range    Ascending aorta 3.10 cm    STJ 3.03 cm    AV mean gradient 3.96 mmHg    Ao peak zhen 1.39 m/s    Ao VTI 25.92 cm    IVS 0.78 0.6 - 1.1 cm    LA size 3.53 cm    Left Atrium Major Axis 4.58 cm    Left Atrium Minor Axis 4.38 cm    LVIDD 3.96 3.5 - 6.0 cm    LVIDS 2.87 2.1 - 4.0 cm    LVOT diameter 2.04 cm    LVOT peak VTI 23.05 cm    PW 0.96 0.6 - 1.1 cm    MV Peak A Zhen 0.94 m/s    E wave decelartion time 208.47 msec    MV Peak E Zhen 1.09 m/s    RA Major Axis 3.10 cm    RA Width 3.33 cm    RVDD 3.12 cm    Sinus 3.36 cm    TAPSE 1.84 cm    TDI LATERAL 0.12     TDI SEPTAL 0.08     LA WIDTH 3.44 cm    LV Diastolic Volume 68.21 mL    LV Systolic Volume 31.41 mL    LV LATERAL E/E' RATIO 9.08     LV SEPTAL E/E' RATIO 13.63     FS 28 %    LA volume 46.22 cm3    LV mass 103.01 g    Left Ventricle Relative Wall Thickness 0.48 cm    AV valve area 2.91 cm2    AV index (prosthetic) 0.89     E/A ratio 1.16     Mean e' 0.10     LVOT area 3.27 cm2    LVOT stroke volume 75.30 cm3    AV peak gradient 7.73 mmHg    E/E' ratio 10.90     LV Systolic Volume Index 17.4 mL/m2    LV Diastolic Volume Index 37.70 mL/m2    LA Volume Index 25.5 mL/m2    LV Mass Index 56.9 g/m2    BSA 1.82 m2    Right Atrial Pressure (from IVC) 8 mmHg      Assessment and Plan:     Soft tissue infection    Cardiology was consulted for concern of endocarditis due to bilateral upper extremity abscesses resulting from septic emboli. BHARAT done at outside hospital showed normal systolic function with no vegetation on any valves (images reviewed by Dr. Valentine and Dr. Guardado). On physical exam, no cardiac murmur is appreciated. She has been afebrile, has leukocytosis and found to have MSSA bacteremia for which she is on Ancef. Blood cultures on this hospital admission are in process. Due to normal BHARAT, low suspicion of endocarditis at this time. Continue care per primary team. ID consulted, agree with them to perform imaging of spine to evaluate spinal hardware.    Cardiology Consult team will sign off.      Arelis Hidalgo MD  Cardiology Fellow, PGY-4   [facility] [address]   CHIEF COMPLAINT: better sleep--no new sob or pain    Interval Events: No OOB, ID, wound care     REVIEW OF SYSTEMS:  Constitutional: No fevers or chills. No weight loss. + fatigue or generalized malaise.  Eyes: No itching or discharge from the eyes  ENT: No ear pain. No ear discharge. No nasal congestion. No post nasal drip. No epistaxis. No throat pain. No sore throat. No difficulty swallowing.   CV: No chest pain. No palpitations. No lightheadedness or dizziness.   Resp: No dyspnea at rest. + dyspnea on exertion. No orthopnea. No wheezing. No cough. No stridor. No sputum production. No chest pain with respiration.  GI: No nausea. No vomiting. No diarrhea.  MSK: No joint pain or pain in any extremities--except RLE  Integumentary: No skin lesions. No pedal edema.  Neurological: No gross motor weakness. No sensory changes.  [+ ] All other systems negative  [ ] Unable to assess ROS because ________    OBJECTIVE:  ICU Vital Signs Last 24 Hrs  T(C): 36.9 (05 May 2018 00:55), Max: 36.9 (05 May 2018 00:55)  T(F): 98.4 (05 May 2018 00:55), Max: 98.4 (05 May 2018 00:55)  HR: 80 (05 May 2018 00:55) (80 - 86)  BP: 122/68 (05 May 2018 00:55) (114/67 - 146/69)  BP(mean): --  ABP: --  ABP(mean): --  RR: 18 (05 May 2018 00:55) (18 - 18)  SpO2: 95% (05 May 2018 00:55) (94% - 95%)        05-03 @ 07:01  -  05-04 @ 07:00  --------------------------------------------------------  IN: 1250 mL / OUT: 500 mL / NET: 750 mL    05-04 @ 07:01  -  05-05 @ 04:57  --------------------------------------------------------  IN: 1070 mL / OUT: 0 mL / NET: 1070 mL      CAPILLARY BLOOD GLUCOSE          PHYSICAL EXAM: NAD in bed  General: Awake, alert, oriented X 3.   HEENT: Atraumatic, normocephalic.                 Mallampatti Grade 3                No nasal congestion.                No tonsillar or pharyngeal exudates.  Lymph Nodes: No palpable lymphadenopathy  Neck: No JVD. No carotid bruit.   Respiratory: Normal chest expansion--decreased BS bases                         Normal percussion                         Normal and equal air entry                         No wheeze, rhonchi or rales.  Cardiovascular: S1 S2 normal. No murmurs, rubs or gallops.   Abdomen: Soft, non-tender, non-distended. No organomegaly.  Extremities: Warm to touch. Peripheral pulse palpable. No pedal edema. RLE leg wound  Skin: No rashes or skin lesions  Neurological: Motor and sensory examination equal and normal in all four extremities.  Psychiatry: Appropriate mood and affect.    HOSPITAL MEDICATIONS:  MEDICATIONS  (STANDING):  ascorbic acid 500 milliGRAM(s) Oral daily  aspirin enteric coated 81 milliGRAM(s) Oral daily  buDESOnide 160 MICROgram(s)/formoterol 4.5 MICROgram(s) Inhaler 2 Puff(s) Inhalation two times a day  calcium carbonate 1250 mG + Vitamin D (OsCal 500 + D) 1 Tablet(s) Oral three times a day  Dakins Solution - 1/4 Strength 1 Application(s) Topical three times a day  DAPTOmycin IVPB 800 milliGRAM(s) IV Intermittent every 24 hours  docusate sodium 100 milliGRAM(s) Oral three times a day  ferrous    sulfate 325 milliGRAM(s) Oral daily  folic acid 1 milliGRAM(s) Oral daily  melatonin 3 milliGRAM(s) Oral at bedtime  multivitamin 1 Tablet(s) Oral daily  nystatin    Suspension 886178 Unit(s) Oral every 6 hours  oxyCODONE  ER Tablet 10 milliGRAM(s) Oral every 12 hours  pantoprazole    Tablet 40 milliGRAM(s) Oral before breakfast  polyethylene glycol 3350 17 Gram(s) Oral daily  povidone iodine 10% Solution 1 Application(s) Topical three times a day  sodium chloride 0.9%. 1000 milliLiter(s) (75 mL/Hr) IV Continuous <Continuous>  tiotropium 18 MICROgram(s) Capsule 1 Capsule(s) Inhalation daily  zinc oxide 20% Ointment 1 Application(s) Topical daily    MEDICATIONS  (PRN):  acetaminophen   Tablet 650 milliGRAM(s) Oral every 6 hours PRN For Temp greater than 38 C (100.4 F)  acetaminophen   Tablet. 650 milliGRAM(s) Oral every 6 hours PRN Mild Pain (1 - 3)  HYDROmorphone   Tablet 2 milliGRAM(s) Oral every 4 hours PRN Severe Pain (7 - 10)  lidocaine 2% Viscous 10 milliLiter(s) Swish and Spit every 6 hours PRN Mouth Sores  senna 2 Tablet(s) Oral at bedtime PRN Constipation      LABS:                        8.4    9.71  )-----------( 318      ( 03 May 2018 08:01 )             26.4     05-04    137  |  97  |  8   ----------------------------<  104<H>  3.6   |  29  |  0.61    Ca    8.6      04 May 2018 06:37      PT/INR - ( 04 May 2018 07:29 )   PT: 31.1 sec;   INR: 2.70 ratio                   MICROBIOLOGY:     RADIOLOGY:  [ ] Reviewed and interpreted by me    Point of Care Ultrasound Findings:    PFT:    EKG:

## 2018-12-22 NOTE — SUBJECTIVE & OBJECTIVE
"Principal Problem: Sepsis    Principal Orthopedic Problem: As above s/p BUE I&D / Right wrist I&D / right knee I&D    Interval History: NAEO, dressings in place to BUE. Right knee pain that is controlled. ID following. HDS    Review of patient's allergies indicates:  No Known Allergies    Current Facility-Administered Medications   Medication    0.9%  NaCl infusion (for blood administration)    0.9%  NaCl infusion    acetaminophen tablet 650 mg    albuterol sulfate nebulizer solution 2.5 mg    ceFAZolin injection 2 g    dextrose 50% injection 12.5 g    glucagon (human recombinant) injection 1 mg    hydrALAZINE injection 10 mg    hydrALAZINE injection 5 mg    HYDROmorphone injection 1 mg    insulin aspart U-100 pen 0-5 Units    lactated ringers infusion    niCARdipine 40 mg/200 mL infusion    nicotine 14 mg/24 hr 1 patch    ondansetron injection 4 mg    pantoprazole injection 40 mg    promethazine (PHENERGAN) 6.25 mg in dextrose 5 % 50 mL IVPB    sodium chloride 0.9% flush 3 mL     Objective:     Vital Signs (Most Recent):  Temp: 96.3 °F (35.7 °C) (12/22/18 0630)  Pulse: 83 (12/22/18 0630)  Resp: 12 (12/22/18 0630)  BP: (!) 147/79 (12/22/18 0630)  SpO2: 98 % (12/22/18 0630) Vital Signs (24h Range):  Temp:  [95 °F (35 °C)-98 °F (36.7 °C)] 96.3 °F (35.7 °C)  Pulse:  [66-83] 83  Resp:  [9-20] 12  SpO2:  [77 %-100 %] 98 %  BP: (121-180)/() 147/79     Weight: 69.9 kg (154 lb)  Height: 5' 7" (170.2 cm)  Body mass index is 24.12 kg/m².      Intake/Output Summary (Last 24 hours) at 12/22/2018 0716  Last data filed at 12/22/2018 0600  Gross per 24 hour   Intake 3221.3 ml   Output 865 ml   Net 2356.3 ml       Ortho/SPM Exam   PE:    AA&O x 4.  NAD  HEENT:  NCAT, sclera nonicteric  Lungs:  Respirations are equal and unlabored.  CV:  2+ bilateral upper and lower extremity pulses.  Skin:  Intact throughout.    MSK:    BUE in dressings, serosanguinous drainage present to RUE dressings.  ROM intact to left " hand with intact sensation  Right hand with flexion of digits and decreased digit extension at the MCPJ. Sensation intact to right hand.    Dressings CDI to right knee.            Significant Labs: All pertinent labs within the past 24 hours have been reviewed.    Significant Imaging: I have reviewed all pertinent imaging results/findings.

## 2018-12-22 NOTE — PROGRESS NOTES
Dr. Florez placed stat MRI of L-spine w/contrast orders for pt; MRI called shortly thereafter stating kidney function not acceptable for contrast and advised MD call to speak with radiologist/change order to w/out contrast.  Informed Dr. Florez of above and continuous MIVF of LR rate has now been adjusted from 100-150 mL/hr to 200 mL/hr, with goal response of improving kidney function and greater hourly urine output and then reassessing new serum BMP.  States okay to hold off on MRI for now, in the interim. MRI aware.

## 2018-12-22 NOTE — ASSESSMENT & PLAN NOTE
Misty Khalil is a 58 y.o. female s/p BUE I&D with open wounds and right septic knee / right septic wrist    Will follow with general surgery  Patient denies deep pain to any joint - low clinical suspicion for occult septic joint    Dispo: Please call with questions. Will defer management to general surgery. No plans for repeat I&D of right knee at this time, however will continue to follow / reevaluate.

## 2018-12-22 NOTE — PROGRESS NOTES
[facility]  Infectious Disease  Progress Note    Patient Name: Misty Khalil  MRN: [id number]  Admission Date: 12/20/2018  Length of Stay: 2 days  Attending Physician: Javier Kirk MD  Primary Care Provider: JOEL Mccauley    Isolation Status: No active isolations  Assessment/Plan:      Septic arthritis of knee, right    -See soft tissue infection     Soft tissue infection    58 year old F w/ MHx of HTN, RA, GI bleeds that was transferred from [facility] on 12/20.  Found to have abscesses in RUE and LUE  Started on empiric abx coverage with vanc/zosyn.  BCx grew MSSA and she was switched to cefazolin.  TTE on 12/18 showed possible mitral valve vegetation, but BHARAT was negative for acute pathology.  Of note, she was treated w/ bactrim for MSSA infection of R 3rd toe in Sept 2018.  ID was consulted for possible endocarditis in setting of MSSA bacteremia.      Recommendations  -Started cefazolin 2g q8h on 12/21  -Awaiting improvement in renal function for MRI lumbar w/ contrast to evaluate hardware  -TTE yesterday negative for vegetation  -Cultures 12/20 NGTD  -Pt will need 4 weeks of antibiotic therapy from last clean culture  -Will continue to follow               Thank you for your consult. I will follow-up with patient. Please contact us if you have any additional questions.    Yusuf Cazares MD  Infectious Disease  [facility]    Subjective:     Principal Problem:<principal problem not specified>    HPI: Misty Khalil is a 58 year old F w/ MHx of HTN, RA, GI bleeds that was transferred from [facility] on 12/20.  She originally presented to [facility] on 12/17 w/ primary complaints of R shoulder pain, swelling, and fevers in the setting of multiple recent falls.  Workup there discovered bilateral abscesses in the RUE and L axilla.  She underwent bilateral arm I&D in the ED on 12/17 and was started on empiric abx coverage with vanc/zosyn.  Formal  incision and washout was performed on 12/18 of the R hand, LUE, and RUE per orthopedic surgery.  Abscesses were packed and repeat washouts occurred 12/19.  At this time she was found to have additional L olecranon abscess and a L hand abscess.  Also at this time her BCx grew MSSA and she was switched to cefazolin.  TTE on 12/18 showed possible mitral valve vegetation, but BHARAT was negative for acute pathology.  Of note, she was treated w/ bactrim for MSSA infection of R 3rd toe in Sept 2018.  She was sent to [facility] on 12/20.      ID was consulted for possible endocarditis in setting of MSSA bacteremia.  Repeat TTE scheduled for today.  She is currently on vanc/zosyn/clindamycin (covering potential necrotizing fasciitis).  BCx and UCx in process, as well as knee fluid cultures (aerobic/anaerobic, fungus, AFB, gram stain).  Synovial fluid of R knee consistent w/ septic arthritis (146k WBCs).  Plan is for repeat washouts today w/ possible wound vac placement per general surgery, as well as arthroscopic I&D of R knee per orthopedic surgery.  Interval History: Switched to cefazolin yesterday.  Underwent I&D of bilateral UEs and R knee.  Awaiting improvement in renal function for MRI lumbar w/ contrast.  TTE yesterday negative for vegetation; normal function.  Cultures 12/20 NGTD.     Review of Systems   Constitutional: Positive for fever and unexpected weight change. Negative for chills and diaphoresis.   HENT: Positive for mouth sores. Negative for congestion and sore throat.    Eyes: Negative for visual disturbance.   Respiratory: Positive for shortness of breath (When being moved around). Negative for cough, wheezing and stridor.    Cardiovascular: Negative for chest pain, palpitations and leg swelling.   Gastrointestinal: Positive for blood in stool (FOBT reportedly + at OSH) and constipation. Negative for abdominal distention, abdominal pain, diarrhea, nausea and vomiting.   Genitourinary: Positive for hematuria.  Negative for dysuria, frequency and pelvic pain.   Musculoskeletal: Positive for arthralgias, back pain and myalgias.   Skin: Positive for wound. Negative for color change and rash.   Neurological: Positive for weakness and headaches. Negative for dizziness, tremors and facial asymmetry.   Psychiatric/Behavioral: Positive for dysphoric mood. Negative for agitation and confusion. The patient is nervous/anxious.      Objective:     Vital Signs (Most Recent):  Temp: 96.1 °F (35.6 °C) (12/22/18 1445)  Pulse: 71 (12/22/18 1445)  Resp: 12 (12/22/18 1445)  BP: 135/67 (12/22/18 1445)  SpO2: 98 % (12/22/18 1445) Vital Signs (24h Range):  Temp:  [95 °F (35 °C)-96.6 °F (35.9 °C)] 96.1 °F (35.6 °C)  Pulse:  [61-89] 71  Resp:  [9-18] 12  SpO2:  [91 %-100 %] 98 %  BP: (118-180)/() 135/67     Weight: 69.9 kg (154 lb)  Body mass index is 24.12 kg/m².    Estimated Creatinine Clearance: 33.1 mL/min (A) (based on SCr of 1.8 mg/dL (H)).    Physical Exam   Constitutional: She is oriented to person, place, and time. She appears cachectic. She is cooperative. She is easily aroused. She has a sickly appearance. No distress.   HENT:   Head: Normocephalic and atraumatic.   Ulcer noted on tongue   Eyes: Conjunctivae and EOM are normal. No scleral icterus.   Neck: Normal range of motion. Neck supple.   Cardiovascular: Normal rate and regular rhythm.   Murmur (Systolic) heard.  Pulmonary/Chest: Effort normal and breath sounds normal. No respiratory distress. She has no wheezes. She has no rales.   Abdominal: Soft. Bowel sounds are normal. She exhibits no distension. There is tenderness (Describes sensation of diffuse pressure when palpated). There is no rebound and no guarding.   Musculoskeletal: She exhibits tenderness. She exhibits no edema.   Neurological: She is alert, oriented to person, place, and time and easily aroused. No cranial nerve deficit.   Skin: Skin is warm and dry. No pallor.   Surgical wounds to bilateral UE's wrapped,  visible blood   Psychiatric: She has a normal mood and affect.       Significant Labs:   Blood Culture:   Recent Labs   Lab 12/21/18  0615   LABBLOO No Growth to date  No Growth to date     CBC:   Recent Labs   Lab 12/21/18  0400 12/21/18  1807 12/22/18  0330   WBC 16.24* 12.39 15.18*   HGB 8.1* 6.1* 8.4*   HCT 25.5* 19.0* 25.8*    199 175     CMP:   Recent Labs   Lab 12/20/18  2243 12/21/18  0400 12/22/18  0330   * 136 138   K 4.5 4.2 4.4    107 109   CO2 19* 18* 18*   * 120* 90   BUN 51* 51* 53*   CREATININE 1.8* 1.7* 1.8*   CALCIUM 8.1* 7.5* 7.8*   PROT 5.2* 4.7* 4.6*   ALBUMIN 1.2* 1.1* 1.3*   BILITOT 0.7 0.8 0.7   ALKPHOS 308* 268* 156*   AST 24 22 15   ALT <5* <5* <5*   ANIONGAP 11 11 11   EGFRNONAA 30.6* 32.8* 30.6*     Microbiology Results (last 7 days)     Procedure Component Value Units Date/Time    Aerobic culture [[id number]] Collected:  12/21/18 0311    Order Status:  Completed Specimen:  Joint Fluid from Knee, Right Updated:  12/22/18 1002     Aerobic Bacterial Culture --     STAPHYLOCOCCUS AUREUS  Moderate  Susceptibility pending      AFB Culture & Smear [[id number]] Collected:  12/21/18 0311    Order Status:  Completed Specimen:  Joint Fluid from Knee, Right Updated:  12/22/18 0927     AFB Culture & Smear Culture in progress     AFB CULTURE STAIN No acid fast bacilli seen.    Blood culture - site #1 [[id number]] Collected:  12/21/18 0615    Order Status:  Completed Specimen:  Blood from Line, Jugular, Internal Right Updated:  12/22/18 0812     Blood Culture, Routine No Growth to date     Blood Culture, Routine No Growth to date    Aerobic culture [[id number]] Collected:  12/21/18 1700    Order Status:  Completed Specimen:  Joint Fluid from Wrist, Right Updated:  12/22/18 0710     Aerobic Bacterial Culture No growth    Narrative:       1. Right wrist- cultures    Urine culture [[id number]] Collected:  12/20/18 8109    Order Status:  Completed Specimen:  Urine, Catheterized  Updated:  12/22/18 0451     Urine Culture, Routine No growth    Narrative:       gray tube    Gram stain [[id number]] Collected:  12/21/18 1700    Order Status:  Completed Specimen:  Joint Fluid from Wrist, Right Updated:  12/21/18 2150     Gram Stain Result Rare WBC's      No organisms seen    Narrative:       1. Right wrist- cultures    Culture, Anaerobe [[id number]] Collected:  12/21/18 1700    Order Status:  Sent Specimen:  Joint Fluid from Wrist, Right Updated:  12/21/18 1752    AFB Culture & Smear [[id number]] Collected:  12/21/18 1700    Order Status:  Sent Specimen:  Joint Fluid from Wrist, Right Updated:  12/21/18 1752    Fungus culture [[id number]] Collected:  12/21/18 1700    Order Status:  Sent Specimen:  Joint Fluid from Wrist, Right Updated:  12/21/18 1751    Gram stain [[id number]] Collected:  12/21/18 0311    Order Status:  Completed Specimen:  Joint Fluid from Knee, Right Updated:  12/21/18 0616     Gram Stain Result No WBC's      No organisms seen    Fungus culture [[id number]] Collected:  12/21/18 0311    Order Status:  Sent Specimen:  Joint Fluid from Knee, Right Updated:  12/21/18 0533    Culture, Anaerobe [[id number]] Collected:  12/21/18 0311    Order Status:  Sent Specimen:  Joint Fluid from Knee, Right Updated:  12/21/18 0533    Blood culture - site #2 [[id number]]     Order Status:  Canceled Specimen:  Blood         Wound Culture:   Recent Labs   Lab 10/22/18  1416 12/21/18 0311 12/21/18 1700   LABAERO STAPHYLOCOCCUS AUREUS  Moderate   STAPHYLOCOCCUS AUREUS  Moderate  Susceptibility pending   No growth       Significant Imaging: I have reviewed all pertinent imaging results/findings within the past 24 hours.

## 2018-12-22 NOTE — SUBJECTIVE & OBJECTIVE
Interval History: Switched to cefazolin yesterday.  Underwent I&D of bilateral UEs and R knee.  Awaiting improvement in renal function for MRI lumbar w/ contrast.  TTE yesterday negative for vegetation; normal function.  Cultures 12/20 NGTD.     Review of Systems   Constitutional: Positive for fever and unexpected weight change. Negative for chills and diaphoresis.   HENT: Positive for mouth sores. Negative for congestion and sore throat.    Eyes: Negative for visual disturbance.   Respiratory: Positive for shortness of breath (When being moved around). Negative for cough, wheezing and stridor.    Cardiovascular: Negative for chest pain, palpitations and leg swelling.   Gastrointestinal: Positive for blood in stool (FOBT reportedly + at OSH) and constipation. Negative for abdominal distention, abdominal pain, diarrhea, nausea and vomiting.   Genitourinary: Positive for hematuria. Negative for dysuria, frequency and pelvic pain.   Musculoskeletal: Positive for arthralgias, back pain and myalgias.   Skin: Positive for wound. Negative for color change and rash.   Neurological: Positive for weakness and headaches. Negative for dizziness, tremors and facial asymmetry.   Psychiatric/Behavioral: Positive for dysphoric mood. Negative for agitation and confusion. The patient is nervous/anxious.      Objective:     Vital Signs (Most Recent):  Temp: 96.1 °F (35.6 °C) (12/22/18 1445)  Pulse: 71 (12/22/18 1445)  Resp: 12 (12/22/18 1445)  BP: 135/67 (12/22/18 1445)  SpO2: 98 % (12/22/18 1445) Vital Signs (24h Range):  Temp:  [95 °F (35 °C)-96.6 °F (35.9 °C)] 96.1 °F (35.6 °C)  Pulse:  [61-89] 71  Resp:  [9-18] 12  SpO2:  [91 %-100 %] 98 %  BP: (118-180)/() 135/67     Weight: 69.9 kg (154 lb)  Body mass index is 24.12 kg/m².    Estimated Creatinine Clearance: 33.1 mL/min (A) (based on SCr of 1.8 mg/dL (H)).    Physical Exam   Constitutional: She is oriented to person, place, and time. She appears cachectic. She is  cooperative. She is easily aroused. She has a sickly appearance. No distress.   HENT:   Head: Normocephalic and atraumatic.   Ulcer noted on tongue   Eyes: Conjunctivae and EOM are normal. No scleral icterus.   Neck: Normal range of motion. Neck supple.   Cardiovascular: Normal rate and regular rhythm.   Murmur (Systolic) heard.  Pulmonary/Chest: Effort normal and breath sounds normal. No respiratory distress. She has no wheezes. She has no rales.   Abdominal: Soft. Bowel sounds are normal. She exhibits no distension. There is tenderness (Describes sensation of diffuse pressure when palpated). There is no rebound and no guarding.   Musculoskeletal: She exhibits tenderness. She exhibits no edema.   Neurological: She is alert, oriented to person, place, and time and easily aroused. No cranial nerve deficit.   Skin: Skin is warm and dry. No pallor.   Surgical wounds to bilateral UE's wrapped, visible blood   Psychiatric: She has a normal mood and affect.       Significant Labs:   Blood Culture:   Recent Labs   Lab 12/21/18  0615   LABBLOO No Growth to date  No Growth to date     CBC:   Recent Labs   Lab 12/21/18  0400 12/21/18  1807 12/22/18  0330   WBC 16.24* 12.39 15.18*   HGB 8.1* 6.1* 8.4*   HCT 25.5* 19.0* 25.8*    199 175     CMP:   Recent Labs   Lab 12/20/18  2243 12/21/18  0400 12/22/18  0330   * 136 138   K 4.5 4.2 4.4    107 109   CO2 19* 18* 18*   * 120* 90   BUN 51* 51* 53*   CREATININE 1.8* 1.7* 1.8*   CALCIUM 8.1* 7.5* 7.8*   PROT 5.2* 4.7* 4.6*   ALBUMIN 1.2* 1.1* 1.3*   BILITOT 0.7 0.8 0.7   ALKPHOS 308* 268* 156*   AST 24 22 15   ALT <5* <5* <5*   ANIONGAP 11 11 11   EGFRNONAA 30.6* 32.8* 30.6*     Microbiology Results (last 7 days)     Procedure Component Value Units Date/Time    Aerobic culture [[id number]] Collected:  12/21/18 0311    Order Status:  Completed Specimen:  Joint Fluid from Knee, Right Updated:  12/22/18 1002     Aerobic Bacterial Culture --     STAPHYLOCOCCUS  AUREUS  Moderate  Susceptibility pending      AFB Culture & Smear [[id number]] Collected:  12/21/18 0311    Order Status:  Completed Specimen:  Joint Fluid from Knee, Right Updated:  12/22/18 0927     AFB Culture & Smear Culture in progress     AFB CULTURE STAIN No acid fast bacilli seen.    Blood culture - site #1 [[id number]] Collected:  12/21/18 0615    Order Status:  Completed Specimen:  Blood from Line, Jugular, Internal Right Updated:  12/22/18 0812     Blood Culture, Routine No Growth to date     Blood Culture, Routine No Growth to date    Aerobic culture [[id number]] Collected:  12/21/18 1700    Order Status:  Completed Specimen:  Joint Fluid from Wrist, Right Updated:  12/22/18 0710     Aerobic Bacterial Culture No growth    Narrative:       1. Right wrist- cultures    Urine culture [[id number]] Collected:  12/20/18 2333    Order Status:  Completed Specimen:  Urine, Catheterized Updated:  12/22/18 0451     Urine Culture, Routine No growth    Narrative:       gray tube    Gram stain [[id number]] Collected:  12/21/18 1700    Order Status:  Completed Specimen:  Joint Fluid from Wrist, Right Updated:  12/21/18 2150     Gram Stain Result Rare WBC's      No organisms seen    Narrative:       1. Right wrist- cultures    Culture, Anaerobe [[id number]] Collected:  12/21/18 1700    Order Status:  Sent Specimen:  Joint Fluid from Wrist, Right Updated:  12/21/18 1752    AFB Culture & Smear [[id number]] Collected:  12/21/18 1700    Order Status:  Sent Specimen:  Joint Fluid from Wrist, Right Updated:  12/21/18 1752    Fungus culture [[id number]] Collected:  12/21/18 1700    Order Status:  Sent Specimen:  Joint Fluid from Wrist, Right Updated:  12/21/18 1751    Gram stain [[id number]] Collected:  12/21/18 0311    Order Status:  Completed Specimen:  Joint Fluid from Knee, Right Updated:  12/21/18 0616     Gram Stain Result No WBC's      No organisms seen    Fungus culture [[id number]] Collected:  12/21/18 0311    Order Status:   Sent Specimen:  Joint Fluid from Knee, Right Updated:  12/21/18 0533    Culture, Anaerobe [[id number]] Collected:  12/21/18 0311    Order Status:  Sent Specimen:  Joint Fluid from Knee, Right Updated:  12/21/18 0533    Blood culture - site #2 [[id number]]     Order Status:  Canceled Specimen:  Blood         Wound Culture:   Recent Labs   Lab 10/22/18  1416 12/21/18  0311 12/21/18  1700   LABAERO STAPHYLOCOCCUS AUREUS  Moderate   STAPHYLOCOCCUS AUREUS  Moderate  Susceptibility pending   No growth       Significant Imaging: I have reviewed all pertinent imaging results/findings within the past 24 hours.

## 2018-12-22 NOTE — ASSESSMENT & PLAN NOTE
Cardiology was consulted for concern of endocarditis due to bilateral upper extremity abscesses resulting from septic emboli. BHARAT done at outside hospital showed normal systolic function with no vegetation on any valves (images reviewed by Dr. Valentine and Dr. Guardado). She has been afebrile, has leukocytosis and found to have MSSA bacteremia for which she is on Ancef. Blood cultures on this hospital admission are in process. Due to normal BHARAT, low suspicion of endocarditis at this time. Continue care per primary team. ID consulted, agree with them to perform imaging of spine to evaluate spinal hardware, will defer to primary team if suitable to use contrast to assess for possibility of etiology of infection.    Cardiology Consult team will sign off.

## 2018-12-22 NOTE — ASSESSMENT & PLAN NOTE
57yo F w/ hx of Anemia, HTN, RA (on prior prednisone taper) with extensive bilateral upper extremity abscesses now s/p two drainage/washouts at OSH.      Neuro: No history of seizures/strokes.   Pain: Dilaudid , Tylenol     CVS: Hemodynamically stable without inotrope/pressor requirement. BHARAT @ OSH negative for vegetation.   Cardiology consulted - they reviewed BHARAT and agree there is low suspicion of endocarditis at this time  History of HTN: Continue cardene gtt     Pulm: Chronic smoker  Comfortable on room air  - Continuous pulse oximetry, incentive spirometry.     Renal:   - VIDYA , Cr now 1.8, continue volume resuscitation  - Daily BMP, Strict I/O (+Caro)     FEN/GI  Erosive Esophagitis: Continue PPI BID, TPN  Regular diet  IVF: LR @ 50cc/hr     MSK/ ID:   Staph bacteremia manifesting as necrotizing fascitis of bilateral upper extremity, now septic arthritis of right knee. BHARAT @OSH negative for endocarditis. Presentation concerning for seeding from back hardware. Orthopedics consulted for septic knee already, appreciate input ; would like to obtain spine MRI imaging if helpful.  Infectious disease consulted for antibiotic duration management.  - Continue high dose Ancef 2g q8h   - Repeat cultures obtained     Heme/Onc. H/H stable, continue to monitor     PPX:   SCD, restart SQH after OR  Protonix BID  PT/OT     Dispo: Continue ICU care for now  Primary: ACS

## 2018-12-22 NOTE — SUBJECTIVE & OBJECTIVE
Interval History: No acute overnight events. Pt in NSR on telemetry. This AM, pt complaining of shoulder/knee pain.     Review of Systems   Constitution: Positive for weakness and malaise/fatigue. Negative for chills and fever.   HENT: Negative for congestion.    Cardiovascular: Negative for chest pain, irregular heartbeat, near-syncope, orthopnea, palpitations, paroxysmal nocturnal dyspnea and syncope.   Respiratory: Negative for cough, shortness of breath, sputum production and wheezing.    Gastrointestinal: Negative for abdominal pain, nausea and vomiting.   Neurological: Negative for dizziness, headaches and light-headedness.     Objective:     Vital Signs (Most Recent):  Temp: 96.4 °F (35.8 °C) (12/22/18 0823)  Pulse: 84 (12/22/18 0823)  Resp: 12 (12/22/18 0823)  BP: (!) 156/87 (12/22/18 0800)  SpO2: 97 % (12/22/18 0823) Vital Signs (24h Range):  Temp:  [95 °F (35 °C)-98 °F (36.7 °C)] 96.4 °F (35.8 °C)  Pulse:  [66-87] 84  Resp:  [9-20] 12  SpO2:  [77 %-100 %] 97 %  BP: (121-180)/() 156/87     Weight: 69.9 kg (154 lb)  Body mass index is 24.12 kg/m².     SpO2: 97 %  O2 Device (Oxygen Therapy): nasal cannula      Intake/Output Summary (Last 24 hours) at 12/22/2018 0831  Last data filed at 12/22/2018 0800  Gross per 24 hour   Intake 3221.3 ml   Output 910 ml   Net 2311.3 ml       Lines/Drains/Airways     Central Venous Catheter Line                 Percutaneous Central Line Insertion/Assessment - triple lumen  right internal jugular -- days          Drain                 Urethral Catheter 12/20/18 2335 Latex 16 Fr. 1 day                Physical Exam   Constitutional: No distress.   HENT:   Mouth/Throat: Oropharynx is clear and moist.   Eyes: Pupils are equal, round, and reactive to light.   Neck: No JVD present.   Cardiovascular: Normal rate and regular rhythm.   Pulmonary/Chest: Effort normal and breath sounds normal.   Abdominal: Soft.   Musculoskeletal: She exhibits no edema.   Skin: Skin is warm and  dry.   Psychiatric: She has a normal mood and affect. Her behavior is normal.       Significant Labs: All pertinent lab results from the last 24 hours have been reviewed.    Significant Imaging: Echocardiogram:   Transthoracic echo (TTE) complete (Cupid Only):   Results for orders placed or performed during the hospital encounter of 12/20/18   Transthoracic echo (TTE) complete (Cupid Only)   Result Value Ref Range    Ascending aorta 3.10 cm    STJ 3.03 cm    AV mean gradient 3.96 mmHg    Ao peak zhen 1.39 m/s    Ao VTI 25.92 cm    IVS 0.78 0.6 - 1.1 cm    LA size 3.53 cm    Left Atrium Major Axis 4.58 cm    Left Atrium Minor Axis 4.38 cm    LVIDD 3.96 3.5 - 6.0 cm    LVIDS 2.87 2.1 - 4.0 cm    LVOT diameter 2.04 cm    LVOT peak VTI 23.05 cm    PW 0.96 0.6 - 1.1 cm    MV Peak A Zhen 0.94 m/s    E wave decelartion time 208.47 msec    MV Peak E Zhen 1.09 m/s    RA Major Axis 3.10 cm    RA Width 3.33 cm    RVDD 3.12 cm    Sinus 3.36 cm    TAPSE 1.84 cm    TDI LATERAL 0.12     TDI SEPTAL 0.08     LA WIDTH 3.44 cm    LV Diastolic Volume 68.21 mL    LV Systolic Volume 31.41 mL    LV LATERAL E/E' RATIO 9.08     LV SEPTAL E/E' RATIO 13.63     FS 28 %    LA volume 46.22 cm3    LV mass 103.01 g    Left Ventricle Relative Wall Thickness 0.48 cm    AV valve area 2.91 cm2    AV index (prosthetic) 0.89     E/A ratio 1.16     Mean e' 0.10     LVOT area 3.27 cm2    LVOT stroke volume 75.30 cm3    AV peak gradient 7.73 mmHg    E/E' ratio 10.90     LV Systolic Volume Index 17.4 mL/m2    LV Diastolic Volume Index 37.70 mL/m2    LA Volume Index 25.5 mL/m2    LV Mass Index 56.9 g/m2    BSA 1.82 m2    Right Atrial Pressure (from IVC) 8 mmHg

## 2018-12-23 NOTE — ASSESSMENT & PLAN NOTE
58 y.o. female 2 Days Post-Op for Procedure(s) (LRB):  DEBRIDEMENT, WOUND (Bilateral)  ARTHROSCOPY, KNEE (Right)  ARTHROSCOPY, KNEE, septic (Right)    Severe soft tissue infection with joint involvement.  Continue abx, diet, PT/OT  Take back tomorrow, consented.  Guarded prognosis, infection is quite extensive.

## 2018-12-23 NOTE — SUBJECTIVE & OBJECTIVE
Interval History: Patient clinically stable, eating, pain controlled.    Medications:  Continuous Infusions:   lactated ringers 100 mL/hr at 12/23/18 1000     Scheduled Meds:   acetaminophen  650 mg Oral Q8H    busPIRone  15 mg Oral BID    ceFAZolin (ANCEF) IVPB  2 g Intravenous Q8H    doxazosin  2 mg Oral Daily    heparin (porcine)  5,000 Units Subcutaneous Q8H    lactated ringers  1,000 mL Intravenous Once    metoprolol tartrate  50 mg Oral BID    nicotine  1 patch Transdermal Daily    pantoprazole  40 mg Intravenous BID     PRN Meds:sodium chloride, albuterol sulfate, ALPRAZolam, dextrose 50%, glucagon (human recombinant), hydrALAZINE, hydrALAZINE, HYDROmorphone, insulin aspart U-100, ondansetron, oxyCODONE-acetaminophen, promethazine (PHENERGAN) IVPB, sodium chloride 0.9%, zolpidem     Review of patient's allergies indicates:  No Known Allergies  Objective:     Vital Signs (Most Recent):  Temp: 97 °F (36.1 °C) (12/23/18 1000)  Pulse: 80 (12/23/18 1000)  Resp: 12 (12/23/18 1000)  BP: (!) 153/74 (12/23/18 1000)  SpO2: 97 % (12/23/18 1000) Vital Signs (24h Range):  Temp:  [96.1 °F (35.6 °C)-98.2 °F (36.8 °C)] 97 °F (36.1 °C)  Pulse:  [61-93] 80  Resp:  [11-18] 12  SpO2:  [91 %-100 %] 97 %  BP: (118-180)/(57-92) 153/74     Weight: 69.9 kg (154 lb)  Body mass index is 24.12 kg/m².    Intake/Output - Last 3 Shifts       12/21 0700 - 12/22 0659 12/22 0700 - 12/23 0659 12/23 0700 - 12/24 0659    I.V. (mL/kg) 3221.3 (46.1) 3515.6 (50.3) 236 (3.4)    IV Piggyback  1000     Total Intake(mL/kg) 3221.3 (46.1) 4515.6 (64.6) 236 (3.4)    Urine (mL/kg/hr) 900 (0.5) 679 (0.4) 110 (0.5)    Total Output 900 679 110    Net +2321.3 +3836.6 +126                 Physical Exam   Constitutional: She is oriented to person, place, and time. She appears well-developed and well-nourished. No distress.   Cardiovascular: Normal rate.   Pulmonary/Chest: Effort normal.   Musculoskeletal: She exhibits no edema.   Neurological: She is  alert and oriented to person, place, and time.   Skin:   Surgical dressing in place to arms   Psychiatric: She has a normal mood and affect. Her behavior is normal.       Significant Labs:  CBC:   Recent Labs   Lab 12/23/18 0300   WBC 10.20   RBC 2.80*   HGB 7.4*   HCT 23.7*      MCV 85   MCH 26.4*   MCHC 31.2*     CMP:   Recent Labs   Lab 12/23/18 0300   GLU 78   CALCIUM 7.8*   ALBUMIN 1.1*   PROT 4.3*      K 4.2   CO2 20*      BUN 51*   CREATININE 1.8*   ALKPHOS 128   ALT <5*   AST 9*   BILITOT 0.4       Significant Diagnostics:

## 2018-12-23 NOTE — ANESTHESIA POSTPROCEDURE EVALUATION
"Anesthesia Post Evaluation    Patient: Misty Khalil    Procedure(s) Performed: Procedure(s) (LRB):  DEBRIDEMENT, WOUND (Bilateral)  ARTHROSCOPY, KNEE (Right)  ARTHROSCOPY, KNEE, septic (Right)    Final Anesthesia Type: general  Patient location during evaluation: labor & delivery  Patient participation: Yes- Able to Participate  Level of consciousness: awake and alert  Post-procedure vital signs: reviewed and stable  Pain management: adequate  Airway patency: patent  PONV status at discharge: No PONV  Anesthetic complications: no      Cardiovascular status: blood pressure returned to baseline and stable  Respiratory status: room air, unassisted and spontaneous ventilation  Hydration status: euvolemic  Follow-up not needed.        Visit Vitals  BP (!) 156/79 (BP Location: Left leg, Patient Position: Lying)   Pulse 79   Temp 36.2 °C (97.2 °F)   Resp 14   Ht 5' 7" (1.702 m)   Wt 69.9 kg (154 lb)   SpO2 98%   BMI 24.12 kg/m²       Pain/Jimmy Score: Pain Rating Prior to Med Admin: 8 (12/23/2018  8:10 AM)  Pain Rating Post Med Admin: 3 (12/23/2018  4:20 AM)        "

## 2018-12-23 NOTE — PROGRESS NOTES
[facility]  Critical Care - Surgery  Progress Note    Patient Name: Misty Khalil  MRN: [id number]  Admission Date: 12/20/2018  Hospital Length of Stay: 3 days  Code Status: Full Code  Attending Provider: Javier Kirk MD  Primary Care Provider: JOEL Mccauley   Principal Problem: <principal problem not specified>    Subjective:     Hospital/ICU Course:  No notes on file    Interval History/Significant Events:   NAEON. AF and HDS without pressors on 2L NC. Tolerating regular diet. UOP remains borderline 20-30 cc/hr on maintenance fluids with intermittent bolus.    Follow-up For: Procedure(s) (LRB):  DEBRIDEMENT, WOUND (Bilateral)  ARTHROSCOPY, KNEE (Right)  ARTHROSCOPY, KNEE, septic (Right)    Post-Operative Day: 2 Days Post-Op    Objective:     Vital Signs (Most Recent):  Temp: 97 °F (36.1 °C) (12/23/18 1000)  Pulse: 80 (12/23/18 1000)  Resp: 12 (12/23/18 1000)  BP: (!) 153/74 (12/23/18 1000)  SpO2: 97 % (12/23/18 1000) Vital Signs (24h Range):  Temp:  [96.1 °F (35.6 °C)-98.2 °F (36.8 °C)] 97 °F (36.1 °C)  Pulse:  [61-93] 80  Resp:  [11-18] 12  SpO2:  [91 %-100 %] 97 %  BP: (118-180)/(57-92) 153/74     Weight: 69.9 kg (154 lb)  Body mass index is 24.12 kg/m².      Intake/Output Summary (Last 24 hours) at 12/23/2018 1022  Last data filed at 12/23/2018 1000  Gross per 24 hour   Intake 4751.6 ml   Output 651 ml   Net 4100.6 ml       Physical Exam  Constitutional: She is oriented to person, place, and time. She appears well-developed.   Thin lady who appears older than stated age.   HENT:   Head: Normocephalic and atraumatic.   Eyes: EOM are normal. Pupils are equal, round, and reactive to light.   Neck: No JVD present. No tracheal deviation present.   Cardiovascular: Normal rate and regular rhythm.   Pulmonary/Chest: Breath sounds normal. No respiratory distress. She has no wheezes.   Abdominal: Soft. She exhibits no distension.   Genitourinary:   Genitourinary  Comments: +Caro   Musculoskeletal:   B/L UE with bandages, scant serosanguinous discoloration. DP pulses intact in all extremities. Able to spontaneously move all extremities.    Neurological: She is alert and oriented to person, place, and time.   Nursing note and vitals reviewed.      Lines/Drains/Airways     Central Venous Catheter Line                 Percutaneous Central Line Insertion/Assessment - triple lumen  right internal jugular -- days          Drain                 Urethral Catheter 12/20/18 2335 Latex 16 Fr. 2 days                Significant Labs:    CBC/Anemia Profile:  Recent Labs   Lab 12/21/18  1807 12/22/18  0330 12/23/18  0300   WBC 12.39 15.18* 10.20   HGB 6.1* 8.4* 7.4*   HCT 19.0* 25.8* 23.7*    175 180   MCV 83 82 85   RDW 18.7* 18.1* 18.8*        Chemistries:  Recent Labs   Lab 12/21/18  1133  12/22/18  0330 12/22/18  1739 12/22/18  2053 12/23/18  0300   NA  --    < > 138 138 138 138   K  --    < > 4.4 4.2 4.2 4.2   CL  --    < > 109 110 110 110   CO2  --    < > 18* 20* 21* 20*   BUN  --    < > 53* 52* 50* 51*   CREATININE  --    < > 1.8* 1.8* 1.8* 1.8*   CALCIUM  --    < > 7.8* 7.5* 7.4* 7.8*   ALBUMIN  --   --  1.3*  --   --  1.1*   PROT  --   --  4.6*  --   --  4.3*   BILITOT  --   --  0.7  --   --  0.4   ALKPHOS  --   --  156*  --   --  128   ALT  --   --  <5*  --   --  <5*   AST  --   --  15  --   --  9*   MG 2.7*  --  2.5  --   --  2.0   PHOS  --   --  5.3*  --   --  4.5    < > = values in this interval not displayed.       All pertinent labs within the past 24 hours have been reviewed.    Significant Imaging:  I have reviewed and interpreted all pertinent imaging results/findings within the past 24 hours.    Assessment/Plan:     Soft tissue infection    57yo F w/ hx of Anemia, HTN, RA (on prior prednisone taper) with extensive bilateral upper extremity abscesses now s/p two drainage/washouts at OSH.      Neuro: No history of seizures/strokes.   Pain: Dilaudid ,  Tylenol     CVS: Hemodynamically stable without inotrope/pressor requirement. BHARAT @ OSH negative for vegetation.   Cardiology consulted - they reviewed BHARAT and agree there is low suspicion of endocarditis at this time  History of HTN: Off cardene, holding home antihypertensives     Pulm: Chronic smoker  - On 2L NC  - Continuous pulse oximetry, incentive spirometry.     Renal:   - VIDYA , Cr now 1.8, continue volume resuscitation with maintenance fluids and prn bolus  - Daily BMP, Strict I/O (+Caro)     FEN/GI  Erosive Esophagitis: Continue PPI BID, TPN  Regular diet  IVF: LR @ 50cc/hr     MSK/ ID:   Staph bacteremia manifesting as necrotizing fascitis of bilateral upper extremity, now septic arthritis of right knee. BHARAT @OSH negative for endocarditis. Presentation concerning for seeding from back hardware. Orthopedics consulted for septic knee already, appreciate input ; would like to obtain spine MRI imaging if helpful.  Infectious disease consulted for antibiotic duration management.  - Continue high dose Ancef 2g q8h (4-6 weeks)  - Repeat cultures obtained     Heme/Onc. H/H stable, continue to monitor     PPX:   SCD, restart SQH after OR  Protonix BID  PT/OT     Dispo: Continue ICU care for now. To OR tomorrow for I&D  Primary: ACS            Critical care was time spent personally by me on the following activities: development of treatment plan with patient or surrogate and bedside caregivers, discussions with consultants, evaluation of patient's response to treatment, examination of patient, ordering and performing treatments and interventions, ordering and review of laboratory studies, ordering and review of radiographic studies, pulse oximetry, re-evaluation of patient's condition.  This critical care time did not overlap with that of any other provider or involve time for any procedures.     Gordo Fernandez MD  Critical Care - Surgery  [facility]

## 2018-12-23 NOTE — PROGRESS NOTES
[facility]  Orthopedics  Progress Note    Patient Name: Misty Khalil  MRN: [id number]  Admission Date: 12/20/2018  Hospital Length of Stay: 3 days  Attending Provider: Javier Kirk MD  Primary Care Provider: JOEL Mccauley  Follow-up For: Procedure(s) (LRB):  DEBRIDEMENT, WOUND (Bilateral)  ARTHROSCOPY, KNEE (Right)  ARTHROSCOPY, KNEE, septic (Right)    Post-Operative Day: 2 Days Post-Op  Subjective:     Principal Problem: Sepsis    Principal Orthopedic Problem: As above s/p BUE I&D / Right wrist I&D / right knee I&D    Interval History: NAEO, dressings in place to BUE. Right knee pain that is controlled. ID following. HDS    Review of patient's allergies indicates:  No Known Allergies    Current Facility-Administered Medications   Medication    0.9%  NaCl infusion (for blood administration)    acetaminophen tablet 650 mg    albuterol sulfate nebulizer solution 2.5 mg    ALPRAZolam tablet 1 mg    busPIRone tablet 15 mg    ceFAZolin injection 2 g    dextrose 50% injection 12.5 g    doxazosin tablet 2 mg    glucagon (human recombinant) injection 1 mg    heparin (porcine) injection 5,000 Units    hydrALAZINE injection 10 mg    hydrALAZINE injection 5 mg    HYDROmorphone injection 1 mg    insulin aspart U-100 pen 0-5 Units    lactated ringers infusion    metoprolol tartrate (LOPRESSOR) tablet 50 mg    nicotine 14 mg/24 hr 1 patch    ondansetron injection 4 mg    oxyCODONE-acetaminophen  mg per tablet 1 tablet    pantoprazole injection 40 mg    promethazine (PHENERGAN) 6.25 mg in dextrose 5 % 50 mL IVPB    sodium chloride 0.9% flush 3 mL    zolpidem tablet 5 mg     Objective:     Vital Signs (Most Recent):  Temp: 97.2 °F (36.2 °C) (12/23/18 0600)  Pulse: 79 (12/23/18 0600)  Resp: 14 (12/23/18 0600)  BP: (!) 156/79 (12/23/18 0600)  SpO2: 98 % (12/23/18 0600) Vital Signs (24h Range):  Temp:  [96.1 °F (35.6 °C)-98.2 °F (36.8 °C)] 97.2 °F (36.2 °C)  Pulse:  [61-89]  "79  Resp:  [9-18] 14  SpO2:  [91 %-100 %] 98 %  BP: (118-180)/(57-88) 156/79     Weight: 69.9 kg (154 lb)  Height: 5' 7" (170.2 cm)  Body mass index is 24.12 kg/m².      Intake/Output Summary (Last 24 hours) at 12/23/2018 0828  Last data filed at 12/23/2018 0600  Gross per 24 hour   Intake 4515.6 ml   Output 599 ml   Net 3916.6 ml       Ortho/SPM Exam     PE:    AA&O x 4.  NAD  HEENT:  NCAT, sclera nonicteric  Lungs:  Respirations are equal and unlabored.  CV:  2+ bilateral upper and lower extremity pulses.  Skin:  Intact throughout.    MSK:    BUE in dressings, serosanguinous drainage present to RUE dressings.  ROM intact to left hand with intact sensation  Right hand with flexion of digits and decreased digit extension at the MCPJ. Sensation intact to right hand.    RLE wound clean and intact without significant erythema or induration. AROM intact without significant pain.            Significant Labs: All pertinent labs within the past 24 hours have been reviewed.    Significant Imaging: I have reviewed all pertinent imaging results/findings.    Assessment/Plan:     Septic arthritis of knee, right    Misty Khalil is a 58 y.o. female s/p BUE I&D with open wounds and right septic knee / right septic wrist    Will follow with general surgery  Patient denies deep pain to any joint - low clinical suspicion for occult septic joint  Dressing changes PRN to right knee to keep clean and covered at all times.    Dispo: Please call with questions. Will defer management to general surgery. No plans for repeat I&D of right knee at this time           Nixon Casanova MD  Orthopedics  [facility]  "

## 2018-12-23 NOTE — PROGRESS NOTES
Dr. Fernandez notified of patient's low UOP for past 2 hrs (25 and 20cc). Also notified of patient's current BUN and Cr.  Gave order for 1L bolus of LR.  Will continue to closely monitor.       Results for RUDOLPH HASSAN (MRN [id number]) as of 12/23/2018 10:08   Ref. Range 12/23/2018 03:00   BUN, Bld Latest Ref Range: 6 - 20 mg/dL 51 (H)   Creatinine Latest Ref Range: 0.5 - 1.4 mg/dL 1.8 (H)

## 2018-12-23 NOTE — SUBJECTIVE & OBJECTIVE
"Principal Problem: Sepsis    Principal Orthopedic Problem: As above s/p BUE I&D / Right wrist I&D / right knee I&D    Interval History: NAEO, dressings in place to BUE. Right knee pain that is controlled. ID following. HDS    Review of patient's allergies indicates:  No Known Allergies    Current Facility-Administered Medications   Medication    0.9%  NaCl infusion (for blood administration)    acetaminophen tablet 650 mg    albuterol sulfate nebulizer solution 2.5 mg    ALPRAZolam tablet 1 mg    busPIRone tablet 15 mg    ceFAZolin injection 2 g    dextrose 50% injection 12.5 g    doxazosin tablet 2 mg    glucagon (human recombinant) injection 1 mg    heparin (porcine) injection 5,000 Units    hydrALAZINE injection 10 mg    hydrALAZINE injection 5 mg    HYDROmorphone injection 1 mg    insulin aspart U-100 pen 0-5 Units    lactated ringers infusion    metoprolol tartrate (LOPRESSOR) tablet 50 mg    nicotine 14 mg/24 hr 1 patch    ondansetron injection 4 mg    oxyCODONE-acetaminophen  mg per tablet 1 tablet    pantoprazole injection 40 mg    promethazine (PHENERGAN) 6.25 mg in dextrose 5 % 50 mL IVPB    sodium chloride 0.9% flush 3 mL    zolpidem tablet 5 mg     Objective:     Vital Signs (Most Recent):  Temp: 97.2 °F (36.2 °C) (12/23/18 0600)  Pulse: 79 (12/23/18 0600)  Resp: 14 (12/23/18 0600)  BP: (!) 156/79 (12/23/18 0600)  SpO2: 98 % (12/23/18 0600) Vital Signs (24h Range):  Temp:  [96.1 °F (35.6 °C)-98.2 °F (36.8 °C)] 97.2 °F (36.2 °C)  Pulse:  [61-89] 79  Resp:  [9-18] 14  SpO2:  [91 %-100 %] 98 %  BP: (118-180)/(57-88) 156/79     Weight: 69.9 kg (154 lb)  Height: 5' 7" (170.2 cm)  Body mass index is 24.12 kg/m².      Intake/Output Summary (Last 24 hours) at 12/23/2018 0828  Last data filed at 12/23/2018 0600  Gross per 24 hour   Intake 4515.6 ml   Output 599 ml   Net 3916.6 ml       Ortho/SPM Exam     PE:    AA&O x 4.  NAD  HEENT:  NCAT, sclera nonicteric  Lungs:  Respirations are " equal and unlabored.  CV:  2+ bilateral upper and lower extremity pulses.  Skin:  Intact throughout.    MSK:    BUE in dressings, serosanguinous drainage present to RUE dressings.  ROM intact to left hand with intact sensation  Right hand with flexion of digits and decreased digit extension at the MCPJ. Sensation intact to right hand.    RLE wound clean and intact without significant erythema or induration. AROM intact without significant pain.            Significant Labs: All pertinent labs within the past 24 hours have been reviewed.    Significant Imaging: I have reviewed all pertinent imaging results/findings.

## 2018-12-23 NOTE — ASSESSMENT & PLAN NOTE
Misty Khalil is a 58 y.o. female s/p BUE I&D with open wounds and right septic knee / right septic wrist    Will follow with general surgery  Patient denies deep pain to any joint - low clinical suspicion for occult septic joint  Dressing changes PRN to right knee to keep clean and covered at all times.    Dispo: Please call with questions. Will defer management to general surgery. No plans for repeat I&D of right knee at this time

## 2018-12-23 NOTE — PT/OT/SLP EVAL
"Occupational Therapy   Evaluation    Name: Misty Khalil  MRN: [id number]  Admitting Diagnosis: soft tissue infection  Pt t/f from [facility] with necrotizing fascitis   Pt with B UE I and D with open wounds. R septic knee and R septic wrist.    Recommendations:     Discharge Recommendations:  TBD; possible SNF    History:     Occupational Profile:  Living Environment: Pt reports she was living alone and was MOD I with RW approx 2 months ago before she fell and injured her right shoulder. Pt then with progressive decline in function and has had a friend stay with her to assist with ADL skills. Pt was initially admitted to [facility], t/f to [facility] and [place] [facility]   Equipment Used at Home:  rollator  Assistance upon Discharge: sister present and support; friend who was assisting her can no longer assist.     Past Medical History:   Diagnosis Date    Anxiety     Depression     GI bleed     Hypertension     Rheumatoid arthritis        Past Surgical History:   Procedure Laterality Date    BACK SURGERY       SECTION      COLONOSCOPY N/A 2018    Performed by [facility]    ESOPHAGOGASTRODUODENOSCOPY (EGD) N/A 2018    Performed by [facility] at [facility] ENDO    HYSTERECTOMY      OOPHORECTOMY         Subjective     "I can't do this" pt reports.     Pain/Comfort:  · Pain Rating 1: 10/10  · Location - Orientation 1: generalized  · Pain Addressed 1: Pre-medicate for activity, Reposition, Nurse notified, Distraction    Patients cultural, spiritual, Islam conflicts given the current situation:    None stated   Objective:     Communicated with: nsg  prior to session.    Pt found supine in room and was medicated via IV for pain prior to OT arrival.     General Precautions: Standard, fall     Occupational Performance:    Bed Mobility:    · Rolling right<>left with TOTAL A   · Supine<>sit with TOTAL A x 2    Activities of Daily " "Living:  · TOTAL A for all ADL's.     Cognitive/Visual Perceptual:  Pt lethargic but remains awake throughout session. Pt follows all commands.     Physical Exam:  Pt is right hand dominant. B UE's wrapped in dressing/Kerlix. Fingers only exposed on B UE.   Pt able to moved B fingers and reports adequate light touch sensation in fingers.  Pt demo grossly 2-/5 UE strength.       AMPAC 6 Click ADL:  AMPAC Total Score: 6    Treatment & Education:  Pt tolerated sitting EOB approx 3 min with TOTAL A for balance. Pt reporting increased pain in back when sitting EOB. Pt reports old back surgery.  Education provided re: UE positioning on pillows to promote elevation to assist with edema anival't and promote neutral joint alignment. OT provided small towel roll for right hand to promote open hand positioning; nsg and pt notified of importance of positioning of right hand to maintain full ROM of fingers. Left hand fingers are less restricted and rest naturally in open hand.  Education provided re: safety with functional mobility/ADL skills and OT POC      Patient left supine with all lines intact, call button in reach and nsg notified    Assessment:     Misty Khalil is a 58 y.o. female with a necrotizing fascitis.   Pt with impaired strength/endurance and ROM;  pt with impaired ADL skills, bed mobs and functional mobility. Pt with increased pain throughout body.  Pt is not safe to return home at this time but d/c recs uncertain at this time pending medical plan/prognosis. Pt to benefit from OT to address stated goals.       Rehab Prognosis: Fair; patient would benefit from acute skilled OT services to address these deficits and reach maximum level of function.         Clinical Decision Makin.  OT Mod:  "Pt evaluation falls under moderate complexity for evaluation coding due to identification of 3-5 performance deficits noted as stated above. Eval required Min/Mod assistance to complete on this date and detailed " "assessment(s) were utilized. Moreover, an expanded review of history and occupational profile obtained with additional review of cognitive, physical and psychosocial hx."     Plan:     Patient to be seen 3 x/week to address the above listed problems via self-care/home management, therapeutic activities, therapeutic exercises  · Plan of Care Expires:    · Plan of Care Reviewed with: patient, sibling    This Plan of care has been discussed with the patient who was involved in its development and understands and is in agreement with the identified goals and treatment plan    GOALS:   Multidisciplinary Problems     Occupational Therapy Goals        Problem: Occupational Therapy Goal    Goal Priority Disciplines Outcome Interventions   Occupational Therapy Goal     OT, PT/OT     Description:  Goals to be met by: 2 weeks 1/6/19     Patient will increase functional independence with ADLs by performing:    Pt will complete self feeding with SEJAL   Pt will tolerate sitting EOB with Fair balance to allow for progression with ADL and transfer training.  Pt will complete basic g/h skills with MIN A                     Time Tracking:     OT Date of Treatment: 12/23/18  OT Start Time: 1045  OT Stop Time: 1115  OT Total Time (min): 30 min    Billable Minutes:Evaluation 30    DARREN Mcnally  12/23/2018    "

## 2018-12-23 NOTE — PLAN OF CARE
Problem: Adult Inpatient Plan of Care  Goal: Plan of Care Review  Hx:  HTN, microcytic anemia, transaminitis, RA, hx of GI bleed     Dx: necrotizing fascitis     12/21: transfer to SICU, CT of chest/arm/knee, cultures; bilat UE washout/I&D; 1L crystalloid and 250 albumin in OR; LR bolus, cont. LR infusion started, PRBC's x2 units   12/22: LR bolusx3    Nursing:  SBP<180  Accucheck Q6           Outcome: Ongoing (interventions implemented as appropriate)  Pt AAOx4. Pain uncontrolled by IV dilaudid and PO percocet PRN. Pt on 2L NC, o2 sats >98%. SBP maintained below 180. Pt advanced to regular diet today. Pt received 2L LR boluses for decreasing u/o. Accuchecks and labs monitored. Dr. Salamanca notified of VS and labs. Plans for MRI with contrast and I&D this week. Plan of care reviewed with pt and family. VSS at this time. Will continue to monitor. See flowsheet for full assessment details.

## 2018-12-23 NOTE — PLAN OF CARE
Problem: Occupational Therapy Goal  Goal: Occupational Therapy Goal  Goals to be met by: 2 weeks 1/6/19     Patient will increase functional independence with ADLs by performing:    Pt will complete self feeding with SEJAL   Pt will tolerate sitting EOB with Fair balance to allow for progression with ADL and transfer training.  Pt will complete basic g/h skills with MIN A   Goals and POC established today

## 2018-12-23 NOTE — ASSESSMENT & PLAN NOTE
57yo F w/ hx of Anemia, HTN, RA (on prior prednisone taper) with extensive bilateral upper extremity abscesses now s/p two drainage/washouts at OSH.      Neuro: No history of seizures/strokes.   Pain: Dilaudid , Tylenol     CVS: Hemodynamically stable without inotrope/pressor requirement. BHARAT @ OSH negative for vegetation.   Cardiology consulted - they reviewed BHARAT and agree there is low suspicion of endocarditis at this time  History of HTN: Off cardene, holding home antihypertensives     Pulm: Chronic smoker  - On 2L NC  - Continuous pulse oximetry, incentive spirometry.     Renal:   - VIDYA , Cr now 1.8, continue volume resuscitation with maintenance fluids and prn bolus  - Daily BMP, Strict I/O (+Caro)     FEN/GI  Erosive Esophagitis: Continue PPI BID, TPN  Regular diet  IVF: LR @ 50cc/hr     MSK/ ID:   Staph bacteremia manifesting as necrotizing fascitis of bilateral upper extremity, now septic arthritis of right knee. BHARAT @OSH negative for endocarditis. Presentation concerning for seeding from back hardware. Orthopedics consulted for septic knee already, appreciate input ; would like to obtain spine MRI imaging if helpful.  Infectious disease consulted for antibiotic duration management.  - Continue high dose Ancef 2g q8h (4-6 weeks)  - Repeat cultures obtained     Heme/Onc. H/H stable, continue to monitor     PPX:   SCD, restart SQH after OR  Protonix BID  PT/OT     Dispo: Continue ICU care for now. To OR tomorrow for I&D  Primary: ACS

## 2018-12-23 NOTE — PROGRESS NOTES
Dr. Salamanca updated with new/updated (unchanged) BUN/Crea results and present urine output of 25-30 mL/hr. She has advised/ordered admin of additional LR bolus over 3 hours, and to then increase continuous LR infusion from 50 mL/hr to 100 mL/hr. Pt updated on plan of care.

## 2018-12-23 NOTE — PLAN OF CARE
Problem: Adult Inpatient Plan of Care  Goal: Plan of Care Review  Hx:  HTN, microcytic anemia, transaminitis, RA, hx of GI bleed     Dx: necrotizing fascitis     12/21: transfer to SICU, CT of chest/arm/knee, cultures; bilat UE washout/I&D; 1L crystalloid and 250 albumin in OR; LR bolus, cont. LR infusion started, PRBC's x2 units   12/22: LR bolusx3    Nursing:  SBP<180  Accucheck Q6           Outcome: Ongoing (interventions implemented as appropriate)  Patient AAOx4, following commands, and intermittently moving all extremities. SBP maintained <180; SpO2 >95% on 2L 02 via NC; remains afebrile.  LR bolus x1L given on night shift in addition to LR MIVF @ 100mL/hr; urine output remains janet colored/concentrated and <40 mL/hr; MD aware. Pain meds given PRN pain and for pt comfort. All bilateral upper extremity dressings draining moderate serosanguineous output and were fully changed and reinforced by SICU RN on this a.m. Plan is still for pt to have MRI w/contrast, pending kidney function improvement/successful fluid response.  Sacral preventative foam dressing replaced and heel foam dressings have been placed.  Family at bedside and updated on plan of care.

## 2018-12-23 NOTE — SUBJECTIVE & OBJECTIVE
Interval History/Significant Events:   NAEON. AF and HDS without pressors on 2L NC. Tolerating regular diet. UOP remains borderline 20-30 cc/hr on maintenance fluids with intermittent bolus.    Follow-up For: Procedure(s) (LRB):  DEBRIDEMENT, WOUND (Bilateral)  ARTHROSCOPY, KNEE (Right)  ARTHROSCOPY, KNEE, septic (Right)    Post-Operative Day: 2 Days Post-Op    Objective:     Vital Signs (Most Recent):  Temp: 97 °F (36.1 °C) (12/23/18 1000)  Pulse: 80 (12/23/18 1000)  Resp: 12 (12/23/18 1000)  BP: (!) 153/74 (12/23/18 1000)  SpO2: 97 % (12/23/18 1000) Vital Signs (24h Range):  Temp:  [96.1 °F (35.6 °C)-98.2 °F (36.8 °C)] 97 °F (36.1 °C)  Pulse:  [61-93] 80  Resp:  [11-18] 12  SpO2:  [91 %-100 %] 97 %  BP: (118-180)/(57-92) 153/74     Weight: 69.9 kg (154 lb)  Body mass index is 24.12 kg/m².      Intake/Output Summary (Last 24 hours) at 12/23/2018 1022  Last data filed at 12/23/2018 1000  Gross per 24 hour   Intake 4751.6 ml   Output 651 ml   Net 4100.6 ml       Physical Exam  Constitutional: She is oriented to person, place, and time. She appears well-developed.   Thin lady who appears older than stated age.   HENT:   Head: Normocephalic and atraumatic.   Eyes: EOM are normal. Pupils are equal, round, and reactive to light.   Neck: No JVD present. No tracheal deviation present.   Cardiovascular: Normal rate and regular rhythm.   Pulmonary/Chest: Breath sounds normal. No respiratory distress. She has no wheezes.   Abdominal: Soft. She exhibits no distension.   Genitourinary:   Genitourinary Comments: +Caro   Musculoskeletal:   B/L UE with bandages, scant serosanguinous discoloration. DP pulses intact in all extremities. Able to spontaneously move all extremities.    Neurological: She is alert and oriented to person, place, and time.   Nursing note and vitals reviewed.      Lines/Drains/Airways     Central Venous Catheter Line                 Percutaneous Central Line Insertion/Assessment - triple lumen  right  internal jugular -- days          Drain                 Urethral Catheter 12/20/18 2335 Latex 16 Fr. 2 days                Significant Labs:    CBC/Anemia Profile:  Recent Labs   Lab 12/21/18  1807 12/22/18  0330 12/23/18  0300   WBC 12.39 15.18* 10.20   HGB 6.1* 8.4* 7.4*   HCT 19.0* 25.8* 23.7*    175 180   MCV 83 82 85   RDW 18.7* 18.1* 18.8*        Chemistries:  Recent Labs   Lab 12/21/18  1133  12/22/18  0330 12/22/18  1739 12/22/18 2053 12/23/18  0300   NA  --    < > 138 138 138 138   K  --    < > 4.4 4.2 4.2 4.2   CL  --    < > 109 110 110 110   CO2  --    < > 18* 20* 21* 20*   BUN  --    < > 53* 52* 50* 51*   CREATININE  --    < > 1.8* 1.8* 1.8* 1.8*   CALCIUM  --    < > 7.8* 7.5* 7.4* 7.8*   ALBUMIN  --   --  1.3*  --   --  1.1*   PROT  --   --  4.6*  --   --  4.3*   BILITOT  --   --  0.7  --   --  0.4   ALKPHOS  --   --  156*  --   --  128   ALT  --   --  <5*  --   --  <5*   AST  --   --  15  --   --  9*   MG 2.7*  --  2.5  --   --  2.0   PHOS  --   --  5.3*  --   --  4.5    < > = values in this interval not displayed.       All pertinent labs within the past 24 hours have been reviewed.    Significant Imaging:  I have reviewed and interpreted all pertinent imaging results/findings within the past 24 hours.

## 2018-12-23 NOTE — PROGRESS NOTES
[facility]  General Surgery  Progress Note    Subjective:     History of Present Illness:  No notes on file    Post-Op Info:  Procedure(s) (LRB):  DEBRIDEMENT, WOUND (Bilateral)  ARTHROSCOPY, KNEE (Right)  ARTHROSCOPY, KNEE, septic (Right)   2 Days Post-Op     Interval History: Patient clinically stable, eating, pain controlled.    Medications:  Continuous Infusions:   lactated ringers 100 mL/hr at 12/23/18 1000     Scheduled Meds:   acetaminophen  650 mg Oral Q8H    busPIRone  15 mg Oral BID    ceFAZolin (ANCEF) IVPB  2 g Intravenous Q8H    doxazosin  2 mg Oral Daily    heparin (porcine)  5,000 Units Subcutaneous Q8H    lactated ringers  1,000 mL Intravenous Once    metoprolol tartrate  50 mg Oral BID    nicotine  1 patch Transdermal Daily    pantoprazole  40 mg Intravenous BID     PRN Meds:sodium chloride, albuterol sulfate, ALPRAZolam, dextrose 50%, glucagon (human recombinant), hydrALAZINE, hydrALAZINE, HYDROmorphone, insulin aspart U-100, ondansetron, oxyCODONE-acetaminophen, promethazine (PHENERGAN) IVPB, sodium chloride 0.9%, zolpidem     Review of patient's allergies indicates:  No Known Allergies  Objective:     Vital Signs (Most Recent):  Temp: 97 °F (36.1 °C) (12/23/18 1000)  Pulse: 80 (12/23/18 1000)  Resp: 12 (12/23/18 1000)  BP: (!) 153/74 (12/23/18 1000)  SpO2: 97 % (12/23/18 1000) Vital Signs (24h Range):  Temp:  [96.1 °F (35.6 °C)-98.2 °F (36.8 °C)] 97 °F (36.1 °C)  Pulse:  [61-93] 80  Resp:  [11-18] 12  SpO2:  [91 %-100 %] 97 %  BP: (118-180)/(57-92) 153/74     Weight: 69.9 kg (154 lb)  Body mass index is 24.12 kg/m².    Intake/Output - Last 3 Shifts       12/21 0700 - 12/22 0659 12/22 0700 - 12/23 0659 12/23 0700 - 12/24 0659    I.V. (mL/kg) 3221.3 (46.1) 3515.6 (50.3) 236 (3.4)    IV Piggyback  1000     Total Intake(mL/kg) 3221.3 (46.1) 4515.6 (64.6) 236 (3.4)    Urine (mL/kg/hr) 900 (0.5) 679 (0.4) 110 (0.5)    Total Output 900 679 110    Net +2321.3 +3836.6 +126                  Physical Exam   Constitutional: She is oriented to person, place, and time. She appears well-developed and well-nourished. No distress.   Cardiovascular: Normal rate.   Pulmonary/Chest: Effort normal.   Musculoskeletal: She exhibits no edema.   Neurological: She is alert and oriented to person, place, and time.   Skin:   Surgical dressing in place to arms   Psychiatric: She has a normal mood and affect. Her behavior is normal.       Significant Labs:  CBC:   Recent Labs   Lab 12/23/18  0300   WBC 10.20   RBC 2.80*   HGB 7.4*   HCT 23.7*      MCV 85   MCH 26.4*   MCHC 31.2*     CMP:   Recent Labs   Lab 12/23/18  0300   GLU 78   CALCIUM 7.8*   ALBUMIN 1.1*   PROT 4.3*      K 4.2   CO2 20*      BUN 51*   CREATININE 1.8*   ALKPHOS 128   ALT <5*   AST 9*   BILITOT 0.4       Significant Diagnostics:      Assessment/Plan:     Soft tissue infection    58 y.o. female 2 Days Post-Op for Procedure(s) (LRB):  DEBRIDEMENT, WOUND (Bilateral)  ARTHROSCOPY, KNEE (Right)  ARTHROSCOPY, KNEE, septic (Right)    Severe soft tissue infection with joint involvement.  Continue abx, diet, PT/OT  Take back tomorrow, consented.  Guarded prognosis, infection is quite extensive.         Kirstin Miller MD  General Surgery  [facility]

## 2018-12-23 NOTE — OP NOTE
DATE OF PROCEDURE:  12/21/2018    Preop Dx:       Septic arthritis right knee                          Multiple bilateral upper extremity abscesses s/p incision and drainage     Postop Dx:     Septic arthritis right knee    Mild right knee synovitis    Right knee anterior horn medial meniscus tear                          Multiple bilateral upper extremity abscesses s/p incision and drainage                          Septic arthritis right wrist      Procedure:      Arthroscopic irrigation right knee joint for septic arthritis     Partial arthroscopic synovectomy right knee     Partial medial meniscectomy right knee                           Right wrist arthrotomy with irrigation for septic arthritis    Surgeon:         Danilo Goyal M.D.     Asst:                Octavio Bell M.D, Nixon Casanova M.D.     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  Right wrist with purulence.     Dispo:              To ICU extubated/stable       Procedures in Detail:    The patient was brought to the operating room and put on the table in the supine position.  General endotracheal anesthesia was induced.  The right lower extremity was prepped sterile.  A timeout was undertaken to confirm patient, side, site, surgery, surgeon.  All agreed and we proceeded.      A standard inferolateral portal was established with an 11 blade.  The camera was inserted and the the inferomedial portal established under direct visualization with some murky fluid liberated.  The area in the notch was cleared of some mild synovitis with shaver.  The knee was run and the the patient had some mild synovitis in all three compartments that was shaved and then ablated.  There was grade II-III chondromalacia on the medial femoral condyle and some freying of the  anterior horn of the medial meniscus which was removed with shaver.  The knee was run and there was no other pathology.  9L of saline were run through the knee. The arthroscopic instruments were removed after suctioning all excess fluid.  The portal sites were closed with 3-0 nylon suture and sterile dressings applied.          Following resolution of right knee arthroscopy for a   septic joint, the patient was once again prepped and draped in the standard   sterile fashion by General Surgery with the lead surgeon being Dr. Kirk.    Orthopedics was asked to participate for possible   septic joints of the bilateral upper extremities.  The patient had significant   wounds to the right and left arm including open wounds to the entirety of the   anterior humerus, the posterior elbow, the forearm and the dorsum of the right   hand.  With regards to the left hand, there was again an open wound to the left   hand, transverse across the metacarpophalangeal joints as well as a proximal   anterior shoulder wound and a posterior elbow wound.  Evaluation of the   bilateral hands revealed no significant abscesses that were present; however,   there was a significant amount of pus that was in the wounds that was debrided   prior to our participation.  There was no evidence of   any septic arthritis of the bilateral elbow joints and therefore given the   infectious nature of all the soft tissues involved, arthrotomy with a needle of   the bilateral elbows was deferred.  A significant amount of swelling was   palpable at the level of the right wrist joint capsule.  An 18-gauge needle was   inserted into the wrist joint and approximately, 3 mL of christi pus was removed.    As a result, a standard 3-4 dorsal arthrotomy was performed and the wound was   copiously irrigated.  The dorsal wrist capsule was then closed with #0 PDS   suture.  It was discussed in detail with the general surgeons as they continued   to washout that while  there was no evidence of any other significant septic   joints at this time, the patient will be evaluated clinically in the Operating   Room for any successful washouts that they performed, which was likely planned   for 12/24/2018.    DISPOSITION:  At this time, the patient has no evidence of any other septic   joints.  We will defer management of the soft tissue wounds to General Surgery.    We will follow clinically to assess any need for repeat joint washouts.      Attg Note:  I was present for all key aspects of the case.   Danilo Goyal MD

## 2018-12-24 NOTE — PLAN OF CARE
Patient returned from OR. VSS. Pain medication given. AAO*4. 3L NC. Dressing and pen jen drain intact. Warren upper extremities elevated per pillow. Will continue to monitor patient.

## 2018-12-24 NOTE — TRANSFER OF CARE
"Anesthesia Transfer of Care Note    Patient: Misty Khalil    Procedure(s) Performed: Procedure(s) (LRB):  INCISION AND DRAINAGE, UPPER EXTREMITY (Bilateral)    Patient location: ICU    Anesthesia Type: general    Transport from OR: Transported from OR on 6-10 L/min O2 by face mask with adequate spontaneous ventilation    Post pain: adequate analgesia    Post assessment: no apparent anesthetic complications    Post vital signs: stable    Level of consciousness: awake and alert    Nausea/Vomiting: no nausea/vomiting    Complications: none    Transfer of care protocol was followedComments: /74 Hr 76, O2 sat 100% RR 14      Last vitals:   Visit Vitals  BP (!) 153/74   Pulse 96   Temp 36.9 °C (98.4 °F)   Resp 12   Ht 5' 7" (1.702 m)   Wt 69.9 kg (154 lb)   SpO2 99%   BMI 24.12 kg/m²     "

## 2018-12-24 NOTE — ASSESSMENT & PLAN NOTE
59yo F w/ hx of Anemia, HTN, RA (on prior prednisone taper) with extensive bilateral upper extremity abscesses now s/p two drainage/washouts at OSH. Would like to obtain MRI of L-spine with contrast once when renal function can tolerate. Continue Abx.     Neuro: No history of seizures/strokes.   Pain: Dilaudid , Tylenol     CVS: Hemodynamically stable without inotrope/pressor requirement. BHARAT @ OSH negative for vegetation.   Cardiology consulted - they reviewed BHARAT and agree there is low suspicion of endocarditis at this time  History of HTN: holding home antihypertensives     Pulm: Chronic smoker  - On 2L NC  - Continuous pulse oximetry, incentive spirometry.     Renal:   - Oliguric VIDYA , Cr now 1.9, significant insensible losses through multiple wounds, Intravascular depletion in the setting of SIRS.  - Daily BMP, Strict I/O (+Caro)     FEN/GI  Erosive Esophagitis: Continue PPI BID, TPN  Regular diet  IVF: LR @ 125cc/hr     MSK/ ID:   Staph bacteremia manifesting as necrotizing fascitis of bilateral upper extremity, now septic arthritis of right knee. BHARAT @OSH negative for endocarditis. Presentation concerning for seeding from back hardware. Orthopedics consulted for septic knee already, appreciate input ; would like to obtain spine MRI imaging if helpful.  Infectious disease consulted for antibiotic duration management.  - Continue high dose Ancef 2g q8h (4-6 weeks)  - Repeat cultures obtained     Heme/Onc. H/H continue to monitor  - s/p 2U pRBCs 12/24     PPX:   SCD, SQH  Protonix BID  PT/OT     Dispo: Continue ICU care for now. Discuss plan with ortho   Primary: ACS

## 2018-12-24 NOTE — SUBJECTIVE & OBJECTIVE
Interval History/Significant Events: 2U pRBCs transfused overnight. Eating this AM with help from family. Stillhaving issues with UOP and fluid repletion likely due to low nutritional status.     Follow-up For: Procedure(s) (LRB):  INCISION AND DRAINAGE, UPPER EXTREMITY (Bilateral)    Post-Operative Day: Day of Surgery    Objective:     Vital Signs (Most Recent):  Temp: 96.3 °F (35.7 °C) (12/24/18 1200)  Pulse: 71 (12/24/18 1200)  Resp: 12 (12/24/18 1200)  BP: (!) 155/73 (12/24/18 1200)  SpO2: 100 % (12/24/18 1200) Vital Signs (24h Range):  Temp:  [96.3 °F (35.7 °C)-98.6 °F (37 °C)] 96.3 °F (35.7 °C)  Pulse:  [] 71  Resp:  [10-29] 12  SpO2:  [96 %-100 %] 100 %  BP: (117-167)/(59-85) 155/73     Weight: 69.9 kg (154 lb)  Body mass index is 24.12 kg/m².      Intake/Output Summary (Last 24 hours) at 12/24/2018 1221  Last data filed at 12/24/2018 1100  Gross per 24 hour   Intake 4171 ml   Output 710 ml   Net 3461 ml       Physical Exam   Constitutional: She is oriented to person, place, and time. She appears well-developed and well-nourished.   HENT:   Head: Normocephalic and atraumatic.   Eyes: Conjunctivae are normal. Pupils are equal, round, and reactive to light.   Neck: Normal range of motion.   Cardiovascular: Normal rate and regular rhythm.   Pulmonary/Chest: Effort normal. No respiratory distress.   Abdominal: Soft. She exhibits no distension.   Genitourinary:   Genitourinary Comments: +Caro   Musculoskeletal:   R knee with bandage   Neurological: She is alert and oriented to person, place, and time.   Skin: Skin is warm.   Nursing note and vitals reviewed.      Lines/Drains/Airways     Central Venous Catheter Line                 Percutaneous Central Line Insertion/Assessment - triple lumen  right internal jugular -- days          Drain                 Urethral Catheter 12/20/18 2335 Latex 16 Fr. 3 days         Open Drain 12/24/18 0846 Right  Penrose 5/8 inch less than 1 day         Open Drain 12/24/18  0846 Right Other (Comment) Penrose 5/8 inch less than 1 day         Open Drain 12/24/18 0847 Left  Penrose 5/8 inch less than 1 day                Significant Labs:    CBC/Anemia Profile:  Recent Labs   Lab 12/23/18  0300 12/24/18  0445   WBC 10.20 8.68   HGB 7.4* 6.8*   HCT 23.7* 22.2*    191   MCV 85 83   RDW 18.8* 19.3*        Chemistries:  Recent Labs   Lab 12/22/18 2053 12/23/18 0300 12/24/18  0445    138 135*   K 4.2 4.2 4.0    110 108   CO2 21* 20* 20*   BUN 50* 51* 54*   CREATININE 1.8* 1.8* 2.0*   CALCIUM 7.4* 7.8* 7.4*   ALBUMIN  --  1.1* 1.0*   PROT  --  4.3* 4.2*   BILITOT  --  0.4 0.3   ALKPHOS  --  128 123   ALT  --  <5* <5*   AST  --  9* 8*   MG  --  2.0 1.8   PHOS  --  4.5 4.5       All pertinent labs within the past 24 hours have been reviewed.

## 2018-12-24 NOTE — ANESTHESIA POSTPROCEDURE EVALUATION
"Anesthesia Post Evaluation    Patient: Misty Kahlil    Procedure(s) Performed: Procedure(s) (LRB):  DEBRIDEMENT, WOUND (Bilateral)  ARTHROSCOPY, KNEE (Right)  ARTHROSCOPY, KNEE, septic (Right)    Final Anesthesia Type: general  Patient location during evaluation: PACU  Patient participation: Yes- Able to Participate  Level of consciousness: awake and alert and oriented  Post-procedure vital signs: reviewed and stable  Pain management: adequate  Airway patency: patent  PONV status at discharge: No PONV  Anesthetic complications: no      Cardiovascular status: hemodynamically stable  Respiratory status: unassisted  Hydration status: euvolemic  Follow-up not needed.        Visit Vitals  BP (!) 118/59 (BP Location: Left arm, Patient Position: Lying)   Pulse 74   Temp 36.6 °C (97.9 °F)   Resp 17   Ht 5' 7" (1.702 m)   Wt 69.9 kg (154 lb)   SpO2 98%   BMI 24.12 kg/m²       Pain/Jimmy Score: Pain Rating Prior to Med Admin: 6 (12/23/2018  8:41 PM)  Pain Rating Post Med Admin: 0 (12/23/2018  4:00 PM)        "

## 2018-12-24 NOTE — PLAN OF CARE
Problem: Adult Inpatient Plan of Care  Goal: Plan of Care Review  Hx:  HTN, microcytic anemia, transaminitis, RA, hx of GI bleed     Dx: necrotizing fascitis     12/21: transfer to SICU, CT of chest/arm/knee, cultures; bilat UE washout/I&D; 1L crystalloid and 250 albumin in OR; LR bolus, cont. LR infusion started, PRBC's x2 units   12/22: LR bolusx3    Nursing:  SBP<180  Accucheck Q6           Outcome: Ongoing (interventions implemented as appropriate)  Patient AAOx4.  VSS.  Has remained afebrile this shift.  Patient turned Q2H.  Participated in OT, sat on edge of bed with max assist.  Respiratory status maintained on 2L NC humidified oxygen.  IS used throughout shift with encouragement.  PRN pain medication used to control pain.  POC reviewed with patient.  Open discussion was facilitated.  Patient verbalized understanding.  Bed in lowest position, side rails up x2, call light within reach, and safety measures maintained throughout shift.  Patient denies any needs at this time.  Will continue to monitor.

## 2018-12-24 NOTE — PLAN OF CARE
Patient AAO*4, alert and follows commands. BP controlled with hydralazine. SPO2> 92% with 2L NC. HR 60-70s. Patient was taken to OR for wound debridement/I&D. Total of 3 pen jen drains placed- 2 miguel angel axillary and 1 to RFA. Arms elevated with pillows. SS drainage. Pain being managed with q2h dilaudid as per orders. Total of 2 unit of PRBC and 2 L LR bolus given this shift. Urine output between 30-35cc/hr. MD notified and aware- will continue to monitor. Tolerating dyspaghia diet. Repositioned q2h per pillows. BG checked q6h- no insulin coverage needed. No new skin breakdown noted. Plan of care reviewed. Questions answered. Will continue to monitor patient. See flowsheet for detailed assessment.

## 2018-12-24 NOTE — PT/OT/SLP PROGRESS
Physical Therapy      Patient Name:  Misty Khalil   MRN:  [id number]    Patient not seen today secondary to (pt not seen 2nd to having I&D BUE. New orders needed for therapy services). Will follow-up with new orders at a later date..    Christina Gregory, PT   12/24/2018

## 2018-12-24 NOTE — BRIEF OP NOTE
BRIEF OP NOTE    Preop Dx: Septic arthritis right knee    Multiple bilateral upper extremity abscesses s/p incision and drainage    Postop Dx: Septic arthritis right knee    Multiple bilateral upper extremity abscesses s/p incision and drainage    Septic arthritis right wrist     Procedure: Arthroscopic irrigation right knee joint for septic arthritis    Right wrist arthrotomy with irrigation for septic arthritis  Surgeon: Danilo Goyal M.D.    Asst:  Octavio Bell M.D, Nixon Casanova M.D.    Anesthesia: GETA    EBL:  Minimal    IVF:  Crystalloid    Specimens: Cultures    Findings: Right knee with limited synovitis, grade II/III chondromalacia medial femoral condyle and tibial plateau.  Right wrist with purulence.    Dispo:  To ICU extubated/stable

## 2018-12-25 PROBLEM — L08.9 SOFT TISSUE INFECTION: Chronic | Status: ACTIVE | Noted: 2018-01-01

## 2018-12-25 PROBLEM — R10.84 GENERALIZED ABDOMINAL PAIN: Status: RESOLVED | Noted: 2018-01-01 | Resolved: 2018-01-01

## 2018-12-25 PROBLEM — M79.2 NEURITIS: Status: RESOLVED | Noted: 2018-01-01 | Resolved: 2018-01-01

## 2018-12-25 PROBLEM — K92.1 HEMATOCHEZIA: Status: RESOLVED | Noted: 2018-01-01 | Resolved: 2018-01-01

## 2018-12-25 PROBLEM — L97.512 SKIN ULCER OF TOE OF RIGHT FOOT WITH FAT LAYER EXPOSED: Status: RESOLVED | Noted: 2018-01-01 | Resolved: 2018-01-01

## 2018-12-25 PROBLEM — L03.031 CELLULITIS OF THIRD TOE OF RIGHT FOOT: Status: RESOLVED | Noted: 2018-01-01 | Resolved: 2018-01-01

## 2018-12-25 NOTE — CARE UPDATE
Notified SICU resident of UOP 15cc/hr x2 hours. Paged Nephrology. No new orders & continue to monitor at this time.    Update: 12.5 gm albumin given x2. Will cont to monitor

## 2018-12-25 NOTE — ASSESSMENT & PLAN NOTE
58 y.o. female 1 Day Post-Op for Procedure(s) (LRB):  INCISION AND DRAINAGE, UPPER EXTREMITY (Bilateral)    Severe soft tissue infection with joint involvement.  Continue abx, diet, PT/OT  VIDYA worsening, FENA sent, seems adequately resuscitated.   Guarded prognosis, infection is quite extensive.

## 2018-12-25 NOTE — PLAN OF CARE
Patient AAO*4, alert and follows commands. Systolic BP<180. SPO2> 92% room air. HR 60-70s. Arms elevated with pillows. Dressing reinforced with gauze and elastic bandage. Small SS drainage. Pain being managed with q2h dilaudid as per orders. Urine output between 25-30 cc/hr. MD notified and aware- will continue to monitor. 1L LR bolus given. Nephrology consulted. Tolerating dyspaghia diet. Repositioned q2h per pillows. BG checked q6h- no insulin coverage needed. No new skin breakdown noted. Plan of care reviewed. Questions answered. Will continue to monitor patient. See flowsheet for detailed assessment.

## 2018-12-25 NOTE — SUBJECTIVE & OBJECTIVE
Interval History: Patient clinically stable, eating, pain controlled.    Medications:  Continuous Infusions:   lactated ringers 150 mL/hr at 12/25/18 1000     Scheduled Meds:   acetaminophen  650 mg Oral Q8H    busPIRone  15 mg Oral BID    ceFAZolin (ANCEF) IVPB  2 g Intravenous Q8H    doxazosin  2 mg Oral Daily    heparin (porcine)  5,000 Units Subcutaneous Q8H    metoprolol tartrate  50 mg Oral BID    nicotine  1 patch Transdermal Daily     PRN Meds:albuterol sulfate, ALPRAZolam, dextrose 50%, glucagon (human recombinant), HYDROmorphone, insulin aspart U-100, ondansetron, oxyCODONE-acetaminophen, promethazine (PHENERGAN) IVPB, sodium chloride 0.9%, zolpidem     Review of patient's allergies indicates:  No Known Allergies  Objective:     Vital Signs (Most Recent):  Temp: 97.5 °F (36.4 °C) (12/25/18 1000)  Pulse: 74 (12/25/18 1000)  Resp: 15 (12/25/18 1000)  BP: 122/75 (12/25/18 1000)  SpO2: (!) 92 % (12/25/18 1000) Vital Signs (24h Range):  Temp:  [95.9 °F (35.5 °C)-98.3 °F (36.8 °C)] 97.5 °F (36.4 °C)  Pulse:  [57-95] 74  Resp:  [10-18] 15  SpO2:  [92 %-100 %] 92 %  BP: ()/(55-89) 122/75     Weight: 69.9 kg (154 lb)  Body mass index is 24.12 kg/m².    Intake/Output - Last 3 Shifts       12/23 0700 - 12/24 0659 12/24 0700 - 12/25 0659 12/25 0700 - 12/26 0659    P.O. 120 920 356    I.V. (mL/kg) 3359 (48.1) 1418.5 (20.3)     Blood  428     IV Piggyback       Total Intake(mL/kg) 3479 (49.8) 2766.5 (39.6) 356 (5.1)    Urine (mL/kg/hr) 800 (0.5) 775 (0.5) 75 (0.3)    Total Output 800 775 75    Net +2679 +1991.5 +281                 Physical Exam   Constitutional: She is oriented to person, place, and time. She appears well-developed and well-nourished. No distress.   Cardiovascular: Normal rate.   Pulmonary/Chest: Effort normal.   Musculoskeletal: She exhibits no edema.   Neurological: She is alert and oriented to person, place, and time.   Skin:   Surgical dressing in place to arms   Psychiatric: She has  a normal mood and affect. Her behavior is normal.       Significant Labs:  CBC:   Recent Labs   Lab 12/25/18  0500   WBC 8.00   RBC 3.06*   HGB 8.2*   HCT 26.2*      MCV 86   MCH 26.8*   MCHC 31.3*     CMP:   Recent Labs   Lab 12/25/18  0500   GLU 74   CALCIUM 7.4*   ALBUMIN 1.3*   PROT 4.0*      K 4.3   CO2 19*      BUN 56*   CREATININE 1.9*   ALKPHOS 94   ALT <5*   AST 8*   BILITOT 0.3       Significant Diagnostics:

## 2018-12-25 NOTE — PROGRESS NOTES
[facility]  Critical Care - Surgery  Progress Note    Patient Name: Misty Khalil  MRN: [id number]  Admission Date: 12/20/2018  Hospital Length of Stay: 5 days  Code Status: Full Code  Attending Provider: Javier Kirk MD  Primary Care Provider: JOEL Mccauley   Principal Problem: Soft tissue infection    Subjective:     Hospital/ICU Course:  No notes on file    Interval History/Significant Events: 2U pRBCs transfused overnight. Eating this AM with help from family. Stillhaving issues with UOP and fluid repletion likely due to low nutritional status.     Follow-up For: Procedure(s) (LRB):  INCISION AND DRAINAGE, UPPER EXTREMITY (Bilateral)    Post-Operative Day: Day of Surgery    Objective:     Vital Signs (Most Recent):  Temp: 96.3 °F (35.7 °C) (12/24/18 1200)  Pulse: 71 (12/24/18 1200)  Resp: 12 (12/24/18 1200)  BP: (!) 155/73 (12/24/18 1200)  SpO2: 100 % (12/24/18 1200) Vital Signs (24h Range):  Temp:  [96.3 °F (35.7 °C)-98.6 °F (37 °C)] 96.3 °F (35.7 °C)  Pulse:  [] 71  Resp:  [10-29] 12  SpO2:  [96 %-100 %] 100 %  BP: (117-167)/(59-85) 155/73     Weight: 69.9 kg (154 lb)  Body mass index is 24.12 kg/m².      Intake/Output Summary (Last 24 hours) at 12/24/2018 1221  Last data filed at 12/24/2018 1100  Gross per 24 hour   Intake 4171 ml   Output 710 ml   Net 3461 ml       Physical Exam   Constitutional: She is oriented to person, place, and time. She appears well-developed and well-nourished.   HENT:   Head: Normocephalic and atraumatic.   Eyes: Conjunctivae are normal. Pupils are equal, round, and reactive to light.   Neck: Normal range of motion.   Cardiovascular: Normal rate and regular rhythm.   Pulmonary/Chest: Effort normal. No respiratory distress.   Abdominal: Soft. She exhibits no distension.   Genitourinary:   Genitourinary Comments: +Caro   Musculoskeletal:   R knee with bandage   Neurological: She is alert and oriented to person, place, and time.   Skin: Skin is  warm.   Nursing note and vitals reviewed.      Lines/Drains/Airways     Central Venous Catheter Line                 Percutaneous Central Line Insertion/Assessment - triple lumen  right internal jugular -- days          Drain                 Urethral Catheter 12/20/18 2335 Latex 16 Fr. 3 days         Open Drain 12/24/18 0846 Right  Penrose 5/8 inch less than 1 day         Open Drain 12/24/18 0846 Right Other (Comment) Penrose 5/8 inch less than 1 day         Open Drain 12/24/18 0847 Left  Penrose 5/8 inch less than 1 day                Significant Labs:    CBC/Anemia Profile:  Recent Labs   Lab 12/23/18  0300 12/24/18  0445   WBC 10.20 8.68   HGB 7.4* 6.8*   HCT 23.7* 22.2*    191   MCV 85 83   RDW 18.8* 19.3*        Chemistries:  Recent Labs   Lab 12/22/18 2053 12/23/18  0300 12/24/18  0445    138 135*   K 4.2 4.2 4.0    110 108   CO2 21* 20* 20*   BUN 50* 51* 54*   CREATININE 1.8* 1.8* 2.0*   CALCIUM 7.4* 7.8* 7.4*   ALBUMIN  --  1.1* 1.0*   PROT  --  4.3* 4.2*   BILITOT  --  0.4 0.3   ALKPHOS  --  128 123   ALT  --  <5* <5*   AST  --  9* 8*   MG  --  2.0 1.8   PHOS  --  4.5 4.5       All pertinent labs within the past 24 hours have been reviewed.          Assessment/Plan:     * Soft tissue infection    59yo F w/ hx of Anemia, HTN, RA (on prior prednisone taper) with extensive bilateral upper extremity abscesses now s/p two drainage/washouts at OSH. Would like to obtain MRI of L-spine with contrast once when renal function can tolerate. Continue Abx.     Neuro: No history of seizures/strokes.   Pain: Dilaudid , Tylenol     CVS: Hemodynamically stable without inotrope/pressor requirement. BHARAT @ OSH negative for vegetation.   Cardiology consulted - they reviewed BHARAT and agree there is low suspicion of endocarditis at this time  History of HTN: holding home antihypertensives     Pulm: Chronic smoker  - On 2L NC  - Continuous pulse oximetry, incentive spirometry.     Renal:   - Oliguric VIDYA , Cr  now 1.9, significant insensible losses through multiple wounds, Intravascular depletion in the setting of SIRS.  - Daily BMP, Strict I/O (+Caro)     FEN/GI  Erosive Esophagitis: Continue PPI BID, TPN  Regular diet  IVF: LR @ 125cc/hr     MSK/ ID:   Staph bacteremia manifesting as necrotizing fascitis of bilateral upper extremity, now septic arthritis of right knee. BHARAT @OSH negative for endocarditis. Presentation concerning for seeding from back hardware. Orthopedics consulted for septic knee already, appreciate input ; would like to obtain spine MRI imaging if helpful.  Infectious disease consulted for antibiotic duration management.  - Continue high dose Ancef 2g q8h (4-6 weeks)  - Repeat cultures obtained     Heme/Onc. H/H continue to monitor  - s/p 2U pRBCs 12/24     PPX:   SCD, SQH  Protonix BID  PT/OT     Dispo: Continue ICU care for now. Discuss plan with ortho   Primary: ACS         Critical care was time spent personally by me on the following activities: development of treatment plan with patient or surrogate and bedside caregivers, discussions with consultants, evaluation of patient's response to treatment, examination of patient, ordering and performing treatments and interventions, ordering and review of laboratory studies, ordering and review of radiographic studies, pulse oximetry, re-evaluation of patient's condition.  This critical care time did not overlap with that of any other provider or involve time for any procedures.     Sofiya Salamanca MD  Critical Care - Surgery  [facility]

## 2018-12-26 PROBLEM — N17.9 AKI (ACUTE KIDNEY INJURY): Status: ACTIVE | Noted: 2018-01-01

## 2018-12-26 NOTE — OP NOTE
DATE OF PROCEDURE: 12/21/18    PREOPERATIVE DIAGNOSIS: Bilateral upper extremity necrotizing soft tissue infection    POSTOPERATIVE DIAGNOSIS: Same    PROCEDURES PERFORMED:  1. Irrigation of necrotizing soft tissue infection bilateral upper extremities   2. Debridement of skin, soft tissue, muscle, fascia, bilateral upper extremities    ATTENDING SURGEON: Javier Kirk MD    RESIDENT: Jj Coombs M.D.    ANESTHESIA: GETA    KEY FINDINGS: Purulence coming from soft tissues of bilateral arms, tracking into bilateral axilla     ESTIMATED BLOOD LOSS: 50 ml    DRAINS: Penrose drain in bilateral axilla     COMPLICATIONS: None    INDICATIONS FOR PROCEDURE: The patient is a 58 y.o. female   who presented with bilateral upper extremity abscesses that had initially been debrided at outside facility.  It was felt that they had not been adequately drained and there was concern for joint involvement. Based on this surgery was indicated.    PROCEDURE IN DETAIL: After informed consent was obtained, the patient was brought to the Operative Theater and placed in in the supine position. After adequate anesthesia the patient was prepped and draped in the usual sterile fashion. Orthopedic surgery started the procedure by performing an arthroscopic assessment of the right knee.  This will be dictated in a separate note.   We then prepped bilateral upper extremities.  The muscle and soft tissue was examined bilaterally and necrotic skin, soft tissue, muscle and fascia were excised sharply.  The wounds tracked along the upper arm and deep into the axilla.  A penrose drain was placed bilaterally in the axilla and secured to the skin with silk sutures.  The right and left hands, wrists, elbows, and shoulder joints were assessed by orthopedic surgery.  They performed an arthrotomy of the right wrist and this will be dictated separately.  We then irrigated the wounds bilaterally.  Excellent hemostasis was achieved. The wounds were covered  with moist saline.  Any areas of exposed tendon were covered with xeroform. We will plan to take these dressings down and reassess the wound in 48-72 hours. The patient tolerated the procedure well and was transported to the recovery room in stable condition. Dr Kirk was present for the entire procedure

## 2018-12-26 NOTE — ASSESSMENT & PLAN NOTE
VIDYA, non-oliguric, on top of previously normal renal function, sCr has been stable in the 1.7-1.0 range over the past several days, initially felt elevated sCr due to pre-renal etiology, but fluid resuscitation over the past several days has not led to an improvement, further noted with a UPC ratio at first at 0.82, then 5.82, multiple RBCs and WBCs in urine, noted patient has a Caro catheter, clinically an ATN secondary surgery at [facility], possibly with hemodynamic instability and also toxicity from infection, would seem like the most likely etiology, however cannot rule out an active/nephritic urine and differential needs to include GN, specifically would be concerned for an Infection / Immune complex GN vs other, noted endocarditis initially was concern, but negative BHARAT makes less likely.  Noted patient on multiple antibiotics, raising concern for an AIN, absence of eosinophilia and rash would make less likely, but needs to be on differential, multiple WBCs on UA could be consistent, but may be more from Caro UTI      Plan/Recommendations:  1) repeat urine studies (UA, UPC, urine sodium), also may consider 24hr urine to further delineate degree of proteinuria as UPC is showing a discrepancy between measurements  2) will collect urine to manually spin down and review urine microscopy  3) renal ultrasound  4) complement levels, ALYSSA, ANCA, cryoglobulins,   5) in regards to fluids, this patient is not pre-renal, so would not base fluid management based on current creatinine elevation, but rather an overall assessment of volume status, this sCr is not likely to come down quickly and current baseline appears to be 1.7-1.9    6) if this patient needs a contrast study, specifically appears MRI with gadolinium is under consideration, then would go ahead and do this if it is felt to be important and would alter management, Nephrogenic Systemic Fibrosis is a very small risk in this patient and if it is felt information  gained from gadolinium study obtained in a timely manner is likely to alter management, then the clinical benefit of obtaining such a study likely will outweigh the small risk of NSF, in regards to protecting from NSF, there is no measure that would be recommended, specifically would NOT recommend dialysis unless patient already was ESRD as the risk of HD alone is significantly greater than the chance for NSF in this patient, however, of course, only do study if it is felt it will be relevant to current management

## 2018-12-26 NOTE — PROGRESS NOTES
[facility]  Critical Care - Surgery  Progress Note    Patient Name: Misty Khalil  MRN: [id number]  Admission Date: 12/20/2018  Hospital Length of Stay: 6 days  Code Status: Full Code  Attending Provider: Javier Kirk MD  Primary Care Provider: JOEL Mccauley   Principal Problem: Soft tissue infection    Subjective:     Hospital/ICU Course:  No notes on file    Interval History/Significant Events:   NAEON.  AF and HDS without pressors on room air.    Follow-up For: Procedure(s) (LRB):  INCISION AND DRAINAGE, UPPER EXTREMITY (Bilateral)    Post-Operative Day: 2 Days Post-Op    Objective:     Vital Signs (Most Recent):  Temp: 97.2 °F (36.2 °C) (12/26/18 1000)  Pulse: 68 (12/26/18 1000)  Resp: 13 (12/26/18 1000)  BP: 138/78 (12/26/18 1000)  SpO2: (!) 93 % (12/26/18 1000) Vital Signs (24h Range):  Temp:  [96.8 °F (36 °C)-98.2 °F (36.8 °C)] 97.2 °F (36.2 °C)  Pulse:  [62-84] 68  Resp:  [12-20] 13  SpO2:  [88 %-98 %] 93 %  BP: (114-174)/() 138/78     Weight: 69.9 kg (154 lb)  Body mass index is 24.12 kg/m².      Intake/Output Summary (Last 24 hours) at 12/26/2018 1156  Last data filed at 12/26/2018 1000  Gross per 24 hour   Intake 2858.74 ml   Output 690 ml   Net 2168.74 ml       Physical Exam  Constitutional: She is oriented to person, place, and time. She appears well-developed and well-nourished.   HENT:   Head: Normocephalic and atraumatic.   Eyes: Conjunctivae are normal. Pupils are equal, round, and reactive to light.   Neck: Normal range of motion.   Cardiovascular: Normal rate and regular rhythm.   Pulmonary/Chest: Effort normal. No respiratory distress.   Abdominal: Soft. She exhibits no distension.   Genitourinary:   Genitourinary Comments: +Caro   Musculoskeletal:   R knee with bandage   Neurological: She is alert and oriented to person, place, and time.   Skin: Skin is warm.   Nursing note and vitals reviewed.      Lines/Drains/Airways     Central Venous Catheter Line                  Percutaneous Central Line Insertion/Assessment - triple lumen  right internal jugular -- days          Drain                 Urethral Catheter 12/20/18 2335 Latex 16 Fr. 5 days         Open Drain 12/24/18 0846 Right  Penrose 5/8 inch 2 days         Open Drain 12/24/18 0846 Right Other (Comment) Penrose 5/8 inch 2 days         Open Drain 12/24/18 0847 Left  Penrose 5/8 inch 2 days                Significant Labs:    CBC/Anemia Profile:  Recent Labs   Lab 12/25/18  0500 12/26/18  0455   WBC 8.00 8.61   HGB 8.2* 8.6*   HCT 26.2* 28.0*    197   MCV 86 84   RDW 18.5* 18.9*        Chemistries:  Recent Labs   Lab 12/24/18  1235 12/25/18  0500 12/26/18  0455    137 136   K 4.1 4.3 4.3   * 109 109   CO2 19* 19* 20*   BUN 52* 56* 57*   CREATININE 1.8* 1.9* 1.9*   CALCIUM 7.3* 7.4* 7.3*   ALBUMIN  --  1.3* 1.1*   PROT  --  4.0* 4.2*   BILITOT  --  0.3 0.3   ALKPHOS  --  94 101   ALT  --  <5* <5*   AST  --  8* 9*   MG  --  1.7 1.9   PHOS  --  5.0* 4.7*       All pertinent labs within the past 24 hours have been reviewed.    Significant Imaging:  I have reviewed and interpreted all pertinent imaging results/findings within the past 24 hours.    Assessment/Plan:     * Soft tissue infection    57yo F w/ hx of Anemia, HTN, RA (on prior prednisone taper) with extensive bilateral upper extremity abscesses now s/p two drainage/washouts at OSH.     Neuro: No history of seizures/strokes.   Pain: Dilaudid , Tylenol     CVS: Hemodynamically stable without inotrope/pressor requirement. BHARAT @ OSH negative for vegetation.   Cardiology consulted - they reviewed BHARAT and agree there is low suspicion of endocarditis at this time  History of HTN: holding home antihypertensives     Pulm: Chronic smoker  - On room air  - Continuous pulse oximetry, incentive spirometry.     Renal:   - Oliguric VIDYA , Cr now 1.9, significant insensible losses through multiple wounds, Intravascular depletion in the setting of SIRS.  - Daily  BMP, Strict I/O (+Caro)     FEN/GI  Erosive Esophagitis: Continue PPI BID, TPN  Regular diet  IVF: LR @ 125cc/hr     MSK/ ID:   Staph bacteremia manifesting as necrotizing fascitis of bilateral upper extremity, now septic arthritis of right knee. BHARAT @OSH negative for endocarditis. Presentation concerning for seeding from back hardware. Orthopedics consulted for septic knee already, appreciate input.  Infectious disease consulted for antibiotic duration management.  - Continue high dose Ancef 2g q8h (4-6 weeks)  - Repeat cultures obtained  -MRI L-spine ordered, renal function stable.     Heme/Onc. H/H continue to monitor  - s/p 2U pRBCs 12/24     PPX:   SCD, SQH  Protonix BID  PT/OT     Dispo: Step-down today  Primary: ACS            Critical care was time spent personally by me on the following activities: development of treatment plan with patient or surrogate and bedside caregivers, discussions with consultants, evaluation of patient's response to treatment, examination of patient, ordering and performing treatments and interventions, ordering and review of laboratory studies, ordering and review of radiographic studies, pulse oximetry, re-evaluation of patient's condition.  This critical care time did not overlap with that of any other provider or involve time for any procedures.     Gordo Fernandez MD  Critical Care - Surgery  [facility]

## 2018-12-26 NOTE — PROGRESS NOTES
Notified MD (dr. Fernandez) of UOP 20cc/hr for the past 2hours (1500,1600). Orders received for 1L LR bolus. Will continue to monitor patient

## 2018-12-26 NOTE — PLAN OF CARE
12/26/18 1554   Discharge Reassessment   Assessment Type Discharge Planning Reassessment   Provided patient/caregiver education on the expected discharge date and the discharge plan Yes   Do you have any problems affording any of your prescribed medications? No   Discharge Plan A Home with family;Home Health   Discharge Plan B Skilled Nursing Facility   Anticipated Discharge Disposition Home-Health

## 2018-12-26 NOTE — ANESTHESIA PREPROCEDURE EVALUATION
[facility]  Anesthesia Pre-Operative Evaluation         Patient Name: Misty Khalil  YOB: 1960  MRN: [id number]    SUBJECTIVE:     Pre-operative evaluation for Procedure(s) (LRB):  INCISION AND DRAINAGE, UPPER EXTREMITY (Bilateral)     12/26/2018    Misty Khalil is a 58 y.o. female w/ a significant PMHx of Anemia, HTN, RA (on prior prednisone taper) with extensive bilateral upper extremity abscesses now s/p three drainage/washouts at OSH who presents for the above procedure.    Hemodynamically stable without inotrope/pressor requirement. BHARAT @ OSH negative for vegetation. Cardiology consulted - they reviewed BHARAT and agree there is low suspicion of endocarditis at this time.    Hospital course complicated by VIDYA. Not pre-renal in etiology.     Patient now presents for the above procedure(s).      LDA:        Percutaneous Central Line Insertion/Assessment - triple lumen  right internal jugular (Active)   Dressing biopatch in place;dressing dry and intact 12/26/2018 11:00 AM   Securement secured w/ sutures 12/26/2018 11:00 AM   Additional Site Signs no erythema;no warmth;no edema 12/26/2018 11:00 AM   Distal Patency/Care flushed w/o difficulty;infusing 12/26/2018 11:00 AM   Medial Patency/Care flushed w/o difficulty;infusing 12/26/2018 11:00 AM   Proximal Patency/Care flushed w/o difficulty;normal saline locked 12/26/2018 11:00 AM   Waveform normal 12/26/2018 11:00 AM   Line Interventions line leveled/zeroed 12/26/2018 11:00 AM   Dressing Change Due 12/29/18 12/26/2018 11:00 AM   Daily Line Review Performed 12/26/2018 11:00 AM   Number of days:             Open Drain 12/24/18 0846 Right Other (Comment) Penrose 5/8 inch (Active)   Site Description Unable to view 12/26/2018 11:00 AM   Dressing Status Intact;Old drainage 12/26/2018 11:00 AM   Drainage Serosanguineous 12/26/2018 11:00 AM   Status Clamped 12/26/2018 11:00 AM   Number of days: 2            Open Drain 12/24/18 0846 Right   "Penrose 5/8 inch (Active)   Site Description Unable to view 12/26/2018 11:00 AM   Dressing Status Intact;Old drainage 12/26/2018 11:00 AM   Drainage Serosanguineous 12/26/2018 11:00 AM   Status Clamped 12/26/2018 11:00 AM   Number of days: 2            Open Drain 12/24/18 0847 Left  Penrose 5/8 inch (Active)   Site Description Unable to view 12/26/2018 11:00 AM   Dressing Status Intact;Old drainage 12/26/2018 11:00 AM   Drainage Serosanguineous 12/26/2018 11:00 AM   Status Clamped 12/26/2018 11:00 AM   Number of days: 2            Urethral Catheter 12/20/18 2335 Latex 16 Fr. (Active)   Site Assessment Clean;Intact 12/26/2018 11:00 AM   Collection Container Urimeter 12/26/2018 11:00 AM   Securement Method secured to top of thigh w/ adhesive device 12/26/2018 11:00 AM   Catheter Care Performed yes 12/26/2018 11:00 AM   Reason for Continuing Urinary Catheterization Critically ill in ICU requiring intensive monitoring 12/26/2018 11:00 AM   CAUTI Prevention Bundle StatLock in place w 1" slack;Intact seal between catheter & drainage tubing;Drainage bag off the floor;Green sheeting clip in use;No dependent loops or kinks;Drainage bag not overfilled (<2/3 full);Drainage bag below bladder 12/26/2018  7:01 AM   Output (mL) 35 mL 12/26/2018 12:00 PM   Number of days: 5       Prev airway: Easy mask ventilation with oral airway. Grade I view on Dl, ETT placed without complication.     Drips:    lactated ringers 125 mL/hr at 12/26/18 1200       Patient Active Problem List   Diagnosis    Hypertension    Hyperlipidemia    Anxiety    Peripheral vascular disease    Soft tissue infection    Septic arthritis of knee, right    VIDYA (acute kidney injury)       Review of patient's allergies indicates:  No Known Allergies    Current Inpatient Medications:   busPIRone  15 mg Oral BID    ceFAZolin (ANCEF) IVPB  2 g Intravenous Q8H    doxazosin  2 mg Oral Daily    heparin (porcine)  5,000 Units Subcutaneous Q8H    metoprolol " tartrate  50 mg Oral BID    nicotine  1 patch Transdermal Daily       No current facility-administered medications on file prior to encounter.      Current Outpatient Medications on File Prior to Encounter   Medication Sig Dispense Refill    albuterol (PROVENTIL) 2.5 mg /3 mL (0.083 %) nebulizer solution Take 3 mLs (2.5 mg total) by nebulization daily as needed for Wheezing. Rescue 1 Box 2    ALPRAZolam (XANAX) 1 MG tablet Take 1 mg by mouth 2 (two) times daily.      busPIRone (BUSPAR) 15 MG tablet Take 1 tablet (15 mg total) by mouth 2 (two) times daily. 60 tablet 2    cloNIDine 0.3 mg/24 hr td ptwk (CATAPRES) 0.3 mg/24 hr Place 1 patch onto the skin every 7 days.      doxazosin (CARDURA) 1 MG tablet Take 2 tablets (2 mg total) by mouth once daily. 60 tablet 2    ezetimibe (ZETIA) 10 mg tablet TAKE 1 TABLET (10 MG TOTAL) BY MOUTH ONCE DAILY. 30 tablet 2    hydrALAZINE (APRESOLINE) 100 MG tablet TAKE 1 TABLET BY MOUTH TWICE DAILY 60 tablet 2    metoprolol tartrate (LOPRESSOR) 50 MG tablet Take 1 tablet (50 mg total) by mouth 2 (two) times daily. 60 tablet 2    oxyCODONE-acetaminophen (PERCOCET) 5-325 mg per tablet Take 1-2 tablets by mouth every 4 (four) hours as needed for Pain. 30 tablet 0    quinapril (ACCUPRIL) 40 MG tablet Take 1 tablet (40 mg total) by mouth every evening. 30 tablet 2    zolpidem (AMBIEN) 10 mg Tab Take 5 mg by mouth nightly as needed.         Past Surgical History:   Procedure Laterality Date    ARTHROSCOPY, KNEE Right 2018    Performed by [facility]oyal [facility] at [facility] OR 2ND FLR    ARTHROSCOPY, KNEE, septic Right 2018    Performed by Javier Kirk MD at [facility] OR 2ND FLR    BACK SURGERY       SECTION      COLONOSCOPY N/A 2018    [facility]    DEBRIDEMENT, WOUND Bilateral 2018    Performed by J[facility] Kirk MD at [facility] OR 2ND FLR    ESOPHAGOGASTRODUODENOSCOPY (EGD) N/A 2018    Performed by  [facility] at [facility] ENDO    HYSTERECTOMY      INCISION AND DRAINAGE, UPPER EXTREMITY Bilateral 12/24/2018    [facility]    OOPHORECTOMY         Social History     Socioeconomic History    Marital status:      Spouse name: Not on file    Number of children: Not on file    Years of education: Not on file    Highest education level: Not on file   Social Needs    Financial resource strain: Not on file    Food insecurity - worry: Not on file    Food insecurity - inability: Not on file    Transportation needs - medical: Not on file    Transportation needs - non-medical: Not on file   Occupational History    Not on file   Tobacco Use    Smoking status: Current Every Day Smoker     Packs/day: 1.00     Types: Cigarettes    Smokeless tobacco: Never Used   Substance and Sexual Activity    Alcohol use: Yes     Comment: occasionally    Drug use: No    Sexual activity: No   Other Topics Concern    Not on file   Social History Narrative    Not on file       OBJECTIVE:     Vital Signs Range (Last 24H):  Temp:  [36 °C (96.8 °F)-36.7 °C (98.1 °F)]   Pulse:  [62-84]   Resp:  [12-20]   BP: (114-174)/()   SpO2:  [88 %-98 %]       Significant Labs:  Lab Results   Component Value Date    WBC 8.61 12/26/2018    HGB 8.6 (L) 12/26/2018    HCT 28.0 (L) 12/26/2018     12/26/2018    ALT <5 (L) 12/26/2018    AST 9 (L) 12/26/2018     12/26/2018    K 4.3 12/26/2018     12/26/2018    CREATININE 1.9 (H) 12/26/2018    BUN 57 (H) 12/26/2018    CO2 20 (L) 12/26/2018    INR 1.0 12/20/2018    HGBA1C 5.1 12/21/2018     EKG:   Vent. Rate : 083 BPM     Atrial Rate : 083 BPM     P-R Int : 114 ms          QRS Dur : 080 ms      QT Int : 390 ms       P-R-T Axes : 079 066 085 degrees     QTc Int : 458 ms    Sinus rhythm with Premature atrial complexes  Otherwise normal ECG  When compared with ECG of 13-JUL-2018 13:25,  Premature atrial complexes are now  Present  Confirmed by SURESH BE MD ([id number]) on 12/21/2018 9:44:07 AM    TTE 12/21  · Concentric left ventricular remodeling.  · Normal left ventricular systolic function. The estimated ejection fraction is 65%  · Normal LV diastolic function.  · No wall motion abnormalities.  · Normal right ventricular systolic function.  · Intermediate central venous pressure (8 mm Hg).    ASSESSMENT/PLAN:       Anesthesia Evaluation    I have reviewed the Patient Summary Reports.     I have reviewed the Medications.   Steroids Taken In Past Year: Prednisone    Review of Systems  Anesthesia Hx:  No problems with previous Anesthesia  History of prior surgery of interest to airway management or planning: Previous anesthesia: General Denies Family Hx of Anesthesia complications.   Denies Personal Hx of Anesthesia complications.   Social:  Smoker, Social Alcohol Use Active smoker   Hematology/Oncology:     Oncology Normal    -- Anemia:   EENT/Dental:EENT/Dental Normal   Cardiovascular:   Hypertension Denies MI.    Denies Angina. PVD hyperlipidemia    Pulmonary:   COPD, moderate Denies Asthma.  Denies Recent URI. Intermittently uses home O2   Renal/:  Renal/ Normal     Hepatic/GI:   GERD, well controlled Erosive esophagitis   Musculoskeletal:   RUE and LUE abscess Spine Disorders: (LUMBAR FUSION) lumbar    Neurological:  Neurology Normal  Denies CVA. Denies Seizures.    Endocrine:  Endocrine Normal Denies Diabetes.    Psych:   anxiety depression          Physical Exam  General:  Well nourished    Airway/Jaw/Neck:  Airway Findings: Mouth Opening: Small, but > 3cm Tongue: Normal  General Airway Assessment: Adult  Mallampati: III  Improves to II with phonation.  TM Distance: Normal, at least 6 cm  Jaw/Neck Findings:     Neck ROM: Normal Extension, Painful  Neck Findings: Normal    Eyes/Ears/Nose:  Eyes/Ears/Nose Findings:    Dental:  Dental Findings: Edentulous   Chest/Lungs:  Chest/Lungs Findings: Clear to auscultation, Normal  Respiratory Rate  Saturating in the low 90's on RA     Heart/Vascular:  Heart Findings: Rate: Normal  Rhythm: Regular Rhythm  Vascular Findings:  Vascular Access: Existing Central Line(s)  Edema Locations: BLE  Edema: +1 or +2     Abdomen:  Abdomen Findings: Normal      Mental Status:  Mental Status Findings:  Cooperative, Alert and Oriented         Anesthesia Plan  Type of Anesthesia, risks & benefits discussed:  Anesthesia Type:  general  Patient's Preference:   Intra-op Monitoring Plan: standard ASA monitors  Intra-op Monitoring Plan Comments:   Post Op Pain Control Plan: per primary service following discharge from PACU, IV/PO Opioids PRN and multimodal analgesia  Post Op Pain Control Plan Comments:   Induction:   IV  Beta Blocker:  Patient is on a Beta-Blocker and has received one dose within the past 24 hours (No further documentation required).       Informed Consent: Patient representative understands risks and agrees with Anesthesia plan.  Questions answered. Anesthesia consent signed with patient representative.  ASA Score: 4     Day of Surgery Review of History & Physical:    H&P update referred to the surgeon.         Ready For Surgery From Anesthesia Perspective.

## 2018-12-26 NOTE — CONSULTS
[facility]  Nephrology  Consult Note    Patient Name: Misty Khalil  MRN: [id number]  Admission Date: 12/20/2018  Hospital Length of Stay: 6 days  Attending Provider: Javier Kirk MD   Primary Care Physician: JOEL Mccauley  Principal Problem:Soft tissue infection    Inpatient consult to Nephrology  Consult performed by: Hardik Anand MD  Consult ordered by: Sofiya Salamanca MD  Reason for consult: VIDYA        Subjective:     HPI: 57 yo WEF with medical hx of HTN, anemia, RA who was transferred from [facility] overnight for evaluation fo necrotizing fascitis. She initially presented on 12/17 with right shoulder pain, swelling, and fevers and found to have bilateral upper extremity abscesses: right arm 14.3x4.3x2.7 collection, left axilla 8.3x5.4x7.7 fluid collection. She is s/p I&D in ED and initiation of vancomycin and zosyn.  She was taken to OR for incision and washout 12/18 of right hand, RUE, and LUE abscess by Dr. Falk (orthopedic surgery) and again on 12/19 for repeat washout. She was additionally found to have left olecranon abscess, left hand abscess. Cultures grew MSSA and she was transitioned to Ancef at some point during her course. Of note, she has history of back surgery with implanted hardware. Cardiology was consulted as there was an initial concern for endocarditis, but negative BHARAT would seem to make this less likely.    Nephrology consulted for sCr that is elevated and not normalizing with fluid repletion x several days (since transfer from [facility] to [facility]), sCr has remained stable between 1.7 to 2.0 over that time period, per report patient had an sCr of around 1.0 on admit to [facility], also reviewing old [facility] labs, renal function was WNL over the summer.            Review of patient's allergies indicates:  No Known Allergies  Current Facility-Administered Medications   Medication Frequency    acetaminophen tablet 650 mg Q8H    albuterol sulfate nebulizer  solution 2.5 mg Q4H PRN    ALPRAZolam tablet 1 mg BID PRN    busPIRone tablet 15 mg BID    ceFAZolin injection 2 g Q8H    dextrose 50% injection 12.5 g PRN    doxazosin tablet 2 mg Daily    glucagon (human recombinant) injection 1 mg PRN    heparin (porcine) injection 5,000 Units Q8H    HYDROmorphone injection 1 mg Q2H PRN    insulin aspart U-100 pen 0-5 Units Q6H PRN    lactated ringers infusion Continuous    metoprolol tartrate (LOPRESSOR) tablet 50 mg BID    nicotine 14 mg/24 hr 1 patch Daily    ondansetron injection 4 mg Q12H PRN    oxyCODONE-acetaminophen  mg per tablet 1 tablet Q4H PRN    promethazine (PHENERGAN) 6.25 mg in dextrose 5 % 50 mL IVPB Q6H PRN    sodium chloride 0.9% flush 3 mL PRN    zolpidem tablet 5 mg Nightly PRN       Objective:     Vital Signs (Most Recent):  Temp: 97 °F (36.1 °C) (12/26/18 0830)  Pulse: 75 (12/26/18 0830)  Resp: 14 (12/26/18 0830)  BP: (!) 162/81 (12/26/18 0800)  SpO2: (!) 94 % (12/26/18 0830)  O2 Device (Oxygen Therapy): room air (12/26/18 0800) Vital Signs (24h Range):  Temp:  [96.8 °F (36 °C)-98.2 °F (36.8 °C)] 97 °F (36.1 °C)  Pulse:  [62-84] 75  Resp:  [13-20] 14  SpO2:  [88 %-98 %] 94 %  BP: (114-174)/() 162/81     Weight: 69.9 kg (154 lb) (12/21/18 1145)  Body mass index is 24.12 kg/m².  Body surface area is 1.82 meters squared.    I/O last 3 completed shifts:  In: 3814.7 [P.O.:1236; I.V.:2578.7]  Out: 990 [Urine:990]    Physical Exam   HENT:   Head: Atraumatic.   Cardiovascular: Normal rate and regular rhythm.   Pulmonary/Chest: Effort normal and breath sounds normal.   Abdominal: Soft. She exhibits no distension.   Musculoskeletal: She exhibits edema. She exhibits no tenderness.   Neurological: She is alert.   Skin: Skin is warm.         Assessment/Plan:     VIDYA (acute kidney injury)    VIDYA, non-oliguric, on top of previously normal renal function, sCr has been stable in the 1.7-2.0 range over the past several days, initially felt  elevated sCr due to pre-renal etiology, but fluid resuscitation over the past several days has not led to an improvement, further noted with a UPC ratio at first at 0.82, then 5.82, multiple RBCs and WBCs in urine, noted patient has a Caro catheter, clinically an ATN secondary surgery at [facility], possibly with hemodynamic instability and also toxicity from infection, would seem like the most likely etiology, however cannot rule out an active/nephritic urine and differential needs to include GN, specifically would be concerned for an Infection / Immune complex GN vs other, noted endocarditis initially was concern, but negative BHARAT makes less likely.  Noted patient on multiple antibiotics, raising concern for an AIN, absence of eosinophilia and rash would make less likely, but needs to be on differential, multiple WBCs on UA could be consistent, but may be more from Caro UTI      Plan/Recommendations:  1) repeat urine studies (UA, UPC, urine sodium), also may consider 24hr urine to further delineate degree of proteinuria as UPC is showing a discrepancy between measurements  2) will collect urine to manually spin down and review urine microscopy  3) renal ultrasound  4) complement levels, ALYSSA, ANCA, cryoglobulins,   5) in regards to fluids, this patient is not pre-renal, so would not base fluid management based on current creatinine elevation, but rather an overall assessment of volume status, this sCr is not likely to come down quickly and current baseline appears to be 1.7-1.9    6) if this patient needs a contrast study, specifically appears MRI with gadolinium is under consideration, then would go ahead and do this if it is felt to be important and would alter management, Nephrogenic Systemic Fibrosis is a very small risk in this patient and if it is felt information gained from gadolinium study obtained in a timely manner is likely to alter management, then the clinical benefit of obtaining such a study likely will  outweigh the small risk of NSF, in regards to protecting from NSF, there is no measure that would be recommended, specifically would NOT recommend dialysis unless patient already was ESRD as the risk of HD alone is significantly greater than the chance for NSF in this patient, however, of course, only do study if it is felt it will be relevant to current management         Thank you for your consult. I will follow-up with patient. Please contact us if you have any additional questions.    Hardik Anand MD  Nephrology  [facility]

## 2018-12-26 NOTE — ASSESSMENT & PLAN NOTE
57yo F w/ hx of Anemia, HTN, RA (on prior prednisone taper) with extensive bilateral upper extremity abscesses now s/p two drainage/washouts at OSH.     Neuro: No history of seizures/strokes.   Pain: Dilaudid , Tylenol     CVS: Hemodynamically stable without inotrope/pressor requirement. BHARAT @ OSH negative for vegetation.   Cardiology consulted - they reviewed BHARAT and agree there is low suspicion of endocarditis at this time  History of HTN: holding home antihypertensives     Pulm: Chronic smoker  - On room air  - Continuous pulse oximetry, incentive spirometry.     Renal:   - Oliguric VIDYA , Cr now 1.9, significant insensible losses through multiple wounds, Intravascular depletion in the setting of SIRS.  - Daily BMP, Strict I/O (+Caro)     FEN/GI  Erosive Esophagitis: Continue PPI BID, TPN  Regular diet  IVF: LR @ 125cc/hr     MSK/ ID:   Staph bacteremia manifesting as necrotizing fascitis of bilateral upper extremity, now septic arthritis of right knee. BHARAT @OSH negative for endocarditis. Presentation concerning for seeding from back hardware. Orthopedics consulted for septic knee already, appreciate input.  Infectious disease consulted for antibiotic duration management.  - Continue high dose Ancef 2g q8h (4-6 weeks)  - Repeat cultures obtained  -MRI L-spine ordered, renal function stable.     Heme/Onc. H/H continue to monitor  - s/p 2U pRBCs 12/24     PPX:   SCD, SQH  Protonix BID  PT/OT     Dispo: Step-down today  Primary: ACS

## 2018-12-26 NOTE — SUBJECTIVE & OBJECTIVE
Review of patient's allergies indicates:  No Known Allergies  Current Facility-Administered Medications   Medication Frequency    acetaminophen tablet 650 mg Q8H    albuterol sulfate nebulizer solution 2.5 mg Q4H PRN    ALPRAZolam tablet 1 mg BID PRN    busPIRone tablet 15 mg BID    ceFAZolin injection 2 g Q8H    dextrose 50% injection 12.5 g PRN    doxazosin tablet 2 mg Daily    glucagon (human recombinant) injection 1 mg PRN    heparin (porcine) injection 5,000 Units Q8H    HYDROmorphone injection 1 mg Q2H PRN    insulin aspart U-100 pen 0-5 Units Q6H PRN    lactated ringers infusion Continuous    metoprolol tartrate (LOPRESSOR) tablet 50 mg BID    nicotine 14 mg/24 hr 1 patch Daily    ondansetron injection 4 mg Q12H PRN    oxyCODONE-acetaminophen  mg per tablet 1 tablet Q4H PRN    promethazine (PHENERGAN) 6.25 mg in dextrose 5 % 50 mL IVPB Q6H PRN    sodium chloride 0.9% flush 3 mL PRN    zolpidem tablet 5 mg Nightly PRN       Objective:     Vital Signs (Most Recent):  Temp: 97 °F (36.1 °C) (12/26/18 0830)  Pulse: 75 (12/26/18 0830)  Resp: 14 (12/26/18 0830)  BP: (!) 162/81 (12/26/18 0800)  SpO2: (!) 94 % (12/26/18 0830)  O2 Device (Oxygen Therapy): room air (12/26/18 0800) Vital Signs (24h Range):  Temp:  [96.8 °F (36 °C)-98.2 °F (36.8 °C)] 97 °F (36.1 °C)  Pulse:  [62-84] 75  Resp:  [13-20] 14  SpO2:  [88 %-98 %] 94 %  BP: (114-174)/() 162/81     Weight: 69.9 kg (154 lb) (12/21/18 1145)  Body mass index is 24.12 kg/m².  Body surface area is 1.82 meters squared.    I/O last 3 completed shifts:  In: 3814.7 [P.O.:1236; I.V.:2578.7]  Out: 990 [Urine:990]    Physical Exam   HENT:   Head: Atraumatic.   Cardiovascular: Normal rate and regular rhythm.   Pulmonary/Chest: Effort normal and breath sounds normal.   Abdominal: Soft. She exhibits no distension.   Musculoskeletal: She exhibits edema. She exhibits no tenderness.   Neurological: She is alert.   Skin: Skin is warm.

## 2018-12-26 NOTE — OP NOTE
DATE OF PROCEDURE: 12/24/18     PREOPERATIVE DIAGNOSIS: Necrotizing soft tissue infections bilateral upper extremities     POSTOPERATIVE DIAGNOSIS: Same     PROCEDURES PERFORMED:  1. Irrigation bilateral upper extremity necrotizing soft tissue infection  2. Debridement skin, soft tissue, muscle, fascia bilateral upper extremities     ATTENDING SURGEON: Javier Kirk MD     RESIDENT: jJ Coombs M.D.     ANESTHESIA: GETA     KEY FINDINGS: Purulence coming from soft tissues of bilateral arms, tracking into bilateral axilla      ESTIMATED BLOOD LOSS: 50 mL     DRAINS: Penrose drain in bilateral axilla      COMPLICATIONS: None     INDICATIONS FOR PROCEDURE: The patient is a 58 y.o. female who presented with bilateral upper extremity abscesses.   We had debrided these wounds 72 hours ago and they needed to be assessed again. Based on this surgery was indicated.     PROCEDURE IN DETAIL: After informed consent was obtained, the patient was brought to the Operative Theater and placed in in the supine position. After adequate anesthesia the patient was prepped and draped in the usual sterile fashion. We removed the previous dressings.  The right upper extremity muscle was viable and looked better than the previous trip to the operating room.  Two penrose drains were placed through counter incisions on the right upper extremity into the larger wound to allow for better drainage.  The left upper extremity was noted to have more purulence tracking into the left axilla.  A counter incision was made over the left chest and a penrose drain was placed to allow for better drainage.  An area of non-viable skin, soft tissue, muscle and fascia was removed from the left upper extremity lateral to the previous wound.  We then irrigated the wounds bilaterally.  Excellent hemostasis was achieved. The wounds were covered with moist saline.  Any areas of exposed tendon were covered with xeroform. We will plan to take these dressings down  and reassess the wound in 48-72 hours. The patient tolerated the procedure well and was transported to the recovery room in stable condition. Dr Kirk was present for the entire procedure

## 2018-12-26 NOTE — PROGRESS NOTES
MRI was ordered for patient. Patient was worried about going for MRI because she has rods and a plate in her back. Notified Dr. Sam of patients concerns.

## 2018-12-26 NOTE — HPI
59 yo WEF with medical hx of HTN, anemia, RA who was transferred from [facility] overnight for evaluation fo necrotizing fascitis. She initially presented on 12/17 with right shoulder pain, swelling, and fevers and found to have bilateral upper extremity abscesses: right arm 14.3x4.3x2.7 collection, left axilla 8.3x5.4x7.7 fluid collection. She is s/p I&D in ED and initiation of vancomycin and zosyn.  She was taken to OR for incision and washout 12/18 of right hand, RUE, and LUE abscess by Dr. Falk (orthopedic surgery) and again on 12/19 for repeat washout. She was additionally found to have left olecranon abscess, left hand abscess. Cultures grew MSSA and she was transitioned to Ancef at some point during her course. Of note, she has history of back surgery with implanted hardware. Cardiology was consulted as there was an initial concern for endocarditis, but negative BHARAT would seem to make this less likely.    Nephrology consulted for sCr that is elevated and not normalizing with fluid repletion x several days (since [place] [facility] [place] [facility] sCr has remained stable between 1.7 to 2.0 over that time period, per report patient had an sCr of around 1.0 on admit to [facility], also reviewing [place] [facility] [place], renal function was WNL over the summer.

## 2018-12-26 NOTE — SUBJECTIVE & OBJECTIVE
Interval History/Significant Events:   NAEON.  AF and HDS without pressors on room air.    Follow-up For: Procedure(s) (LRB):  INCISION AND DRAINAGE, UPPER EXTREMITY (Bilateral)    Post-Operative Day: 2 Days Post-Op    Objective:     Vital Signs (Most Recent):  Temp: 97.2 °F (36.2 °C) (12/26/18 1000)  Pulse: 68 (12/26/18 1000)  Resp: 13 (12/26/18 1000)  BP: 138/78 (12/26/18 1000)  SpO2: (!) 93 % (12/26/18 1000) Vital Signs (24h Range):  Temp:  [96.8 °F (36 °C)-98.2 °F (36.8 °C)] 97.2 °F (36.2 °C)  Pulse:  [62-84] 68  Resp:  [12-20] 13  SpO2:  [88 %-98 %] 93 %  BP: (114-174)/() 138/78     Weight: 69.9 kg (154 lb)  Body mass index is 24.12 kg/m².      Intake/Output Summary (Last 24 hours) at 12/26/2018 1156  Last data filed at 12/26/2018 1000  Gross per 24 hour   Intake 2858.74 ml   Output 690 ml   Net 2168.74 ml       Physical Exam  Constitutional: She is oriented to person, place, and time. She appears well-developed and well-nourished.   HENT:   Head: Normocephalic and atraumatic.   Eyes: Conjunctivae are normal. Pupils are equal, round, and reactive to light.   Neck: Normal range of motion.   Cardiovascular: Normal rate and regular rhythm.   Pulmonary/Chest: Effort normal. No respiratory distress.   Abdominal: Soft. She exhibits no distension.   Genitourinary:   Genitourinary Comments: +Caro   Musculoskeletal:   R knee with bandage   Neurological: She is alert and oriented to person, place, and time.   Skin: Skin is warm.   Nursing note and vitals reviewed.      Lines/Drains/Airways     Central Venous Catheter Line                 Percutaneous Central Line Insertion/Assessment - triple lumen  right internal jugular -- days          Drain                 Urethral Catheter 12/20/18 2335 Latex 16 Fr. 5 days         Open Drain 12/24/18 0846 Right  Penrose 5/8 inch 2 days         Open Drain 12/24/18 0846 Right Other (Comment) Penrose 5/8 inch 2 days         Open Drain 12/24/18 0847 Left  Penrose 5/8 inch 2 days                 Significant Labs:    CBC/Anemia Profile:  Recent Labs   Lab 12/25/18  0500 12/26/18  0455   WBC 8.00 8.61   HGB 8.2* 8.6*   HCT 26.2* 28.0*    197   MCV 86 84   RDW 18.5* 18.9*        Chemistries:  Recent Labs   Lab 12/24/18  1235 12/25/18  0500 12/26/18  0455    137 136   K 4.1 4.3 4.3   * 109 109   CO2 19* 19* 20*   BUN 52* 56* 57*   CREATININE 1.8* 1.9* 1.9*   CALCIUM 7.3* 7.4* 7.3*   ALBUMIN  --  1.3* 1.1*   PROT  --  4.0* 4.2*   BILITOT  --  0.3 0.3   ALKPHOS  --  94 101   ALT  --  <5* <5*   AST  --  8* 9*   MG  --  1.7 1.9   PHOS  --  5.0* 4.7*       All pertinent labs within the past 24 hours have been reviewed.    Significant Imaging:  I have reviewed and interpreted all pertinent imaging results/findings within the past 24 hours.

## 2018-12-27 NOTE — PLAN OF CARE
"Problem: Adult Inpatient Plan of Care  Goal: Plan of Care Review  Hx:  HTN, microcytic anemia, transaminitis, RA, hx of GI bleed     Dx: necrotizing fascitis     12/21: transfer to SICU, CT of chest/arm/knee, cultures; bilat UE washout/I&D; 1L crystalloid and 250 albumin in OR; LR bolus, cont. LR infusion started, PRBC's x2 units   12/22: LR bolusx3  12/23: LR bolusx1  12/24: 2 unit of PRBC total. OR for wound debridement. 2L LR bolus  12/25: LR continuous at 125. Nephrology consulted. 1L LR bolus   12/26: MRI of lower back, 1L LR bolus    Nursing:  SBP<180  Accucheck Q6                  Outcome: Ongoing (interventions implemented as appropriate)  Vital signs stable. Afebrile. Alert, oriented and following commands. 2L NC. Wound vacs present to bilateral shoulders and to -125 suction. IV fluids per MD order. Caro intact and draining. Pt complaining of pain 10/10 but states that "it'll always be that" and that she wants to return to her room ASAP. Denies nausea. Pt requesting HOB to be flat. Bed placed in reverse trendelenburg for medication administration. Tolerating PO fluids. POC reviewed with pt and verbalizes understanding.       "

## 2018-12-27 NOTE — PLAN OF CARE
Problem: Adult Inpatient Plan of Care  Goal: Patient-Specific Goal (Individualization)  Hx:  HTN, microcytic anemia, transaminitis, RA, hx of GI bleed     Dx: Necrotizing fasciitis     12/21: Transfer to SICU, CT of chest/arm/knee, cultures; bilat UE washout/I&D; 1L crystalloid and 250 albumin in OR; LR bolus, cont. LR infusion started, PRBC's x2 units   12/22: LR bolus x4  12/23: Consent done for I&D on 24th w/ possible WV placement to wounds  12/24: Return to OR for 3rd I&D; 2 pen-jen drains placed.     Nursing:  SBP<180  Accucheck Q6              Outcome: Ongoing (interventions implemented as appropriate)  POC reviewed with patient and sibling. No acute event throughout the day. Patient AAOx4, moves all extremities purposeful. Afebrile. SBP maintained <180. O2 saturations >92% on room air. UOP 20cc/hr, md notified. Replaced howard to collect urine cultures. CVP 11-12. Administered pain medication multiple times today. Patient not eating meals, states she is not hungry. HR NSR. All questions answered. Will continue to monitor. See flowsheet for detailed assessment.

## 2018-12-27 NOTE — BRIEF OP NOTE
[facility]  Brief Operative Note    SUMMARY     Surgery Date: 12/27/2018     Surgeon(s) and Role:     * Javier Kirk MD - Primary     * Nixon Casanova MD - Resident - Assisting     * Kirstin Miller MD - Resident - Assisting        Pre-op Diagnosis:  Soft tissue infection [L08.9]    Post-op Diagnosis:  Post-Op Diagnosis Codes:     * Soft tissue infection [L08.9]    Procedure(s) (LRB):  INCISION AND DRAINAGE, UPPER EXTREMITY (Bilateral)  APPLICATION, WOUND VAC x 2 (Bilateral)    Anesthesia: General    Description of Procedure: 300 square centimeters of debridement including the humeral join on the left.     Description of the findings of the procedure: as above    Estimated Blood Loss: 20cc         Specimens:   Specimen (12h ago, onward)    None

## 2018-12-27 NOTE — TRANSFER OF CARE
"Anesthesia Transfer of Care Note    Patient: Misty Khalil    Procedure(s) Performed: Procedure(s) (LRB):  INCISION AND DRAINAGE, UPPER EXTREMITY (Bilateral)  APPLICATION, WOUND VAC x 2 (Bilateral)    Patient location: PACU    Anesthesia Type: general    Transport from OR: Transported from OR on 6-10 L/min O2 by face mask with adequate spontaneous ventilation    Post pain: adequate analgesia    Post assessment: no apparent anesthetic complications and tolerated procedure well    Post vital signs: stable    Level of consciousness: awake, alert and oriented    Nausea/Vomiting: no nausea/vomiting    Complications: none    Transfer of care protocol was followed      Last vitals:   Visit Vitals  /64 (BP Location: Right arm, Patient Position: Lying)   Pulse 64   Temp 36.6 °C (97.9 °F) (Temporal)   Resp 16   Ht 5' 7" (1.702 m)   Wt 69.9 kg (154 lb)   SpO2 100%   BMI 24.12 kg/m²     "

## 2018-12-27 NOTE — PROGRESS NOTES
[facility]  Critical Care - Surgery  Progress Note    Patient Name: Misty Khalil  MRN: [id number]  Admission Date: 12/20/2018  Hospital Length of Stay: 7 days  Code Status: Full Code  Attending Provider: Javier Kirk MD  Primary Care Provider: JOEL Mccauley   Principal Problem: Soft tissue infection    Subjective:     Hospital/ICU Course:  No notes on file    Interval History/Significant Events:  To OR today    Follow-up For: Procedure(s) (LRB):  INCISION AND DRAINAGE, UPPER EXTREMITY (Bilateral)    Post-Operative Day: Day of Surgery    Objective:     Vital Signs (Most Recent):  Temp: 98.1 °F (36.7 °C) (12/27/18 0300)  Pulse: 80 (12/27/18 0700)  Resp: 18 (12/27/18 0700)  BP: (!) 161/97 (12/27/18 0600)  SpO2: 99 % (12/27/18 0700) Vital Signs (24h Range):  Temp:  [96.8 °F (36 °C)-98.4 °F (36.9 °C)] 98.1 °F (36.7 °C)  Pulse:  [66-87] 80  Resp:  [12-96] 18  SpO2:  [83 %-100 %] 99 %  BP: (104-182)/() 161/97     Weight: 69.9 kg (154 lb)  Body mass index is 24.12 kg/m².      Intake/Output Summary (Last 24 hours) at 12/27/2018 0743  Last data filed at 12/27/2018 0600  Gross per 24 hour   Intake 2387 ml   Output 495 ml   Net 1892 ml     Physical Exam  Constitutional: She is oriented to person, place, and time. She appears well-developed and well-nourished.   HENT:   Head: Normocephalic and atraumatic.   Eyes: Conjunctivae are normal. Pupils are equal, round, and reactive to light.   Neck: Normal range of motion.   Cardiovascular: Normal rate and regular rhythm.   Pulmonary/Chest: Effort normal. No respiratory distress.   Abdominal: Soft. She exhibits no distension.   Genitourinary:   Genitourinary Comments: +Caro   Musculoskeletal:   R knee with bandage   Neurological: She is alert and oriented to person, place, and time.   Skin: Skin is warm.   Nursing note and vitals reviewed.  Vents:       Lines/Drains/Airways     Central Venous Catheter Line                 Percutaneous Central Line  Insertion/Assessment - triple lumen  right internal jugular -- days          Drain                 Open Drain 12/24/18 0846 Right  Penrose 5/8 inch 2 days         Open Drain 12/24/18 0846 Right Other (Comment) Penrose 5/8 inch 2 days         Open Drain 12/24/18 0847 Left  Penrose 5/8 inch 2 days         Urethral Catheter 12/26/18 1000 Straight-tip 16 Fr. less than 1 day                Significant Labs:    CBC/Anemia Profile:  Recent Labs   Lab 12/26/18  0455 12/27/18  0330   WBC 8.61 7.57   HGB 8.6* 8.4*   HCT 28.0* 26.5*    193   MCV 84 83   RDW 18.9* 18.7*        Chemistries:  Recent Labs   Lab 12/26/18  0455 12/27/18  0330    136   K 4.3 4.4    110   CO2 20* 19*   BUN 57* 59*   CREATININE 1.9* 2.0*   CALCIUM 7.3* 7.4*   ALBUMIN 1.1* 0.9*   PROT 4.2* 3.9*   BILITOT 0.3 0.2   ALKPHOS 101 104   ALT <5* <5*   AST 9* 10   MG 1.9 1.9   PHOS 4.7* 4.6*       All pertinent labs within the past 24 hours have been reviewed.    Significant Imaging:  I have reviewed all pertinent imaging results/findings within the past 24 hours.    Assessment/Plan:     * Soft tissue infection    57yo F w/ hx of Anemia, HTN, RA (on prior prednisone taper) with extensive bilateral upper extremity abscesses now s/p two drainage/washouts at OSH.     Neuro: No history of seizures/strokes.   Pain: Dilaudid , Tylenol     CVS: Hemodynamically stable without inotrope/pressor requirement. BHARAT @ OSH negative for vegetation.   Cardiology consulted - they reviewed BHARAT and agree there is low suspicion of endocarditis at this time  History of HTN: holding home antihypertensives     Pulm: Chronic smoker  - On room air  - Continuous pulse oximetry, incentive spirometry.     Renal:   - Oliguric VIDYA , Cr now 1.9, significant insensible losses through multiple wounds, Intravascular depletion in the setting of SIRS.  - Daily BMP, Strict I/O (+Caro)     FEN/GI  Erosive Esophagitis: Continue PPI BID, TPN  Regular diet  IVF: LR @  125cc/hr     MSK/ ID:   Staph bacteremia manifesting as necrotizing fascitis of bilateral upper extremity, now septic arthritis of right knee. BHARAT @OSH negative for endocarditis. Presentation concerning for seeding from back hardware. Orthopedics consulted for septic knee already, appreciate input.  Infectious disease consulted for antibiotic duration management.  - Continue high dose Ancef 2g q8h (4-6 weeks)  - Repeat cultures obtained  -MRI L-spine ordered, renal function stable.     Heme/Onc. H/H continue to monitor  - s/p 2U pRBCs 12/24     PPX:   SCD, SQH  Protonix BID  PT/OT     Dispo: to OR today, possible step down  Primary: ACS               Critical care was time spent personally by me on the following activities: development of treatment plan with patient or surrogate and bedside caregivers, discussions with consultants, evaluation of patient's response to treatment, examination of patient, ordering and performing treatments and interventions, ordering and review of laboratory studies, ordering and review of radiographic studies, pulse oximetry, re-evaluation of patient's condition.  This critical care time did not overlap with that of any other provider or involve time for any procedures.     Alton Florez MD  Critical Care - Surgery  [facility]

## 2018-12-27 NOTE — SUBJECTIVE & OBJECTIVE
Interval History/Significant Events:  To OR today    Follow-up For: Procedure(s) (LRB):  INCISION AND DRAINAGE, UPPER EXTREMITY (Bilateral)    Post-Operative Day: Day of Surgery    Objective:     Vital Signs (Most Recent):  Temp: 98.1 °F (36.7 °C) (12/27/18 0300)  Pulse: 80 (12/27/18 0700)  Resp: 18 (12/27/18 0700)  BP: (!) 161/97 (12/27/18 0600)  SpO2: 99 % (12/27/18 0700) Vital Signs (24h Range):  Temp:  [96.8 °F (36 °C)-98.4 °F (36.9 °C)] 98.1 °F (36.7 °C)  Pulse:  [66-87] 80  Resp:  [12-96] 18  SpO2:  [83 %-100 %] 99 %  BP: (104-182)/() 161/97     Weight: 69.9 kg (154 lb)  Body mass index is 24.12 kg/m².      Intake/Output Summary (Last 24 hours) at 12/27/2018 0743  Last data filed at 12/27/2018 0600  Gross per 24 hour   Intake 2387 ml   Output 495 ml   Net 1892 ml     Physical Exam  Constitutional: She is oriented to person, place, and time. She appears well-developed and well-nourished.   HENT:   Head: Normocephalic and atraumatic.   Eyes: Conjunctivae are normal. Pupils are equal, round, and reactive to light.   Neck: Normal range of motion.   Cardiovascular: Normal rate and regular rhythm.   Pulmonary/Chest: Effort normal. No respiratory distress.   Abdominal: Soft. She exhibits no distension.   Genitourinary:   Genitourinary Comments: +Caro   Musculoskeletal:   R knee with bandage   Neurological: She is alert and oriented to person, place, and time.   Skin: Skin is warm.   Nursing note and vitals reviewed.  Vents:       Lines/Drains/Airways     Central Venous Catheter Line                 Percutaneous Central Line Insertion/Assessment - triple lumen  right internal jugular -- days          Drain                 Open Drain 12/24/18 0846 Right  Penrose 5/8 inch 2 days         Open Drain 12/24/18 0846 Right Other (Comment) Penrose 5/8 inch 2 days         Open Drain 12/24/18 0847 Left  Penrose 5/8 inch 2 days         Urethral Catheter 12/26/18 1000 Straight-tip 16 Fr. less than 1 day                 Significant Labs:    CBC/Anemia Profile:  Recent Labs   Lab 12/26/18  0455 12/27/18  0330   WBC 8.61 7.57   HGB 8.6* 8.4*   HCT 28.0* 26.5*    193   MCV 84 83   RDW 18.9* 18.7*        Chemistries:  Recent Labs   Lab 12/26/18  0455 12/27/18  0330    136   K 4.3 4.4    110   CO2 20* 19*   BUN 57* 59*   CREATININE 1.9* 2.0*   CALCIUM 7.3* 7.4*   ALBUMIN 1.1* 0.9*   PROT 4.2* 3.9*   BILITOT 0.3 0.2   ALKPHOS 101 104   ALT <5* <5*   AST 9* 10   MG 1.9 1.9   PHOS 4.7* 4.6*       All pertinent labs within the past 24 hours have been reviewed.    Significant Imaging:  I have reviewed all pertinent imaging results/findings within the past 24 hours.

## 2018-12-27 NOTE — OP NOTE
DATE OF PROCEDURE:  12/27/2018    PRIMARY SURGEON:  Danilo Goyal M.D.    ASSISTANT SURGEON:  Nixon Casanova M.D. (RES)    PREOPERATIVE DIAGNOSIS:  Bilateral upper extremity soft tissue infections.    POSTOPERATIVE DIAGNOSES:  1.  Bilateral upper extremity soft tissue infections.  2.  Right septic wrist.  3.  Left septic shoulder.    PROCEDURES PERFORMED:  1.  Intraoperative consultation for left septic shoulder.  2.  Irrigation and debridement of left septic shoulder.  3.  Irrigation and debridement of right hand.  Please note the sizes for irrigation and debridement of left shoulder is   approximately 6 x 4 x 10 cm2 involving skin, subcutaneous tissue, muscle and   bone.  Irrigation and debridement of left hand is skin, subcutaneous tissue,   muscle and tendons involving approximately 4 x 6 x 2 cm2.    ANESTHESIA:  General.    ESTIMATED BLOOD LOSS:  See formal General Surgery and operative note.    INTRAVENOUS FLUIDS:  See anesthetic report.    URINE OUTPUT:  See anesthetic report.    SPECIMENS:  Culture swabs from right hand.    COMPLICATIONS:  Nil.    DRAINS:  See General Surgery surgical report.    INDICATIONS FOR PROCEDURE:  Ms. Khalil is a 58-year-old female with bilateral   upper extremity soft tissue infections who has gone for multiple washouts up to   this date.  Orthopedics was requested for an intraoperative consultation   regarding left septic shoulder.    DESCRIPTION OF INFORMED CONSENT:  Please see General Surgery operative notes.    DESCRIPTION OF PROCEDURE:  Upon evaluation of the patient, it was determined   that the left shoulder joint was exposed.  The tendon for the long head of the   biceps as well as the intertubercular groove had undergone liquefactive   necrosis.  The tendon was no longer viable.  This tendon tracked in a necrotic   fashion all the way to the level of the labrum.  The entirety of the rotator   cuff was detached.  The rotator interval was no longer present.  The subscap    appeared to be partially intact; however, a significant amount of superior   tearing of the subscapularis was present.  The rotator cuff had released in an   en bloc fashion from the head of the humerus and was visualized.  It was   friable, but not necrotic and therefore, it was not debrided in its entirety.    The deltoid and subacromial bursa had a significant amount of nonviable tissue   present.  This was debrided with a combination of a lap sponge as well as a   rongeur.  The infection did track to the chest wall where General Surgery had   placed a significant amount of packing.  The short   head of the biceps was intact as well as the coracobrachialis, which was   directly visualized.  The overall viability of these muscles seemed acceptable.    The left shoulder joint was then irrigated with 6 L of normal saline and 1 g of   vancomycin powder was then placed in the glenohumeral joint.  Tthe anterior portion   of the deltoid muscle and the short head of the biceps were imbricated   together to facilitate closure of the joint given the exposed nature of the   joint overall.  The overall function of the joint in the future is likely to be   minimal; however, the preoperative examinations of the patient over the last 36   to 72 hours have shown her to have a higher level of hand function in the left   upper extremity and therefore, all attempts to save the left upper extremity   were undertaken.  The left elbow was then once again evaluated at the   posterior wound.  This was debrided with a combination of lap sponge and   rongeur.  Once again, there was found to be no drainage from the elbow or   violation of the deep tissues and the joint capsule to the elbow and, therefore,   aspiration was deferred.  With respect to the left hand, the transverse   incision was once again evaluated.  A significant amount of subcutaneous edema   was present.  There was some questionable viability to the skin tissues of the    ulnar aspect of the left hand as a result of maceration of the tissue secondary   to edema; however, there were no significant joint effusions and, therefore,   aspiration of the left wrist was deferred as well as significant surgical debridement  incisions of the left hand given the soft tissue edema and lack of purulent   drainage.  Our attention was then turned towards the right shoulder.    The viability of the proximal muscles appeared to be acceptable.  Dr. Kirk of   the General Surgery Service performed extensive skin debridements given the   nonviability of some of the superficial tissues.  This did expose the medial   aspect of the left elbow, at which point, there was found to be no purulence or   significant disruptions in the musculature and, therefore, aspiration of the   right elbow joint was deferred.  The right wrist and hand was once again   evaluated.  A significant amount of purulence was evacuated from the thenar   eminence and expressed dorsally.  Given the severity of the soft tissues as well   as the developing contracture of the right hand, a volar hand incision was   deferred as there was a significant amount of edema that was expressed and   following this, there appeared to be no significant fluid collections and the wound  was able to track deep with examination via a tonsil.  The right wrist joint did   not appear to have any effusion and there was no significant   drainage from the wrist as a result reopening of the wrist and second stage   washout was deferred.  As noted preoperatively, the patient did not have any   significant extensor function of the right hand and has already started to   develop stiffness to the right hand MCP, PIP and DIP joints and a flexion   deformity.  The remainder of soft tissue coverage and closure was deferred to   the General Surgery Service.  The patient will likely not require another   washout of her left shoulder joint unless she develops  persistent infection.    Orthopedics will be available for intraoperative evaluation of the left shoulder   joint moving forward and this was expressed in detail to the General Surgery   Service team.  Please see the General Surgery operative report for all other   details with regards to this operation.    DICTATED BY:  Nixon Casanova M.D. (RES)    Attg Note:  I was present or available for the entire case.     Danilo Goyal MD    DB/IN  dd: 12/27/2018 08:22:45 (CST)  td: 12/27/2018 14:56:41 (CST)  Doc ID   #[id number]  Job ID #[id number]    CC:

## 2018-12-27 NOTE — PROGRESS NOTES
Notified MD (dr. Fernandez) of UOP 15cc/hr for the 1800 rounding. LR bolus is finishing up. Flushed follow for patency with 20cc of normal saline. 20cc returned. No new orders received. Will continue to monitor.

## 2018-12-27 NOTE — NURSING TRANSFER
Nursing Transfer Note      12/27/2018     Transfer to 1059    Transfer via bed    Transfer with iv pole/portable O2    Transported by pt escort    Medicines sent: n/a    Chart send with patient: yes    Notified: daughter via text page    Patient reassessed at: 12/27/18 @ 8050

## 2018-12-27 NOTE — ASSESSMENT & PLAN NOTE
59yo F w/ hx of Anemia, HTN, RA (on prior prednisone taper) with extensive bilateral upper extremity abscesses now s/p two drainage/washouts at OSH.     Neuro: No history of seizures/strokes.   Pain: Dilaudid , Tylenol     CVS: Hemodynamically stable without inotrope/pressor requirement. BHARAT @ OSH negative for vegetation.   Cardiology consulted - they reviewed BHARAT and agree there is low suspicion of endocarditis at this time  History of HTN: holding home antihypertensives     Pulm: Chronic smoker  - On room air  - Continuous pulse oximetry, incentive spirometry.     Renal:   - Oliguric VIDYA , Cr now 1.9, significant insensible losses through multiple wounds, Intravascular depletion in the setting of SIRS.  - Daily BMP, Strict I/O (+Caro)     FEN/GI  Erosive Esophagitis: Continue PPI BID, TPN  Regular diet  IVF: LR @ 125cc/hr     MSK/ ID:   Staph bacteremia manifesting as necrotizing fascitis of bilateral upper extremity, now septic arthritis of right knee. BHARAT @OSH negative for endocarditis. Presentation concerning for seeding from back hardware. Orthopedics consulted for septic knee already, appreciate input.  Infectious disease consulted for antibiotic duration management.  - Continue high dose Ancef 2g q8h (4-6 weeks)  - Repeat cultures obtained  -MRI L-spine ordered, renal function stable.     Heme/Onc. H/H continue to monitor  - s/p 2U pRBCs 12/24     PPX:   SCD, SQH  Protonix BID  PT/OT     Dispo: to OR today, possible step down  Primary: ACS

## 2018-12-27 NOTE — NURSING
Notified Dr. Fernandez of patients decrease in UOP. Hourly output ~15mL/hr. No orders were given. Will continue to monitor.

## 2018-12-28 NOTE — SUBJECTIVE & OBJECTIVE
"Principal Problem: Sepsis    Principal Orthopedic Problem: As above s/p BUE I&D / Right wrist I&D / right knee I&D    Interval History: NAEO, dressings in place bilateral hands/wrists, wound vacs to b/l UE with serosanguinous drainage in canisters. Right knee pain controlled. Denies pain in left ankle/knee, right ankle.     Review of patient's allergies indicates:  No Known Allergies    Current Facility-Administered Medications   Medication    albuterol sulfate nebulizer solution 2.5 mg    ALPRAZolam tablet 1 mg    busPIRone tablet 15 mg    ceFAZolin injection 2 g    dextrose 50% injection 12.5 g    doxazosin tablet 2 mg    glucagon (human recombinant) injection 1 mg    heparin (porcine) injection 5,000 Units    HYDROmorphone injection 1 mg    insulin aspart U-100 pen 0-5 Units    metoprolol tartrate (LOPRESSOR) tablet 50 mg    nicotine 14 mg/24 hr 1 patch    ondansetron injection 4 mg    oxyCODONE-acetaminophen  mg per tablet 1 tablet    promethazine (PHENERGAN) 6.25 mg in dextrose 5 % 50 mL IVPB    sodium chloride 0.9% flush 3 mL    sodium chloride 0.9% flush 3 mL    zolpidem tablet 5 mg     Objective:     Vital Signs (Most Recent):  Temp: 98.6 °F (37 °C) (12/28/18 1140)  Pulse: (!) 57 (12/28/18 1140)  Resp: 18 (12/28/18 1140)  BP: 108/61 (12/28/18 1140)  SpO2: 97 % (12/28/18 1140) Vital Signs (24h Range):  Temp:  [97.5 °F (36.4 °C)-98.6 °F (37 °C)] 98.6 °F (37 °C)  Pulse:  [50-81] 57  Resp:  [18] 18  SpO2:  [95 %-99 %] 97 %  BP: (108-124)/(60-77) 108/61     Weight: 69.9 kg (154 lb)  Height: 5' 7" (170.2 cm)  Body mass index is 24.12 kg/m².      Intake/Output Summary (Last 24 hours) at 12/28/2018 1224  Last data filed at 12/28/2018 0600  Gross per 24 hour   Intake 2058 ml   Output 730 ml   Net 1328 ml       Ortho/SPM Exam     PE:    AA&O x 4.  NAD  HEENT:  NCAT, sclera nonicteric  Lungs:  Respirations are equal and unlabored.  CV:  2+ bilateral upper and lower extremity pulses.  Skin:  " Intact throughout.    MSK:    BUE in dressings, serosanguinous drainage present to RUE dressings, wound vacs in place with serosanguinous drainage in canisters  ROM intact to left hand with intact sensation  Pt with limited mobility of right hand 2/2 pain. Weakly flexes and extends digits. Sensation intact throughout right hand.    RLE wound clean and intact without erythema or induration. AROM intact without significant pain.      Significant Labs: All pertinent labs within the past 24 hours have been reviewed.    Significant Imaging: I have reviewed all pertinent imaging results/findings.

## 2018-12-28 NOTE — ANESTHESIA POSTPROCEDURE EVALUATION
"Anesthesia Post Evaluation    Patient: Misty Khalil    Procedure(s) Performed: Procedure(s) (LRB):  INCISION AND DRAINAGE, UPPER EXTREMITY (Bilateral)  APPLICATION, WOUND VAC x 2 (Bilateral)    Final Anesthesia Type: general  Patient location during evaluation: PACU  Patient participation: Yes- Able to Participate  Level of consciousness: awake and alert  Post-procedure vital signs: reviewed and stable  Pain management: adequate  Airway patency: patent  PONV status at discharge: No PONV  Anesthetic complications: no      Cardiovascular status: blood pressure returned to baseline  Respiratory status: unassisted  Hydration status: euvolemic  Follow-up not needed.        Visit Vitals  /61 (BP Location: Left leg, Patient Position: Lying)   Pulse (!) 57   Temp 37 °C (98.6 °F) (Oral)   Resp 18   Ht 5' 7" (1.702 m)   Wt 69.9 kg (154 lb)   SpO2 97%   Breastfeeding? No   BMI 24.12 kg/m²       Pain/Jimmy Score: Pain Rating Prior to Med Admin: 9 (12/28/2018 11:05 AM)  Pain Rating Post Med Admin: 9 (12/28/2018 11:35 AM)  Jimmy Score: 9 (12/27/2018  9:35 AM)        "

## 2018-12-28 NOTE — ASSESSMENT & PLAN NOTE
VIDYA, non-oliguric, on top of previously normal renal function, at the time of consult, sCr has been stable in the 1.7-2.0 range since [place] [facility] initially felt elevated sCr due to pre-renal etiology, but fluid resuscitation over the past several days has not led to an improvement, further noted with a UPC ratio at 5.82, multiple RBCs and WBCs in urine, noted patient has a Caro catheter, clinically an ATN secondary surgery at [facility], possibly with hemodynamic instability and also toxicity from infection, would seem like the most likely etiology, however cannot rule out an active/nephritic urine and differential needs to include GN, specifically would be concerned for an Infection / Immune complex GN vs other, noted endocarditis initially was concern, but negative BHARAT makes less likely.  Noted patient on multiple antibiotics, raising concern for an AIN, absence of eosinophilia and rash would make less likely, but needs to be on differential, multiple WBCs on UA could be consistent, but may be more from Caro UTI    Work up so far:    ALYSSA positive w/ a medium titer  C4 complement low, C3 on low side of normal  Manual urine slide noted for granular casts, RBCs (no obvious dysmorphia or RBC casts), WBCs (not in cast form), urine ctx w/o growth (but seems patient on abxs when obtained)    Renal ultrasound unremarkable, no hydro    ANCA, cryoglobulin, antistreptolysin Ab, viral hepatitis profile, HIV Abs, ds-DNA, SFE, OMAYRA, PLA2R are all pending    Slight rise in sCr (frome previously stable level 1.7-2.0) to 2.3, may be mostly related to fluid shifts during surgery yesterday, however as outlined per positive results above an acute GN is still a consideration    What seems to be a nephrotic range proteinuria with possible nephritic features, possibly could be seen with combination class III/IV and class V lupus nephritis, noted ALYSSA positive    Plan/Recommendations:  1) follow up on studies outlined above  2) will collect  and spin down urine for manual microscopy again  3) repeat UA and culture  4) seems that probably to get to the bottom of this and get a diagnosis for her kidneys a renal biopsy will be needed, without a rapidly rising sCr, the urgency of this is less, but will be important to know before leavinging the hospital  5) for now continue replacing fluids based on volume status and perceived losses and closely follow serial RFPs  6) anticipate making definitive recommendations regarding need for renal biopsy over the next few days

## 2018-12-28 NOTE — SUBJECTIVE & OBJECTIVE
Interval History: went to OR yesterday, sCr increased 2.3 this morning, continues to make urine    Review of patient's allergies indicates:  No Known Allergies  Current Facility-Administered Medications   Medication Frequency    albuterol sulfate nebulizer solution 2.5 mg Q6H WAKE    ALPRAZolam tablet 1 mg BID PRN    busPIRone tablet 15 mg BID    ceFAZolin injection 2 g Q12H    dextrose 50% injection 12.5 g PRN    doxazosin tablet 2 mg Daily    glucagon (human recombinant) injection 1 mg PRN    heparin (porcine) injection 5,000 Units Q8H    HYDROmorphone injection 1 mg Q2H PRN    insulin aspart U-100 pen 0-5 Units Q6H PRN    metoprolol tartrate (LOPRESSOR) tablet 50 mg BID    nicotine 14 mg/24 hr 1 patch Daily    ondansetron injection 4 mg Q12H PRN    oxyCODONE-acetaminophen  mg per tablet 1 tablet Q4H PRN    promethazine (PHENERGAN) 6.25 mg in dextrose 5 % 50 mL IVPB Q6H PRN    sodium chloride 0.9% flush 3 mL PRN    sodium chloride 0.9% flush 3 mL PRN    zolpidem tablet 5 mg Nightly PRN       Objective:     Vital Signs (Most Recent):  Temp: 98.6 °F (37 °C) (12/28/18 1140)  Pulse: (!) 57 (12/28/18 1140)  Resp: 18 (12/28/18 1140)  BP: 108/61 (12/28/18 1140)  SpO2: 97 % (12/28/18 1140)  O2 Device (Oxygen Therapy): room air (12/28/18 1140) Vital Signs (24h Range):  Temp:  [97.5 °F (36.4 °C)-98.6 °F (37 °C)] 98.6 °F (37 °C)  Pulse:  [50-81] 57  Resp:  [18] 18  SpO2:  [95 %-99 %] 97 %  BP: (108-124)/(60-77) 108/61     Weight: 69.9 kg (154 lb) (12/21/18 1145)  Body mass index is 24.12 kg/m².  Body surface area is 1.82 meters squared.    I/O last 3 completed shifts:  In: 4138 [P.O.:390; I.V.:3748]  Out: 915 [Urine:515; Other:400]    Physical Exam   HENT:   Head: Atraumatic.   Neck: No JVD present.   Cardiovascular: Normal rate and regular rhythm.   Pulmonary/Chest: Effort normal and breath sounds normal.   Abdominal: Soft. She exhibits no distension.   Musculoskeletal: She exhibits edema. She  exhibits no tenderness.   Neurological: She is alert.   Skin: Skin is warm.

## 2018-12-28 NOTE — PHYSICIAN QUERY
"PT Name: Misty Khalil  MR #: [id number]    Physician Query Form - Nutrition Clarification     Winnie Oconnor RN, CCDS  Desk # [phone]; sridhar # [phone] [email or web address]      This form is a permanent document in the medical record.     Query Date: December 28, 2018    By submitting this query, we are merely seeking further clarification of documentation.. Please utilize your independent clinical judgment when addressing the question(s) below.    The Medical record contains the following:   Indicators  Supporting Clinical Findings Location in Medical Record    % of Estimated Energy Intake over a time frame from p.o., TF, or TPN     x Weight Status over a time frame Positive for weight change ID CN 12/21   x Subcutaneous Fat and/or Muscle Loss Cachectic & Sickly appearance  Thin  Well nourished ID CN & PN 12/22  H&P  Gen Sx Pn 12/23    Fluid Accumulation or Edema     x Reduced  Strength Pt reports severe pain and weakness in bilateral upper extremities.  ROS : Motor grossly intact but significant weakness throughout Ortho Sx 12/21   x Wt / BMI / Usual Body Weight   BMI: 24    Ht: 5' 7";  Wt: 69.9 kg VS Flow Sheet   x Delayed Wound Healing / Failure to Thrive Multiple wounds   Septic arthritis right knee; Mild right knee synovitis  Right knee anterior horn medial meniscus tear  Multiple BUE abscesses s/p incision &  drainage  Septic arthritis right wrist  L Septic Shoulder CC SX PN 12/25  Op Note 12/21, file 12/25      Op Note 12/27   x Acute or Chronic Illness transfer from [facility] for evaluation for necrotizing fascitis   Initially fell a month ago and presented to [facility] on 12/17 with right shoulder pain, swelling and fevers.    -found to have bilateral abscesses   VIDYA  MSSA Bacteremia  Cellulitis  Erosive Esophagitis: Continue PPI BID, TPN  VIDYA  Staph bacteremia manifesting as necrotizing fascitis of BUE    Severe soft tissue infection with joint involvement.   infection is quite " extensive 12/10 Gen Sx CN              H&P Crit Care        Gen Sx PN 12/23    Medication     x Treatment GI rec's TPN in addition to PPI BID for severe malnutrition  Eating with help of family  Boost Plus all meals  Regular Diet Gen Sx CN 12/21  CC SX PN 12/25  Orders   x Other · Severe malnutrition  · Still having issues with UOP and fluid repletion likely due to low nutritional status Gen Sx CN 12/21  CC PN 12/25   x  Developing contracture of R hand   As noted preoperatively, the patient did not have any   significant extensor function of the right hand and has already started to develop stiffness to the right hand MCP, PIP and DIP joints and a flexion deformity.  Ortho Op Note 12/27     AND / ASPEN Clinical Characteristics (October 2011)  A minimum of two characteristics is recommended for diagnosing either moderate or severe malnutrition   Mild Malnutrition Moderate Malnutrition Severe Malnutrition   Energy Intake from p.o., TF or TPN. < 75% intake of estimated energy needs for less than 7 days < 75% intake of estimated energy needs for greater than 7 days < 50% intake of estimated energy needs for > 5 days   Weight Loss 1-2% in 1 month  5% in 3 months  7.5% in 6 months  10% in 1 year 1-2 % in 1 week  5% in 1 month  7.5% in 3 months  10% in 6 months  20% in 1 year > 2% in 1 week  > 5% in 1 month  > 7.5% in 3 months  > 10% in 6 months  > 20% in 1 year   Physical Findings     None *Mild subcutaneous fat and/or muscle loss  *Mild fluid accumulation  *Stage II decubitus  *Surgical wound or non-healing wound *Mod/severe subcutaneous fat and/or muscle loss  *Mod/severe fluid accumulation  *Stage III or IV decubitus  *Non-healing surgical wound     Provider, please specify diagnosis or diagnoses associated with above clinical findings.          Please clarify diagnosis associated with above clinical findings.   Lamin all that apply.     [x  ] Severe Protein-Calorie Malnutrition   [  ] Cachexia   [  ]  Other degree of  malnutrition - specify: ______________   [  ] Malnutrition ruled out   [  ] Other Nutritional Diagnosis (please specify):    [  ] Other:    [  ] Clinically Undetermined       Please document in your progress notes daily for the duration of treatment until resolved and include in your discharge summary.

## 2018-12-28 NOTE — PLAN OF CARE
Problem: Adult Inpatient Plan of Care  Goal: Plan of Care Review  Hx:  HTN, microcytic anemia, transaminitis, RA, hx of GI bleed     Dx: necrotizing fascitis     12/21: transfer to SICU, CT of chest/arm/knee, cultures; bilat UE washout/I&D; 1L crystalloid and 250 albumin in OR; LR bolus, cont. LR infusion started, PRBC's x2 units   12/22: LR bolusx3  12/23: LR bolusx1  12/24: 2 unit of PRBC total. OR for wound debridement. 2L LR bolus  12/25: LR continuous at 125. Nephrology consulted. 1L LR bolus   12/26: MRI of lower back, 1L LR bolus    Nursing:  SBP<180  Accucheck Q6                  Outcome: Ongoing (interventions implemented as appropriate)  POC reviewed with pt who verbalized understanding. AAOx4. VSS on RA. Remains free of falls and injury. Wound vacs in place. MD to bedside 2x this shift to try to fix left arm air leak warning. Caro in place with low urine output; MD aware, 80mg Lasix given x1. Severe edema to bilateral lower extremities noted; MD aware. Tolerating regular diet; no complaints of nausea. Blood glucose monitored Q6 with no coverage needed. Pain managed with PRN meds per MAR. Resting well between care. Refusing TEDs at this time; moon boots in place. No distress noted. Friend at bedside. Will continue to monitor.

## 2018-12-28 NOTE — PHYSICIAN QUERY
"PT Name: Misty Khalil  MR #: [id number]    Physician Query Form - Cause and Effect Relationship Clarification      Winnie Oconnor RN, CCDS  Desk # [phone]; [facility] # [phone] [email or web address]      This form is a permanent document in the medical record.     Query Date: December 28, 2018    By submitting this query, we are merely seeking further clarification of documentation. Please utilize your independent clinical judgment when addressing the question(s) below.    The Medical record contains the following:  Supporting Clinical Findings   Location in record    Specimen Type: Joint Fluid  Specimen Source: Knee, Right  Collected: 12/21/2018  3:11 AM  Aerobic Bacterial Culture   STAPHYLOCOCCUS AUREUS Moderate   No anaerobes isolated  Fungal Culture in process                                                                                          Lab      Staph bacteremia manifesting as necrotizing fascitis of bilateral upper extremity, now septic arthritis of right knee.                         Crit Care Pn 12/27       Provider, please clarify if there is any correlation between "Septic Arthritis of R Knee" & "Staph Aureus".                    Are the conditions:    [ x ] Due to or associated with each other   [  ] Unrelated to each other   [  ] Other (Please Specify): __________________    [  ] Clinically Undetermined                                                                                                                                                                                             "

## 2018-12-28 NOTE — PROGRESS NOTES
[facility]  Orthopedics  Progress Note    Patient Name: Misty Khalil  MRN: [id number]  Admission Date: 12/20/2018  Hospital Length of Stay: 8 days  Attending Provider: Javier Kirk MD  Primary Care Provider: JOEL Mccauley  Follow-up For: Procedure(s) (LRB):  INCISION AND DRAINAGE, UPPER EXTREMITY (Bilateral)  APPLICATION, WOUND VAC x 2 (Bilateral)    Post-Operative Day: 1 Day Post-Op  Subjective:     Principal Problem: Sepsis    Principal Orthopedic Problem: As above s/p BUE I&D / Right wrist I&D / right knee I&D    Interval History: NAEO, dressings in place bilateral hands/wrists, wound vacs to b/l UE with serosanguinous drainage in canisters. Right knee pain controlled. Denies pain in left ankle/knee, right ankle.     Review of patient's allergies indicates:  No Known Allergies    Current Facility-Administered Medications   Medication    albuterol sulfate nebulizer solution 2.5 mg    ALPRAZolam tablet 1 mg    busPIRone tablet 15 mg    ceFAZolin injection 2 g    dextrose 50% injection 12.5 g    doxazosin tablet 2 mg    glucagon (human recombinant) injection 1 mg    heparin (porcine) injection 5,000 Units    HYDROmorphone injection 1 mg    insulin aspart U-100 pen 0-5 Units    metoprolol tartrate (LOPRESSOR) tablet 50 mg    nicotine 14 mg/24 hr 1 patch    ondansetron injection 4 mg    oxyCODONE-acetaminophen  mg per tablet 1 tablet    promethazine (PHENERGAN) 6.25 mg in dextrose 5 % 50 mL IVPB    sodium chloride 0.9% flush 3 mL    sodium chloride 0.9% flush 3 mL    zolpidem tablet 5 mg     Objective:     Vital Signs (Most Recent):  Temp: 98.6 °F (37 °C) (12/28/18 1140)  Pulse: (!) 57 (12/28/18 1140)  Resp: 18 (12/28/18 1140)  BP: 108/61 (12/28/18 1140)  SpO2: 97 % (12/28/18 1140) Vital Signs (24h Range):  Temp:  [97.5 °F (36.4 °C)-98.6 °F (37 °C)] 98.6 °F (37 °C)  Pulse:  [50-81] 57  Resp:  [18] 18  SpO2:  [95 %-99 %] 97 %  BP: (108-124)/(60-77) 108/61  "    Weight: 69.9 kg (154 lb)  Height: 5' 7" (170.2 cm)  Body mass index is 24.12 kg/m².      Intake/Output Summary (Last 24 hours) at 12/28/2018 1224  Last data filed at 12/28/2018 0600  Gross per 24 hour   Intake 2058 ml   Output 730 ml   Net 1328 ml       Ortho/SPM Exam     PE:    AA&O x 4.  NAD  HEENT:  NCAT, sclera nonicteric  Lungs:  Respirations are equal and unlabored.  CV:  2+ bilateral upper and lower extremity pulses.  Skin:  Intact throughout.    MSK:    BUE in dressings, serosanguinous drainage present to RUE dressings, wound vacs in place with serosanguinous drainage in canisters  ROM intact to left hand with intact sensation  Pt with limited mobility of right hand 2/2 pain. Weakly flexes and extends digits. Sensation intact throughout right hand.    RLE wound clean and intact without erythema or induration. AROM intact without significant pain.      Significant Labs: All pertinent labs within the past 24 hours have been reviewed.    Significant Imaging: I have reviewed all pertinent imaging results/findings.    Assessment/Plan:     Septic arthritis of knee, right    Misty Khalil is a 58 y.o. female s/p BUE I&D with open wounds and right septic knee / right septic wrist    Patient denies deep pain to any joint - low clinical suspicion for occult septic joint.  Dressing changes PRN to right knee to keep clean and covered at all times.  Wound vacs b/l upper extremities per general surgery.  Right hand intra-op cultures growing staph aureus, susceptibilities pending.  ID following    Dispo: Please call with questions. No plans for orthopedic intervention at this time.           Whit Edmond MD  Orthopedics  [facility]    Attg Note:  I agree with the resident's assessment and plan.    Danilo Goyal MD    "

## 2018-12-28 NOTE — PLAN OF CARE
Problem: Physical Therapy Goal  Goal: Physical Therapy Goal  PT goals until 1/10/18    1. Pt supine to sit with max assist-not met  2. Pt sit to supine with max assist-not met  3. Pt sit to stand with RW or PRW as needed with moderate assist-not met  4. Pt to perform gait 10ft with RW (may require PRW) with moderate assist.-not met  5. Pt to transfer bed to/from bedside chair with max assist.-not met  6. Pt to perform B LE exs in sitting or supine x 10 reps to strengthen B LE to improve functional mobility.-not met    Outcome: Ongoing (interventions implemented as appropriate)  Pt's goals set and pt will benefit from skilled PT services to work towards improved functional mobility including: bed mobility, transfers, and gait.   Blessing Rizzo, PT  12/28/2018

## 2018-12-28 NOTE — ASSESSMENT & PLAN NOTE
Misty Khalil is a 58 y.o. female s/p BUE I&D with open wounds and right septic knee / right septic wrist    Patient denies deep pain to any joint - low clinical suspicion for occult septic joint.  Dressing changes PRN to right knee to keep clean and covered at all times.  Wound vacs b/l upper extremities per general surgery.  Right hand intra-op cultures growing staph aureus, susceptibilities pending.  ID following    Dispo: Please call with questions. No plans for orthopedic intervention at this time.

## 2018-12-28 NOTE — PT/OT/SLP EVAL
"Physical Therapy Evaluation    Patient Name:  Misty Khalil   MRN:  [id number]    Recommendations:     Discharge Recommendations:  nursing facility, skilled(long term)   Discharge Equipment Recommendations: (TBD as pt progresses)   Barriers to discharge: Inaccessible home 1 DOROTA    Assessment:     Misty Khalil is a 58 y.o. female admitted with a medical diagnosis of Soft tissue infection.  She presents with the following impairments/functional limitations:  weakness, impaired endurance, gait instability, impaired functional mobilty, impaired balance, decreased lower extremity function, decreased upper extremity function, pain, edema, decreased ROM, impaired skin, impaired cardiopulmonary response to activity, impaired joint extensibility, impaired muscle length . Pt limited due to functional mobility due to back pain, decreased ROM in B UE/LE due to pain and edema, and impaired tolerance to sitting.    Rehab Prognosis: Fair; patient would benefit from acute skilled PT services to address these deficits and reach maximum level of function.    Recent Surgery: Procedure(s) (LRB):  INCISION AND DRAINAGE, UPPER EXTREMITY (Bilateral)  APPLICATION, WOUND VAC x 2 (Bilateral) 1 Day Post-Op    Plan:     During this hospitalization, patient to be seen 4 x/week to address the identified rehab impairments via gait training, therapeutic activities, therapeutic exercises, neuromuscular re-education and progress toward the following goals:    · Plan of Care Expires:  01/27/19    Subjective   "I have a bad back to start off, I can't sit up too long"    Pain/Comfort:  · Pain Rating 1: 8/10  · Location - Orientation 1: generalized  · Location 1: ("all over")  · Pain Addressed 1: Pre-medicate for activity, Reposition, Cessation of Activity  · Pain Rating Post-Intervention 1: 8/10    Patients cultural, spiritual, Jew conflicts given the current situation: no    Living Environment:  Pt lives with friend in a 1 story home with 1 " "DOROTA  Prior to admission, patients level of function was independent.  Equipment used at home: walker, rolling.  Upon discharge, patient will have assistance from friend.    Objective:     Communicated with nurse prior to session.  Patient found all lines intact, call button in reach and nurse notified howard catheter, wound vac, pressure relief boots, central line(wound vac x 2)  upon PT entry to room.    General Precautions: Standard, fall   Orthopedic Precautions:N/A   Braces: N/A     Exams:  · Cognitive Exam:  Patient is oriented to Person, Place and Situation  · Sensation:    · -       Intact  light/touch B LE  · RLE ROM: Deficits: limited hip flex/knee flex due to edema and pain; knee ext WFL; limited ankle DF to ~ -5 deg from neutral   · RLE Strength: Deficits: hip flex 2+/5; knee flex/ext 2+/5; ankle DF 0/5 (pt states she has "drop foot")  · LLE ROM: WFL except knee flex limited due to edema/pain  · LLE Strength: Deficits: hip flex 3+/5; knee flex/ext 3+/5; ankle DF 4-/5    Functional Mobility:  · Bed Mobility:     · Rolling Left:  total assistance and of 2 persons  · Supine to Sit: total assistance and of 2 persons  · Sit to Supine: total assistance and of 2 persons  Pt sat on the EOB ~ 8 min with max assist to avoid posterior instability. Pt limited sitting tolerance due to pain    Therapeutic Activities and Exercises:   pt performed B LE exs in sitting x 5 reps: knee ext (with AAROM on R) and ankle DF (with AAROM on R)    AM-PAC 6 CLICK MOBILITY  Total Score:9     Patient left supine with all lines intact, call button in reach, nurse notified and friend present.    GOALS:   Multidisciplinary Problems     Physical Therapy Goals        Problem: Physical Therapy Goal    Goal Priority Disciplines Outcome Goal Variances Interventions   Physical Therapy Goal     PT, PT/OT Ongoing (interventions implemented as appropriate)     Description:  PT goals until 1/10/18    1. Pt supine to sit with max assist-not met  2. " Pt sit to supine with max assist-not met  3. Pt sit to stand with RW or PRW as needed with moderate assist-not met  4. Pt to perform gait 10ft with RW (may require PRW) with moderate assist.-not met  5. Pt to transfer bed to/from bedside chair with max assist.-not met  6. Pt to perform B LE exs in sitting or supine x 10 reps to strengthen B LE to improve functional mobility.-not met                      History:     Past Medical History:   Diagnosis Date    Anxiety     Depression     GI bleed     Hypertension     Rheumatoid arthritis        Past Surgical History:   Procedure Laterality Date    APPLICATION, WOUND VAC x 2 Bilateral 2018    Performed by [facility]rik [facility] at [facility] OR [address] [place] [address]    ARTHROSCOPY, KNEE Right 2018    Performed by Danilo Goyal MD at [facility] OR [address] [place] [address]    ARTHROSCOPY, KNEE, septic Right 2018    Performed by [facility] Kirk MD at [facility] OR [address] [place] [address]    BACK SURGERY       SECTION      COLONOSCOPY N/A 2018    Performed by [facility] at [facility]    DEBRIDEMENT, WOUND Bilateral 2018    Performed by J[facility] Kirk MD at [facility] OR [address] [place] [address]    ESOPHAGOGASTRODUODENOSCOPY (EGD) N/A 2018    Performed by [facility] at [facility] ENDO    HYSTERECTOMY      INCISION AND DRAINAGE, UPPER EXTREMITY Bilateral 2018    Performed by J[facility] Kirk MD at [facility] OR [address] [place] [address]    INCISION AND DRAINAGE, UPPER EXTREMITY Bilateral 2018    Performed by J[facility] Kirk MD at [facility] OR [address] [place] [address]    OOPHORECTOMY         Clinical Decision Making:     History  Co-morbidities and personal factors that may impact the plan of care Examination  Body Structures and Functions, activity limitations and participation restrictions that may impact the plan of care Clinical Presentation   Decision Making/ Complexity Score   Co-morbidities:   [] Time since onset of injury / illness / exacerbation  [x] Status of current condition  []Patient's  cognitive status and safety concerns    [] Multiple Medical Problems (see med hx)  Personal Factors:   [] Patient's age  [] Prior Level of function   [x] Patient's home situation (environment and family support)  [] Patient's level of motivation  [] Expected progression of patient      HISTORY:(criteria)    [] 97504 - no personal factors/history    [x] 79255 - has 1-2 personal factor/comorbidity     [] 19168 - has >3 personal factor/comorbidity     Body Regions:  [] Objective examination findings  [] Head     []  Neck  [] Trunk   [] Upper Extremity  [] Lower Extremity    Body Systems:  [] For communication ability, affect, cognition, language, and learning style: the assessment of the ability to make needs known, consciousness, orientation (person, place, and time), expected emotional /behavioral responses, and learning preferences (eg, learning barriers, education  needs)  [x] For the neuromuscular system: a general assessment of gross coordinated movement (eg, balance, gait, locomotion, transfers, and transitions) and motor function  (motor control and motor learning)  [x] For the musculoskeletal system: the assessment of gross symmetry, gross range of motion, gross strength, height, and weight  [x] For the integumentary system: the assessment of pliability(texture), presence of scar formation, skin color, and skin integrity  [] For cardiovascular/pulmonary system: the assessment of heart rate, respiratory rate, blood pressure, and edema     Activity limitations:    [] Patient's cognitive status and saf ety concerns          [x] Status of current condition      [] Weight bearing restriction  [] Cardiopulmunary Restriction    Participation Restrictions:   [] Goals and goal agreement with the patient     [] Rehab potential (prognosis) and probable outcome      Examination of Body System: (criteria)    [] 86948 - addressing 1-2 elements    [] 54552 - addressing a total of 3 or more elements     [x] 74871 -   Addressing a total of 4 or more elements         Clinical Presentation: (criteria)  Stable - 89411     On examination of body system using standardized tests and measures patient presents with (CHOOSE ONE) elements from any of the following: body structures and functions, activity limitations, and/or participation restrictions.  Leading to a clinical presentation that is considered (CHOOSE ONE)                              Clinical Decision Making  (Eval Complexity):  Low- 52981     Time Tracking:     PT Received On: 12/28/18  PT Start Time: 0945     PT Stop Time: 1018(time added due to PT returned to work with pt)  PT Total Time (min): 33 min     Billable Minutes: Evaluation 13 and Therapeutic Activity 20      Blessing Rizzo, PT  12/28/2018

## 2018-12-28 NOTE — PROGRESS NOTES
[facility]  Nephrology  Progress Note    Patient Name: Misty Khalil  MRN: [id number]  Admission Date: 12/20/2018  Hospital Length of Stay: 8 days  Attending Provider: Javier Kirk MD   Primary Care Physician: JOEL Mccauley  Principal Problem:Soft tissue infection    Subjective:     HPI: 59 yo WEF with medical hx of HTN, anemia, RA who was transferred from [facility] overnight for evaluation fo necrotizing fascitis. She initially presented on 12/17 with right shoulder pain, swelling, and fevers and found to have bilateral upper extremity abscesses: right arm 14.3x4.3x2.7 collection, left axilla 8.3x5.4x7.7 fluid collection. She is s/p I&D in ED and initiation of vancomycin and zosyn.  She was taken to OR for incision and washout 12/18 of right hand, RUE, and LUE abscess by Dr. Falk (orthopedic surgery) and again on 12/19 for repeat washout. She was additionally found to have left olecranon abscess, left hand abscess. Cultures grew MSSA and she was transitioned to Ancef at some point during her course. Of note, she has history of back surgery with implanted hardware. Cardiology was consulted as there was an initial concern for endocarditis, but negative BHARAT would seem to make this less likely.    Nephrology consulted for sCr that is elevated and not normalizing with fluid repletion x several days (since [place] [facility] sCr has remained stable between 1.7 to 2.0 over that time period, per report patient had an sCr of around 1.0 on admit to [facility], also reviewing old [facility] labs, renal function was WNL over the summer.        Interval History: went to OR yesterday, sCr increased 2.3 this morning, continues to make urine    Review of patient's allergies indicates:  No Known Allergies  Current Facility-Administered Medications   Medication Frequency    albuterol sulfate nebulizer solution 2.5 mg Q6H WAKE    ALPRAZolam tablet 1 mg BID PRN    busPIRone tablet 15 mg BID     ceFAZolin injection 2 g Q12H    dextrose 50% injection 12.5 g PRN    doxazosin tablet 2 mg Daily    glucagon (human recombinant) injection 1 mg PRN    heparin (porcine) injection 5,000 Units Q8H    HYDROmorphone injection 1 mg Q2H PRN    insulin aspart U-100 pen 0-5 Units Q6H PRN    metoprolol tartrate (LOPRESSOR) tablet 50 mg BID    nicotine 14 mg/24 hr 1 patch Daily    ondansetron injection 4 mg Q12H PRN    oxyCODONE-acetaminophen  mg per tablet 1 tablet Q4H PRN    promethazine (PHENERGAN) 6.25 mg in dextrose 5 % 50 mL IVPB Q6H PRN    sodium chloride 0.9% flush 3 mL PRN    sodium chloride 0.9% flush 3 mL PRN    zolpidem tablet 5 mg Nightly PRN       Objective:     Vital Signs (Most Recent):  Temp: 98.6 °F (37 °C) (12/28/18 1140)  Pulse: (!) 57 (12/28/18 1140)  Resp: 18 (12/28/18 1140)  BP: 108/61 (12/28/18 1140)  SpO2: 97 % (12/28/18 1140)  O2 Device (Oxygen Therapy): room air (12/28/18 1140) Vital Signs (24h Range):  Temp:  [97.5 °F (36.4 °C)-98.6 °F (37 °C)] 98.6 °F (37 °C)  Pulse:  [50-81] 57  Resp:  [18] 18  SpO2:  [95 %-99 %] 97 %  BP: (108-124)/(60-77) 108/61     Weight: 69.9 kg (154 lb) (12/21/18 1145)  Body mass index is 24.12 kg/m².  Body surface area is 1.82 meters squared.    I/O last 3 completed shifts:  In: 4138 [P.O.:390; I.V.:3748]  Out: 915 [Urine:515; Other:400]    Physical Exam   HENT:   Head: Atraumatic.   Neck: No JVD present.   Cardiovascular: Normal rate and regular rhythm.   Pulmonary/Chest: Effort normal and breath sounds normal.   Abdominal: Soft. She exhibits no distension.   Musculoskeletal: She exhibits edema. She exhibits no tenderness.   Neurological: She is alert.   Skin: Skin is warm.       Assessment/Plan:     VIDYA (acute kidney injury)    VIDYA, non-oliguric, on top of previously normal renal function, at the time of consult, sCr has been stable in the 1.7-2.0 range since coming to [facility], initially felt elevated sCr due to pre-renal etiology, but fluid resuscitation  over the past several days has not led to an improvement, further noted with a UPC ratio at 5.82, multiple RBCs and WBCs in urine, noted patient has a Caro catheter, clinically an ATN secondary surgery at [facility], possibly with hemodynamic instability and also toxicity from infection, would seem like the most likely etiology, however cannot rule out an active/nephritic urine and differential needs to include GN, specifically would be concerned for an Infection / Immune complex GN vs other, noted endocarditis initially was concern, but negative BHARAT makes less likely.  Noted patient on multiple antibiotics, raising concern for an AIN, absence of eosinophilia and rash would make less likely, but needs to be on differential, multiple WBCs on UA could be consistent, but may be more from Caro UTI    Work up so far:    ALYSSA positive w/ a medium titer  C4 complement low, C3 on low side of normal  Manual urine slide noted for granular casts, RBCs (no obvious dysmorphia or RBC casts), WBCs (not in cast form), urine ctx w/o growth (but seems patient on abxs when obtained)    Renal ultrasound unremarkable, no hydro    ANCA, cryoglobulin, antistreptolysin Ab, viral hepatitis profile, HIV Abs, ds-DNA, SFE, OMAYRA, PLA2R are all pending    Slight rise in sCr (frome previously stable level 1.7-2.0) to 2.3, may be mostly related to fluid shifts during surgery yesterday, however as outlined per positive results above an acute GN is still a consideration    What seems to be a nephrotic range proteinuria with possible nephritic features, possibly could be seen with combination class III/IV and class V lupus nephritis, noted ALYSSA positive    Plan/Recommendations:  1) follow up on studies outlined above  2) will collect and spin down urine for manual microscopy again  3) repeat UA and culture  4) seems that probably to get to the bottom of this and get a diagnosis for her kidneys a renal biopsy will be needed, without a rapidly rising sCr, the  urgency of this is less, but will be important to know before leavinging the hospital  5) for now continue replacing fluids based on volume status and perceived losses and closely follow serial RFPs  6) anticipate making definitive recommendations regarding need for renal biopsy over the next few days           Thank you for your consult. I will follow-up with patient. Please contact us if you have any additional questions.    Hardik Anand MD  Nephrology  [facility]

## 2018-12-28 NOTE — PT/OT/SLP RE-EVAL
"Occupational Therapy   Re-evaluation    Name: Misty Khalil  MRN: [id number]  Admitting Diagnosis:  Soft tissue infection 1 Day Post-Op    Recommendations:     Discharge Recommendations: nursing facility, skilled(long term)  Discharge Equipment Recommendations:  (TBD)  Barriers to discharge:  Decreased caregiver support(at current level of function)    History:     Past Medical History:   Diagnosis Date    Anxiety     Depression     GI bleed     Hypertension     Rheumatoid arthritis        Past Surgical History:   Procedure Laterality Date    APPLICATION, WOUND VAC x 2 Bilateral 2018    [facility]    ARTHROSCOPY, KNEE Right 2018    Performed by Danilo Goyal MD at [facility] OR [address] [place] [address]    ARTHROSCOPY, KNEE, septic Right 2018    Performed by J[facility] Kirk MD at [facility] OR [address] [place] [address]    BACK SURGERY       SECTION      COLONOSCOPY N/A 2018    Performed by [facility]aver MD at [facility] ENDO    DEBRIDEMENT, WOUND Bilateral 2018    Performed by Javier Kirk MD at [facility] OR [address] [place] [address]    ESOPHAGOGASTRODUODENOSCOPY (EGD) N/A 2018    Performed by [facility] at [facility] ENDO    HYSTERECTOMY      INCISION AND DRAINAGE, UPPER EXTREMITY Bilateral 2018    [facility]    INCISION AND DRAINAGE, UPPER EXTREMITY Bilateral 2018    Performed by Javier Kirk MD at [facility] OR [address] [place] [address]    OOPHORECTOMY         Subjective     Chief Complaint: pain  Patient/Family stated goals: return to PLOF  Communicated with: RN prior to session.  Pain/Comfort:  · Pain Rating 1: 8/10  · Location - Orientation 1: generalized  · Location 1: ("everywhere")  · Pain Addressed 1: Pre-medicate for activity, Reposition, Distraction, Cessation of Activity  · Pain Rating Post-Intervention 1: 8/10    Objective:     Patient found with: howard catheter, wound vac, pressure relief boots    General " Precautions: Standard, fall   Orthopedic Precautions:N/A   Braces: N/A     Occupational Performance:    Bed Mobility:    · Patient completed Rolling/Turning to Left with  total assistance  · Patient completed Scooting/Bridging with total assistance  · Patient completed Supine to Sit with total assistance  · Patient completed Sit to Supine with total assistance   · Pt seated EOB ~ 5 min with max A for sitting balance    Functional Mobility/Transfers:  · Pt decline    Activities of Daily Living:  · Upper Body Dressing: maximal assistance to correctly don gown in supine    Cognitive/Visual Perceptual:  Cognitive/Psychosocial Skills:     -       Oriented to: Person, Place and Situation   -       Follows Commands/attention:Follows multistep  commands  -       Communication: clear/fluent  -       Memory: No Deficits noted  -       Safety awareness/insight to disability: intact   -       Mood/Affect/Coping skills/emotional control: Appropriate to situation  Visual/Perceptual:      -Intact vision and hearing    Physical Exam:  Balance:    -       poor sitting balance; TAJ standing balance  Postural examination/scapula alignment:    -       Rounded shoulders  -       Forward head  Skin integrity: BUE wounds 2/2 sx   Edema:  Moderate BUE and RLE  Sensation:    -       Intact  Dominant hand:    -       R hand  Upper Extremity Range of Motion:     -       Right Upper Extremity: AAROM to 90 at shoulder; wound vac preventing hand/wrist ROM; towels with co-band placed in palms to prevent contracture  -       Left Upper Extremity: AAROM to 90 at shoulder; wound vac preventing hand/wrist ROM; towels with co-band placed in palms to prevent contracture  Upper Extremity Strength:    -       Right Upper Extremity: TAJ 2/2 UE wounds with wound vac covering  -       Left Upper Extremity: TAJ 2/2 UE wounds with wound vac covering   Strength:    -       Right Upper Extremity: 1/5  -       Left Upper Extremity: 1/5  Fine Motor  "Coordination:    -       Impaired  BUE with wounds and wound vac preventing ROM and fine motor coordination  Gross motor coordination:   Pt with pain with BLE movement; TAJ standing    Patient left HOB elevated with all lines intact and call button in reach    [facility] 6 Click:  [facility] Total Score: 7    Treatment & Education:  Pt educated on role of OT/POC  Pt educated on importance of EOB/OOB activity  Pt educated on importance of UE/LE ROM  White board/communication board updated  Education:    Assessment:     Misty Khalil is a 58 y.o. female with a medical diagnosis of Soft tissue infection.  She presents with impaired UE and LE use 2/2 wounds and wound vac placement.  Performance deficits affecting function are weakness, impaired endurance, impaired balance, gait instability, impaired self care skills, impaired functional mobilty, pain, decreased lower extremity function, decreased upper extremity function, decreased ROM.      Rehab Prognosis:  Fair; patient would benefit from acute skilled OT services to address these deficits and reach maximum level of function.         Clinical Decision Makin.  OT Mod:  "Pt evaluation falls under moderate complexity for evaluation coding due to identification of 3-5 performance deficits noted as stated above. Eval required Min/Mod assistance to complete on this date and detailed assessment(s) were utilized. Moreover, an expanded review of history and occupational profile obtained with additional review of cognitive, physical and psychosocial hx."     Plan:     Patient to be seen 4 x/week to address the above listed problems via self-care/home management, therapeutic activities, therapeutic exercises  · Plan of Care Expires: 19  · Plan of Care Reviewed with: patient    This Plan of care has been discussed with the patient who was involved in its development and understands and is in agreement with the identified goals and treatment plan    GOALS: "   Multidisciplinary Problems     Occupational Therapy Goals        Problem: Occupational Therapy Goal    Goal Priority Disciplines Outcome Interventions   Occupational Therapy Goal     OT, PT/OT Ongoing (interventions implemented as appropriate)    Description:  Goals to be met by: 1/11/19     Patient will increase functional independence with ADLs by performing:    UE Dressing with Moderate Assistance.  LE Dressing with Maximum Assistance and Assistive Devices as needed.  Grooming while EOB with Minimal Assistance.  Toileting from bedside commode with Maximum Assistance for hygiene and clothing management.   Sitting at edge of bed x10 minutes with Minimal Assistance.  Rolling to Bilateral with Minimal Assistance.   Supine to sit with Moderate Assistance.  Toilet transfer to bedside commode with Maximum Assistance.                Multidisciplinary Problems (Resolved)        Problem: Occupational Therapy Goal    Goal Priority Disciplines Outcome Interventions   Occupational Therapy Goal   (Resolved)     OT, PT/OT Outcome(s) achieved    Description:  Goals to be met by: 2 weeks 1/6/19     Patient will increase functional independence with ADLs by performing:    Pt will complete self feeding with SEJAL   Pt will tolerate sitting EOB with Fair balance to allow for progression with ADL and transfer training.  Pt will complete basic g/h skills with MIN A                     Time Tracking:     OT Date of Treatment: 12/28/18  OT Start Time: 0825(second in time 0953)  OT Stop Time: 0836(second out time 1010)  OT Total Time (min): 28 min    Billable Minutes:Monika-nida 13  Therapeutic Activity 15    Ally Garland OT  12/28/2018

## 2018-12-28 NOTE — PROGRESS NOTES
Pharmacist Renal Dose Adjustment Note    Misty Khalil is a 58 y.o. female being treated with the medication IV cefazolin    Patient Data:    Vital Signs (Most Recent):  Temp: 97.7 °F (36.5 °C) (12/28/18 0349)  Pulse: 81 (12/28/18 0815)  Resp: 18 (12/28/18 0815)  BP: 120/65 (12/28/18 0406)  SpO2: 97 % (12/28/18 0349) Vital Signs (72h Range):  Temp:  [96.8 °F (36 °C)-98.4 °F (36.9 °C)]   Pulse:  [50-87]   Resp:  [12-96]   BP: (103-182)/()   SpO2:  [83 %-100 %]      Recent Labs   Lab 12/27/18  0330 12/28/18  0152 12/28/18  0532   CREATININE 2.0* 2.3*  2.3* 2.3*     Serum creatinine: 2.3 mg/dL (H) 12/28/18 0532  Estimated creatinine clearance: 25.9 mL/min (A)    Medication: IV cefazolin 2g every 8 hours will be changed to medication: IV cefazolin 2 g every 12 hour.    Pharmacist's Name: Ezequiel Robles  Pharmacist's Extension: [phone]

## 2018-12-28 NOTE — OP NOTE
DATE OF PROCEDURE:  12/27/2018    SURGEON:  Javier Kirk M.D.    ASSISTANT:  Kirstin Miller M.D. (RES)    PREOPERATIVE DIAGNOSIS:  Necrotizing soft tissue infection bilateral upper extremities    POSTOPERATIVE DIAGNOSIS:  Same    PROCEDURE PERFORMED:  Irrigation and debridement of bilateral upper extremities   and wound VAC placement.    ANESTHESIA:  General endotracheal anesthesia.    COMPLICATIONS:  None.    ESTIMATED BLOOD LOSS:  20 mL.    PROCEDURE IN DETAIL:  After informed consent was obtained, Ms. Khalil was taken to   the Operating Room and placed supine on the table.  General endotracheal   anesthesia was induced and the patient was prepped and draped in usual sterile   fashion.  A timeout was called prior to beginning of the procedure.  We turned   our attention first to the left arm.  We examined the exposed humeral head and   found the joint to be necrotic and compromised.  Nearly, the entire humeral head   is now exposed.  Necrotic tissue in this area was debrided and included subcutaneous fat, muscle and fascia.  We also turned our   attention to the elbow joint where she is also noted to have exposed bone.    Some necrotic tissue was debrided in this area as well.  Another incision was   inspected over the plantar surface of her hand.  She has exposed tendon in this   area and significant pus.  This was irrigated.  All wounds on the left arm were   irrigated thoroughly and hemostasis was achieved.  We irrigated the humeral   joint with 6 L and then placed 1 g of vancomycin.  The muscle was closed loosely   with 2 interrupted sutures over the bone.  We then turned our attention to the   right arm.  The patient has multiple skin incisions in this arm.  Necrotic   tissue was debrided and included skin, soft tissue, muscle and fascia.  There was minimal amount of necrotic or infected tissue in   this arm.  We placed wound VACs on bilateral arms after good hemostasis was   achieved.  The patient was  awakened in the Operating Room and taken to the PACU   in good condition.  Dr. Kirk was present and scrubbed for the entirety of the   procedure.    DICTATED BY:  Kirstin Miller M.D. (RES)      MAGED/IN  dd: 12/28/2018 14:35:14 (CST)  td: 12/28/2018 15:29:37 (CST)  Doc ID   #[id number]  Job ID #[id number]    CC:

## 2018-12-28 NOTE — PHYSICIAN QUERY
"PT Name: Misty Khalil  MR #: [id number]    Physician Query Form - Procedure Clarification     Winnie Oconnor RN, CCDS  Desk # [phone]; sridhar # [phone] [email or web address]    This form is a permanent document in the medical record.     Query Date: December 28, 2018  By submitting this query, we are merely seeking further clarification of documentation. Please utilize your independent clinical judgment when addressing the question(s) below.    The Medical record contains the following:     Indicators  Supporting Clinical Findings Location in Medical Record    Documentation of "Debridement"   DATE OF PROCEDURE:  12/27/2018  PROCEDURES PERFORMED:  1.  Intraoperative consultation for left septic shoulder.  2.  Irrigation and debridement of left septic shoulder.  3.  Irrigation and debridement of right hand.  Please note the sizes for irrigation and debridement of left shoulder is approximately 6 x 4 x 10 cm2 involving skin, subcutaneous tissue, muscle and bone.  Irrigation and debridement of left hand is skin, subcutaneous tissue, muscle and tendons involving approximately 4 x 6 x 2 cm2. Op Notge 12/27    Documentation of "I & D"      EBL =      Other: left elbow was then once again evaluated at the posterior wound.      This was debrided with a combination of lap sponge and rongeur.      Once again, there was found to be no drainage from the elbow or violation of the deep tissues and the joint capsule to the elbow and, therefore,  aspiration was deferred.  Op Note 12/27     Excisional debridement is a surgical removal of  nonvitalized tissue, necrosis or slough. The use of a sharp instrument does not always indicate that an excisional debridement was performed.  Non excisional debridement is the scraping, washing, irrigating, brushing away or removal of loose tissue fragments.    Provider, please specify type of procedure(s) performed:  Left Elbow  Per Op Note 12/27/18    [  x  ]  Excisional Debridement " (Specify site and depth of tissue removed)     * Site: (Specify) ______     *Depth of tissue excised:       [    ] Skin [  x  ] SubcutaneousTissue/Fascia [   x ] Muscle [  x  ] Tendon [   x ] Bone   [    ]  Non-excisional Debridement    *Site : (Specify): _______________     *Depth of tissue excised:       [    ] Skin [   x ] SubcutaneousTissue/Fascia [   x ] Muscle [  x  ] Tendon [   x ] Bone   [    ] Other Procedure (Specify) ________   [  ] Clinically Undetermined

## 2018-12-28 NOTE — RESIDENT HANDOFF
Called to bedside for wound vac alarming and oliguria. Vac tape to reinforce, Y connecters x 2 bilaterally. Urine output marginal prior to downgrade 15-20s/H treated with serial fluid boluses, with nephrology consulted on 12/26 with w/u completed. (+) 18.5 L for admit, on LR 75 + reg diet, grossly fluid loaded BLE with 3+ pitting edema reports previous home lasix 40 QD use, remains on room air hx of COPD (not on home O2, 45 Y x 1-2 PPD), last echo 12/21 EF 65%. Creatinine continuing to uptrend 2.3 from 2 from 1.9, K 4.9, bicarb 21 from 19 from 20, urine output dropping to < 5 over 2 hours period. R IJ trialysis. Contacted neph consults overnight with recs for lasix challenge 160    Plan:  - lasix challenge will attempt 80 x 1 overnight, HLIV  - trend BMP  - f/u formal nephrology consult in the am

## 2018-12-28 NOTE — PLAN OF CARE
Problem: Occupational Therapy Goal  Goal: Occupational Therapy Goal  Goals to be met by: 1/11/19     Patient will increase functional independence with ADLs by performing:    UE Dressing with Moderate Assistance.  LE Dressing with Maximum Assistance and Assistive Devices as needed.  Grooming while EOB with Minimal Assistance.  Toileting from bedside commode with Maximum Assistance for hygiene and clothing management.   Sitting at edge of bed x10 minutes with Minimal Assistance.  Rolling to Bilateral with Minimal Assistance.   Supine to sit with Moderate Assistance.  Toilet transfer to bedside commode with Maximum Assistance.    Outcome: Ongoing (interventions implemented as appropriate)  Re-eval completed; goals established    Comments: Initiate new OT POC     Ally Garland OT  12/28/2018

## 2018-12-29 NOTE — PROGRESS NOTES
[facility]  Nephrology  Progress Note    Patient Name: Misty Khalil  MRN: [id number]  Admission Date: 12/20/2018  Hospital Length of Stay: 9 days  Attending Provider: Javier Kirk MD   Primary Care Physician: JOEL Mccauley  Principal Problem:Soft tissue infection    Subjective:     HPI: 57 yo WEF with medical hx of HTN, anemia, RA who was transferred from [facility] overnight for evaluation fo necrotizing fascitis. She initially presented on 12/17 with right shoulder pain, swelling, and fevers and found to have bilateral upper extremity abscesses: right arm 14.3x4.3x2.7 collection, left axilla 8.3x5.4x7.7 fluid collection. She is s/p I&D in ED and initiation of vancomycin and zosyn.  She was taken to OR for incision and washout 12/18 of right hand, RUE, and LUE abscess by Dr. Falk (orthopedic surgery) and again on 12/19 for repeat washout. She was additionally found to have left olecranon abscess, left hand abscess. Cultures grew MSSA and she was transitioned to Ancef at some point during her course. Of note, she has history of back surgery with implanted hardware. Cardiology was consulted as there was an initial concern for endocarditis, but negative BHARAT would seem to make this less likely.    Nephrology consulted for sCr that is elevated and not normalizing with fluid repletion x several days (since transfer from [facility] to [facility] [place] [place]), sCr has remained stable between 1.7 to 2.0 over that time period, per report patient had an sCr of around 1.0 on admit to [facility], also reviewing old [facility] [place] [place] labs, renal function was WNL over the summer.        Interval History: sCr increased to 2.7    Review of patient's allergies indicates:  No Known Allergies  Current Facility-Administered Medications   Medication Frequency    albuterol sulfate nebulizer solution 2.5 mg Q6H WAKE    ALPRAZolam tablet 1 mg BID PRN    busPIRone tablet 15 mg BID    ceFAZolin injection 2 g Q12H    dextrose 50% injection 12.5  g PRN    doxazosin tablet 2 mg Daily    glucagon (human recombinant) injection 1 mg PRN    heparin (porcine) injection 5,000 Units Q8H    HYDROmorphone injection 1 mg Q2H PRN    insulin aspart U-100 pen 0-5 Units Q6H PRN    metoprolol tartrate (LOPRESSOR) tablet 50 mg BID    nicotine 14 mg/24 hr 1 patch Daily    ondansetron injection 4 mg Q12H PRN    oxyCODONE-acetaminophen  mg per tablet 1 tablet Q4H PRN    promethazine (PHENERGAN) 6.25 mg in dextrose 5 % 50 mL IVPB Q6H PRN    sodium bicarbonate tablet 1,300 mg BID    sodium chloride 0.9% flush 3 mL PRN    sodium chloride 0.9% flush 3 mL PRN    zolpidem tablet 5 mg Nightly PRN       Objective:     Vital Signs (Most Recent):  Temp: 97.4 °F (36.3 °C) (12/29/18 1621)  Pulse: 66 (12/29/18 1621)  Resp: 18 (12/29/18 1621)  BP: 114/71 (12/29/18 1621)  SpO2: 96 % (12/29/18 1621)  O2 Device (Oxygen Therapy): room air (12/29/18 1621) Vital Signs (24h Range):  Temp:  [96 °F (35.6 °C)-97.9 °F (36.6 °C)] 97.4 °F (36.3 °C)  Pulse:  [64-70] 66  Resp:  [16-18] 18  SpO2:  [96 %-99 %] 96 %  BP: ()/(63-75) 114/71     Weight: 69.9 kg (154 lb) (12/21/18 1145)  Body mass index is 24.12 kg/m².  Body surface area is 1.82 meters squared.    I/O last 3 completed shifts:  In: 1620 [P.O.:1020; I.V.:600]  Out: 805 [Urine:155; Other:650]    Physical Exam   Constitutional: She is oriented to person, place, and time.   HENT:   Head: Atraumatic.   Neck: No JVD present.   Cardiovascular: Normal rate and regular rhythm.   Pulmonary/Chest: Effort normal and breath sounds normal.   Abdominal: Soft. She exhibits no distension.   Musculoskeletal: She exhibits no edema or tenderness.   Neurological: She is alert and oriented to person, place, and time.   Skin: Skin is warm.           Assessment/Plan:     VIDYA (acute kidney injury)    VIDYA, non-oliguric, on top of previously normal renal function, at the time of consult, sCr has been stable in the 1.7-2.0 range since [place] [facility]  initially felt elevated sCr due to pre-renal etiology, but fluid resuscitation over the past several days has not led to an improvement, further noted with a UPC ratio at 5.82, multiple RBCs and WBCs in urine, noted patient has a Caro catheter, clinically an ATN secondary surgery at [facility], possibly with hemodynamic instability and also toxicity from infection, would seem like the most likely etiology, however cannot rule out an active/nephritic urine and differential needs to include GN, specifically would be concerned for an Infection / Immune complex GN vs other, noted endocarditis initially was concern, but negative BHARAT makes less likely.  Noted patient on multiple antibiotics, raising concern for an AIN, absence of eosinophilia and rash would make less likely, but needs to be on differential, multiple WBCs on UA could be consistent, but may be more from Caro UTI    Work up so far:    ALYSSA positive w/ a medium titer  C4 complement low, C3 on low side of normal  Manual urine slide noted for granular casts, RBCs (no obvious dysmorphia or RBC casts), WBCs (not in cast form), urine ctx w/o growth (but seems patient on abxs when obtained)    Renal ultrasound unremarkable, no hydro    ANCA, cryoglobulin, antistreptolysin Ab, viral hepatitis profile, HIV Abs, ds-DNA, SFE, OMAYRA, PLA2R are all pending    Slight rise in sCr (frome previously stable level 1.7-2.0) to 2.3, may be mostly related to fluid shifts during surgery yesterday, however as outlined per positive results above an acute GN is still a consideration    What seems to be a nephrotic range proteinuria with possible nephritic features, possibly could be seen with combination class III/IV and class V lupus nephritis, noted ALYSSA positive    Plan/Recommendations:  1) follow up on studies outlined above  2) will collect and spin down urine for manual microscopy again  3) awaiting repeat UA & ctxs  4) may need renal biopsy   5) for now continue replacing fluids based  on volume status and perceived losses and closely follow serial RFPs  days    ---  Addendum:  Repeat urine microscopy reviewed and noted for muddy brown casts and no dysmorphic cells or other things of concern, in short, consistent with ATN, still and of course, need to follow up closely on studies as outlined, but findings on urine microscopy would seem to make the potential need for renal biopsy less         Thank you for your consult. I will follow-up with patient. Please contact us if you have any additional questions.    Hardik Anand MD  Nephrology  [facility]    ATTENDING PHYSICIAN ATTESTATION  I have personally interviewed and examined the patient. I thoroughly reviewed the demographic, clinical, laboratorial and imaging information available in medical records. I agree with the assessment and recommendations provided by the subspecialty resident. Dr. Anand was under my supervision.

## 2018-12-29 NOTE — PT/OT/SLP PROGRESS
Occupational Therapy      Patient Name:  Misty Khalil   MRN:  [id number]    Patient with no active treatment orders for follow up OT session. 2nd sign orders sent to MD (Reagan) on this date.   Will follow up at later time.     DARREN Ramirez  12/29/2018

## 2018-12-29 NOTE — PLAN OF CARE
Problem: Adult Inpatient Plan of Care  Goal: Plan of Care Review  Hx:  HTN, microcytic anemia, transaminitis, RA, hx of GI bleed     Dx: necrotizing fascitis     12/21: transfer to SICU, CT of chest/arm/knee, cultures; bilat UE washout/I&D; 1L crystalloid and 250 albumin in OR; LR bolus, cont. LR infusion started, PRBC's x2 units   12/22: LR bolusx3  12/23: LR bolusx1  12/24: 2 unit of PRBC total. OR for wound debridement. 2L LR bolus  12/25: LR continuous at 125. Nephrology consulted. 1L LR bolus   12/26: MRI of lower back, 1L LR bolus    Nursing:  SBP<180  Accucheck Q6                  Outcome: Ongoing (interventions implemented as appropriate)  POC reviewed with pt who verbalized understanding. AAOx4. VSS on RA. Remains free of falls and injury. Bilateral arm wound vacs in place. Caro in place with low urine output; MD aware. Severe edema noted; MD aware. Tolerating renal diet; no complaints of nausea. Blood glucose monitored Q6 with no coverage needed per MAR. Pain managed with PRN meds per MAR. Resting between care. Refusing TEDs at this time; moon boots in place. No acute events. No distress noted. Friend at bedside. Will continue to monitor.

## 2018-12-29 NOTE — SUBJECTIVE & OBJECTIVE
Interval History:     No acute events overnight, patient complaining of bilateral upper extremities. Low UOP overnight. Tolerating a diet.     Medications:  Continuous Infusions:  Scheduled Meds:   albuterol sulfate  2.5 mg Nebulization Q6H WAKE    busPIRone  15 mg Oral BID    ceFAZolin (ANCEF) IVPB  2 g Intravenous Q12H    doxazosin  2 mg Oral Daily    heparin (porcine)  5,000 Units Subcutaneous Q8H    metoprolol tartrate  50 mg Oral BID    nicotine  1 patch Transdermal Daily    sodium bicarbonate  1,300 mg Oral BID     PRN Meds:ALPRAZolam, dextrose 50%, glucagon (human recombinant), HYDROmorphone, insulin aspart U-100, ondansetron, oxyCODONE-acetaminophen, promethazine (PHENERGAN) IVPB, sodium chloride 0.9%, sodium chloride 0.9%, zolpidem     Review of patient's allergies indicates:  No Known Allergies  Objective:     Vital Signs (Most Recent):  Temp: 97.9 °F (36.6 °C) (12/29/18 0845)  Pulse: 65 (12/29/18 0845)  Resp: 18 (12/29/18 0845)  BP: 95/70 (12/29/18 0845)  SpO2: 97 % (12/29/18 0845) Vital Signs (24h Range):  Temp:  [96 °F (35.6 °C)-98.6 °F (37 °C)] 97.9 °F (36.6 °C)  Pulse:  [57-70] 65  Resp:  [16-18] 18  SpO2:  [96 %-98 %] 97 %  BP: ()/(61-75) 95/70     Weight: 69.9 kg (154 lb)  Body mass index is 24.12 kg/m².    Intake/Output - Last 3 Shifts       12/27 0700 - 12/28 0659 12/28 0700 - 12/29 0659 12/29 0700 - 12/30 0659    P.O. 390 870 150    I.V. (mL/kg) 2468 (35.3)      Total Intake(mL/kg) 2858 (40.9) 870 (12.4) 150 (2.1)    Urine (mL/kg/hr) 330 (0.2) 25 (0) 7 (0)    Other 400 250 50    Stool 0 0 0    Total Output 730 275 57    Net +2128 +595 +93           Stool Occurrence 0 x 0 x 0 x          Physical Exam    General: In no acute distress, well appearance.   CV: RRR  Pulm: non labored breathing, b/l clear breath sounds.   Ext: Bilateral upper extremities with wound vacs in place.     Significant Labs:  CBC:   Recent Labs   Lab 12/29/18  0524   WBC 9.32   RBC 2.77*   HGB 7.3*   HCT 23.4*       MCV 85   MCH 26.4*   MCHC 31.2*     CMP:   Recent Labs   Lab 12/29/18  0524   GLU 73   CALCIUM 7.2*   ALBUMIN 0.9*   PROT 3.9*   *   K 5.2*   CO2 19*      BUN 73*   CREATININE 2.7*   ALKPHOS 102   ALT <5*   AST 10   BILITOT 0.1

## 2018-12-29 NOTE — NURSING
1130--Wound vac to left side with loss of seal.  On call for Reagan paged .  MD in OR scrubbed in.  Message left with OR staff.  Instructs MD will assess

## 2018-12-29 NOTE — PROGRESS NOTES
MD at bedside to assess bilateral wound vac air leaks. MD and RNs able to patch both. Currently working well. Will continue to monitor.

## 2018-12-29 NOTE — PLAN OF CARE
Problem: Adult Inpatient Plan of Care  Goal: Plan of Care Review  Hx:  HTN, microcytic anemia, transaminitis, RA, hx of GI bleed     Dx: necrotizing fascitis     12/21: transfer to SICU, CT of chest/arm/knee, cultures; bilat UE washout/I&D; 1L crystalloid and 250 albumin in OR; LR bolus, cont. LR infusion started, PRBC's x2 units   12/22: LR bolusx3  12/23: LR bolusx1  12/24: 2 unit of PRBC total. OR for wound debridement. 2L LR bolus  12/25: LR continuous at 125. Nephrology consulted. 1L LR bolus   12/26: MRI of lower back, 1L LR bolus    Nursing:  SBP<180  Accucheck Q6                  Outcome: Ongoing (interventions implemented as appropriate)  Pt AAOx4, family at bedside. Pt tolerating regular diet with no complaints of discomfort or nausea. Pain managed with PRN pain meds. R & L arm WV intact and patent, cont suction @125. VSS on RA. Pt refuses to turn. Vania d/c'd per MD order, DTV @ 7507. Pt slept between care. Call light in reach and bed in lowest position. Pt remains free of falls and injury. No acute events this shift. Will continue to monitor.

## 2018-12-29 NOTE — SUBJECTIVE & OBJECTIVE
"Principal Problem: Sepsis    Principal Orthopedic Problem: As above s/p BUE I&D / Right wrist I&D / right knee I&D    Interval History: AFVSS. Pain controlled at rest. Denies change in pain in b/l shoulders, ankles, knees, elbows, wrists.      Review of patient's allergies indicates:  No Known Allergies    Current Facility-Administered Medications   Medication    albuterol sulfate nebulizer solution 2.5 mg    ALPRAZolam tablet 1 mg    busPIRone tablet 15 mg    ceFAZolin injection 2 g    dextrose 50% injection 12.5 g    doxazosin tablet 2 mg    glucagon (human recombinant) injection 1 mg    heparin (porcine) injection 5,000 Units    HYDROmorphone injection 1 mg    insulin aspart U-100 pen 0-5 Units    metoprolol tartrate (LOPRESSOR) tablet 50 mg    nicotine 14 mg/24 hr 1 patch    ondansetron injection 4 mg    oxyCODONE-acetaminophen  mg per tablet 1 tablet    promethazine (PHENERGAN) 6.25 mg in dextrose 5 % 50 mL IVPB    sodium chloride 0.9% flush 3 mL    sodium chloride 0.9% flush 3 mL    zolpidem tablet 5 mg     Objective:     Vital Signs (Most Recent):  Temp: 96.2 °F (35.7 °C) (12/29/18 0329)  Pulse: 67 (12/29/18 0744)  Resp: 16 (12/29/18 0744)  BP: 118/66 (12/29/18 0329)  SpO2: 96 % (12/29/18 0329) Vital Signs (24h Range):  Temp:  [96 °F (35.6 °C)-98.6 °F (37 °C)] 96.2 °F (35.7 °C)  Pulse:  [57-81] 67  Resp:  [16-18] 16  SpO2:  [96 %-98 %] 96 %  BP: (107-118)/(61-75) 118/66     Weight: 69.9 kg (154 lb)  Height: 5' 7" (170.2 cm)  Body mass index is 24.12 kg/m².      Intake/Output Summary (Last 24 hours) at 12/29/2018 0811  Last data filed at 12/29/2018 0500  Gross per 24 hour   Intake 630 ml   Output 275 ml   Net 355 ml       Ortho/SPM Exam     PE:    AA&O x 4.  NAD  HEENT:  NCAT, sclera nonicteric  Lungs:  Respirations are equal and unlabored.  CV:  2+ bilateral upper and lower extremity pulses.  Skin:  Intact throughout.    MSK:    BUE in dressings, serosanguinous drainage present to RUE " dressings, wound vacs in place with serosanguinous drainage in canisters  ROM intact to left hand with intact sensation. Dressing changed; dorsal hand wound with exposed tendons, granulation tissue with fibrous exudate around periphery of wound, no purulence  Pt with limited mobility of right hand 2/2 pain. Weakly flexes and extends digits. Sensation intact throughout right hand. Dressing changed; dorsal hand wound also with exposed tendons, some granulation tissue present around periphery, no purulence    RLE wound clean and intact without erythema or induration. AROM intact without significant pain.      Significant Labs: All pertinent labs within the past 24 hours have been reviewed.    Significant Imaging: I have reviewed all pertinent imaging results/findings.

## 2018-12-29 NOTE — PROGRESS NOTES
[facility]  General Surgery  Progress Note    Subjective:     History of Present Illness:  No notes on file    Post-Op Info:  Procedure(s) (LRB):  INCISION AND DRAINAGE, UPPER EXTREMITY (Bilateral)  APPLICATION, WOUND VAC x 2 (Bilateral)   2 Days Post-Op     Interval History:     No acute events overnight, patient complaining of bilateral upper extremities. Low UOP overnight. Tolerating a diet.     Medications:  Continuous Infusions:  Scheduled Meds:   albuterol sulfate  2.5 mg Nebulization Q6H WAKE    busPIRone  15 mg Oral BID    ceFAZolin (ANCEF) IVPB  2 g Intravenous Q12H    doxazosin  2 mg Oral Daily    heparin (porcine)  5,000 Units Subcutaneous Q8H    metoprolol tartrate  50 mg Oral BID    nicotine  1 patch Transdermal Daily    sodium bicarbonate  1,300 mg Oral BID     PRN Meds:ALPRAZolam, dextrose 50%, glucagon (human recombinant), HYDROmorphone, insulin aspart U-100, ondansetron, oxyCODONE-acetaminophen, promethazine (PHENERGAN) IVPB, sodium chloride 0.9%, sodium chloride 0.9%, zolpidem     Review of patient's allergies indicates:  No Known Allergies  Objective:     Vital Signs (Most Recent):  Temp: 97.9 °F (36.6 °C) (12/29/18 0845)  Pulse: 65 (12/29/18 0845)  Resp: 18 (12/29/18 0845)  BP: 95/70 (12/29/18 0845)  SpO2: 97 % (12/29/18 0845) Vital Signs (24h Range):  Temp:  [96 °F (35.6 °C)-98.6 °F (37 °C)] 97.9 °F (36.6 °C)  Pulse:  [57-70] 65  Resp:  [16-18] 18  SpO2:  [96 %-98 %] 97 %  BP: ()/(61-75) 95/70     Weight: 69.9 kg (154 lb)  Body mass index is 24.12 kg/m².    Intake/Output - Last 3 Shifts       12/27 0700 - 12/28 0659 12/28 0700 - 12/29 0659 12/29 0700 - 12/30 0659    P.O. 390 870 150    I.V. (mL/kg) 2468 (35.3)      Total Intake(mL/kg) 2858 (40.9) 870 (12.4) 150 (2.1)    Urine (mL/kg/hr) 330 (0.2) 25 (0) 7 (0)    Other 400 250 50    Stool 0 0 0    Total Output 730 275 57    Net +2128 +595 +93           Stool Occurrence 0 x 0 x 0 x          Physical  Exam    General: In no acute distress, well appearance.   CV: RRR  Pulm: non labored breathing, b/l clear breath sounds.   Ext: Bilateral upper extremities with wound vacs in place.     Significant Labs:  CBC:   Recent Labs   Lab 12/29/18  0524   WBC 9.32   RBC 2.77*   HGB 7.3*   HCT 23.4*      MCV 85   MCH 26.4*   MCHC 31.2*     CMP:   Recent Labs   Lab 12/29/18  0524   GLU 73   CALCIUM 7.2*   ALBUMIN 0.9*   PROT 3.9*   *   K 5.2*   CO2 19*      BUN 73*   CREATININE 2.7*   ALKPHOS 102   ALT <5*   AST 10   BILITOT 0.1         Assessment/Plan:     * Soft tissue infection    58 y.o. female 2 Days Post-Op for Procedure(s) (LRB):  INCISION AND DRAINAGE, UPPER EXTREMITY (Bilateral)  APPLICATION, WOUND VAC x 2 (Bilateral)    Severe soft tissue infection with joint involvement.  Continue abx, diet, PT/OT  Will change Wound Vac on Monday.   VIDYA worsening, FENA sent, seems adequately resuscitated.   Will likely consult nephrology to be discussed with staff.   Guarded prognosis, infection is quite extensive.  Culture growing MSSA on Cefazolin.   Would like to remove central line.          Sonia Qureshi MD  General Surgery PGY V  Beeper: [phone]

## 2018-12-29 NOTE — ASSESSMENT & PLAN NOTE
VIDYA, non-oliguric, on top of previously normal renal function, at the time of consult, sCr has been stable in the 1.7-2.0 range since coming to [facility], initially felt elevated sCr due to pre-renal etiology, but fluid resuscitation over the past several days has not led to an improvement, further noted with a UPC ratio at 5.82, multiple RBCs and WBCs in urine, noted patient has a Caro catheter, clinically an ATN secondary surgery at [facility], possibly with hemodynamic instability and also toxicity from infection, would seem like the most likely etiology, however cannot rule out an active/nephritic urine and differential needs to include GN, specifically would be concerned for an Infection / Immune complex GN vs other, noted endocarditis initially was concern, but negative BHARAT makes less likely.  Noted patient on multiple antibiotics, raising concern for an AIN, absence of eosinophilia and rash would make less likely, but needs to be on differential, multiple WBCs on UA could be consistent, but may be more from Caro UTI    Work up so far:    ALYSSA positive w/ a medium titer  C4 complement low, C3 on low side of normal  Manual urine slide noted for granular casts, RBCs (no obvious dysmorphia or RBC casts), WBCs (not in cast form), urine ctx w/o growth (but seems patient on abxs when obtained)    Renal ultrasound unremarkable, no hydro    ANCA, cryoglobulin, antistreptolysin Ab, viral hepatitis profile, HIV Abs, ds-DNA, SFE, OMAYRA, PLA2R are all pending    Slight rise in sCr (frome previously stable level 1.7-2.0) to 2.3, may be mostly related to fluid shifts during surgery yesterday, however as outlined per positive results above an acute GN is still a consideration    What seems to be a nephrotic range proteinuria with possible nephritic features, possibly could be seen with combination class III/IV and class V lupus nephritis, noted ALYSSA positive    Plan/Recommendations:  1) follow up on studies outlined above  2) will collect  and spin down urine for manual microscopy again  3) awaiting repeat UA & ctxs  4) may need renal biopsy   5) for now continue replacing fluids based on volume status and perceived losses and closely follow serial RFPs  days    ---  Addendum:  Repeat urine microscopy reviewed and noted for muddy brown casts and no dysmorphic cells or other things of concern, in short, consistent with ATN, still and of course, need to follow up closely on studies as outlined, but findings on urine microscopy would seem to make the potential need for renal biopsy less

## 2018-12-29 NOTE — SUBJECTIVE & OBJECTIVE
Interval History: sCr increased to 2.7    Review of patient's allergies indicates:  No Known Allergies  Current Facility-Administered Medications   Medication Frequency    albuterol sulfate nebulizer solution 2.5 mg Q6H WAKE    ALPRAZolam tablet 1 mg BID PRN    busPIRone tablet 15 mg BID    ceFAZolin injection 2 g Q12H    dextrose 50% injection 12.5 g PRN    doxazosin tablet 2 mg Daily    glucagon (human recombinant) injection 1 mg PRN    heparin (porcine) injection 5,000 Units Q8H    HYDROmorphone injection 1 mg Q2H PRN    insulin aspart U-100 pen 0-5 Units Q6H PRN    metoprolol tartrate (LOPRESSOR) tablet 50 mg BID    nicotine 14 mg/24 hr 1 patch Daily    ondansetron injection 4 mg Q12H PRN    oxyCODONE-acetaminophen  mg per tablet 1 tablet Q4H PRN    promethazine (PHENERGAN) 6.25 mg in dextrose 5 % 50 mL IVPB Q6H PRN    sodium bicarbonate tablet 1,300 mg BID    sodium chloride 0.9% flush 3 mL PRN    sodium chloride 0.9% flush 3 mL PRN    zolpidem tablet 5 mg Nightly PRN       Objective:     Vital Signs (Most Recent):  Temp: 97.4 °F (36.3 °C) (12/29/18 1621)  Pulse: 66 (12/29/18 1621)  Resp: 18 (12/29/18 1621)  BP: 114/71 (12/29/18 1621)  SpO2: 96 % (12/29/18 1621)  O2 Device (Oxygen Therapy): room air (12/29/18 1621) Vital Signs (24h Range):  Temp:  [96 °F (35.6 °C)-97.9 °F (36.6 °C)] 97.4 °F (36.3 °C)  Pulse:  [64-70] 66  Resp:  [16-18] 18  SpO2:  [96 %-99 %] 96 %  BP: ()/(63-75) 114/71     Weight: 69.9 kg (154 lb) (12/21/18 1145)  Body mass index is 24.12 kg/m².  Body surface area is 1.82 meters squared.    I/O last 3 completed shifts:  In: 1620 [P.O.:1020; I.V.:600]  Out: 805 [Urine:155; Other:650]    Physical Exam   Constitutional: She is oriented to person, place, and time.   HENT:   Head: Atraumatic.   Neck: No JVD present.   Cardiovascular: Normal rate and regular rhythm.   Pulmonary/Chest: Effort normal and breath sounds normal.   Abdominal: Soft. She exhibits no distension.    Musculoskeletal: She exhibits no edema or tenderness.   Neurological: She is alert and oriented to person, place, and time.   Skin: Skin is warm.

## 2018-12-29 NOTE — ASSESSMENT & PLAN NOTE
58 y.o. female 2 Days Post-Op for Procedure(s) (LRB):  INCISION AND DRAINAGE, UPPER EXTREMITY (Bilateral)  APPLICATION, WOUND VAC x 2 (Bilateral)    Severe soft tissue infection with joint involvement.  Continue abx, diet, PT/OT  Will change Wound Vac on Monday.   VIDYA worsening, FENA sent, seems adequately resuscitated.   Will likely consult nephrology to be discussed with staff.   Guarded prognosis, infection is quite extensive.  Culture growing MSSA on Cefazolin.   Would like to remove central line.

## 2018-12-29 NOTE — PROGRESS NOTES
"[facility]  Orthopedics  Progress Note    Patient Name: Misty Khalil  MRN: [id number]  Admission Date: 12/20/2018  Hospital Length of Stay: 9 days  Attending Provider: Javier Kirk MD  Primary Care Provider: JOEL Mccauley  Follow-up For: Procedure(s) (LRB):  INCISION AND DRAINAGE, UPPER EXTREMITY (Bilateral)  APPLICATION, WOUND VAC x 2 (Bilateral)    Post-Operative Day: 2 Days Post-Op  Subjective:     Principal Problem: Sepsis    Principal Orthopedic Problem: As above s/p BUE I&D / Right wrist I&D / right knee I&D    Interval History: AFVSS. Pain controlled at rest. Denies change in pain in b/l shoulders, ankles, knees, elbows, wrists.      Review of patient's allergies indicates:  No Known Allergies    Current Facility-Administered Medications   Medication    albuterol sulfate nebulizer solution 2.5 mg    ALPRAZolam tablet 1 mg    busPIRone tablet 15 mg    ceFAZolin injection 2 g    dextrose 50% injection 12.5 g    doxazosin tablet 2 mg    glucagon (human recombinant) injection 1 mg    heparin (porcine) injection 5,000 Units    HYDROmorphone injection 1 mg    insulin aspart U-100 pen 0-5 Units    metoprolol tartrate (LOPRESSOR) tablet 50 mg    nicotine 14 mg/24 hr 1 patch    ondansetron injection 4 mg    oxyCODONE-acetaminophen  mg per tablet 1 tablet    promethazine (PHENERGAN) 6.25 mg in dextrose 5 % 50 mL IVPB    sodium chloride 0.9% flush 3 mL    sodium chloride 0.9% flush 3 mL    zolpidem tablet 5 mg     Objective:     Vital Signs (Most Recent):  Temp: 96.2 °F (35.7 °C) (12/29/18 0329)  Pulse: 67 (12/29/18 0744)  Resp: 16 (12/29/18 0744)  BP: 118/66 (12/29/18 0329)  SpO2: 96 % (12/29/18 0329) Vital Signs (24h Range):  Temp:  [96 °F (35.6 °C)-98.6 °F (37 °C)] 96.2 °F (35.7 °C)  Pulse:  [57-81] 67  Resp:  [16-18] 16  SpO2:  [96 %-98 %] 96 %  BP: (107-118)/(61-75) 118/66     Weight: 69.9 kg (154 lb)  Height: 5' 7" (170.2 cm)  Body mass index is 24.12 " kg/m².      Intake/Output Summary (Last 24 hours) at 12/29/2018 0811  Last data filed at 12/29/2018 0500  Gross per 24 hour   Intake 630 ml   Output 275 ml   Net 355 ml       Ortho/SPM Exam     PE:    AA&O x 4.  NAD  HEENT:  NCAT, sclera nonicteric  Lungs:  Respirations are equal and unlabored.  CV:  2+ bilateral upper and lower extremity pulses.  Skin:  Intact throughout.    MSK:    BUE in dressings, serosanguinous drainage present to RUE dressings, wound vacs in place with serosanguinous drainage in canisters  ROM intact to left hand with intact sensation. Dressing changed; dorsal hand wound with exposed tendons, granulation tissue with fibrous exudate around periphery of wound, no purulence  Pt with limited mobility of right hand 2/2 pain. Weakly flexes and extends digits. Sensation intact throughout right hand. Dressing changed; dorsal hand wound also with exposed tendons, some granulation tissue present around periphery, no purulence    RLE wound clean and intact without erythema or induration. AROM intact without significant pain.      Significant Labs: All pertinent labs within the past 24 hours have been reviewed.    Significant Imaging: I have reviewed all pertinent imaging results/findings.    Assessment/Plan:     Septic arthritis of knee, right    Misty Khalil is a 58 y.o. female s/p BUE I&D with open wounds and right septic knee / right septic wrist    Low clinical suspicion for occult septic joints at this time. No indications for repeat debridements.  Dressing changes PRN to right knee to keep clean and covered at all times.  Wound vacs b/l upper extremities per general surgery.  Right hand intra-op cultures growing staph aureus, susceptibilities pending.  ID following.    Dispo: Please call with questions. No plans for orthopedic intervention at this time.           Whit Edmond MD  Orthopedics  [facility]

## 2018-12-29 NOTE — ASSESSMENT & PLAN NOTE
Misty Khalil is a 58 y.o. female s/p BUE I&D with open wounds and right septic knee / right septic wrist    Low clinical suspicion for occult septic joints at this time. No indications for repeat debridements.  Dressing changes PRN to right knee to keep clean and covered at all times.  Wound vacs b/l upper extremities per general surgery.  Right hand intra-op cultures growing staph aureus, susceptibilities pending.  ID following.    Dispo: Please call with questions. No plans for orthopedic intervention at this time.

## 2018-12-30 NOTE — ANESTHESIA PREPROCEDURE EVALUATION
12/29/2018   [facility]  Anesthesia Pre-Operative Evaluation         Patient Name: Misty Khalil  YOB: 1960  MRN: [id number]    SUBJECTIVE:     Pre-operative evaluation for Procedure(s) (LRB):  DEBRIDEMENT, WOUND (Bilateral)  INSERTION, CATHETER, CENTRAL VENOUS, VORA (Right)     12/29/2018    Misty Khalil is a 58 y.o. female   PMHx:   RA (on prior prednisone taper) with extensive bilateral upper extremity abscesses and septic arthritis of knee now s/p mulitple drainage/washouts at OSH and here at [facility]. Bacterial endocarditis ruled out.  Anemia of uncertain etiology, Hgb 6.3  COPD (intermittent O2 use at home)  HTN-multiple meds  anxiety-taking Xanax and ambien  HLD   PVD        Hospital course complicated by VIDYA. Not pre-renal in etiology. Neprhology has been consulted and is working up the patient.     Pt requesting that she be put to sleep on her stretcher prior to moving to OR bed as it is very painful for her to move.     Patient now presents for the above procedure(s).      LDA:       Percutaneous Central Line Insertion/Assessment - triple lumen  right internal jugular (Active)   Dressing biopatch in place;dressing dry and intact;dressing changed;transparent semipermeable dressing applied 12/29/2018 12:57 PM   Securement secured w/ sutures 12/29/2018 12:57 PM   Additional Site Signs no erythema;no warmth;no edema;no pain;no palpable cord;no streak formation;no drainage 12/29/2018 12:57 PM   Distal Patency/Care flushed w/o difficulty;normal saline locked 12/29/2018 12:57 PM   Medial Patency/Care flushed w/o difficulty;normal saline locked 12/29/2018 12:57 PM   Proximal Patency/Care flushed w/o difficulty;normal saline locked 12/29/2018 12:57 PM   Waveform normal 12/27/2018  3:15 AM   Line Interventions line leveled/zeroed 12/27/2018  3:15 AM   Dressing Change Due  01/05/19 12/29/2018 12:57 PM   Daily Line Review Performed 12/28/2018  7:54 PM   Number of days:        Prev airway:   Easy Mask  DL with Leonardo #2  ETT 7.0  Grade I View      Drips: None documented.      Patient Active Problem List   Diagnosis    Hypertension    Hyperlipidemia    Anxiety    Peripheral vascular disease    Soft tissue infection    Septic arthritis of knee, right    VIDYA (acute kidney injury)       Review of patient's allergies indicates:  No Known Allergies    Current Inpatient Medications:   albuterol sulfate  2.5 mg Nebulization Q6H WAKE    busPIRone  15 mg Oral BID    ceFAZolin (ANCEF) IVPB  2 g Intravenous Q12H    doxazosin  2 mg Oral Daily    heparin (porcine)  5,000 Units Subcutaneous Q8H    metoprolol tartrate  50 mg Oral BID    nicotine  1 patch Transdermal Daily    sodium bicarbonate  1,300 mg Oral BID       No current facility-administered medications on file prior to encounter.      Current Outpatient Medications on File Prior to Encounter   Medication Sig Dispense Refill    albuterol (PROVENTIL) 2.5 mg /3 mL (0.083 %) nebulizer solution Take 3 mLs (2.5 mg total) by nebulization daily as needed for Wheezing. Rescue 1 Box 2    ALPRAZolam (XANAX) 1 MG tablet Take 1 mg by mouth 2 (two) times daily.      busPIRone (BUSPAR) 15 MG tablet Take 1 tablet (15 mg total) by mouth 2 (two) times daily. 60 tablet 2    cloNIDine 0.3 mg/24 hr td ptwk (CATAPRES) 0.3 mg/24 hr Place 1 patch onto the skin every 7 days.      doxazosin (CARDURA) 1 MG tablet Take 2 tablets (2 mg total) by mouth once daily. 60 tablet 2    ezetimibe (ZETIA) 10 mg tablet TAKE 1 TABLET (10 MG TOTAL) BY MOUTH ONCE DAILY. 30 tablet 2    hydrALAZINE (APRESOLINE) 100 MG tablet TAKE 1 TABLET BY MOUTH TWICE DAILY 60 tablet 2    metoprolol tartrate (LOPRESSOR) 50 MG tablet Take 1 tablet (50 mg total) by mouth 2 (two) times daily. 60 tablet 2    oxyCODONE-acetaminophen (PERCOCET) 5-325 mg per tablet Take 1-2 tablets by  mouth every 4 (four) hours as needed for Pain. 30 tablet 0    quinapril (ACCUPRIL) 40 MG tablet Take 1 tablet (40 mg total) by mouth every evening. 30 tablet 2    zolpidem (AMBIEN) 10 mg Tab Take 5 mg by mouth nightly as needed.         Past Surgical History:   Procedure Laterality Date    APPLICATION, WOUND VAC x 2 Bilateral 2018    [facility]    ARTHROSCOPY, KNEE Right 2018    [facility]    ARTHROSCOPY, KNEE, septic Right 2018    Performed by [facility] Kirk MD at [facility] OR [address] [place] [address]    BACK SURGERY       SECTION      COLONOSCOPY N/A 2018    Performed by [facility] at [facility]    DEBRIDEMENT, WOUND Bilateral 2018    Performed by aJvier Kirk MD at [facility] OR [address] [place] [address]    ESOPHAGOGASTRODUODENOSCOPY (EGD) N/A 2018    Performed by [facility]aver [facility] at [facility]    HYSTERECTOMY      INCISION AND DRAINAGE, UPPER EXTREMITY Bilateral 2018    [facility]    INCISION AND DRAINAGE, UPPER EXTREMITY Bilateral 2018    Performed by Javier Kirk MD at [facility] OR [address] [place] [address]    OOPHORECTOMY         Social History     Socioeconomic History    Marital status:      Spouse name: Not on file    Number of children: Not on file    Years of education: Not on file    Highest education level: Not on file   Social Needs    Financial resource strain: Not on file    Food insecurity - worry: Not on file    Food insecurity - inability: Not on file    Transportation needs - medical: Not on file    Transportation needs - non-medical: Not on file   Occupational History    Not on file   Tobacco Use    Smoking status: Current Every Day Smoker     Packs/day: 1.00     Types: Cigarettes    Smokeless tobacco: Never Used   Substance and Sexual Activity    Alcohol use: Yes     Comment: occasionally    Drug use: No    Sexual  activity: No   Other Topics Concern    Not on file   Social History Narrative    Not on file       OBJECTIVE:     Vital Signs Range (Last 24H):  Temp:  [35.6 °C (96 °F)-36.6 °C (97.9 °F)]   Pulse:  [65-76]   Resp:  [16-18]   BP: ()/(63-71)   SpO2:  [95 %-99 %]       Significant Labs:  Lab Results   Component Value Date    WBC 9.32 12/29/2018    HGB 7.3 (L) 12/29/2018    HCT 23.4 (L) 12/29/2018     12/29/2018    ALT <5 (L) 12/29/2018    AST 10 12/29/2018     (L) 12/29/2018    K 5.2 (H) 12/29/2018     12/29/2018    CREATININE 2.7 (H) 12/29/2018    BUN 73 (H) 12/29/2018    CO2 19 (L) 12/29/2018    INR 1.0 12/20/2018    HGBA1C 5.1 12/21/2018       Diagnostic Studies: No relevant studies.    EKG:    Vent. Rate : 083 BPM     Atrial Rate : 083 BPM     P-R Int : 114 ms          QRS Dur : 080 ms      QT Int : 390 ms       P-R-T Axes : 079 066 085 degrees     QTc Int : 458 ms    Sinus rhythm with Premature atrial complexes  Otherwise normal ECG  When compared with ECG of 13-JUL-2018 13:25,  Premature atrial complexes are now Present  Confirmed by SURESH BE MD ([id number]) on 12/21/2018 9:44:07 AM    2D ECHO:No results found for this or any previous visit.      TRANSTHORACIC ECHO (TTE) COMPLETE 12/21/18   Conclusion     · Concentric left ventricular remodeling.  · Normal left ventricular systolic function. The estimated ejection fraction is 65%  · Normal LV diastolic function.  · No wall motion abnormalities.  · Normal right ventricular systolic function.  · Intermediate central venous pressure (8 mm Hg).            ASSESSMENT/PLAN:       Anesthesia Evaluation    I have reviewed the Patient Summary Reports.    I have reviewed the Nursing Notes.   I have reviewed the Medications.   Steroids Taken In Past Year: Prednisone    Review of Systems  Anesthesia Hx:  History of prior surgery of interest to airway management or planning: Denies Family Hx of Anesthesia complications.   Denies Personal Hx of  Anesthesia complications.   Social:  Smoker, Social Alcohol Use    Hematology/Oncology:     Oncology Normal    -- Anemia:   EENT/Dental:EENT/Dental Normal   Cardiovascular:   Hypertension PVD hyperlipidemia    Pulmonary:   COPD (intermittently uses home O2), moderate    Renal/:   Chronic Renal Disease, ARF    Psych:   anxiety depression             Anesthesia Plan  Type of Anesthesia, risks & benefits discussed:  Anesthesia Type:  general, MAC  Patient's Preference:   Intra-op Monitoring Plan: standard ASA monitors  Intra-op Monitoring Plan Comments:   Post Op Pain Control Plan: per primary service following discharge from PACU  Post Op Pain Control Plan Comments:   Induction:   IV  Beta Blocker:  Patient is not currently on a Beta-Blocker (No further documentation required).       Informed Consent: Patient representative understands risks and agrees with Anesthesia plan.  Questions answered. Anesthesia consent signed with patient representative.  ASA Score: 3     Day of Surgery Review of History & Physical:    H&P update referred to the provider.     Anesthesia Plan Notes: Please put pt to sleep on her stretcher prior to moving her to OR bed - pt states it is extremely painful to move        Ready For Surgery From Anesthesia Perspective.

## 2018-12-30 NOTE — TRANSFER OF CARE
"Anesthesia Transfer of Care Note    Patient: Misty Khalil    Procedure(s) Performed: Procedure(s) (LRB):  DEBRIDEMENT, WOUND (Bilateral)  INSERTION, CATHETER, CENTRAL VENOUS, VORA (Right)    Patient location: PACU    Anesthesia Type: general    Transport from OR: Transported from OR on 6-10 L/min O2 by face mask with adequate spontaneous ventilation    Post pain: adequate analgesia    Post assessment: no apparent anesthetic complications    Post vital signs: stable    Level of consciousness: awake, alert and oriented    Nausea/Vomiting: no nausea/vomiting    Complications: none    Transfer of care protocol was followed      Last vitals:   Visit Vitals  /69 (BP Location: Left leg, Patient Position: Lying)   Pulse 62   Temp 36.6 °C (97.8 °F) (Oral)   Resp 18   Ht 5' 7" (1.702 m)   Wt 69.9 kg (154 lb)   SpO2 98%   Breastfeeding? No   BMI 24.12 kg/m²     "

## 2018-12-30 NOTE — PLAN OF CARE
Problem: Adult Inpatient Plan of Care  Goal: Plan of Care Review  Hx:  HTN, microcytic anemia, transaminitis, RA, hx of GI bleed     Dx: necrotizing fascitis     12/21: transfer to SICU, CT of chest/arm/knee, cultures; bilat UE washout/I&D; 1L crystalloid and 250 albumin in OR; LR bolus, cont. LR infusion started, PRBC's x2 units   12/22: LR bolusx3  12/23: LR bolusx1  12/24: 2 unit of PRBC total. OR for wound debridement. 2L LR bolus  12/25: LR continuous at 125. Nephrology consulted. 1L LR bolus   12/26: MRI of lower back, 1L LR bolus    Nursing:  SBP<180  Accucheck Q6                  Outcome: Ongoing (interventions implemented as appropriate)  POC reviewed and understood by pt. AAOx4. IV intact and patent. Pain managed by prn pain meds per MD order. Pure wick in place. NPO at 0000. Accuchecks Q6. Wound vac to bilateral arms @ 125- intact. Pt refuses to be turned or repositioned. VSS on RM air. Max assist. No acute events at this time. Call bell within reach. Bed in lowest position. WCTM.

## 2018-12-30 NOTE — PROGRESS NOTES
[facility]  Orthopedics  Progress Note    Patient Name: Misty Khalil  MRN: [id number]   Admission Date: 12/20/2018  Hospital Length of Stay: 10 days  Attending Provider: Javier Kirk MD  Primary Care Provider: JOEL Mccauley  Follow-up For: Procedure(s) (LRB):  DEBRIDEMENT, WOUND (Bilateral)  INSERTION, CATHETER, CENTRAL VENOUS, VORA (Right)    Post-Operative Day: Day of Surgery  Subjective:     Principal Problem: Sepsis    Principal Orthopedic Problem: As above s/p BUE I&D / Right wrist I&D / right knee I&D    Interval History: AFVSS. Pain controlled at rest. Denies pain in b/l ankles, knees. Continuing to have significant pain with movement of upper extremities.    Review of patient's allergies indicates:  No Known Allergies    Current Facility-Administered Medications   Medication    albuterol sulfate nebulizer solution 2.5 mg    ALPRAZolam tablet 1 mg    busPIRone tablet 15 mg    ceFAZolin injection 2 g    dextrose 50% injection 12.5 g    doxazosin tablet 2 mg    glucagon (human recombinant) injection 1 mg    heparin (porcine) injection 5,000 Units    HYDROmorphone injection 1 mg    insulin aspart U-100 pen 0-5 Units    metoprolol tartrate (LOPRESSOR) tablet 50 mg    nicotine 14 mg/24 hr 1 patch    ondansetron injection 4 mg    oxyCODONE-acetaminophen  mg per tablet 1 tablet    promethazine (PHENERGAN) 6.25 mg in dextrose 5 % 50 mL IVPB    sodium bicarbonate tablet 1,300 mg    sodium chloride 0.9% flush 3 mL    sodium chloride 0.9% flush 3 mL    zolpidem tablet 5 mg     Objective:     Vital Signs (Most Recent):  Temp: 97.8 °F (36.6 °C) (12/30/18 0424)  Pulse: 62 (12/30/18 0424)  Resp: 18 (12/30/18 0424)  BP: 110/69 (12/30/18 0424)  SpO2: 98 % (12/30/18 0424) Vital Signs (24h Range):  Temp:  [96.1 °F (35.6 °C)-97.9 °F (36.6 °C)] 97.8 °F (36.6 °C)  Pulse:  [62-76] 62  Resp:  [16-18] 18  SpO2:  [95 %-100 %] 98 %  BP: ()/(63-71) 110/69     Weight: 69.9  "kg (154 lb)  Height: 5' 7" (170.2 cm)  Body mass index is 24.12 kg/m².      Intake/Output Summary (Last 24 hours) at 12/30/2018 0721  Last data filed at 12/30/2018 0500  Gross per 24 hour   Intake 475 ml   Output 626 ml   Net -151 ml       Ortho/SPM Exam     PE:    AA&O x 4.  NAD  HEENT:  NCAT, sclera nonicteric  Lungs:  Respirations are equal and unlabored.  CV:  2+ bilateral upper and lower extremity pulses.  Skin:  Intact throughout.    MSK:    BUE in dressings, serosanguinous drainage present to RUE dressings, wound vacs in place with serosanguinous drainage in canisters  ROM intact to left hand with intact sensation. Dorsal hand wound with exposed tendons, granulation tissue with fibrous exudate around periphery of wound, no purulence  Pt with limited mobility of right hand 2/2 pain. Weakly flexes and extends digits. Sensation intact throughout right hand. Dorsal hand wound also with exposed tendons, some granulation tissue present around periphery, no purulence    RLE wound clean and intact without erythema or induration. AROM intact without significant pain.      Significant Labs: All pertinent labs within the past 24 hours have been reviewed.    Significant Imaging: I have reviewed all pertinent imaging results/findings.    Assessment/Plan:     Septic arthritis of knee, right    Misty Khalil is a 58 y.o. female s/p BUE I&D with open wounds and right septic knee / right septic wrist    Low clinical suspicion for occult septic joints at this time. No indications for repeat debridements from orthopedic standpoint.  Dressing changes PRN to right knee to keep clean and covered at all times.  Wound vacs b/l upper extremities per general surgery.  Right hand intra-op cultures growing staph aureus, pan sensitive.  To OR with general surgery today for repeat wound debridement b/l UE    Dispo: Please call with questions. No plans for orthopedic intervention at this time.           Whit Edmond, " MD  Orthopedics  [facility]

## 2018-12-30 NOTE — BRIEF OP NOTE
[facility]  Brief Operative Note     SUMMARY     Surgery Date: 12/30/2018     Surgeon(s) and Role:     * Javier Kirk MD - Primary     * Jessica Najera MD - Resident - Assisting    Pre-op Diagnosis:  Soft tissue infection [L08.9]    Post-op Diagnosis:  Post-Op Diagnosis Codes:     * Soft tissue infection [L08.9]    Procedure(s) (LRB):  DEBRIDEMENT, WOUND (Bilateral)  INSERTION, CATHETER, CENTRAL VENOUS, VORA (Right)    Anesthesia: Choice    Description of the findings of the procedure: bilateral upper extremity wound debridement and wound vac replacement     Findings/Key Components: see operative note     Estimated Blood Loss: <10cc  Specimens:   Specimen (12h ago, onward)    None        Dispo: Extubated in OR and transferred to PACU. Will return to inpatient room.     OTTO Najera MD   General Surgery- PGYII  [phone]

## 2018-12-30 NOTE — PROGRESS NOTES
Patient admitted to recovery see [facility] for complete assessment pacu bcg's maintained safety measures verified patient instructed on pain scale and patient verbalized understanding. Also orders being carried out also asked patient about calling family and she stated she will call family when she goes to her room called for chest x ray also patient states she always has 10/10 pain .

## 2018-12-30 NOTE — ANESTHESIA POSTPROCEDURE EVALUATION
"Anesthesia Post Evaluation    Patient: Misty Khalil    Procedure(s) Performed: Procedure(s) (LRB):  DEBRIDEMENT, WOUND (Bilateral)  INSERTION, CATHETER, CENTRAL VENOUS, VORA (Right)    Final Anesthesia Type: general  Patient location during evaluation: PACU  Patient participation: Yes- Able to Participate  Level of consciousness: awake and alert and oriented  Post-procedure vital signs: reviewed and stable  Pain management: adequate  Airway patency: patent  PONV status at discharge: No PONV  Anesthetic complications: no      Cardiovascular status: hemodynamically stable  Respiratory status: unassisted, spontaneous ventilation and room air  Hydration status: euvolemic  Follow-up not needed.        Visit Vitals  BP (!) 103/52 (BP Location: Left leg, Patient Position: Lying)   Pulse 84   Temp 36.2 °C (97.2 °F) (Temporal)   Resp 18   Ht 5' 7" (1.702 m)   Wt 69.9 kg (154 lb)   SpO2 96%   Breastfeeding? No   BMI 24.12 kg/m²       Pain/Jimmy Score: Pain Rating Prior to Med Admin: 10 (12/30/2018 11:20 AM)  Pain Rating Post Med Admin: 10 (12/30/2018 11:00 AM)  Jimmy Score: 10 (12/30/2018 11:00 AM)        "

## 2018-12-30 NOTE — PROGRESS NOTES
Artificial Intelligence Notificaton      Admit Date: 2018  LOS: 10  Code Status: Full Code   Date of Consult: 2018  : 1960  Age: 58 y.o.  Weight:   Wt Readings from Last 1 Encounters:   18 69.9 kg (154 lb)     Sex: female  Bed: 1059/1059 A:   MRN: [id number]  Attending Physician: Javier Kirk MD  Primary Service: Networked reference to record PCT   Time AI Alert Received: 0516  Time at Bedside: 0520           Patient found lying in bed, discussing consent for a procedure, in no apparent distress. Was breathing comfortably on room air. Denies any pain to abdomen, chest pain, or SOB. States pain to arms bilaterally is stable. Wound vac to arms holding suction, draining serosanguinous fluid. Has not had UOP since , about 180 cc on bladder scan. Order has been placed for straight cath. VSS. Afebrile. AM CBC pending. Phos elevated on labs this am (up to 7.0), but has been elevated and holding stable at 6.7 over the past day).  Patient currently being seen by nephrology and determined to be in ATN; elevated Cr (up to 3.1) and BUN (up to 78) likely secondary to ATN. Will discuss findings with primary team.       Vital Signs (Most Recent):  Temp: 97.8 °F (36.6 °C) (18 0424)  Pulse: 62 (18 0424)  Resp: 18 (18 0424)  BP: 110/69 (18 0424)  SpO2: 98 % (18 0424) Vital Signs (24h Range):  Temp:  [96.1 °F (35.6 °C)-97.9 °F (36.6 °C)] 97.8 °F (36.6 °C)  Pulse:  [62-76] 62  Resp:  [16-18] 18  SpO2:  [95 %-100 %] 98 %  BP: ()/(63-71) 110/69       This is an  Artificial Intelligence Notification.     Artificial Intelligence alert discussed with Primary team:  Name: General Surgery    Please place a Critical Care consult if evaluation/consultation is needed.      Ally Costello MD  General Surgery, PGYI [facility]

## 2018-12-30 NOTE — NURSING TRANSFER
Nursing Transfer Note      12/30/2018     Transfer To: 1059a    Transfer via stretcher    Transfer with chart and wound vacs    Transported by transport with ticket to ride    Medicines sent: none    Chart send with patient: Yes    Notified: nurse    Patient reassessed at: see epic (date, time)    Upon arrival to floor: to room no distress noted.

## 2018-12-30 NOTE — PROGRESS NOTES
Patient from the OR with no armband printed another armband and placed on patient also right line site with blood noted MD stated to place new dressing will place using sterile technique and biopatch. Also bilateral scd's in use also bilateral cushion boots in use and patient repoisitioned with assistance of 2 nurses for comfort also left hand dressing with kerlix with blood tinge drainage dressing reiforced.

## 2018-12-30 NOTE — PROGRESS NOTES
Patient medicated for pain and then falls asleep and when I wake her up to assess pain level and effectiveness of medication she moans and says 9 to 10 out of 10.

## 2018-12-30 NOTE — SUBJECTIVE & OBJECTIVE
"Principal Problem: Sepsis    Principal Orthopedic Problem: As above s/p BUE I&D / Right wrist I&D / right knee I&D    Interval History: AFVSS. Pain controlled at rest. Denies pain in b/l ankles, knees. Continuing to have significant pain with movement of upper extremities.    Review of patient's allergies indicates:  No Known Allergies    Current Facility-Administered Medications   Medication    albuterol sulfate nebulizer solution 2.5 mg    ALPRAZolam tablet 1 mg    busPIRone tablet 15 mg    ceFAZolin injection 2 g    dextrose 50% injection 12.5 g    doxazosin tablet 2 mg    glucagon (human recombinant) injection 1 mg    heparin (porcine) injection 5,000 Units    HYDROmorphone injection 1 mg    insulin aspart U-100 pen 0-5 Units    metoprolol tartrate (LOPRESSOR) tablet 50 mg    nicotine 14 mg/24 hr 1 patch    ondansetron injection 4 mg    oxyCODONE-acetaminophen  mg per tablet 1 tablet    promethazine (PHENERGAN) 6.25 mg in dextrose 5 % 50 mL IVPB    sodium bicarbonate tablet 1,300 mg    sodium chloride 0.9% flush 3 mL    sodium chloride 0.9% flush 3 mL    zolpidem tablet 5 mg     Objective:     Vital Signs (Most Recent):  Temp: 97.8 °F (36.6 °C) (12/30/18 0424)  Pulse: 62 (12/30/18 0424)  Resp: 18 (12/30/18 0424)  BP: 110/69 (12/30/18 0424)  SpO2: 98 % (12/30/18 0424) Vital Signs (24h Range):  Temp:  [96.1 °F (35.6 °C)-97.9 °F (36.6 °C)] 97.8 °F (36.6 °C)  Pulse:  [62-76] 62  Resp:  [16-18] 18  SpO2:  [95 %-100 %] 98 %  BP: ()/(63-71) 110/69     Weight: 69.9 kg (154 lb)  Height: 5' 7" (170.2 cm)  Body mass index is 24.12 kg/m².      Intake/Output Summary (Last 24 hours) at 12/30/2018 0721  Last data filed at 12/30/2018 0500  Gross per 24 hour   Intake 475 ml   Output 626 ml   Net -151 ml       Ortho/SPM Exam     PE:    AA&O x 4.  NAD  HEENT:  NCAT, sclera nonicteric  Lungs:  Respirations are equal and unlabored.  CV:  2+ bilateral upper and lower extremity pulses.  Skin:  Intact " throughout.    MSK:    BUE in dressings, serosanguinous drainage present to RUE dressings, wound vacs in place with serosanguinous drainage in canisters  ROM intact to left hand with intact sensation. Dorsal hand wound with exposed tendons, granulation tissue with fibrous exudate around periphery of wound, no purulence  Pt with limited mobility of right hand 2/2 pain. Weakly flexes and extends digits. Sensation intact throughout right hand. Dorsal hand wound also with exposed tendons, some granulation tissue present around periphery, no purulence    RLE wound clean and intact without erythema or induration. AROM intact without significant pain.      Significant Labs: All pertinent labs within the past 24 hours have been reviewed.    Significant Imaging: I have reviewed all pertinent imaging results/findings.

## 2018-12-30 NOTE — OP NOTE
[facility]  Surgery Department  Operative Note    SUMMARY   DATE OF PROCEDURE:  12/30/2018     SURGEON:  Javier Kirk M.D.     ASSISTANT:  OTTO Najera MD -Resident      PREOPERATIVE DIAGNOSIS:  Necrotizing soft tissue infection bilateral upper extremities     POSTOPERATIVE DIAGNOSIS:  Same     PROCEDURE PERFORMED:1.) Placement of right subclavian brewster single lumen catheter.   2.) Irrigation and debridement of bilateral upper extremities including skin, soft tissue, muscle, fascia,  and wound VAC placement.     ANESTHESIA:  General endotracheal anesthesia.     COMPLICATIONS:  None.     ESTIMATED BLOOD LOSS:  20 mL.     PROCEDURE IN DETAIL:  After informed consent was obtained, Ms. Khalil was taken to the Operating Room and placed supine on the table.  General endotracheal   anesthesia was induced and the patient was prepped and draped in usual sterile   fashion.  A timeout was called prior to beginning of the procedure.  Landmarks were identified and using an access needle and 10cc syringe, three  attempts were made to acces the right subclavian vein. On the final attempt vessel was accessed and the guidewire was passed without difficulty, position confirmed by fluoroscopy and the needle removed.  Following this, a 5mm skin incision with made with a scalpel on the anterior chest wall. The catheter was tunneled and then measured using fluroscopy and cut to desired length. A vessel dilator and tear away sheath were then placed over the guidewire under live fluoroscopy. The catheter was advanced under fluroscopy and once in place the guidewire and dilator were then removed. The sheath was torn away slowly to ensure stable placement of catheter. Catheter position was confirmed intraoperatively by fluoroscopy and appropriately lay at the right atrial junction. The catheter flushed easily. It was sutured in place and sterile dressing placed. Previous right IJ CVC was removed.     Our attention  was then turned to her bilateral upper extremities. Wound vacs were removed and wounds prepped with betadine. On the left, the wounds were inspected with no evidence of purulent material expressed. Old clot was removed from the left shoulder wound and hemostasis achieved.  Muscle that was previously sutured loosely over the joint remained intact.  The elbow, forearm, and dorsal surface of hand wounds appeared to have good granulation tissue with no evidence of necrotic tissue. Tendons still remain exposed on hand. The right arm wound had evidence of necrotic tissue that was debrided, including skin, soft tissue, muscle and fascia. Hemostasis was achieved. We placed wound VACs on bilateral arms. The patient was awakened in the Operating Room and taken to the PACU in good condition.  Dr. Kirk was present and scrubbed for the entirety of the procedure.    OTTO Najera MD   General Surgery- PGYII  [phone]

## 2018-12-30 NOTE — ASSESSMENT & PLAN NOTE
Misty Khalil is a 58 y.o. female s/p BUE I&D with open wounds and right septic knee / right septic wrist    Low clinical suspicion for occult septic joints at this time. No indications for repeat debridements from orthopedic standpoint.  Dressing changes PRN to right knee to keep clean and covered at all times.  Wound vacs b/l upper extremities per general surgery.  Right hand intra-op cultures growing staph aureus, pan sensitive.  To OR with general surgery today for repeat wound debridement b/l UE    Dispo: Please call with questions. No plans for orthopedic intervention at this time.

## 2018-12-30 NOTE — PROGRESS NOTES
Spoke with Dr. Kirk about chest xray done and asked is he will look at it and put order in ok to use and he said he will I will report this to floor nurse.

## 2018-12-30 NOTE — PROGRESS NOTES
Notified MD Costello of pt having heparin due on the day of surgery. MD stated to give heparin as ordered. RN kaci blood glucose from IJ blood draw. The result was 65. RN re-kaci blood glucose from a fingerstick- 75. WCTM.

## 2018-12-30 NOTE — PROGRESS NOTES
Chest xray done giving patient pain medication as per ordered also patient gets pain medication and drips off to sleep when I arouse patient from sleep to see how pain is states 10/10 will continue to monitor.

## 2018-12-31 PROBLEM — R79.1 ABNORMAL COAGULATION PROFILE: Status: ACTIVE | Noted: 2018-01-01

## 2018-12-31 NOTE — SIGNIFICANT EVENT
RECEIVED AI NOTIFICATION OF DETERIORATION ALERT AT 9:20PM    General Surgery intern on call was paged and notified of the event at 9:46pm.  He is en route to evaluate the patient.   WDL

## 2018-12-31 NOTE — ASSESSMENT & PLAN NOTE
VIDYA, non-oliguric, on top of previously normal renal function, at the time of consult, sCr has been stable in the 1.7-2.0 range since coming to [facility], initially felt elevated sCr due to pre-renal etiology, but fluid resuscitation over the past several days has not led to an improvement, further noted with a UPC ratio at 5.82, multiple RBCs and WBCs in urine, noted patient has a Caro catheter, clinically an ATN secondary surgery at [facility], possibly with hemodynamic instability and also toxicity from infection, would seem like the most likely etiology, however cannot rule out an active/nephritic urine and differential needs to include GN, specifically would be concerned for an Infection / Immune complex GN vs other, noted endocarditis initially was concern, but negative BHARAT makes less likely.  Noted patient on multiple antibiotics, raising concern for an AIN, absence of eosinophilia and rash would make less likely, but needs to be on differential, multiple WBCs on UA could be consistent, but may be more from Caro UTI    Work up so far:    ALYSSA positive w/ a medium titer  C4 complement low, C3 on low side of normal  Manual urine slide noted for granular casts, RBCs (no obvious dysmorphia or RBC casts), WBCs (not in cast form), urine ctx w/o growth (but seems patient on abxs when obtained)    Renal ultrasound unremarkable, no hydro    ANCA MPO titer positive at 27    sCr at 3.3 (3.4 yesterday)     Plan/Recommendations:  1) follow up on studies outlined above  2) will collect and spin down urine for manual microscopy again  3) awaiting repeat UA & ctxs  4) may need renal biopsy, especially in light of the positive ANCA, stable sCr is a good sign, but will need to monitor closely and spin her urine again, regardless may try to set up bx Wednesday or Thursday   5) for now continue replacing fluids based on volume status and perceived losses and closely follow serial RFPs  days

## 2018-12-31 NOTE — PLAN OF CARE
Problem: Adult Inpatient Plan of Care  Goal: Plan of Care Review  Hx:  HTN, microcytic anemia, transaminitis, RA, hx of GI bleed     Dx: necrotizing fascitis     12/21: transfer to SICU, CT of chest/arm/knee, cultures; bilat UE washout/I&D; 1L crystalloid and 250 albumin in OR; LR bolus, cont. LR infusion started, PRBC's x2 units   12/22: LR bolusx3  12/23: LR bolusx1  12/24: 2 unit of PRBC total. OR for wound debridement. 2L LR bolus  12/25: LR continuous at 125. Nephrology consulted. 1L LR bolus   12/26: MRI of lower back, 1L LR bolus    Nursing:  SBP<180  Accucheck Q6                  POC reviewed and understood by pt. AAOx4. IV intact and patent. Pain managed by prn pain meds per MD order. Pt had debriedment surgery today.  Accuchecks Q6. Dextroze given at end of shift. Nyastin given for mouth sores  Wound vac to bilateral arms @ 125- intact. Left hand bleeding. No urine output all shift. MD aware. Pt refuses to be turned or repositioned. VSS on RM air. Max assist. No acute events at this time. Call bell within reach. Bed in lowest position. WCTM.

## 2018-12-31 NOTE — ASSESSMENT & PLAN NOTE
58 y.o. female 1 Day Post-Op for Procedure(s) (LRB):  DEBRIDEMENT, WOUND (Bilateral)  INSERTION, CATHETER, CENTRAL VENOUS, VORA (Right)    Severe soft tissue infection with joint involvement.  Continue abx, diet, PT/OT  Will change Wound Vac on Wednesday.  VIDYA worsening stable but decreased UOP, Caro placed.   Appreciate Nephrology recs of Lasix 120 mg BID and Bicarb tabs.   Hematology consult for INR of 5.4, likely from poor nutrition. Recommend optimizing nutrition and give 3 days of Vit K.   Daily Coags.   Culture growing MSSA on Cefazolin, ID recommended about 4-6 weeks.   Vora catheter placed yesterday for long term IV access.

## 2018-12-31 NOTE — ASSESSMENT & PLAN NOTE
Elevated PTT and INR on 12/30/18 with last known normal values 12/20/18.  Initial admit albumin 1.2 and now it is at 0.8.  Suspect coagulopathy multifactorial with profound malnutrition with vit K deficiency and secondary contribution with exposure to heparin.  Has been receiving heparin s.c. 12/22/18 - 12/30/18 (heparin chosen over lovenox due to ATN).   Last dose heparin 12/30/18 and repeat coags 12/31/18 have improved since after 3 units of FFP. Fibrinogen 445, D-dimer 2.2, and DIC score 4 making DIC in the setting of sepsis less likely.  No history of bruising or bleeding and with previously normal coagulation factors, acquired inhibitor extremely unlikely.  Reviewed CT C/A/P with contrast imaging 9/2018 that did not show any overt evidence of cirrhosis or splenomegaly.  Reviewed OSH records and suspicion microcytic anemia secondary to chronic GIB in the setting of NSAID use and EGD evidence of erosions as a potential source.  -Will check factor V and factor VIII.  If both low, would favor a profile of DIC  -Optimize nutrition  -Vitamin k 2.5 mg po x 3 days  -Check PTT and INR daily  -Resume DVT ppx when hemoglobin stable and not requiring transfusion  -Would reserve FFP transfusions prior to surgery or if visible active bleed causing hemodynamic instability or anemia requiring pRBC transfusions.    -Needs outpatient GI follow up with both EGD and colonoscopy for her anemia.

## 2018-12-31 NOTE — CONSULTS
"[facility]  Hematology/Oncology  Consult Note    Patient Name: Misty Khalil  MRN: [id number]  Admission Date: 12/20/2018  Hospital Length of Stay: 11 days  Code Status: Full Code   Attending Provider: Javier Kirk MD  Consulting Provider: Yusuf Mendoza MD  Primary Care Physician: JOEL Mccauley  Principal Problem:Soft tissue infection    Inpatient consult to Hematology  Consult performed by: Yusuf Mendoza MD  Consult ordered by: Kirstin Miller MD        Subjective:     HPI:  58 year old woman with past medical history of rheumatoid arthritis on chronic prednisone, HTN, and microcytic anemia who presented to OSH 12/17/18 with fevers and right shoulder pain and swelling.  12/18/18 she was taken to the OR for I&D and found to have multiple abscesses and was diagnosed with MSSA infection.  12/19/18 she had EGD done presumably due to microcytic anemia, which showed a stomach with "diffusely moderate erythematous changes throughout with erosive gastritis in prepyloric region with erosions but without christi ulceration.  Duodenum normal up to the second portion".  12/19/18 blood cultures positive for MSSA bacteremia.  Echo performed with suspicious mass but OSH BHARAT negative for endocarditis (OSH BHARAT reviewed by [facility] as well). Underwent knee aspiration 12/21/18 that also was positive for MSSA.  Underwent multiple procedures since transfer to [facility] as listed below.  Heme onc consulted for elevated INR.    12/21/18 Findings:  Arthroscopic irrigation right knee joint for septic arthritis Right wrist arthrotomy with irrigation for septic arthritis. Right knee with limited synovitis, grade II/III chondromalacia medial femoral condyle and tibial plateau.  Right wrist with purulence     12/21/18 1. Irrigation of necrotizing soft tissue infection bilateral upper extremities  2. Debridement of skin, soft tissue, muscle, fascia, bilateral upper extremities  KEY FINDINGS: Purulence coming from soft " tissues of bilateral arms, tracking into bilateral axilla      12/24/18 1. Irrigation bilateral upper extremity necrotizing soft tissue infection 2. Debridement skin, soft tissue, muscle, fascia bilateral upper extremities  KEY FINDINGS: Purulence coming from soft tissues of bilateral arms, tracking into bilateral axilla     12/27/18 1.  Intraoperative consultation for left septic shoulder. 2.  Irrigation and debridement of left septic shoulder. 3.  Irrigation and debridement of right hand.  Followed by Irrigation and debridement of bilateral upper extremities and wound VAC placement.    12/30/18 PROCEDURE PERFORMED: Placement of right subclavian brewster single lumen catheter.  Irrigation and debridement of bilateral upper extremities   and wound VAC placement.    12/20/18 PTT and INR WNL.  12/30/18 elevated PTT 53.7 and INR 5.8.    She reports hematuria since 7/2018 although at OSH there was suspicion for GI source given her microcytic anemia, for which she had EGD per above and colo with inadequate prep.  Best friend at bedside says she chronically has low appetite.  Patient reports low appetite but denies abdominal pain.  Reports hematuria she describes as bright red blood and denies passing clots.  She has had 2 children and denies history of complications with bleeding.  Denies easy bruising. Denies epistaxis, hemoptysis, hematemesis, hematochezia, or melena.  Walks with walker at baseline since her back surgery    Oncology Treatment Plan:   [No treatment plan]    Medications:  Continuous Infusions:  Scheduled Meds:   albuterol sulfate  2.5 mg Nebulization Q6H WAKE    busPIRone  15 mg Oral BID    ceFAZolin (ANCEF) IVPB  2 g Intravenous Q12H    doxazosin  2 mg Oral Daily    metoprolol tartrate  50 mg Oral BID    nicotine  1 patch Transdermal Daily    nystatin  500,000 Units Oral QID    silver nitrate applicators  5 applicator Topical (Top) Once    sodium bicarbonate  1,300 mg Oral BID     PRN  Meds:sodium chloride, ALPRAZolam, dextrose 50%, glucagon (human recombinant), HYDROmorphone, HYDROmorphone, insulin aspart U-100, ondansetron, ondansetron, oxyCODONE-acetaminophen, promethazine (PHENERGAN) IVPB, sodium chloride 0.9%, sodium chloride 0.9%, zolpidem     Review of patient's allergies indicates:  No Known Allergies     Past Medical History:   Diagnosis Date    Anxiety     Depression     GI bleed     Hypertension     Rheumatoid arthritis      Past Surgical History:   Procedure Laterality Date    APPLICATION, WOUND VAC x 2 Bilateral 2018    Performed by Javier Kirk MD at [facility] OR [address] [place] [address]    ARTHROSCOPY, KNEE Right 2018    Performed by Danilo Goyal MD at [facility] OR [address] [place] [address]    ARTHROSCOPY, KNEE, septic Right 2018    Performed by [facility] Kirk [facility] at [facility] OR [address] [place] [address]    BACK SURGERY       SECTION      COLONOSCOPY N/A 2018    Performed by [facility] at [facility] END[facility]    DEBRIDEMENT, WOUND Bilateral 2018    Performed by Javier Kirk MD at [facility] OR [address] [place] [address]    ESOPHAGOGASTRODUODENOSCOPY (EGD) N/A 2018    Performed by [facility]aver MD at [facility] ENDO    HYSTERECTOMY      INCISION AND DRAINAGE, UPPER EXTREMITY Bilateral 2018    [facility]    INCISION AND DRAINAGE, UPPER EXTREMITY Bilateral 2018    Performed by Javier Kirk MD at [facility] OR [address] [place] [address]    OOPHORECTOMY       Family History     Problem Relation (Age of Onset)    Breast cancer Paternal Grandmother    Diabetes Mother    Heart attack Mother, Father    Heart disease Mother    Hypertension Father    Ovarian cancer Sister        Tobacco Use    Smoking status: Current Every Day Smoker     Packs/day: 1.00     Types: Cigarettes    Smokeless tobacco: Never Used   Substance and Sexual Activity    Alcohol use: Yes     Comment: occasionally    Drug use: No    Sexual activity: No       Review of Systems   Constitutional:  Positive for fever and unexpected weight change. Negative for chills and diaphoresis.   HENT: Positive for mouth sores. Negative for congestion and sore throat.    Eyes: Negative for visual disturbance.   Respiratory: Positive for shortness of breath (When being moved around). Negative for cough, wheezing and stridor.    Cardiovascular: Negative for chest pain, palpitations and leg swelling.   Gastrointestinal: Positive for blood in stool (FOBT reportedly + at OSH) and constipation. Negative for abdominal distention, abdominal pain, diarrhea, nausea and vomiting.   Genitourinary: Positive for hematuria. Negative for dysuria, frequency and pelvic pain.   Musculoskeletal: Positive for arthralgias, back pain and myalgias.   Skin: Positive for wound. Negative for color change and rash.   Neurological: Positive for weakness and headaches. Negative for dizziness, tremors and facial asymmetry.   Psychiatric/Behavioral: Positive for dysphoric mood. Negative for agitation and confusion. The patient is nervous/anxious.      Objective:     Vital Signs (Most Recent):  Temp: 98.1 °F (36.7 °C) (12/31/18 1003)  Pulse: 76 (12/31/18 1003)  Resp: (!) 22 (12/31/18 1003)  BP: 97/61 (12/31/18 1003)  SpO2: 96 % (12/31/18 1003) Vital Signs (24h Range):  Temp:  [93.3 °F (34.1 °C)-98.2 °F (36.8 °C)] 98.1 °F (36.7 °C)  Pulse:  [69-87] 76  Resp:  [14-22] 22  SpO2:  [93 %-100 %] 96 %  BP: ()/(39-62) 97/61     Weight: 69.9 kg (154 lb)  Body mass index is 24.12 kg/m².  Body surface area is 1.82 meters squared.      Intake/Output Summary (Last 24 hours) at 12/31/2018 1041  Last data filed at 12/31/2018 0946  Gross per 24 hour   Intake 1050 ml   Output 370 ml   Net 680 ml       Physical Exam   Constitutional: She appears cachectic. She is cooperative. She is easily aroused. She has a sickly appearance. No distress.   HENT:   Head: Normocephalic and atraumatic.   Ulcer noted on tongue   Eyes: Conjunctivae and EOM are normal. Pupils are equal,  round, and reactive to light. No scleral icterus.   Neck: Normal range of motion. Neck supple. No tracheal deviation present.   Cardiovascular: Normal rate and regular rhythm.   No murmur heard.  Pulmonary/Chest: Effort normal and breath sounds normal. No respiratory distress. She has no wheezes. She has no rales.   Abdominal: Soft. Bowel sounds are normal. She exhibits distension. She exhibits no mass. There is no tenderness. There is no rebound and no guarding.   Musculoskeletal: She exhibits edema (3+ pitting BLE). She exhibits no tenderness.   Upper extremity wound vac     Neurological: She is alert and easily aroused. No cranial nerve deficit.   Slow to respond to questions   Skin: Skin is warm and dry. No pallor.       Significant Labs:   CBC:   Recent Labs   Lab 12/30/18 0451 12/30/18  2301 12/31/18  0610   WBC 5.54 7.39 7.70   HGB 6.3* 6.5* 7.9*   HCT 20.5* 20.2* 24.3*    166 181   , CMP:   Recent Labs   Lab 12/30/18  0451 12/30/18  2301 12/31/18  0610    137 138   K 5.1 5.3* 5.7*    107 108   CO2 19* 18* 17*   GLU 74 65* 62*   BUN 78* 83* 84*   CREATININE 3.1* 3.4* 3.3*   CALCIUM 7.0* 7.2* 7.1*   PROT 3.7* 3.7* 3.7*   ALBUMIN 0.8* 0.8* 0.8*   BILITOT 0.1 0.1 0.1   ALKPHOS 105 103 98   AST 9* 14 11   ALT <5* <5* <5*   ANIONGAP 9 12 13   EGFRNONAA 15.9* 14.2* 14.7*    and Coagulation:   Recent Labs   Lab 12/30/18  2301 12/31/18  0015 12/31/18  0838   INR 5.8* 5.6* 3.9*   APTT  --  53.7* 52.1*       Diagnostic Results:  None    Assessment/Plan:     Abnormal coagulation profile    Elevated PTT and INR on 12/30/18 with last known normal values 12/20/18.  Initial admit albumin 1.2 and now it is at 0.8.  Suspect coagulopathy multifactorial with profound malnutrition with vit K deficiency and secondary contribution with exposure to heparin.  Has been receiving heparin s.c. 12/22/18 - 12/30/18 (heparin chosen over lovenox due to ATN).   Last dose heparin 12/30/18 and repeat coags 12/31/18 have  improved since after 3 units of FFP. Fibrinogen 445, D-dimer 2.2, and DIC score 4 making DIC in the setting of sepsis less likely.  No history of bruising or bleeding and with previously normal coagulation factors, acquired inhibitor extremely unlikely.  Reviewed CT C/A/P with contrast imaging 9/2018 that did not show any overt evidence of cirrhosis or splenomegaly.  Reviewed OSH records and suspicion microcytic anemia secondary to chronic GIB in the setting of NSAID use and EGD evidence of erosions as a potential source.  -Will check factor V and factor VIII.  If both low, would favor a profile of DIC  -Optimize nutrition  -Vitamin k 2.5 mg po x 3 days  -Check PTT and INR daily  -Resume DVT ppx when hemoglobin stable and not requiring transfusion  -Would reserve FFP transfusions prior to surgery or if visible active bleed causing hemodynamic instability or anemia requiring pRBC transfusions.    -Needs outpatient GI follow up with both EGD and colonoscopy for her anemia.         Thank you for your consult. I will sign off. Please contact us if you have any additional questions.    Yusuf Mendoza MD  Hematology/Oncology  [facility]      I have reviewed the notes, assessments, and/or procedures performed by the housestaff, as above.  I have personally interviewed and examined the patient at the beside, and rounded with the housestaff. I concur with her/his assessment and plan and the documentation of Misty Khalil.  I, Dr. Ankush Seth, personally spent more than 70 mins during this encounter, greater than 50% was spent in direct counseling and/or coordination of care.     Ankush Seth M.D., M.S., F.A.C.P.  Hematology/Oncology Attending  [facility]

## 2018-12-31 NOTE — PROGRESS NOTES
[facility]  General Surgery  Progress Note    Subjective:     History of Present Illness:  No notes on file    Post-Op Info:  Procedure(s) (LRB):  DEBRIDEMENT, WOUND (Bilateral)  INSERTION, CATHETER, CENTRAL VENOUS, VORA (Right)   1 Day Post-Op     Interval History:     Wound VAC changed yesterday. Patient with low H&H requiring x2 pRBC, responded well to transfusion. Was found to have INR of 5.2, given 1 FFP with improvement. Hematology consulted. Low UOP, howard placed and Nephrology consulted. K of 5.7 given Insulin 10 with D50. Wound Vacs with 150 and 175 cc last 24 hours.     Medications:  Continuous Infusions:  Scheduled Meds:   albuterol sulfate  2.5 mg Nebulization Q6H WAKE    busPIRone  15 mg Oral BID    ceFAZolin (ANCEF) IVPB  2 g Intravenous Q12H    doxazosin  2 mg Oral Daily    furosemide  120 mg Intravenous BID    metoprolol tartrate  50 mg Oral BID    nicotine  1 patch Transdermal Daily    nystatin  500,000 Units Oral QID    [START ON 1/1/2019] phytonadione  2.5 mg Oral Daily    silver nitrate applicators  5 applicator Topical (Top) Once    sodium bicarbonate  1,300 mg Oral BID     PRN Meds:sodium chloride, ALPRAZolam, dextrose 50%, glucagon (human recombinant), HYDROmorphone, HYDROmorphone, insulin aspart U-100, ondansetron, ondansetron, oxyCODONE-acetaminophen, promethazine (PHENERGAN) IVPB, sodium chloride 0.9%, sodium chloride 0.9%, zolpidem     Review of patient's allergies indicates:  No Known Allergies  Objective:     Vital Signs (Most Recent):  Temp: 97.8 °F (36.6 °C) (12/31/18 1245)  Pulse: 72 (12/31/18 1245)  Resp: 20 (12/31/18 1245)  BP: (!) 131/53 (12/31/18 1245)  SpO2: 97 % (12/31/18 1245) Vital Signs (24h Range):  Temp:  [93.3 °F (34.1 °C)-98.2 °F (36.8 °C)] 97.8 °F (36.6 °C)  Pulse:  [69-87] 72  Resp:  [14-22] 20  SpO2:  [93 %-100 %] 97 %  BP: ()/(39-81) 131/53     Weight: 69.9 kg (154 lb)  Body mass index is 24.12 kg/m².    Intake/Output - Last 3  Shifts       12/29 0700 - 12/30 0659 12/30 0700 - 12/31 0659 12/31 0700 - 01/01 0659    P.O. 475      I.V. (mL/kg)  900 (12.9)     Blood  700 350    Total Intake(mL/kg) 475 (6.8) 1600 (22.9) 350 (5)    Urine (mL/kg/hr) 126 (0.1) 45 (0) 150 (0.2)    Emesis/NG output 0      Other 500 325 30    Stool 0  0    Blood 0      Total Output 626 370 180    Net -151 +1230 +170           Urine Occurrence 1 x      Stool Occurrence 0 x  0 x    Emesis Occurrence 0 x            Physical Exam     General: In no acute distress, well appearance.   CV: RRR  Pulm: non labored breathing, b/l clear breath sounds.   Ext: Bilateral extremities with wound vac in place. SS fluid.       Significant Labs:  CBC:   Recent Labs   Lab 12/31/18  0610   WBC 7.70   RBC 2.87*   HGB 7.9*   HCT 24.3*      MCV 85   MCH 27.5   MCHC 32.5     CMP:   Recent Labs   Lab 12/31/18  0610   GLU 62*   CALCIUM 7.1*   ALBUMIN 0.8*   PROT 3.7*      K 5.7*   CO2 17*      BUN 84*   CREATININE 3.3*   ALKPHOS 98   ALT <5*   AST 11   BILITOT 0.1           Assessment/Plan:     * Soft tissue infection    58 y.o. female 1 Day Post-Op for Procedure(s) (LRB):  DEBRIDEMENT, WOUND (Bilateral)  INSERTION, CATHETER, CENTRAL VENOUS, VORA (Right)    Severe soft tissue infection with joint involvement.  Continue abx, diet, PT/OT  Will change Wound Vac on Wednesday.  VIDYA worsening stable but decreased UOP, Caro placed.   Appreciate Nephrology recs of Lasix 120 mg BID and Bicarb tabs.   Hematology consult for INR of 5.4, likely from poor nutrition. Recommend optimizing nutrition and give 3 days of Vit K.   Daily Coags.   Culture growing MSSA on Cefazolin, ID recommended about 4-6 weeks.   Vora catheter placed yesterday for long term IV access.          Sonia Qureshi MD  General Surgery PGY V  Beeper: [phone]

## 2018-12-31 NOTE — PHYSICIAN QUERY
"PT Name: Misty Khalil  MR #: [id number]  Physician Query Form - Procedure Clarification     Winnie Oconnor RN, CCDS  Desk # [phone]; sridhar # [phone] [email or web address]    This form is a permanent document in the medical record.     Query Date: December 31, 2018  By submitting this query, we are merely seeking further clarification of documentation. Please utilize your independent clinical judgment when addressing the question(s) below.       The Medical record contains the following:    Indicators    Supporting Clinical Findings Location in Medical Record     Documentation of "Debridement" DATE OF PROCEDURE:  12/27/2018  PROCEDURES PERFORMED:  1.  Intraoperative consultation for left septic shoulder.  2.  Irrigation and debridement of left septic shoulder.  3.  Irrigation and debridement of right hand.  Please note the sizes for irrigation and debridement of left shoulder is approximately 6 x 4 x 10 cm2 involving skin, subcutaneous tissue, muscle and bone.  Irrigation and debridement of left hand is skin, subcutaneous tissue, muscle and tendons involving approximately 4 x 6 x 2 cm2.    Op Note 12/27     Documentation of "I & D"         EBL =         Other: it was determined left shoulder joint was exposed   The tendon for the long head of the biceps as well as the intertubercular groove had undergone liquefactive necrosis.  The tendon was no longer viable.  This tendon tracked in a necrotic fashion all the way to the level of the labrum.  The entirety of the rotator ff was detached.  The rotator interval was no longer present.  The subscap appeared to be partially intact; however, a significant amount of superior tearing of the subscapularis was present.  The rotator cuff had released in an en bloc fashion from the head of the humerus and was visualized.       It was friable, but not necrotic and therefore, it was not debrided in its entirety.     The deltoid and subacromial bursa had a significant " "amount of nonviable tissue present.  This was debrided with a combination of a lap sponge as well as a rongeur.      Excisional debridement is a surgical removal of  nonvitalized tissue, necrosis or slough. The use of a sharp instrument does not always indicate that an excisional debridement was performed.  Non excisional debridement is the scraping, washing, irrigating, brushing away or removal of loose tissue fragments.  Provider, please specify type of procedure(s) performed:      Please specify type of debridement performed for  "Left Shoulder"  on OP Note 12/27:     [  x  ]  Excisional Debridement     [  x  ]  Non-excisional Debridement   [    ] Other Procedure (Specify) ________   [  ] Clinically Undetermined     Combination of sharp debridement and non excisional debridement      "

## 2018-12-31 NOTE — PHYSICIAN QUERY
"PT Name: Misty Khalil  MR #: [id number]    Physician Query Form - Procedure Clarification     Winnie Oconnor RN, CCDS  Desk # [phone]; sridhar # [phone] [email or web address]      This form is a permanent document in the medical record.     Query Date: December 31, 2018  By submitting this query, we are merely seeking further clarification of documentation. Please utilize your independent clinical judgment when addressing the question(s) below.    The Medical record contains the following:     Indicators   Supporting Clinical Findings Location in Medical Record   x Documentation of "Debridement"   PROCEDURES PERFORMED:  1. Irrigation bilateral upper extremity necrotizing soft tissue infection  2. Debridement skin, soft tissue, muscle, fascia bilateral upper extremities    An area of non-viable skin, soft tissue, muscle and fascia was removed from the left upper extremity lateral to the previous wound.  We then irrigated the wounds bilaterally.   Reagan Op Note 12/24/18; file date 12/29    Documentation of "I & D"      EBL =      Other:       Excisional debridement is a surgical removal of  nonvitalized tissue, necrosis or slough. The use of a sharp instrument does not always indicate that an excisional debridement was performed.  Non excisional debridement is the scraping, washing, irrigating, brushing away or removal of loose tissue fragments.    Provider, please specify type of procedure(s) performed: Left Upper Extremity (Procedure Date 12/24; file date 12/29)    [   x ]  Excisional Debridement (Specify site and depth of tissue removed)   [    ]  Non-excisional Debridement   [    ] Other Procedure (Specify) ________   [  ] Clinically Undetermined           "

## 2018-12-31 NOTE — PLAN OF CARE
PT NOT MEDICALLY READY FOR DISCHARGE     12/31/18 3032   Discharge Reassessment   Assessment Type Discharge Planning Reassessment   Provided patient/caregiver education on the expected discharge date and the discharge plan No   Do you have any problems affording any of your prescribed medications? No

## 2018-12-31 NOTE — PHYSICIAN QUERY
"PT Name: Misty Khalil  MR #: [id number]     Physician Query Form - Documentation Clarification      Winnie Oconnor RN, CCDS  Desk # [phone]; sridhar # [phone] [email or web address]      This form is a permanent document in the medical record.     Query Date: December 31, 2018    By submitting this query, we are merely seeking further clarification of documentation. Please utilize your independent clinical judgment when addressing the question(s) below.    The Medical record reflects the following:    Supporting Clinical Findings Location in Medical Record   left elbow was then once again evaluated at the posterior wound.       This was debrided with a combination of lap sponge and rongeur.       Once again, there was found to be no drainage from the elbow or violation of the deep tissues and the joint capsule to the elbow and, therefore,  aspiration was deferred.    Op Note 12/27   Excisional Debridement  Subcutaneous Tissue/Fascia  Muscle  Tendon  Bone MD Query 12/28/18   Doctor, Please specify diagnosis or diagnoses associated with above clinical findings.         Please specify Left Elbow "bone" that was excisionally debrided.     Provider Use Only      (  ) Ulna   (x  ) Olecranon process with Rongeur  (  ) Radius  (  ) Humerus  (  ) Other - specify: __________________                                                                                                               [  ] Clinically Undetermined               "

## 2018-12-31 NOTE — PHYSICIAN QUERY
"PT Name: Misty Khalil  MR #: [id number]    Physician Query Form - Procedure Clarification     Winnie Oconnor RN, CCDS  Desk # [phone]; sridhar # [phone] [email or web address]    This form is a permanent document in the medical record.     Query Date: December 31, 2018  By submitting this query, we are merely seeking further clarification of documentation. Please utilize your independent clinical judgment when addressing the question(s) below.    The Medical record contains the following:     Indicators    Supporting Clinical Findings Location in Medical Record    Documentation of "Debridement"   The elbow, forearm, and dorsal surface of hand wounds appeared to have good granulation tissue with no evidence of necrotic tissue. Tendons still remain exposed on hand.     The right arm wound had evidence of necrotic tissue that was debrided, including skin and soft tissue. Reagan/Dania Op Note 12/30    Documentation of "I & D"      EBL =      Other:       Excisional debridement is a surgical removal of  nonvitalized tissue, necrosis or slough. The use of a sharp instrument does not always indicate that an excisional debridement was performed.  Non excisional debridement is the scraping, washing, irrigating, brushing away or removal of loose tissue fragments.    Provider, please specify type of procedure(s) performed:  "Right arm wound" (Op Note 12/30)    [   x ]  Excisional Debridement (Specify site and depth of tissue removed)   [    ]  Non-excisional Debridement   [    ] Other Procedure (Specify) ________   [  ] Clinically Undetermined           "

## 2018-12-31 NOTE — SIGNIFICANT EVENT
Artificial Intelligence Notificaton      Admit Date: 2018  LOS: 11  Code Status: Full Code   Date of Consult: 2018  : 1960  Age: 58 y.o.  Weight:   Wt Readings from Last 1 Encounters:   18 69.9 kg (154 lb)     Sex: female  Bed: [facility]9/[facility] A:   MRN: [id number]  Attending Physician: Javier Kirk MD  Primary Service: Networked reference to record PCT   Time AI Alert Received: 11:30  Time at Bedside: 16:40           Patient found resting in bed, alert, oriented to person and place. Complaining of pain to bilat UE's where wounds/woundvacs are placed. Chart reviewed, hemodynamics stable. VIDYA with hyperkalemia noted. Nephrology following, s/p shifting with D50/insulin and just received lasix. I discussed AI with Dr. Erin Dolan, primary team, and recommending following up on urine output, repeating renal function panel, obtaining EKG to evaluate for changes given hyperkalemia and intervening as necessary. Please notify MICU team if consult requested.       Vital Signs (Most Recent):  Temp: 98.7 °F (37.1 °C) (18 1624)  Pulse: 76 (18 1624)  Resp: 18 (18 1624)  BP: 106/63 (18 1624)  SpO2: 97 % (18 1624) Vital Signs (24h Range):  Temp:  [93.3 °F (34.1 °C)-98.7 °F (37.1 °C)] 98.7 °F (37.1 °C)  Pulse:  [69-87] 76  Resp:  [14-22] 18  SpO2:  [93 %-100 %] 97 %  BP: ()/(39-81) 106/63         This is an  Artificial Intelligence Notification.     Artificial Intelligence alert discussed with Primary team:  Dr. Dolan    Please place a Critical Care consult if evaluation/consultation is needed  The Critical Care Fellow can be reached at [phone]

## 2018-12-31 NOTE — SUBJECTIVE & OBJECTIVE
Oncology Treatment Plan:   [No treatment plan]    Medications:  Continuous Infusions:  Scheduled Meds:   albuterol sulfate  2.5 mg Nebulization Q6H WAKE    busPIRone  15 mg Oral BID    ceFAZolin (ANCEF) IVPB  2 g Intravenous Q12H    doxazosin  2 mg Oral Daily    metoprolol tartrate  50 mg Oral BID    nicotine  1 patch Transdermal Daily    nystatin  500,000 Units Oral QID    silver nitrate applicators  5 applicator Topical (Top) Once    sodium bicarbonate  1,300 mg Oral BID     PRN Meds:sodium chloride, ALPRAZolam, dextrose 50%, glucagon (human recombinant), HYDROmorphone, HYDROmorphone, insulin aspart U-100, ondansetron, ondansetron, oxyCODONE-acetaminophen, promethazine (PHENERGAN) IVPB, sodium chloride 0.9%, sodium chloride 0.9%, zolpidem     Review of patient's allergies indicates:  No Known Allergies     Past Medical History:   Diagnosis Date    Anxiety     Depression     GI bleed     Hypertension     Rheumatoid arthritis      Past Surgical History:   Procedure Laterality Date    APPLICATION, WOUND VAC x 2 Bilateral 2018    Performed by Javier Kirk MD at [facility] OR [address] [place] [address]    ARTHROSCOPY, KNEE Right 2018    Performed by Danilo Goyal MD at [facility] OR [address] [place] [address]    ARTHROSCOPY, KNEE, septic Right 2018    Performed by Javier Kirk MD at [facility] OR [address] [place] [address]    BACK SURGERY       SECTION      COLONOSCOPY N/A 2018    Performed by Brian Shaver MD at [facility] ENDO    DEBRIDEMENT, WOUND Bilateral 2018    Performed by Javier Kirk MD at [facility] OR [address] [place] [address]    ESOPHAGOGASTRODUODENOSCOPY (EGD) N/A 2018    Performed by Brian Shaver MD at [facility] ENDO    HYSTERECTOMY      INCISION AND DRAINAGE, UPPER EXTREMITY Bilateral 2018    Performed by Javier Kirk MD at [facility] OR [address] [place] [address]    INCISION AND DRAINAGE, UPPER EXTREMITY Bilateral 2018    Performed by Javier Kirk MD at [facility] OR [address] [place] [address]    OOPHORECTOMY       Family History      Problem Relation (Age of Onset)    Breast cancer Paternal Grandmother    Diabetes Mother    Heart attack Mother, Father    Heart disease Mother    Hypertension Father    Ovarian cancer Sister        Tobacco Use    Smoking status: Current Every Day Smoker     Packs/day: 1.00     Types: Cigarettes    Smokeless tobacco: Never Used   Substance and Sexual Activity    Alcohol use: Yes     Comment: occasionally    Drug use: No    Sexual activity: No       Review of Systems   Constitutional: Positive for fever and unexpected weight change. Negative for chills and diaphoresis.   HENT: Positive for mouth sores. Negative for congestion and sore throat.    Eyes: Negative for visual disturbance.   Respiratory: Positive for shortness of breath (When being moved around). Negative for cough, wheezing and stridor.    Cardiovascular: Negative for chest pain, palpitations and leg swelling.   Gastrointestinal: Positive for blood in stool (FOBT reportedly + at OSH) and constipation. Negative for abdominal distention, abdominal pain, diarrhea, nausea and vomiting.   Genitourinary: Positive for hematuria. Negative for dysuria, frequency and pelvic pain.   Musculoskeletal: Positive for arthralgias, back pain and myalgias.   Skin: Positive for wound. Negative for color change and rash.   Neurological: Positive for weakness and headaches. Negative for dizziness, tremors and facial asymmetry.   Psychiatric/Behavioral: Positive for dysphoric mood. Negative for agitation and confusion. The patient is nervous/anxious.      Objective:     Vital Signs (Most Recent):  Temp: 98.1 °F (36.7 °C) (12/31/18 1003)  Pulse: 76 (12/31/18 1003)  Resp: (!) 22 (12/31/18 1003)  BP: 97/61 (12/31/18 1003)  SpO2: 96 % (12/31/18 1003) Vital Signs (24h Range):  Temp:  [93.3 °F (34.1 °C)-98.2 °F (36.8 °C)] 98.1 °F (36.7 °C)  Pulse:  [69-87] 76  Resp:  [14-22] 22  SpO2:  [93 %-100 %] 96 %  BP: ()/(39-62) 97/61     Weight: 69.9 kg (154 lb)  Body mass index is  24.12 kg/m².  Body surface area is 1.82 meters squared.      Intake/Output Summary (Last 24 hours) at 12/31/2018 1041  Last data filed at 12/31/2018 0946  Gross per 24 hour   Intake 1050 ml   Output 370 ml   Net 680 ml       Physical Exam   Constitutional: She appears cachectic. She is cooperative. She is easily aroused. She has a sickly appearance. No distress.   HENT:   Head: Normocephalic and atraumatic.   Ulcer noted on tongue   Eyes: Conjunctivae and EOM are normal. Pupils are equal, round, and reactive to light. No scleral icterus.   Neck: Normal range of motion. Neck supple. No tracheal deviation present.   Cardiovascular: Normal rate and regular rhythm.   No murmur heard.  Pulmonary/Chest: Effort normal and breath sounds normal. No respiratory distress. She has no wheezes. She has no rales.   Abdominal: Soft. Bowel sounds are normal. She exhibits distension. She exhibits no mass. There is no tenderness. There is no rebound and no guarding.   Musculoskeletal: She exhibits edema (3+ pitting BLE). She exhibits no tenderness.   Upper extremity wound vac     Neurological: She is alert and easily aroused. No cranial nerve deficit.   Slow to respond to questions   Skin: Skin is warm and dry. No pallor.       Significant Labs:   CBC:   Recent Labs   Lab 12/30/18  0451 12/30/18  2301 12/31/18  0610   WBC 5.54 7.39 7.70   HGB 6.3* 6.5* 7.9*   HCT 20.5* 20.2* 24.3*    166 181   , CMP:   Recent Labs   Lab 12/30/18  0451 12/30/18  2301 12/31/18  0610    137 138   K 5.1 5.3* 5.7*    107 108   CO2 19* 18* 17*   GLU 74 65* 62*   BUN 78* 83* 84*   CREATININE 3.1* 3.4* 3.3*   CALCIUM 7.0* 7.2* 7.1*   PROT 3.7* 3.7* 3.7*   ALBUMIN 0.8* 0.8* 0.8*   BILITOT 0.1 0.1 0.1   ALKPHOS 105 103 98   AST 9* 14 11   ALT <5* <5* <5*   ANIONGAP 9 12 13   EGFRNONAA 15.9* 14.2* 14.7*    and Coagulation:   Recent Labs   Lab 12/30/18  2301 12/31/18  0015 12/31/18  0838   INR 5.8* 5.6* 3.9*   APTT  --  53.7* 52.1*        Diagnostic Results:  None

## 2018-12-31 NOTE — SUBJECTIVE & OBJECTIVE
Interval History:     Wound VAC changed yesterday. Patient with low H&H requiring x2 pRBC, responded well to transfusion. Was found to have INR of 5.2, given 1 FFP with improvement. Hematology consulted. Low UOP, howard placed and Nephrology consulted. K of 5.7 given Insulin 10 with D50. Wound Vacs with 150 and 175 cc last 24 hours.     Medications:  Continuous Infusions:  Scheduled Meds:   albuterol sulfate  2.5 mg Nebulization Q6H WAKE    busPIRone  15 mg Oral BID    ceFAZolin (ANCEF) IVPB  2 g Intravenous Q12H    doxazosin  2 mg Oral Daily    furosemide  120 mg Intravenous BID    metoprolol tartrate  50 mg Oral BID    nicotine  1 patch Transdermal Daily    nystatin  500,000 Units Oral QID    [START ON 1/1/2019] phytonadione  2.5 mg Oral Daily    silver nitrate applicators  5 applicator Topical (Top) Once    sodium bicarbonate  1,300 mg Oral BID     PRN Meds:sodium chloride, ALPRAZolam, dextrose 50%, glucagon (human recombinant), HYDROmorphone, HYDROmorphone, insulin aspart U-100, ondansetron, ondansetron, oxyCODONE-acetaminophen, promethazine (PHENERGAN) IVPB, sodium chloride 0.9%, sodium chloride 0.9%, zolpidem     Review of patient's allergies indicates:  No Known Allergies  Objective:     Vital Signs (Most Recent):  Temp: 97.8 °F (36.6 °C) (12/31/18 1245)  Pulse: 72 (12/31/18 1245)  Resp: 20 (12/31/18 1245)  BP: (!) 131/53 (12/31/18 1245)  SpO2: 97 % (12/31/18 1245) Vital Signs (24h Range):  Temp:  [93.3 °F (34.1 °C)-98.2 °F (36.8 °C)] 97.8 °F (36.6 °C)  Pulse:  [69-87] 72  Resp:  [14-22] 20  SpO2:  [93 %-100 %] 97 %  BP: ()/(39-81) 131/53     Weight: 69.9 kg (154 lb)  Body mass index is 24.12 kg/m².    Intake/Output - Last 3 Shifts       12/29 0700 - 12/30 0659 12/30 0700 - 12/31 0659 12/31 0700 - 01/01 0659    P.O. 475      I.V. (mL/kg)  900 (12.9)     Blood  700 350    Total Intake(mL/kg) 475 (6.8) 1600 (22.9) 350 (5)    Urine (mL/kg/hr) 126 (0.1) 45 (0) 150 (0.2)    Emesis/NG output 0       Other 500 325 30    Stool 0  0    Blood 0      Total Output 626 370 180    Net -151 +1230 +170           Urine Occurrence 1 x      Stool Occurrence 0 x  0 x    Emesis Occurrence 0 x            Physical Exam     General: In no acute distress, well appearance.   CV: RRR  Pulm: non labored breathing, b/l clear breath sounds.   Ext: Bilateral extremities with wound vac in place. SS fluid.       Significant Labs:  CBC:   Recent Labs   Lab 12/31/18  0610   WBC 7.70   RBC 2.87*   HGB 7.9*   HCT 24.3*      MCV 85   MCH 27.5   MCHC 32.5     CMP:   Recent Labs   Lab 12/31/18  0610   GLU 62*   CALCIUM 7.1*   ALBUMIN 0.8*   PROT 3.7*      K 5.7*   CO2 17*      BUN 84*   CREATININE 3.3*   ALKPHOS 98   ALT <5*   AST 11   BILITOT 0.1

## 2018-12-31 NOTE — NURSING
Pt. Not having any urine output in howard since 7 am. Bladder scan completed with 198ml of urine retained. Flushed howard with water, 60ml of u/o. Primary team paged.

## 2018-12-31 NOTE — SUBJECTIVE & OBJECTIVE
Review of patient's allergies indicates:  No Known Allergies  Current Facility-Administered Medications   Medication Frequency    0.9%  NaCl infusion (for blood administration) Q24H PRN    albuterol sulfate nebulizer solution 2.5 mg Q6H WAKE    ALPRAZolam tablet 1 mg BID PRN    busPIRone tablet 15 mg BID    ceFAZolin injection 2 g Q12H    dextrose 50% injection 12.5 g PRN    dextrose 50% injection 25 g Once    doxazosin tablet 2 mg Daily    glucagon (human recombinant) injection 1 mg PRN    HYDROmorphone injection 0.2 mg Q5 Min PRN    HYDROmorphone injection 1 mg Q2H PRN    insulin aspart U-100 pen 0-5 Units Q6H PRN    insulin regular injection 10 Units Once    metoprolol tartrate (LOPRESSOR) tablet 50 mg BID    nicotine 14 mg/24 hr 1 patch Daily    nystatin 100,000 unit/mL suspension 500,000 Units QID    ondansetron injection 4 mg Q12H PRN    ondansetron injection 4 mg Once PRN    oxyCODONE-acetaminophen  mg per tablet 1 tablet Q4H PRN    promethazine (PHENERGAN) 6.25 mg in dextrose 5 % 50 mL IVPB Q6H PRN    silver nitrate applicators applicator 5 applicator Once    sodium bicarbonate tablet 1,300 mg BID    sodium chloride 0.9% flush 3 mL PRN    sodium chloride 0.9% flush 3 mL PRN    zolpidem tablet 5 mg Nightly PRN       Objective:     Vital Signs (Most Recent):  Temp: 97.8 °F (36.6 °C) (12/31/18 1245)  Pulse: 72 (12/31/18 1245)  Resp: 20 (12/31/18 1245)  BP: (!) 131/53 (12/31/18 1245)  SpO2: 97 % (12/31/18 1245)  O2 Device (Oxygen Therapy): nasal cannula (12/31/18 1245) Vital Signs (24h Range):  Temp:  [93.3 °F (34.1 °C)-98.2 °F (36.8 °C)] 97.8 °F (36.6 °C)  Pulse:  [69-87] 72  Resp:  [14-22] 20  SpO2:  [93 %-100 %] 97 %  BP: ()/(39-81) 131/53     Weight: 69.9 kg (154 lb) (12/21/18 1145)  Body mass index is 24.12 kg/m².  Body surface area is 1.82 meters squared.    I/O last 3 completed shifts:  In: 1675 [P.O.:75; I.V.:900; Blood:700]  Out: 720 [Urine:145;  Other:575]    Physical Exam   HENT:   Head: Atraumatic.   Neck: No JVD present.   Cardiovascular: Normal rate and regular rhythm.   Pulmonary/Chest: Effort normal and breath sounds normal.   Abdominal: Soft. She exhibits no distension.   Musculoskeletal: She exhibits edema. She exhibits no tenderness.   Neurological: She is alert.   Skin: Skin is warm.

## 2018-12-31 NOTE — PLAN OF CARE
Problem: Adult Inpatient Plan of Care  Goal: Plan of Care Review  Hx:  HTN, microcytic anemia, transaminitis, RA, hx of GI bleed     Dx: necrotizing fascitis     12/21: transfer to SICU, CT of chest/arm/knee, cultures; bilat UE washout/I&D; 1L crystalloid and 250 albumin in OR; LR bolus, cont. LR infusion started, PRBC's x2 units   12/22: LR bolusx3  12/23: LR bolusx1  12/24: 2 unit of PRBC total. OR for wound debridement. 2L LR bolus  12/25: LR continuous at 125. Nephrology consulted. 1L LR bolus   12/26: MRI of lower back, 1L LR bolus    Nursing:  SBP<180  Accucheck Q6                  Outcome: Ongoing (interventions implemented as appropriate)  POC reviewed and understood by pt. AAOx4. IV intact and patent. Wound vac to bilateral upper extremities at 125. Howard placed for MD order due to zero urine output throughout day-shift.The howard has put out 20mL since 2300.  RN could not get an oral or axillary temp on the pt. Rectal temp showed 93 degrees. Bear hugger placed on pt to warm setting. Pt temp is now 97.5 degrees. 1 unit of RBCs given with a fluid warmer. SpO2 went from 100 to 93. Placed the pt on 2L NC per MD order. Tele and continuous pulse ox placed on pt. Pt blood glucose was 50- IV dextrose given and blood glucose increased to 80. Pt's left hand was saturated in blood through the kurlex wrap- MD Sims notified. MD came to bedside and placed silvadene ointment and reinforced dressing- the bleeding has stopped since. The pt refuses to be moved or turned. Boots on and pillows used to reposition the pt. Mepelex applied to sacrum. 3+ Max assist to move/turn the pt. Pt complains of constant pain at a 10- PRN pain meds given to manage pain. Call light within reach. Bed in lowest position. WCTM.

## 2018-12-31 NOTE — HPI
"58 year old woman with past medical history of rheumatoid arthritis on chronic prednisone, HTN, and microcytic anemia who presented to OSH 12/17/18 with fevers and right shoulder pain and swelling.  12/18/18 she was taken to the OR for I&D and found to have multiple abscesses and was diagnosed with MSSA infection.  12/19/18 she had EGD done presumably due to microcytic anemia, which showed a stomach with "diffusely moderate erythematous changes throughout with erosive gastritis in prepyloric region with erosions but without christi ulceration.  Duodenum normal up to the second portion".  12/19/18 blood cultures positive for MSSA bacteremia.  Echo performed with suspicious mass but OSH BHARAT negative for endocarditis (OS BHARAT reviewed by [facility] cardiology as well). Underwent knee aspiration 12/21/18 that also was positive for MSSA.  Underwent multiple procedures since transfer to [facility] as listed below.  Heme onc consulted for elevated INR.    12/21/18 Findings: Arthroscopic irrigation right knee joint for septic arthritis Right wrist arthrotomy with irrigation for septic arthritis. Right knee with limited synovitis, grade II/III chondromalacia medial femoral condyle and tibial plateau.  Right wrist with purulence     12/21/18 1. Irrigation of necrotizing soft tissue infection bilateral upper extremities  2. Debridement of skin, soft tissue, muscle, fascia, bilateral upper extremities  KEY FINDINGS: Purulence coming from soft tissues of bilateral arms, tracking into bilateral axilla      12/24/18 1. Irrigation bilateral upper extremity necrotizing soft tissue infection 2. Debridement skin, soft tissue, muscle, fascia bilateral upper extremities  KEY FINDINGS: Purulence coming from soft tissues of bilateral arms, tracking into bilateral axilla     12/27/18 1.  Intraoperative consultation for left septic shoulder. 2.  Irrigation and debridement of left septic shoulder. 3.  Irrigation and debridement of right hand.  Followed by " Irrigation and debridement of bilateral upper extremities and wound VAC placement.    12/30/18 PROCEDURE PERFORMED: Placement of right subclavian brewster single lumen catheter.  Irrigation and debridement of bilateral upper extremities   and wound VAC placement.    12/20/18 PTT and INR WNL.  12/30/18 elevated PTT 53.7 and INR 5.8.    She reports hematuria since 7/2018 although at OSH there was suspicion for GI source given her microcytic anemia, for which she had EGD per above and colo with inadequate prep.  Best friend at bedside says she chronically has low appetite.  Patient reports low appetite but denies abdominal pain.  Reports hematuria she describes as bright red blood and denies passing clots.  She has had 2 children and denies history of complications with bleeding.  Denies easy bruising. Denies epistaxis, hemoptysis, hematemesis, hematochezia, or melena.  Walks with walker at baseline since her back surgery

## 2018-12-31 NOTE — PLAN OF CARE
Recommendations    Recommendation/Intervention:     1. Continue Renal diet as tolerated.   2. Recommend Novasource with meals.   3. Encourage po intake.   4. RD following.     Goals: meet >85% EEN/EPN  Nutrition Goal Status: new

## 2018-12-31 NOTE — PHYSICIAN QUERY
"PT Name: Misty Khalil  MR #: [id number]    Physician Query Form - Hematology Clarification      Winnie Oconnor RN, CCDS  Desk # [phone]; [facility] # [phone] [email or web address]      This form is a permanent document in the medical record.      Query Date: December 31, 2018    By submitting this query, we are merely seeking further clarification of documentation. Please utilize your independent clinical judgment when addressing the question(s) below.    The Medical record contains the following:   Indicators  Supporting Clinical Findings Location in Medical Record   x "Anemia" documented Anemia  Pmhx: anemia    hx of HTN, anemia, RA, GiB  Anesthsia Chart 12/30    H&P   x H & H = 12/20: 9.7/29.5; 12/21: 6.1/19.0; 12/22: 8.4/25.8; 12/24: 6.8/22.2  12/26: 8.6/28.0; 12/28: 7.6/24.5; 12/30: 6.3/20.5; 12/31: 7.9/24.3 Lab    BP =                     HR=      "GI bleeding" documented     x Acute bleeding (Non GI site) pt is actively bleeding and has low Hgb; had surgery today    left hand was saturated in blood through the kurlex wrap -   MD Sims notified.  Blood Transfusion order 12/30    RN PoC 12/31   x Transfusion(s) PRBC: 12/21 x2 units; 12/24 x2 units; 12/30 x2 units  FFP: 12/31 x3 units Blood Bank   x Treatment: CBC  Transfuse PRBC & FFP Orders   x Other:  - Op Note 12/30  Gen Sx  PROCEDURE PERFORMED:   Placement of right subclavian brewster single lumen catheter.   Irrigation and debridement of bilateral upper extremities   and wound VAC placement.  EBL: 20 ml    - Brief Op Note 12/27: Gen Sx  Procedure(s) (LRB):  INCISION AND DRAINAGE, UPPER EXTREMITY (Bilateral)  APPLICATION, WOUND VAC x 2 (Bilateral)  EBL: 20 cc    - Op Note 12/24 (file 12/29) Gen Sx  PROCEDURES PERFORMED:  1. Irrigation bilateral upper extremity necrotizing soft tissue infection  2. Debridement skin, soft tissue, muscle, fascia bilateral upper extremities  EBL: 50 mL    - 12/21 Op Note  (file 12/29) Gen Sx  PROCEDURES PERFORMED:  1. " Irrigation of necrotizing soft tissue infection bilateral upper extremities   2. Debridement of skin, soft tissue, muscle, fascia, bilateral upper extremities   ESTIMATED BLOOD LOSS: 50 ml    - Op Note 12/21; file 12/25 Ortho Sx  Procedure:        Arthroscopic irrigation right knee joint for septic arthritis   Partial arthroscopic synovectomy right knee   Partial medial meniscectomy right knee   Right wrist arthrotomy with irrigation for septic arthritis  EBL: Minimal Op Notes     Provider, please specify diagnosis or diagnoses associated with above clinical findings.    [ x ] Acute blood loss anemia expected post-operatively   [  ] Acute blood loss anemia   [  ] Chronic blood loss anemia     [  ] Anemia of chronic disease - specify: ________________      [  ] Other Hematological Diagnosis (please specify):     [  ] Clinically Undetermined       Please document in your progress notes daily for the duration of treatment, until resolved, and include in your discharge summary.

## 2018-12-31 NOTE — PROGRESS NOTES
[facility]  Nephrology  Progress Note    Patient Name: Misty Khalil  MRN: [id number]  Admission Date: 12/20/2018  Hospital Length of Stay: 11 days  Attending Provider: Javier Kirk MD   Primary Care Physician: JOEL Mccauley  Principal Problem:Soft tissue infection    Subjective:     HPI: 59 yo WEF with medical hx of HTN, anemia, RA who [place] for evaluation fo necrotizing fascitis. She initially presented on 12/17 with right shoulder pain, swelling, and fevers and found to have bilateral upper extremity abscesses: right arm 14.3x4.3x2.7 collection, left axilla 8.3x5.4x7.7 fluid collection. She is s/p I&D in ED and initiation of vancomycin and zosyn.  She was taken to OR for incision and washout 12/18 of right hand, RUE, and LUE abscess by Dr. Falk (orthopedic surgery) and again on 12/19 for repeat washout. She was additionally found to have left olecranon abscess, left hand abscess. Cultures grew MSSA and she was transitioned to Ancef at some point during her course. Of note, she has history of back surgery with implanted hardware. Cardiology was consulted as there was an initial concern for endocarditis, but negative BHARAT would seem to make this less likely.    Nephrology consulted for sCr that is elevated and not normalizing with fluid repletion x several days (since transfer from [facility] to [facility]), sCr has remained stable between 1.7 to 2.0 over that time period, per report patient had an sCr of around 1.0 on admit to [facility], also reviewing old [facility] labs, renal function was WNL over the summer.            Review of patient's allergies indicates:  No Known Allergies  Current Facility-Administered Medications   Medication Frequency    0.9%  NaCl infusion (for blood administration) Q24H PRN    albuterol sulfate nebulizer solution 2.5 mg Q6H WAKE    ALPRAZolam tablet 1 mg BID PRN    busPIRone tablet 15 mg BID    ceFAZolin injection 2 g Q12H     dextrose 50% injection 12.5 g PRN    dextrose 50% injection 25 g Once    doxazosin tablet 2 mg Daily    glucagon (human recombinant) injection 1 mg PRN    HYDROmorphone injection 0.2 mg Q5 Min PRN    HYDROmorphone injection 1 mg Q2H PRN    insulin aspart U-100 pen 0-5 Units Q6H PRN    insulin regular injection 10 Units Once    metoprolol tartrate (LOPRESSOR) tablet 50 mg BID    nicotine 14 mg/24 hr 1 patch Daily    nystatin 100,000 unit/mL suspension 500,000 Units QID    ondansetron injection 4 mg Q12H PRN    ondansetron injection 4 mg Once PRN    oxyCODONE-acetaminophen  mg per tablet 1 tablet Q4H PRN    promethazine (PHENERGAN) 6.25 mg in dextrose 5 % 50 mL IVPB Q6H PRN    silver nitrate applicators applicator 5 applicator Once    sodium bicarbonate tablet 1,300 mg BID    sodium chloride 0.9% flush 3 mL PRN    sodium chloride 0.9% flush 3 mL PRN    zolpidem tablet 5 mg Nightly PRN       Objective:     Vital Signs (Most Recent):  Temp: 97.8 °F (36.6 °C) (12/31/18 1245)  Pulse: 72 (12/31/18 1245)  Resp: 20 (12/31/18 1245)  BP: (!) 131/53 (12/31/18 1245)  SpO2: 97 % (12/31/18 1245)  O2 Device (Oxygen Therapy): nasal cannula (12/31/18 1245) Vital Signs (24h Range):  Temp:  [93.3 °F (34.1 °C)-98.2 °F (36.8 °C)] 97.8 °F (36.6 °C)  Pulse:  [69-87] 72  Resp:  [14-22] 20  SpO2:  [93 %-100 %] 97 %  BP: ()/(39-81) 131/53     Weight: 69.9 kg (154 lb) (12/21/18 1145)  Body mass index is 24.12 kg/m².  Body surface area is 1.82 meters squared.    I/O last 3 completed shifts:  In: 1675 [P.O.:75; I.V.:900; Blood:700]  Out: 720 [Urine:145; Other:575]    Physical Exam   HENT:   Head: Atraumatic.   Neck: No JVD present.   Cardiovascular: Normal rate and regular rhythm.   Pulmonary/Chest: Effort normal and breath sounds normal.   Abdominal: Soft. She exhibits no distension.   Musculoskeletal: She exhibits edema. She exhibits no tenderness.   Neurological: She is alert.   Skin: Skin is warm.            Assessment/Plan:     VIDYA (acute kidney injury)    VIDYA, non-oliguric, on top of previously normal renal function, at the time of consult, sCr has been stable in the 1.7-2.0 range since [place] [facility], initially felt elevated sCr due to pre-renal etiology, but fluid resuscitation over the past several days has not led to an improvement, further noted with a UPC ratio at 5.82, multiple RBCs and WBCs in urine, noted patient has a Caro catheter, clinically an ATN secondary surgery at [facility], possibly with hemodynamic instability and also toxicity from infection, would seem like the most likely etiology, however cannot rule out an active/nephritic urine and differential needs to include GN, specifically would be concerned for an Infection / Immune complex GN vs other, noted endocarditis initially was concern, but negative BHARAT makes less likely.  Noted patient on multiple antibiotics, raising concern for an AIN, absence of eosinophilia and rash would make less likely, but needs to be on differential, multiple WBCs on UA could be consistent, but may be more from Caro UTI    Work up so far:    ALYSSA positive w/ a medium titer  C4 complement low, C3 on low side of normal  Manual urine slide noted for granular casts, RBCs (no obvious dysmorphia or RBC casts), WBCs (not in cast form), urine ctx w/o growth (but seems patient on abxs when obtained)    Renal ultrasound unremarkable, no hydro    ANCA MPO titer positive at 27    sCr at 3.3 (3.4 yesterday)     Plan/Recommendations:  1) follow up on studies outlined above  2) will collect and spin down urine for manual microscopy again  3) awaiting repeat UA & ctxs  4) may need renal biopsy, especially in light of the positive ANCA, stable sCr is a good sign, but will need to monitor closely and spin her urine again, regardless may try to set up bx Wednesday or Thursday   5) for now continue replacing fluids based on volume status and perceived losses and closely follow serial  RFPs  days           Thank you for your consult. I will follow-up with patient. Please contact us if you have any additional questions.    Hardik Anand MD  Nephrology  [facility]    ATTENDING PHYSICIAN ATTESTATION  I have personally interviewed and examined the patient. I thoroughly reviewed the demographic, clinical, laboratorial and imaging information available in medical records. I agree with the assessment and recommendations provided by the subspecialty resident. Dr. Anand was under my supervision.

## 2018-12-31 NOTE — PROGRESS NOTES
"[facility]  Adult Nutrition  Progress Note    SUMMARY       Recommendations    Recommendation/Intervention:     1. Continue Renal diet as tolerated.   2. Recommend Novasource with meals.   3. Encourage po intake.   4. RD following.     Goals: meet >85% EEN/EPN  Nutrition Goal Status: new  Communication of RD Recs: (POC)    Reason for Assessment    Reason For Assessment: length of stay  Diagnosis: infection/sepsis(Soft tissue infection )  Relevant Medical History: HTN, anemia, RA, back surgery with implanted hardware  Interdisciplinary Rounds: attended  General Information Comments: S/p several wound debridements. Last wound debridement and wound vac replacement 12/30. Pt sleeping during visit. Spoke with family member who reports pt with no appetite. Pt takes bites of meal and does not drink ONS. Denies N/V/D/C. Reports up and down appetite, but for unknown time period. Reports wt loss, but unknown amount. Pt with wt gain per chart review, but also with moderate-severe edema. Unable to assess for malnutrition at this time.   Nutrition Discharge Planning: adequate po intake    Nutrition Risk Screen    Nutrition Risk Screen: large or nonhealing wound, burn or pressure injury    Nutrition/Diet History    Spiritual, Cultural Beliefs, Pent[employer]alism Practices, Values that Affect Care: no  Factors Affecting Nutritional Intake: decreased appetite    Anthropometrics    Temp: 97.8 °F (36.6 °C)  Height Method: Stated  Height: 5' 7" (170.2 cm)  Height (inches): 67 in  Weight Method: Bed Scale  Weight: 69.9 kg (154 lb)  Weight (lb): 154 lb  Ideal Body Weight (IBW), Female: 135 lb  % Ideal Body Weight, Female (lb): 114.07 lb  BMI (Calculated): 24.2  BMI Grade: (P) 18.5-24.9 - normal     Lab/Procedures/Meds    Pertinent Labs Reviewed: reviewed  Pertinent Labs Comments: K 5.7, BUN 84, Cr 3.3, GFR 14.7, glucose 62, phos 7.8, ALT <5  Pertinent Medications Reviewed: reviewed  Pertinent Medications Comments: " metoprolol    Estimated/Assessed Needs    Weight Used For Calorie Calculations: 69.9 kg (154 lb 1.6 oz)  Energy Calorie Requirements (kcal): 2097 kcal/day  Energy Need Method: Kcal/kg(25)  Protein Requirements:  gm/day(1.3-1.5 gm/kg)  Weight Used For Protein Calculations: 69.9 kg (154 lb 1.6 oz)  Fluid Requirements (mL): 1 mL/kcal or per MD  Estimated Fluid Requirement Method: other (see comments)(per MD)  RDA Method (mL): 2097     Nutrition Prescription Ordered    Current Diet Order: Renal   Oral Nutrition Supplement: Boost Plus    Evaluation of Received Nutrient/Fluid Intake    I/O: +1.2L x 24 hrs, +19.74L since admit  Tolerance: tolerating  % Intake of Estimated Energy Needs: 0 - 25 %  % Meal Intake: 0 - 25 %    Nutrition Risk    Level of Risk/Frequency of Follow-up: low(f/u 1 x wk)     Assessment and Plan    Nutrition Problem  Inadequate Oral Intake    Related to (etiology):   Decreased appetite    Signs and Symptoms (as evidenced by):   Pt eating <25% of meals.     Nutrition Diagnosis Status:   New      Monitor and Evaluation    Food and Nutrient Intake: energy intake, food and beverage intake  Food and Nutrient Adminstration: diet order  Physical Activity and Function: nutrition-related ADLs and IADLs  Anthropometric Measurements: weight, weight change, body mass index  Biochemical Data, Medical Tests and Procedures: electrolyte and renal panel, gastrointestinal profile, glucose/endocrine profile, inflammatory profile, lipid profile  Nutrition-Focused Physical Findings: overall appearance     Nutrition Follow-Up    RD Follow-up?: Yes

## 2019-01-01 ENCOUNTER — ANESTHESIA (OUTPATIENT)
Dept: SURGERY | Facility: HOSPITAL | Age: 59
DRG: 463 | End: 2019-01-01
Payer: MEDICARE

## 2019-01-01 ENCOUNTER — ANESTHESIA EVENT (OUTPATIENT)
Dept: SURGERY | Facility: HOSPITAL | Age: 59
DRG: 463 | End: 2019-01-01
Payer: MEDICARE

## 2019-01-01 VITALS
HEIGHT: 67 IN | SYSTOLIC BLOOD PRESSURE: 94 MMHG | TEMPERATURE: 95 F | WEIGHT: 154 LBS | DIASTOLIC BLOOD PRESSURE: 52 MMHG | OXYGEN SATURATION: 61 % | BODY MASS INDEX: 24.17 KG/M2

## 2019-01-01 PROBLEM — D68.9 COAGULOPATHY: Status: ACTIVE | Noted: 2019-01-01

## 2019-01-01 LAB
ABO + RH BLD: NORMAL
ALBUMIN SERPL BCP-MCNC: 0.9 G/DL
ALBUMIN SERPL BCP-MCNC: 1 G/DL
ALBUMIN SERPL BCP-MCNC: 1 G/DL
ALBUMIN SERPL BCP-MCNC: 1.1 G/DL
ALBUMIN SERPL BCP-MCNC: 1.1 G/DL
ALBUMIN SERPL BCP-MCNC: 1.3 G/DL
ALBUMIN SERPL BCP-MCNC: 2 G/DL
ALLENS TEST: ABNORMAL
ALP SERPL-CCNC: 44 U/L
ALP SERPL-CCNC: 63 U/L
ALP SERPL-CCNC: 78 U/L
ALP SERPL-CCNC: 84 U/L
ALP SERPL-CCNC: 88 U/L
ALP SERPL-CCNC: 98 U/L
ALP SERPL-CCNC: 99 U/L
ALT SERPL W/O P-5'-P-CCNC: <5 U/L
ANCA AB TITR SER IF: ABNORMAL TITER
ANION GAP SERPL CALC-SCNC: 12 MMOL/L
ANION GAP SERPL CALC-SCNC: 13 MMOL/L
ANION GAP SERPL CALC-SCNC: 14 MMOL/L
ANION GAP SERPL CALC-SCNC: 16 MMOL/L
ANION GAP SERPL CALC-SCNC: 17 MMOL/L
ANION GAP SERPL CALC-SCNC: 18 MMOL/L
ANION GAP SERPL CALC-SCNC: 19 MMOL/L
ANION GAP SERPL CALC-SCNC: 19 MMOL/L
ANION GAP SERPL CALC-SCNC: 20 MMOL/L
ANION GAP SERPL CALC-SCNC: 21 MMOL/L
ANION GAP SERPL CALC-SCNC: 21 MMOL/L
ANION GAP SERPL CALC-SCNC: 23 MMOL/L
ANION GAP SERPL CALC-SCNC: 25 MMOL/L
ANISOCYTOSIS BLD QL SMEAR: ABNORMAL
ANISOCYTOSIS BLD QL SMEAR: SLIGHT
APTT BLDCRRT: 31.6 SEC
APTT BLDCRRT: 40.8 SEC
APTT HEX PL PPP: POSITIVE S
AST SERPL-CCNC: 10 U/L
AST SERPL-CCNC: 10 U/L
AST SERPL-CCNC: 11 U/L
AST SERPL-CCNC: 12 U/L
AST SERPL-CCNC: 13 U/L
AST SERPL-CCNC: 13 U/L
AST SERPL-CCNC: 15 U/L
B2 GLYCOPROT1 IGA SER QL: <9 SAU
BASO STIPL BLD QL SMEAR: ABNORMAL
BASOPHILS # BLD AUTO: ABNORMAL K/UL
BASOPHILS NFR BLD: 0 %
BASOPHILS NFR BLD: 0.7 %
BILIRUB SERPL-MCNC: 0.2 MG/DL
BILIRUB SERPL-MCNC: 0.4 MG/DL
BLD GP AB SCN CELLS X3 SERPL QL: NORMAL
BLD PROD TYP BPU: NORMAL
BLOOD UNIT EXPIRATION DATE: NORMAL
BLOOD UNIT TYPE CODE: 5100
BLOOD UNIT TYPE CODE: 9500
BLOOD UNIT TYPE: NORMAL
BUN SERPL-MCNC: 100 MG/DL
BUN SERPL-MCNC: 106 MG/DL
BUN SERPL-MCNC: 112 MG/DL
BUN SERPL-MCNC: 113 MG/DL
BUN SERPL-MCNC: 115 MG/DL
BUN SERPL-MCNC: 115 MG/DL
BUN SERPL-MCNC: 116 MG/DL
BUN SERPL-MCNC: 118 MG/DL
BUN SERPL-MCNC: 119 MG/DL
BUN SERPL-MCNC: 122 MG/DL
BUN SERPL-MCNC: 122 MG/DL
BUN SERPL-MCNC: 124 MG/DL
BUN SERPL-MCNC: 126 MG/DL
BUN SERPL-MCNC: 127 MG/DL
BUN SERPL-MCNC: 95 MG/DL
BUN SERPL-MCNC: 96 MG/DL
BURR CELLS BLD QL SMEAR: ABNORMAL
C DIFF GDH STL QL: NEGATIVE
C DIFF TOX A+B STL QL IA: NEGATIVE
C3 SERPL-MCNC: 60 MG/DL
C4 SERPL-MCNC: 10 MG/DL
CA-I BLDV-SCNC: 0.91 MMOL/L
CALCIUM SERPL-MCNC: 6.6 MG/DL
CALCIUM SERPL-MCNC: 6.7 MG/DL
CALCIUM SERPL-MCNC: 6.8 MG/DL
CALCIUM SERPL-MCNC: 6.9 MG/DL
CALCIUM SERPL-MCNC: 7 MG/DL
CALCIUM SERPL-MCNC: 7 MG/DL
CALCIUM SERPL-MCNC: 7.2 MG/DL
CALCIUM SERPL-MCNC: 7.3 MG/DL
CALCIUM SERPL-MCNC: 7.4 MG/DL
CALCIUM SERPL-MCNC: 7.5 MG/DL
CALCIUM SERPL-MCNC: 7.6 MG/DL
CALCIUM SERPL-MCNC: 7.7 MG/DL
CALCIUM SERPL-MCNC: 7.8 MG/DL
CARDIOLIPIN IGG SER IA-ACNC: <9.4 GPL
CARDIOLIPIN IGM SER IA-ACNC: 13.56 MPL
CHLORIDE SERPL-SCNC: 100 MMOL/L
CHLORIDE SERPL-SCNC: 102 MMOL/L
CHLORIDE SERPL-SCNC: 102 MMOL/L
CHLORIDE SERPL-SCNC: 104 MMOL/L
CHLORIDE SERPL-SCNC: 105 MMOL/L
CHLORIDE SERPL-SCNC: 106 MMOL/L
CHLORIDE SERPL-SCNC: 107 MMOL/L
CHLORIDE SERPL-SCNC: 107 MMOL/L
CHLORIDE SERPL-SCNC: 108 MMOL/L
CHLORIDE SERPL-SCNC: 109 MMOL/L
CO2 SERPL-SCNC: 14 MMOL/L
CO2 SERPL-SCNC: 15 MMOL/L
CO2 SERPL-SCNC: 16 MMOL/L
CO2 SERPL-SCNC: 17 MMOL/L
CO2 SERPL-SCNC: 18 MMOL/L
CO2 SERPL-SCNC: 19 MMOL/L
CO2 SERPL-SCNC: 20 MMOL/L
CO2 SERPL-SCNC: 21 MMOL/L
CODING SYSTEM: NORMAL
CORTIS SERPL-MCNC: 19.1 UG/DL
CREAT SERPL-MCNC: 3 MG/DL
CREAT SERPL-MCNC: 3 MG/DL
CREAT SERPL-MCNC: 3.1 MG/DL
CREAT SERPL-MCNC: 3.1 MG/DL
CREAT SERPL-MCNC: 3.7 MG/DL
CREAT SERPL-MCNC: 3.8 MG/DL
CREAT SERPL-MCNC: 3.8 MG/DL
CREAT SERPL-MCNC: 3.9 MG/DL
CREAT SERPL-MCNC: 4 MG/DL
CREAT SERPL-MCNC: 4.1 MG/DL
CREAT SERPL-MCNC: 4.1 MG/DL
CREAT SERPL-MCNC: 4.2 MG/DL
CREAT SERPL-MCNC: 4.3 MG/DL
CRYOGLOB SER QL: ABNORMAL
DELSYS: ABNORMAL
DIFFERENTIAL METHOD: ABNORMAL
DISPENSE STATUS: NORMAL
EOSINOPHIL # BLD AUTO: ABNORMAL K/UL
EOSINOPHIL NFR BLD: 0 %
EOSINOPHIL NFR BLD: 0.7 %
EOSINOPHIL NFR BLD: 1 %
ERYTHROCYTE [DISTWIDTH] IN BLOOD BY AUTOMATED COUNT: 16 %
ERYTHROCYTE [DISTWIDTH] IN BLOOD BY AUTOMATED COUNT: 16.8 %
ERYTHROCYTE [DISTWIDTH] IN BLOOD BY AUTOMATED COUNT: 16.9 %
ERYTHROCYTE [DISTWIDTH] IN BLOOD BY AUTOMATED COUNT: 17.2 %
ERYTHROCYTE [DISTWIDTH] IN BLOOD BY AUTOMATED COUNT: 17.4 %
ERYTHROCYTE [DISTWIDTH] IN BLOOD BY AUTOMATED COUNT: 17.4 %
ERYTHROCYTE [DISTWIDTH] IN BLOOD BY AUTOMATED COUNT: 17.6 %
ERYTHROCYTE [DISTWIDTH] IN BLOOD BY AUTOMATED COUNT: 17.8 %
ERYTHROCYTE [DISTWIDTH] IN BLOOD BY AUTOMATED COUNT: 19.1 %
ERYTHROCYTE [DISTWIDTH] IN BLOOD BY AUTOMATED COUNT: 19.4 %
ERYTHROCYTE [SEDIMENTATION RATE] IN BLOOD BY WESTERGREN METHOD: 16 MM/H
EST. GFR  (AFRICAN AMERICAN): 12.3 ML/MIN/1.73 M^2
EST. GFR  (AFRICAN AMERICAN): 12.7 ML/MIN/1.73 M^2
EST. GFR  (AFRICAN AMERICAN): 13 ML/MIN/1.73 M^2
EST. GFR  (AFRICAN AMERICAN): 13 ML/MIN/1.73 M^2
EST. GFR  (AFRICAN AMERICAN): 13.4 ML/MIN/1.73 M^2
EST. GFR  (AFRICAN AMERICAN): 13.8 ML/MIN/1.73 M^2
EST. GFR  (AFRICAN AMERICAN): 14.3 ML/MIN/1.73 M^2
EST. GFR  (AFRICAN AMERICAN): 14.3 ML/MIN/1.73 M^2
EST. GFR  (AFRICAN AMERICAN): 14.8 ML/MIN/1.73 M^2
EST. GFR  (AFRICAN AMERICAN): 18.3 ML/MIN/1.73 M^2
EST. GFR  (AFRICAN AMERICAN): 18.3 ML/MIN/1.73 M^2
EST. GFR  (AFRICAN AMERICAN): 19 ML/MIN/1.73 M^2
EST. GFR  (AFRICAN AMERICAN): 19 ML/MIN/1.73 M^2
EST. GFR  (NON AFRICAN AMERICAN): 10.7 ML/MIN/1.73 M^2
EST. GFR  (NON AFRICAN AMERICAN): 11 ML/MIN/1.73 M^2
EST. GFR  (NON AFRICAN AMERICAN): 11.3 ML/MIN/1.73 M^2
EST. GFR  (NON AFRICAN AMERICAN): 11.3 ML/MIN/1.73 M^2
EST. GFR  (NON AFRICAN AMERICAN): 11.6 ML/MIN/1.73 M^2
EST. GFR  (NON AFRICAN AMERICAN): 12 ML/MIN/1.73 M^2
EST. GFR  (NON AFRICAN AMERICAN): 12.4 ML/MIN/1.73 M^2
EST. GFR  (NON AFRICAN AMERICAN): 12.4 ML/MIN/1.73 M^2
EST. GFR  (NON AFRICAN AMERICAN): 12.8 ML/MIN/1.73 M^2
EST. GFR  (NON AFRICAN AMERICAN): 15.9 ML/MIN/1.73 M^2
EST. GFR  (NON AFRICAN AMERICAN): 15.9 ML/MIN/1.73 M^2
EST. GFR  (NON AFRICAN AMERICAN): 16.5 ML/MIN/1.73 M^2
EST. GFR  (NON AFRICAN AMERICAN): 16.5 ML/MIN/1.73 M^2
FIBRINOGEN PPP-MCNC: 365 MG/DL
FIO2: 100
FIO2: 100
FIO2: 50
FIO2: 60
FIO2: 70
FLOW: 3
FLOW: 5
FLOW: 5
GLUCOSE SERPL-MCNC: 102 MG/DL
GLUCOSE SERPL-MCNC: 107 MG/DL
GLUCOSE SERPL-MCNC: 110 MG/DL
GLUCOSE SERPL-MCNC: 115 MG/DL
GLUCOSE SERPL-MCNC: 118 MG/DL
GLUCOSE SERPL-MCNC: 127 MG/DL
GLUCOSE SERPL-MCNC: 137 MG/DL
GLUCOSE SERPL-MCNC: 183 MG/DL
GLUCOSE SERPL-MCNC: 64 MG/DL
GLUCOSE SERPL-MCNC: 64 MG/DL
GLUCOSE SERPL-MCNC: 65 MG/DL
GLUCOSE SERPL-MCNC: 70 MG/DL
GLUCOSE SERPL-MCNC: 73 MG/DL
GLUCOSE SERPL-MCNC: 73 MG/DL
GLUCOSE SERPL-MCNC: 74 MG/DL
GLUCOSE SERPL-MCNC: 75 MG/DL
GLUCOSE SERPL-MCNC: 78 MG/DL
GLUCOSE SERPL-MCNC: 81 MG/DL
GLUCOSE SERPL-MCNC: 85 MG/DL
GLUCOSE SERPL-MCNC: 90 MG/DL
GLUCOSE SERPL-MCNC: 92 MG/DL
HCO3 UR-SCNC: 12.7 MMOL/L (ref 24–28)
HCO3 UR-SCNC: 13.8 MMOL/L (ref 24–28)
HCO3 UR-SCNC: 14.3 MMOL/L (ref 24–28)
HCO3 UR-SCNC: 15.5 MMOL/L (ref 24–28)
HCO3 UR-SCNC: 16.7 MMOL/L (ref 24–28)
HCO3 UR-SCNC: 17.3 MMOL/L (ref 24–28)
HCO3 UR-SCNC: 17.5 MMOL/L (ref 24–28)
HCO3 UR-SCNC: 18.2 MMOL/L (ref 24–28)
HCO3 UR-SCNC: 18.3 MMOL/L (ref 24–28)
HCO3 UR-SCNC: 20.3 MMOL/L (ref 24–28)
HCO3 UR-SCNC: 20.8 MMOL/L (ref 24–28)
HCO3 UR-SCNC: 8.2 MMOL/L (ref 24–28)
HCT VFR BLD AUTO: 20.1 %
HCT VFR BLD AUTO: 21.1 %
HCT VFR BLD AUTO: 22.2 %
HCT VFR BLD AUTO: 22.8 %
HCT VFR BLD AUTO: 23.1 %
HCT VFR BLD AUTO: 23.3 %
HCT VFR BLD AUTO: 23.8 %
HCT VFR BLD AUTO: 24.2 %
HCT VFR BLD AUTO: 24.5 %
HCT VFR BLD AUTO: 24.9 %
HCT VFR BLD AUTO: 27.6 %
HCT VFR BLD AUTO: 27.9 %
HCT VFR BLD CALC: 20 %PCV (ref 36–54)
HCT VFR BLD CALC: 23 %PCV (ref 36–54)
HGB BLD-MCNC: 6.5 G/DL
HGB BLD-MCNC: 6.9 G/DL
HGB BLD-MCNC: 7.2 G/DL
HGB BLD-MCNC: 7.3 G/DL
HGB BLD-MCNC: 7.5 G/DL
HGB BLD-MCNC: 7.5 G/DL
HGB BLD-MCNC: 7.9 G/DL
HGB BLD-MCNC: 7.9 G/DL
HGB BLD-MCNC: 8 G/DL
HGB BLD-MCNC: 8.1 G/DL
HGB BLD-MCNC: 9.1 G/DL
HGB BLD-MCNC: 9.4 G/DL
HYPOCHROMIA BLD QL SMEAR: ABNORMAL
IMM GRANULOCYTES # BLD AUTO: ABNORMAL K/UL
IMM GRANULOCYTES NFR BLD AUTO: ABNORMAL %
INR PPP: 1
INR PPP: 1.1
INR PPP: 1.3
INR PPP: 1.3
INR PPP: 1.9
INR PPP: 2
INR PPP: 9
INR PPP: >10
LACTATE SERPL-SCNC: 1 MMOL/L
LACTATE SERPL-SCNC: 2 MMOL/L
LACTATE SERPL-SCNC: 3 MMOL/L
LYMPHOCYTES # BLD AUTO: ABNORMAL K/UL
LYMPHOCYTES NFR BLD: 0 %
LYMPHOCYTES NFR BLD: 0 %
LYMPHOCYTES NFR BLD: 1.3 %
LYMPHOCYTES NFR BLD: 11 %
LYMPHOCYTES NFR BLD: 2 %
LYMPHOCYTES NFR BLD: 3 %
LYMPHOCYTES NFR BLD: 3.5 %
LYMPHOCYTES NFR BLD: 4 %
LYMPHOCYTES NFR BLD: 5 %
LYMPHOCYTES NFR BLD: 5 %
MAGNESIUM SERPL-MCNC: 1.5 MG/DL
MAGNESIUM SERPL-MCNC: 1.5 MG/DL
MAGNESIUM SERPL-MCNC: 1.6 MG/DL
MAGNESIUM SERPL-MCNC: 1.7 MG/DL
MAGNESIUM SERPL-MCNC: 1.8 MG/DL
MAGNESIUM SERPL-MCNC: 1.8 MG/DL
MAGNESIUM SERPL-MCNC: 1.9 MG/DL
MAGNESIUM SERPL-MCNC: 2 MG/DL
MAGNESIUM SERPL-MCNC: 2 MG/DL
MAGNESIUM SERPL-MCNC: 2.1 MG/DL
MCH RBC QN AUTO: 26.7 PG
MCH RBC QN AUTO: 27.5 PG
MCH RBC QN AUTO: 27.9 PG
MCH RBC QN AUTO: 28.1 PG
MCH RBC QN AUTO: 28.1 PG
MCH RBC QN AUTO: 28.2 PG
MCH RBC QN AUTO: 28.2 PG
MCH RBC QN AUTO: 28.3 PG
MCH RBC QN AUTO: 28.3 PG
MCH RBC QN AUTO: 28.7 PG
MCH RBC QN AUTO: 28.9 PG
MCH RBC QN AUTO: 29.4 PG
MCHC RBC AUTO-ENTMCNC: 32 G/DL
MCHC RBC AUTO-ENTMCNC: 32.2 G/DL
MCHC RBC AUTO-ENTMCNC: 32.2 G/DL
MCHC RBC AUTO-ENTMCNC: 32.3 G/DL
MCHC RBC AUTO-ENTMCNC: 32.4 G/DL
MCHC RBC AUTO-ENTMCNC: 32.5 G/DL
MCHC RBC AUTO-ENTMCNC: 32.5 G/DL
MCHC RBC AUTO-ENTMCNC: 32.6 G/DL
MCHC RBC AUTO-ENTMCNC: 32.7 G/DL
MCHC RBC AUTO-ENTMCNC: 33.1 G/DL
MCHC RBC AUTO-ENTMCNC: 33.2 G/DL
MCHC RBC AUTO-ENTMCNC: 34.1 G/DL
MCV RBC AUTO: 83 FL
MCV RBC AUTO: 85 FL
MCV RBC AUTO: 85 FL
MCV RBC AUTO: 86 FL
MCV RBC AUTO: 87 FL
MCV RBC AUTO: 88 FL
METAMYELOCYTES NFR BLD MANUAL: 1 %
METAMYELOCYTES NFR BLD MANUAL: 1.5 %
METAMYELOCYTES NFR BLD MANUAL: 2 %
METAMYELOCYTES NFR BLD MANUAL: 3 %
MIN VOL: 13.4
MIN VOL: 13.7
MIXING STUDIES PPP-IMP: NORMAL
MIXING STUDIES PPP-IMP: NORMAL
MODE: ABNORMAL
MONOCYTES # BLD AUTO: ABNORMAL K/UL
MONOCYTES NFR BLD: 0 %
MONOCYTES NFR BLD: 1 %
MONOCYTES NFR BLD: 2 %
MONOCYTES NFR BLD: 2 %
MONOCYTES NFR BLD: 3 %
MONOCYTES NFR BLD: 3 %
MONOCYTES NFR BLD: 3.3 %
MONOCYTES NFR BLD: 4 %
MONOCYTES NFR BLD: 6 %
MYELOCYTES NFR BLD MANUAL: 0.7 %
MYELOCYTES NFR BLD MANUAL: 1 %
NEUTROPHILS NFR BLD: 80 %
NEUTROPHILS NFR BLD: 86 %
NEUTROPHILS NFR BLD: 89 %
NEUTROPHILS NFR BLD: 90.5 %
NEUTROPHILS NFR BLD: 92 %
NEUTROPHILS NFR BLD: 92 %
NEUTROPHILS NFR BLD: 92.6 %
NEUTROPHILS NFR BLD: 93 %
NEUTROPHILS NFR BLD: 95 %
NEUTROPHILS NFR BLD: 96 %
NEUTS BAND NFR BLD MANUAL: 0.7 %
NEUTS BAND NFR BLD MANUAL: 1 %
NEUTS BAND NFR BLD MANUAL: 1.5 %
NEUTS BAND NFR BLD MANUAL: 2 %
NEUTS BAND NFR BLD MANUAL: 3 %
NEUTS BAND NFR BLD MANUAL: 3 %
NEUTS BAND NFR BLD MANUAL: 6 %
NRBC BLD-RTO: 0 /100 WBC
NRBC BLD-RTO: 1 /100 WBC
NRBC BLD-RTO: 3 /100 WBC
NRBC BLD-RTO: 4 /100 WBC
NUM UNITS TRANS FFP: NORMAL
OVALOCYTES BLD QL SMEAR: ABNORMAL
P-ANCA TITR SER IF: ABNORMAL TITER
PCO2 BLDA: 22.5 MMHG (ref 35–45)
PCO2 BLDA: 23.7 MMHG (ref 35–45)
PCO2 BLDA: 26.8 MMHG (ref 35–45)
PCO2 BLDA: 27.2 MMHG (ref 35–45)
PCO2 BLDA: 29.9 MMHG (ref 35–45)
PCO2 BLDA: 32.2 MMHG (ref 35–45)
PCO2 BLDA: 32.5 MMHG (ref 35–45)
PCO2 BLDA: 35.4 MMHG (ref 35–45)
PCO2 BLDA: 37.3 MMHG (ref 35–45)
PCO2 BLDA: 38.3 MMHG (ref 35–45)
PCO2 BLDA: 46.2 MMHG (ref 35–45)
PCO2 BLDA: 55.4 MMHG (ref 35–45)
PEEP: 10
PEEP: 5
PEEP: 8
PH SMN: 6.78 [PH] (ref 7.35–7.45)
PH SMN: 7.23 [PH] (ref 7.35–7.45)
PH SMN: 7.26 [PH] (ref 7.35–7.45)
PH SMN: 7.28 [PH] (ref 7.35–7.45)
PH SMN: 7.29 [PH] (ref 7.35–7.45)
PH SMN: 7.33 [PH] (ref 7.35–7.45)
PH SMN: 7.34 [PH] (ref 7.35–7.45)
PH SMN: 7.36 [PH] (ref 7.35–7.45)
PH SMN: 7.37 [PH] (ref 7.35–7.45)
PH SMN: 7.37 [PH] (ref 7.35–7.45)
PH SMN: 7.41 [PH] (ref 7.35–7.45)
PH SMN: 7.44 [PH] (ref 7.35–7.45)
PHOSPHATE SERPL-MCNC: 5.6 MG/DL
PHOSPHATE SERPL-MCNC: 5.6 MG/DL
PHOSPHATE SERPL-MCNC: 6.7 MG/DL
PHOSPHATE SERPL-MCNC: 6.8 MG/DL
PHOSPHATE SERPL-MCNC: 6.8 MG/DL
PHOSPHATE SERPL-MCNC: 6.9 MG/DL
PHOSPHATE SERPL-MCNC: 7.2 MG/DL
PHOSPHATE SERPL-MCNC: 7.4 MG/DL
PHOSPHATE SERPL-MCNC: 7.5 MG/DL
PHOSPHOLIPASE A2 RECEPTOR, ELISA: <2 RU/ML
PHOSPHOLIPASE A2 RECEPTOR, IFA: NEGATIVE
PIP: 21
PIP: 23
PLATELET # BLD AUTO: 120 K/UL
PLATELET # BLD AUTO: 132 K/UL
PLATELET # BLD AUTO: 148 K/UL
PLATELET # BLD AUTO: 154 K/UL
PLATELET # BLD AUTO: 157 K/UL
PLATELET # BLD AUTO: 159 K/UL
PLATELET # BLD AUTO: 161 K/UL
PLATELET # BLD AUTO: 44 K/UL
PLATELET # BLD AUTO: 48 K/UL
PLATELET # BLD AUTO: 63 K/UL
PLATELET # BLD AUTO: 91 K/UL
PLATELET # BLD AUTO: 96 K/UL
PLATELET BLD QL SMEAR: ABNORMAL
PMV BLD AUTO: 11.6 FL
PMV BLD AUTO: 12.2 FL
PMV BLD AUTO: 12.3 FL
PMV BLD AUTO: 12.7 FL
PMV BLD AUTO: 12.8 FL
PMV BLD AUTO: 12.9 FL
PMV BLD AUTO: 12.9 FL
PMV BLD AUTO: 13 FL
PMV BLD AUTO: 13.3 FL
PMV BLD AUTO: 13.4 FL
PMV BLD AUTO: 13.7 FL
PMV BLD AUTO: 14.3 FL
PO2 BLDA: 148 MMHG (ref 80–100)
PO2 BLDA: 311 MMHG (ref 80–100)
PO2 BLDA: 55 MMHG (ref 80–100)
PO2 BLDA: 60 MMHG (ref 80–100)
PO2 BLDA: 63 MMHG (ref 80–100)
PO2 BLDA: 65 MMHG (ref 80–100)
PO2 BLDA: 65 MMHG (ref 80–100)
PO2 BLDA: 67 MMHG (ref 80–100)
PO2 BLDA: 67 MMHG (ref 80–100)
PO2 BLDA: 71 MMHG (ref 80–100)
PO2 BLDA: 71 MMHG (ref 80–100)
PO2 BLDA: 94 MMHG (ref 80–100)
POC BE: -10 MMOL/L
POC BE: -11 MMOL/L
POC BE: -12 MMOL/L
POC BE: -12 MMOL/L
POC BE: -13 MMOL/L
POC BE: -27 MMOL/L
POC BE: -4 MMOL/L
POC BE: -6 MMOL/L
POC BE: -6 MMOL/L
POC BE: -8 MMOL/L
POC IONIZED CALCIUM: 0.96 MMOL/L (ref 1.06–1.42)
POC IONIZED CALCIUM: 1.12 MMOL/L (ref 1.06–1.42)
POC SATURATED O2: 100 % (ref 95–100)
POC SATURATED O2: 55 % (ref 95–100)
POC SATURATED O2: 88 % (ref 95–100)
POC SATURATED O2: 88 % (ref 95–100)
POC SATURATED O2: 91 % (ref 95–100)
POC SATURATED O2: 93 % (ref 95–100)
POC SATURATED O2: 94 % (ref 95–100)
POC SATURATED O2: 95 % (ref 95–100)
POC SATURATED O2: 97 % (ref 95–100)
POC SATURATED O2: 99 % (ref 95–100)
POC TCO2: 10 MMOL/L (ref 23–27)
POC TCO2: 13 MMOL/L (ref 23–27)
POC TCO2: 15 MMOL/L (ref 23–27)
POC TCO2: 15 MMOL/L (ref 23–27)
POC TCO2: 17 MMOL/L (ref 23–27)
POC TCO2: 18 MMOL/L (ref 23–27)
POC TCO2: 19 MMOL/L (ref 23–27)
POC TCO2: 19 MMOL/L (ref 23–27)
POC TCO2: 21 MMOL/L (ref 23–27)
POC TCO2: 22 MMOL/L (ref 23–27)
POCT GLUCOSE: 100 MG/DL (ref 70–110)
POCT GLUCOSE: 101 MG/DL (ref 70–110)
POCT GLUCOSE: 101 MG/DL (ref 70–110)
POCT GLUCOSE: 117 MG/DL (ref 70–110)
POCT GLUCOSE: 124 MG/DL (ref 70–110)
POCT GLUCOSE: 126 MG/DL (ref 70–110)
POCT GLUCOSE: 141 MG/DL (ref 70–110)
POCT GLUCOSE: 151 MG/DL (ref 70–110)
POCT GLUCOSE: 224 MG/DL (ref 70–110)
POCT GLUCOSE: 39 MG/DL (ref 70–110)
POCT GLUCOSE: 57 MG/DL (ref 70–110)
POCT GLUCOSE: 59 MG/DL (ref 70–110)
POCT GLUCOSE: 60 MG/DL (ref 70–110)
POCT GLUCOSE: 63 MG/DL (ref 70–110)
POCT GLUCOSE: 67 MG/DL (ref 70–110)
POCT GLUCOSE: 68 MG/DL (ref 70–110)
POCT GLUCOSE: 69 MG/DL (ref 70–110)
POCT GLUCOSE: 70 MG/DL (ref 70–110)
POCT GLUCOSE: 71 MG/DL (ref 70–110)
POCT GLUCOSE: 72 MG/DL (ref 70–110)
POCT GLUCOSE: 73 MG/DL (ref 70–110)
POCT GLUCOSE: 74 MG/DL (ref 70–110)
POCT GLUCOSE: 75 MG/DL (ref 70–110)
POCT GLUCOSE: 75 MG/DL (ref 70–110)
POCT GLUCOSE: 79 MG/DL (ref 70–110)
POCT GLUCOSE: 79 MG/DL (ref 70–110)
POCT GLUCOSE: 80 MG/DL (ref 70–110)
POCT GLUCOSE: 83 MG/DL (ref 70–110)
POCT GLUCOSE: 83 MG/DL (ref 70–110)
POCT GLUCOSE: 85 MG/DL (ref 70–110)
POCT GLUCOSE: 85 MG/DL (ref 70–110)
POCT GLUCOSE: 86 MG/DL (ref 70–110)
POCT GLUCOSE: 87 MG/DL (ref 70–110)
POCT GLUCOSE: 90 MG/DL (ref 70–110)
POCT GLUCOSE: 93 MG/DL (ref 70–110)
POCT GLUCOSE: 94 MG/DL (ref 70–110)
POCT GLUCOSE: 94 MG/DL (ref 70–110)
POCT GLUCOSE: 97 MG/DL (ref 70–110)
POCT GLUCOSE: 97 MG/DL (ref 70–110)
POCT GLUCOSE: 99 MG/DL (ref 70–110)
POIKILOCYTOSIS BLD QL SMEAR: ABNORMAL
POIKILOCYTOSIS BLD QL SMEAR: ABNORMAL
POIKILOCYTOSIS BLD QL SMEAR: SLIGHT
POLYCHROMASIA BLD QL SMEAR: ABNORMAL
POTASSIUM BLD-SCNC: 3.5 MMOL/L (ref 3.5–5.1)
POTASSIUM BLD-SCNC: 5.1 MMOL/L (ref 3.5–5.1)
POTASSIUM SERPL-SCNC: 3.8 MMOL/L
POTASSIUM SERPL-SCNC: 3.9 MMOL/L
POTASSIUM SERPL-SCNC: 3.9 MMOL/L
POTASSIUM SERPL-SCNC: 4 MMOL/L
POTASSIUM SERPL-SCNC: 4 MMOL/L
POTASSIUM SERPL-SCNC: 4.2 MMOL/L
POTASSIUM SERPL-SCNC: 4.4 MMOL/L
POTASSIUM SERPL-SCNC: 4.4 MMOL/L
POTASSIUM SERPL-SCNC: 4.5 MMOL/L
POTASSIUM SERPL-SCNC: 4.5 MMOL/L
POTASSIUM SERPL-SCNC: 4.9 MMOL/L
POTASSIUM SERPL-SCNC: 5.1 MMOL/L
POTASSIUM SERPL-SCNC: 5.1 MMOL/L
POTASSIUM SERPL-SCNC: 5.3 MMOL/L
POTASSIUM SERPL-SCNC: 5.4 MMOL/L
POTASSIUM SERPL-SCNC: 5.6 MMOL/L
POTASSIUM SERPL-SCNC: 5.7 MMOL/L
POTASSIUM SERPL-SCNC: 5.8 MMOL/L
POTASSIUM SERPL-SCNC: 5.8 MMOL/L
PREALB SERPL-MCNC: 13 MG/DL
PROMYELOCYTES NFR BLD MANUAL: 1 %
PROT SERPL-MCNC: 3.4 G/DL
PROT SERPL-MCNC: 3.4 G/DL
PROT SERPL-MCNC: 3.6 G/DL
PROT SERPL-MCNC: 3.7 G/DL
PROT SERPL-MCNC: 3.9 G/DL
PROT SERPL-MCNC: 3.9 G/DL
PROT SERPL-MCNC: 4 G/DL
PROTHROMBIN TIME: 10.9 SEC
PROTHROMBIN TIME: 11.5 SEC
PROTHROMBIN TIME: 12.9 SEC
PROTHROMBIN TIME: 13 SEC
PROTHROMBIN TIME: 18.9 SEC
PROTHROMBIN TIME: 19.2 SEC
PROTHROMBIN TIME: 92.2 SEC
PROTHROMBIN TIME: >100 SEC
PROVIDER CREDENTIALS: ABNORMAL
PROVIDER NOTIFIED: ABNORMAL
RBC # BLD AUTO: 2.39 M/UL
RBC # BLD AUTO: 2.43 M/UL
RBC # BLD AUTO: 2.58 M/UL
RBC # BLD AUTO: 2.59 M/UL
RBC # BLD AUTO: 2.65 M/UL
RBC # BLD AUTO: 2.65 M/UL
RBC # BLD AUTO: 2.79 M/UL
RBC # BLD AUTO: 2.8 M/UL
RBC # BLD AUTO: 2.87 M/UL
RBC # BLD AUTO: 2.88 M/UL
RBC # BLD AUTO: 3.2 M/UL
RBC # BLD AUTO: 3.24 M/UL
SAMPLE: ABNORMAL
SCHISTOCYTES BLD QL SMEAR: ABNORMAL
SCHISTOCYTES BLD QL SMEAR: PRESENT
SITE: ABNORMAL
SODIUM BLD-SCNC: 137 MMOL/L (ref 136–145)
SODIUM BLD-SCNC: 143 MMOL/L (ref 136–145)
SODIUM SERPL-SCNC: 136 MMOL/L
SODIUM SERPL-SCNC: 138 MMOL/L
SODIUM SERPL-SCNC: 139 MMOL/L
SODIUM SERPL-SCNC: 140 MMOL/L
SODIUM SERPL-SCNC: 141 MMOL/L
SODIUM SERPL-SCNC: 141 MMOL/L
SODIUM SERPL-SCNC: 142 MMOL/L
SODIUM SERPL-SCNC: 143 MMOL/L
SP02: 100
SP02: 90
SP02: 91
SP02: 91
SP02: 94
SP02: 94
TRANS ERYTHROCYTES VOL PATIENT: NORMAL ML
TRANS PLATPHERESIS VOL PATIENT: NORMAL ML
TROPONIN I SERPL DL<=0.01 NG/ML-MCNC: 0.01 NG/ML
VT: 380
VT: 410
WBC # BLD AUTO: 1.82 K/UL
WBC # BLD AUTO: 11.26 K/UL
WBC # BLD AUTO: 12.13 K/UL
WBC # BLD AUTO: 13.69 K/UL
WBC # BLD AUTO: 2.8 K/UL
WBC # BLD AUTO: 6.02 K/UL
WBC # BLD AUTO: 7.41 K/UL
WBC # BLD AUTO: 8.09 K/UL
WBC # BLD AUTO: 8.13 K/UL
WBC # BLD AUTO: 8.56 K/UL
WBC # BLD AUTO: 9.18 K/UL
WBC # BLD AUTO: 9.45 K/UL
WBC TOXIC VACUOLES BLD QL SMEAR: PRESENT
WBC TOXIC VACUOLES BLD QL SMEAR: PRESENT

## 2019-01-01 PROCEDURE — 25000003 PHARM REV CODE 250: Performed by: STUDENT IN AN ORGANIZED HEALTH CARE EDUCATION/TRAINING PROGRAM

## 2019-01-01 PROCEDURE — P9017 PLASMA 1 DONOR FRZ W/IN 8 HR: HCPCS

## 2019-01-01 PROCEDURE — 99233 SBSQ HOSP IP/OBS HIGH 50: CPT | Mod: 24,GC,, | Performed by: SURGERY

## 2019-01-01 PROCEDURE — 84100 ASSAY OF PHOSPHORUS: CPT

## 2019-01-01 PROCEDURE — P9045 ALBUMIN (HUMAN), 5%, 250 ML: HCPCS | Mod: JG

## 2019-01-01 PROCEDURE — 25000242 PHARM REV CODE 250 ALT 637 W/ HCPCS: Performed by: STUDENT IN AN ORGANIZED HEALTH CARE EDUCATION/TRAINING PROGRAM

## 2019-01-01 PROCEDURE — 80048 BASIC METABOLIC PNL TOTAL CA: CPT | Mod: 91

## 2019-01-01 PROCEDURE — 63600175 PHARM REV CODE 636 W HCPCS: Performed by: STUDENT IN AN ORGANIZED HEALTH CARE EDUCATION/TRAINING PROGRAM

## 2019-01-01 PROCEDURE — 63600175 PHARM REV CODE 636 W HCPCS: Mod: JG

## 2019-01-01 PROCEDURE — S0030 INJECTION, METRONIDAZOLE: HCPCS | Performed by: STUDENT IN AN ORGANIZED HEALTH CARE EDUCATION/TRAINING PROGRAM

## 2019-01-01 PROCEDURE — 36000707: Performed by: SURGERY

## 2019-01-01 PROCEDURE — 85610 PROTHROMBIN TIME: CPT

## 2019-01-01 PROCEDURE — 63600175 PHARM REV CODE 636 W HCPCS

## 2019-01-01 PROCEDURE — 36620 INSERTION CATHETER ARTERY: CPT

## 2019-01-01 PROCEDURE — 25000003 PHARM REV CODE 250: Performed by: NURSE ANESTHETIST, CERTIFIED REGISTERED

## 2019-01-01 PROCEDURE — 99499 NO LOS: ICD-10-PCS | Mod: ,,, | Performed by: SURGERY

## 2019-01-01 PROCEDURE — 86146 BETA-2 GLYCOPROTEIN ANTIBODY: CPT

## 2019-01-01 PROCEDURE — 83735 ASSAY OF MAGNESIUM: CPT | Mod: 91

## 2019-01-01 PROCEDURE — 99233 PR SUBSEQUENT HOSPITAL CARE,LEVL III: ICD-10-PCS | Mod: GC,,, | Performed by: INTERNAL MEDICINE

## 2019-01-01 PROCEDURE — 63600175 PHARM REV CODE 636 W HCPCS: Performed by: INTERNAL MEDICINE

## 2019-01-01 PROCEDURE — 99900026 HC AIRWAY MAINTENANCE (STAT)

## 2019-01-01 PROCEDURE — 85007 BL SMEAR W/DIFF WBC COUNT: CPT | Mod: 91

## 2019-01-01 PROCEDURE — P9021 RED BLOOD CELLS UNIT: HCPCS

## 2019-01-01 PROCEDURE — 85014 HEMATOCRIT: CPT

## 2019-01-01 PROCEDURE — 93005 ELECTROCARDIOGRAM TRACING: CPT

## 2019-01-01 PROCEDURE — 25000003 PHARM REV CODE 250: Performed by: SURGERY

## 2019-01-01 PROCEDURE — 80053 COMPREHEN METABOLIC PANEL: CPT

## 2019-01-01 PROCEDURE — 94640 AIRWAY INHALATION TREATMENT: CPT

## 2019-01-01 PROCEDURE — 84134 ASSAY OF PREALBUMIN: CPT

## 2019-01-01 PROCEDURE — 63600175 PHARM REV CODE 636 W HCPCS: Performed by: SURGERY

## 2019-01-01 PROCEDURE — 99499 UNLISTED E&M SERVICE: CPT | Mod: ,,, | Performed by: SURGERY

## 2019-01-01 PROCEDURE — 99900035 HC TECH TIME PER 15 MIN (STAT)

## 2019-01-01 PROCEDURE — 37000009 HC ANESTHESIA EA ADD 15 MINS: Performed by: SURGERY

## 2019-01-01 PROCEDURE — 99232 PR SUBSEQUENT HOSPITAL CARE,LEVL II: ICD-10-PCS | Mod: ICN,CMP,, | Performed by: SURGERY

## 2019-01-01 PROCEDURE — 99232 SBSQ HOSP IP/OBS MODERATE 35: CPT | Mod: ICN,CMP,, | Performed by: SURGERY

## 2019-01-01 PROCEDURE — 82330 ASSAY OF CALCIUM: CPT

## 2019-01-01 PROCEDURE — 99233 PR SUBSEQUENT HOSPITAL CARE,LEVL III: ICD-10-PCS | Mod: ,,, | Performed by: INTERNAL MEDICINE

## 2019-01-01 PROCEDURE — 93010 EKG 12-LEAD: ICD-10-PCS | Mod: 76,,, | Performed by: INTERNAL MEDICINE

## 2019-01-01 PROCEDURE — 86160 COMPLEMENT ANTIGEN: CPT | Mod: 59

## 2019-01-01 PROCEDURE — 84132 ASSAY OF SERUM POTASSIUM: CPT

## 2019-01-01 PROCEDURE — P9035 PLATELET PHERES LEUKOREDUCED: HCPCS

## 2019-01-01 PROCEDURE — 63600175 PHARM REV CODE 636 W HCPCS: Performed by: NURSE ANESTHETIST, CERTIFIED REGISTERED

## 2019-01-01 PROCEDURE — 80048 BASIC METABOLIC PNL TOTAL CA: CPT

## 2019-01-01 PROCEDURE — 84100 ASSAY OF PHOSPHORUS: CPT | Mod: 91

## 2019-01-01 PROCEDURE — 93010 ELECTROCARDIOGRAM REPORT: CPT | Mod: ,,, | Performed by: INTERNAL MEDICINE

## 2019-01-01 PROCEDURE — 25000003 PHARM REV CODE 250: Performed by: INTERNAL MEDICINE

## 2019-01-01 PROCEDURE — 85732 THROMBOPLASTIN TIME PARTIAL: CPT

## 2019-01-01 PROCEDURE — 37799 UNLISTED PX VASCULAR SURGERY: CPT

## 2019-01-01 PROCEDURE — 84295 ASSAY OF SERUM SODIUM: CPT

## 2019-01-01 PROCEDURE — 85730 THROMBOPLASTIN TIME PARTIAL: CPT

## 2019-01-01 PROCEDURE — 82803 BLOOD GASES ANY COMBINATION: CPT

## 2019-01-01 PROCEDURE — 85610 PROTHROMBIN TIME: CPT | Mod: 91

## 2019-01-01 PROCEDURE — 99291 PR CRITICAL CARE, E/M 30-74 MINUTES: ICD-10-PCS | Mod: 25,,, | Performed by: SURGERY

## 2019-01-01 PROCEDURE — 93010 EKG 12-LEAD: ICD-10-PCS | Mod: ,,, | Performed by: INTERNAL MEDICINE

## 2019-01-01 PROCEDURE — D9220A PRA ANESTHESIA: ICD-10-PCS | Mod: ANES,,, | Performed by: ANESTHESIOLOGY

## 2019-01-01 PROCEDURE — 90945 DIALYSIS ONE EVALUATION: CPT

## 2019-01-01 PROCEDURE — 20000000 HC ICU ROOM

## 2019-01-01 PROCEDURE — D9220A PRA ANESTHESIA: ICD-10-PCS | Mod: CRNA,,, | Performed by: NURSE ANESTHETIST, CERTIFIED REGISTERED

## 2019-01-01 PROCEDURE — 86160 COMPLEMENT ANTIGEN: CPT

## 2019-01-01 PROCEDURE — 85613 RUSSELL VIPER VENOM DILUTED: CPT

## 2019-01-01 PROCEDURE — 83735 ASSAY OF MAGNESIUM: CPT

## 2019-01-01 PROCEDURE — 20600001 HC STEP DOWN PRIVATE ROOM

## 2019-01-01 PROCEDURE — 36600 WITHDRAWAL OF ARTERIAL BLOOD: CPT

## 2019-01-01 PROCEDURE — 85027 COMPLETE CBC AUTOMATED: CPT

## 2019-01-01 PROCEDURE — 85007 BL SMEAR W/DIFF WBC COUNT: CPT

## 2019-01-01 PROCEDURE — 36000706: Performed by: SURGERY

## 2019-01-01 PROCEDURE — 94003 VENT MGMT INPAT SUBQ DAY: CPT

## 2019-01-01 PROCEDURE — 86901 BLOOD TYPING SEROLOGIC RH(D): CPT

## 2019-01-01 PROCEDURE — 31500 INSERT EMERGENCY AIRWAY: CPT | Mod: GC,,, | Performed by: ANESTHESIOLOGY

## 2019-01-01 PROCEDURE — 36430 TRANSFUSION BLD/BLD COMPNT: CPT

## 2019-01-01 PROCEDURE — 86147 CARDIOLIPIN ANTIBODY EA IG: CPT

## 2019-01-01 PROCEDURE — 85384 FIBRINOGEN ACTIVITY: CPT

## 2019-01-01 PROCEDURE — 37000008 HC ANESTHESIA 1ST 15 MINUTES: Performed by: SURGERY

## 2019-01-01 PROCEDURE — 27000221 HC OXYGEN, UP TO 24 HOURS

## 2019-01-01 PROCEDURE — 99233 SBSQ HOSP IP/OBS HIGH 50: CPT | Mod: GC,,, | Performed by: INTERNAL MEDICINE

## 2019-01-01 PROCEDURE — 11042 DBRDMT SUBQ TIS 1ST 20SQCM/<: CPT | Mod: 79,GC,, | Performed by: ORTHOPAEDIC SURGERY

## 2019-01-01 PROCEDURE — 25000003 PHARM REV CODE 250

## 2019-01-01 PROCEDURE — 99291 CRITICAL CARE FIRST HOUR: CPT | Mod: 25,,, | Performed by: SURGERY

## 2019-01-01 PROCEDURE — D9220A PRA ANESTHESIA: Mod: ANES,,, | Performed by: ANESTHESIOLOGY

## 2019-01-01 PROCEDURE — 82533 TOTAL CORTISOL: CPT

## 2019-01-01 PROCEDURE — 11042 PR DEBRIDEMENT, SKIN, SUB-Q TISSUE,=<20 SQ CM: ICD-10-PCS | Mod: 79,GC,, | Performed by: ORTHOPAEDIC SURGERY

## 2019-01-01 PROCEDURE — 11043 DBRDMT MUSC&/FSCA 1ST 20/<: CPT | Mod: 79,,, | Performed by: SURGERY

## 2019-01-01 PROCEDURE — 86255 FLUORESCENT ANTIBODY SCREEN: CPT | Mod: 91

## 2019-01-01 PROCEDURE — 83605 ASSAY OF LACTIC ACID: CPT

## 2019-01-01 PROCEDURE — 11043 PR DEBRIDEMENT, SKIN, SUB-Q TISSUE,MUSCLE,=<20 SQ CM: ICD-10-PCS | Mod: 79,,, | Performed by: SURGERY

## 2019-01-01 PROCEDURE — 27200651 HC AIRWAY, LMA: Performed by: NURSE ANESTHETIST, CERTIFIED REGISTERED

## 2019-01-01 PROCEDURE — 92610 EVALUATE SWALLOWING FUNCTION: CPT

## 2019-01-01 PROCEDURE — 31500 INTUBATION: ICD-10-PCS | Mod: GC,,, | Performed by: ANESTHESIOLOGY

## 2019-01-01 PROCEDURE — 80053 COMPREHEN METABOLIC PANEL: CPT | Mod: 91

## 2019-01-01 PROCEDURE — 84484 ASSAY OF TROPONIN QUANT: CPT

## 2019-01-01 PROCEDURE — 97605 PR NEG PRESS WOUND THERAPY (NPWT) W/NON-DISPOSABLE WOUND VAC DEVICE (DME), <=50 CM: ICD-10-PCS | Mod: ,,, | Performed by: SURGERY

## 2019-01-01 PROCEDURE — 85598 HEXAGNAL PHOSPH PLTLT NEUTRL: CPT

## 2019-01-01 PROCEDURE — 94761 N-INVAS EAR/PLS OXIMETRY MLT: CPT

## 2019-01-01 PROCEDURE — 36556 INSERT NON-TUNNEL CV CATH: CPT

## 2019-01-01 PROCEDURE — 11046 PR DEB MUSC/FASCIA ADD-ON: ICD-10-PCS | Mod: 79,,, | Performed by: SURGERY

## 2019-01-01 PROCEDURE — 11046 DBRDMT MUSC&/FSCA EA ADDL: CPT | Mod: 79,,, | Performed by: SURGERY

## 2019-01-01 PROCEDURE — 36415 COLL VENOUS BLD VENIPUNCTURE: CPT

## 2019-01-01 PROCEDURE — 85611 PROTHROMBIN TEST: CPT

## 2019-01-01 PROCEDURE — 27200966 HC CLOSED SUCTION SYSTEM

## 2019-01-01 PROCEDURE — 94002 VENT MGMT INPAT INIT DAY: CPT

## 2019-01-01 PROCEDURE — 85598 HEXAGNAL PHOSPH PLTLT NEUTRL: CPT | Mod: 91

## 2019-01-01 PROCEDURE — 87449 NOS EACH ORGANISM AG IA: CPT

## 2019-01-01 PROCEDURE — 86920 COMPATIBILITY TEST SPIN: CPT

## 2019-01-01 PROCEDURE — D9220A PRA ANESTHESIA: Mod: CRNA,,, | Performed by: NURSE ANESTHETIST, CERTIFIED REGISTERED

## 2019-01-01 PROCEDURE — 99233 SBSQ HOSP IP/OBS HIGH 50: CPT | Mod: ,,, | Performed by: INTERNAL MEDICINE

## 2019-01-01 PROCEDURE — 97605 NEG PRS WND THER DME<=50SQCM: CPT | Mod: ,,, | Performed by: SURGERY

## 2019-01-01 PROCEDURE — 99233 PR SUBSEQUENT HOSPITAL CARE,LEVL III: ICD-10-PCS | Mod: 24,GC,, | Performed by: SURGERY

## 2019-01-01 PROCEDURE — S4991 NICOTINE PATCH NONLEGEND: HCPCS | Performed by: STUDENT IN AN ORGANIZED HEALTH CARE EDUCATION/TRAINING PROGRAM

## 2019-01-01 RX ORDER — PROPOFOL 10 MG/ML
VIAL (ML) INTRAVENOUS
Status: DISCONTINUED | OUTPATIENT
Start: 2019-01-01 | End: 2019-01-01

## 2019-01-01 RX ORDER — PHENYLEPHRINE HYDROCHLORIDE 10 MG/ML
INJECTION INTRAVENOUS
Status: DISCONTINUED | OUTPATIENT
Start: 2019-01-01 | End: 2019-01-01

## 2019-01-01 RX ORDER — FUROSEMIDE 10 MG/ML
160 INJECTION INTRAMUSCULAR; INTRAVENOUS 2 TIMES DAILY
Status: DISCONTINUED | OUTPATIENT
Start: 2019-01-01 | End: 2019-01-01 | Stop reason: HOSPADM

## 2019-01-01 RX ORDER — METOPROLOL TARTRATE 1 MG/ML
INJECTION, SOLUTION INTRAVENOUS
Status: DISPENSED
Start: 2019-01-01 | End: 2019-01-01

## 2019-01-01 RX ORDER — CEFEPIME HYDROCHLORIDE 1 G/1
1 INJECTION, POWDER, FOR SOLUTION INTRAMUSCULAR; INTRAVENOUS
Status: DISCONTINUED | OUTPATIENT
Start: 2019-01-01 | End: 2019-01-01 | Stop reason: HOSPADM

## 2019-01-01 RX ORDER — MAGNESIUM SULFATE HEPTAHYDRATE 40 MG/ML
2 INJECTION, SOLUTION INTRAVENOUS
Status: DISPENSED | OUTPATIENT
Start: 2019-01-01 | End: 2019-01-01

## 2019-01-01 RX ORDER — ALBUMIN HUMAN 50 G/1000ML
25 SOLUTION INTRAVENOUS ONCE
Status: COMPLETED | OUTPATIENT
Start: 2019-01-01 | End: 2019-01-01

## 2019-01-01 RX ORDER — OXYCODONE AND ACETAMINOPHEN 10; 325 MG/1; MG/1
1 TABLET ORAL EVERY 4 HOURS PRN
Status: DISCONTINUED | OUTPATIENT
Start: 2019-01-01 | End: 2019-01-01 | Stop reason: HOSPADM

## 2019-01-01 RX ORDER — ALBUMIN HUMAN 50 G/1000ML
SOLUTION INTRAVENOUS
Status: COMPLETED
Start: 2019-01-01 | End: 2019-01-01

## 2019-01-01 RX ORDER — ALPRAZOLAM 0.5 MG/1
0.5 TABLET ORAL 2 TIMES DAILY PRN
Status: DISCONTINUED | OUTPATIENT
Start: 2019-01-01 | End: 2019-01-01 | Stop reason: HOSPADM

## 2019-01-01 RX ORDER — ROCURONIUM BROMIDE 10 MG/ML
INJECTION, SOLUTION INTRAVENOUS
Status: DISPENSED
Start: 2019-01-01 | End: 2019-01-01

## 2019-01-01 RX ORDER — MIDAZOLAM HYDROCHLORIDE 1 MG/ML
INJECTION, SOLUTION INTRAMUSCULAR; INTRAVENOUS
Status: DISCONTINUED | OUTPATIENT
Start: 2019-01-01 | End: 2019-01-01

## 2019-01-01 RX ORDER — PROPOFOL 10 MG/ML
5 INJECTION, EMULSION INTRAVENOUS CONTINUOUS
Status: DISCONTINUED | OUTPATIENT
Start: 2019-01-01 | End: 2019-01-01 | Stop reason: HOSPADM

## 2019-01-01 RX ORDER — PHENYLEPHRINE HCL IN 0.9% NACL 1 MG/10 ML
100 SYRINGE (ML) INTRAVENOUS ONCE
Status: COMPLETED | OUTPATIENT
Start: 2019-01-01 | End: 2019-01-01

## 2019-01-01 RX ORDER — LIDOCAINE HYDROCHLORIDE 10 MG/ML
INJECTION, SOLUTION EPIDURAL; INFILTRATION; INTRACAUDAL; PERINEURAL
Status: COMPLETED
Start: 2019-01-01 | End: 2019-01-01

## 2019-01-01 RX ORDER — FENTANYL CITRATE 50 UG/ML
INJECTION, SOLUTION INTRAMUSCULAR; INTRAVENOUS
Status: DISCONTINUED | OUTPATIENT
Start: 2019-01-01 | End: 2019-01-01

## 2019-01-01 RX ORDER — METRONIDAZOLE 500 MG/100ML
500 INJECTION, SOLUTION INTRAVENOUS
Status: DISCONTINUED | OUTPATIENT
Start: 2019-01-01 | End: 2019-01-01 | Stop reason: HOSPADM

## 2019-01-01 RX ORDER — HYDROCODONE BITARTRATE AND ACETAMINOPHEN 500; 5 MG/1; MG/1
TABLET ORAL
Status: DISCONTINUED | OUTPATIENT
Start: 2019-01-01 | End: 2019-01-01 | Stop reason: HOSPADM

## 2019-01-01 RX ORDER — HYDROMORPHONE HYDROCHLORIDE 2 MG/ML
2 INJECTION, SOLUTION INTRAMUSCULAR; INTRAVENOUS; SUBCUTANEOUS ONCE
Status: COMPLETED | OUTPATIENT
Start: 2019-01-01 | End: 2019-01-01

## 2019-01-01 RX ORDER — OXYCODONE AND ACETAMINOPHEN 5; 325 MG/1; MG/1
1 TABLET ORAL EVERY 4 HOURS PRN
Status: DISCONTINUED | OUTPATIENT
Start: 2019-01-01 | End: 2019-01-01 | Stop reason: HOSPADM

## 2019-01-01 RX ORDER — HYDROCODONE BITARTRATE AND ACETAMINOPHEN 500; 5 MG/1; MG/1
TABLET ORAL
Status: DISCONTINUED | OUTPATIENT
Start: 2019-01-01 | End: 2019-01-01

## 2019-01-01 RX ORDER — ONDANSETRON 2 MG/ML
4 INJECTION INTRAMUSCULAR; INTRAVENOUS ONCE AS NEEDED
Status: CANCELLED | OUTPATIENT
Start: 2019-01-01 | End: 2030-06-02

## 2019-01-01 RX ORDER — FUROSEMIDE 10 MG/ML
80 INJECTION INTRAMUSCULAR; INTRAVENOUS ONCE
Status: COMPLETED | OUTPATIENT
Start: 2019-01-01 | End: 2019-01-01

## 2019-01-01 RX ORDER — MEPERIDINE HYDROCHLORIDE 50 MG/ML
12.5 INJECTION INTRAMUSCULAR; INTRAVENOUS; SUBCUTANEOUS ONCE AS NEEDED
Status: CANCELLED | OUTPATIENT
Start: 2019-01-01 | End: 2019-01-01

## 2019-01-01 RX ORDER — SUCCINYLCHOLINE CHLORIDE 20 MG/ML
120 INJECTION INTRAMUSCULAR; INTRAVENOUS ONCE
Status: COMPLETED | OUTPATIENT
Start: 2019-01-01 | End: 2019-01-01

## 2019-01-01 RX ORDER — NOREPINEPHRINE BITARTRATE/D5W 4MG/250ML
PLASTIC BAG, INJECTION (ML) INTRAVENOUS
Status: COMPLETED
Start: 2019-01-01 | End: 2019-01-01

## 2019-01-01 RX ORDER — SUCCINYLCHOLINE CHLORIDE 20 MG/ML
INJECTION INTRAMUSCULAR; INTRAVENOUS
Status: COMPLETED
Start: 2019-01-01 | End: 2019-01-01

## 2019-01-01 RX ORDER — FENTANYL CITRATE 50 UG/ML
25 INJECTION, SOLUTION INTRAMUSCULAR; INTRAVENOUS
Status: DISCONTINUED | OUTPATIENT
Start: 2019-01-01 | End: 2019-01-01 | Stop reason: HOSPADM

## 2019-01-01 RX ORDER — POTASSIUM CHLORIDE 14.9 MG/ML
20 INJECTION INTRAVENOUS ONCE
Status: COMPLETED | OUTPATIENT
Start: 2019-01-01 | End: 2019-01-01

## 2019-01-01 RX ORDER — NOREPINEPHRINE BITARTRATE/D5W 4MG/250ML
0.06 PLASTIC BAG, INJECTION (ML) INTRAVENOUS CONTINUOUS
Status: DISCONTINUED | OUTPATIENT
Start: 2019-01-01 | End: 2019-01-01

## 2019-01-01 RX ORDER — HYDROCODONE BITARTRATE AND ACETAMINOPHEN 500; 5 MG/1; MG/1
TABLET ORAL CONTINUOUS
Status: ACTIVE | OUTPATIENT
Start: 2019-01-01 | End: 2019-01-01

## 2019-01-01 RX ORDER — SODIUM BICARBONATE 650 MG/1
1950 TABLET ORAL 3 TIMES DAILY
Status: DISCONTINUED | OUTPATIENT
Start: 2019-01-01 | End: 2019-01-01 | Stop reason: HOSPADM

## 2019-01-01 RX ORDER — FUROSEMIDE 10 MG/ML
40 INJECTION INTRAMUSCULAR; INTRAVENOUS ONCE
Status: COMPLETED | OUTPATIENT
Start: 2019-01-01 | End: 2019-01-01

## 2019-01-01 RX ORDER — FENTANYL CITRATE 50 UG/ML
50 INJECTION, SOLUTION INTRAMUSCULAR; INTRAVENOUS ONCE
Status: COMPLETED | OUTPATIENT
Start: 2019-01-01 | End: 2019-01-01

## 2019-01-01 RX ORDER — ETOMIDATE 2 MG/ML
INJECTION INTRAVENOUS
Status: DISCONTINUED
Start: 2019-01-01 | End: 2019-01-01 | Stop reason: WASHOUT

## 2019-01-01 RX ORDER — KETAMINE HYDROCHLORIDE 10 MG/ML
INJECTION, SOLUTION INTRAMUSCULAR; INTRAVENOUS
Status: DISCONTINUED | OUTPATIENT
Start: 2019-01-01 | End: 2019-01-01

## 2019-01-01 RX ORDER — SODIUM BICARBONATE 650 MG/1
1950 TABLET ORAL 3 TIMES DAILY
Status: DISCONTINUED | OUTPATIENT
Start: 2019-01-01 | End: 2019-01-01

## 2019-01-01 RX ORDER — FENTANYL CITRATE-0.9 % NACL/PF 10 MCG/ML
PLASTIC BAG, INJECTION (ML) INTRAVENOUS CONTINUOUS
Status: DISCONTINUED | OUTPATIENT
Start: 2019-01-01 | End: 2019-01-01 | Stop reason: HOSPADM

## 2019-01-01 RX ORDER — FENTANYL CITRATE 50 UG/ML
25 INJECTION, SOLUTION INTRAMUSCULAR; INTRAVENOUS EVERY 5 MIN PRN
Status: CANCELLED | OUTPATIENT
Start: 2019-01-01

## 2019-01-01 RX ORDER — LIDOCAINE HYDROCHLORIDE 10 MG/ML
5 INJECTION INFILTRATION; PERINEURAL ONCE
Status: COMPLETED | OUTPATIENT
Start: 2019-01-01 | End: 2019-01-01

## 2019-01-01 RX ORDER — MORPHINE SULFATE 2 MG/ML
10 INJECTION, SOLUTION INTRAMUSCULAR; INTRAVENOUS ONCE
Status: DISCONTINUED | OUTPATIENT
Start: 2019-01-01 | End: 2019-01-01 | Stop reason: HOSPADM

## 2019-01-01 RX ORDER — PROPOFOL 10 MG/ML
100 VIAL (ML) INTRAVENOUS ONCE
Status: COMPLETED | OUTPATIENT
Start: 2019-01-01 | End: 2019-01-01

## 2019-01-01 RX ORDER — HYDROMORPHONE HYDROCHLORIDE 1 MG/ML
0.2 INJECTION, SOLUTION INTRAMUSCULAR; INTRAVENOUS; SUBCUTANEOUS EVERY 5 MIN PRN
Status: CANCELLED | OUTPATIENT
Start: 2019-01-01

## 2019-01-01 RX ORDER — DIPHENHYDRAMINE HYDROCHLORIDE 50 MG/ML
25 INJECTION INTRAMUSCULAR; INTRAVENOUS EVERY 6 HOURS PRN
Status: CANCELLED | OUTPATIENT
Start: 2019-01-01

## 2019-01-01 RX ORDER — PROPOFOL 10 MG/ML
INJECTION, EMULSION INTRAVENOUS
Status: COMPLETED
Start: 2019-01-01 | End: 2019-01-01

## 2019-01-01 RX ORDER — ADENOSINE 3 MG/ML
INJECTION, SOLUTION INTRAVENOUS
Status: DISPENSED
Start: 2019-01-01 | End: 2019-01-01

## 2019-01-01 RX ORDER — ACETAMINOPHEN 10 MG/ML
1000 INJECTION, SOLUTION INTRAVENOUS EVERY 8 HOURS
Status: COMPLETED | OUTPATIENT
Start: 2019-01-01 | End: 2019-01-01

## 2019-01-01 RX ORDER — MIDODRINE HYDROCHLORIDE 5 MG/1
5 TABLET ORAL 3 TIMES DAILY
Status: DISCONTINUED | OUTPATIENT
Start: 2019-01-01 | End: 2019-01-01 | Stop reason: HOSPADM

## 2019-01-01 RX ORDER — LIDOCAINE HCL/PF 100 MG/5ML
SYRINGE (ML) INTRAVENOUS
Status: DISCONTINUED | OUTPATIENT
Start: 2019-01-01 | End: 2019-01-01

## 2019-01-01 RX ORDER — METOPROLOL TARTRATE 25 MG/1
12.5 TABLET ORAL 2 TIMES DAILY
Status: DISCONTINUED | OUTPATIENT
Start: 2019-01-01 | End: 2019-01-01 | Stop reason: HOSPADM

## 2019-01-01 RX ORDER — PHYTONADIONE 10 MG/ML
10 INJECTION, EMULSION INTRAMUSCULAR; INTRAVENOUS; SUBCUTANEOUS
Status: CANCELLED | OUTPATIENT
Start: 2019-01-01 | End: 2019-01-01

## 2019-01-01 RX ORDER — NOREPINEPHRINE BITARTRATE/D5W 4MG/250ML
0.02 PLASTIC BAG, INJECTION (ML) INTRAVENOUS CONTINUOUS
Status: DISCONTINUED | OUTPATIENT
Start: 2019-01-01 | End: 2019-01-01 | Stop reason: HOSPADM

## 2019-01-01 RX ADMIN — HYDROMORPHONE HYDROCHLORIDE 1 MG: 1 INJECTION, SOLUTION INTRAMUSCULAR; INTRAVENOUS; SUBCUTANEOUS at 05:01

## 2019-01-01 RX ADMIN — HYDROMORPHONE HYDROCHLORIDE 1 MG: 1 INJECTION, SOLUTION INTRAMUSCULAR; INTRAVENOUS; SUBCUTANEOUS at 03:01

## 2019-01-01 RX ADMIN — FUROSEMIDE 120 MG: 10 INJECTION, SOLUTION INTRAMUSCULAR; INTRAVENOUS at 11:01

## 2019-01-01 RX ADMIN — HYDROMORPHONE HYDROCHLORIDE 1 MG: 1 INJECTION, SOLUTION INTRAMUSCULAR; INTRAVENOUS; SUBCUTANEOUS at 02:01

## 2019-01-01 RX ADMIN — VASOPRESSIN 0.04 UNITS/MIN: 20 INJECTION INTRAVENOUS at 12:01

## 2019-01-01 RX ADMIN — Medication 0.02 MCG/KG/MIN: at 06:01

## 2019-01-01 RX ADMIN — SODIUM BICARBONATE 650 MG TABLET 1950 MG: at 04:01

## 2019-01-01 RX ADMIN — FENTANYL CITRATE 25 MCG: 50 INJECTION INTRAMUSCULAR; INTRAVENOUS at 01:01

## 2019-01-01 RX ADMIN — NYSTATIN 500000 UNITS: 500000 SUSPENSION ORAL at 09:01

## 2019-01-01 RX ADMIN — BUSPIRONE HYDROCHLORIDE 15 MG: 5 TABLET ORAL at 09:01

## 2019-01-01 RX ADMIN — ALPRAZOLAM 1 MG: 1 TABLET ORAL at 09:01

## 2019-01-01 RX ADMIN — PROPOFOL 70 MG: 10 INJECTION, EMULSION INTRAVENOUS at 01:01

## 2019-01-01 RX ADMIN — CEFAZOLIN 2 G: 1 INJECTION, POWDER, FOR SOLUTION INTRAMUSCULAR; INTRAVENOUS at 09:01

## 2019-01-01 RX ADMIN — ALBUTEROL SULFATE 2.5 MG: 2.5 SOLUTION RESPIRATORY (INHALATION) at 09:01

## 2019-01-01 RX ADMIN — SODIUM BICARBONATE 650 MG TABLET 1950 MG: at 01:01

## 2019-01-01 RX ADMIN — PROMETHAZINE HYDROCHLORIDE 6.25 MG: 25 INJECTION INTRAMUSCULAR; INTRAVENOUS at 09:01

## 2019-01-01 RX ADMIN — ALTEPLASE 2 MG: 2.2 INJECTION, POWDER, LYOPHILIZED, FOR SOLUTION INTRAVENOUS at 09:01

## 2019-01-01 RX ADMIN — Medication 100 MG: at 03:01

## 2019-01-01 RX ADMIN — VASOPRESSIN 1 UNITS: 20 INJECTION INTRAVENOUS at 01:01

## 2019-01-01 RX ADMIN — SODIUM BICARBONATE 650 MG TABLET 1950 MG: at 09:01

## 2019-01-01 RX ADMIN — EPINEPHRINE 1.5 MCG/KG/MIN: 1 INJECTION INTRAMUSCULAR; INTRAVENOUS; SUBCUTANEOUS at 12:01

## 2019-01-01 RX ADMIN — EPINEPHRINE 1.5 MCG/KG/MIN: 1 INJECTION INTRAMUSCULAR; INTRAVENOUS; SUBCUTANEOUS at 11:01

## 2019-01-01 RX ADMIN — PROPOFOL 100 MG: 10 INJECTION, EMULSION INTRAVENOUS at 03:01

## 2019-01-01 RX ADMIN — DEXTROSE MONOHYDRATE 12.5 G: 25 INJECTION, SOLUTION INTRAVENOUS at 10:01

## 2019-01-01 RX ADMIN — OXYCODONE HYDROCHLORIDE AND ACETAMINOPHEN 1 TABLET: 10; 325 TABLET ORAL at 09:01

## 2019-01-01 RX ADMIN — METOPROLOL TARTRATE 12.5 MG: 25 TABLET, FILM COATED ORAL at 10:01

## 2019-01-01 RX ADMIN — PHENYLEPHRINE HYDROCHLORIDE 100 MCG: 10 INJECTION INTRAVENOUS at 01:01

## 2019-01-01 RX ADMIN — ALBUMIN HUMAN 25 G: 50 SOLUTION INTRAVENOUS at 10:01

## 2019-01-01 RX ADMIN — PROPOFOL 5 MCG/KG/MIN: 10 INJECTION, EMULSION INTRAVENOUS at 06:01

## 2019-01-01 RX ADMIN — METRONIDAZOLE 500 MG: 500 INJECTION, SOLUTION INTRAVENOUS at 12:01

## 2019-01-01 RX ADMIN — FUROSEMIDE 160 MG: 10 INJECTION, SOLUTION INTRAVENOUS at 09:01

## 2019-01-01 RX ADMIN — DEXTROSE MONOHYDRATE 25 G: 25 INJECTION, SOLUTION INTRAVENOUS at 11:01

## 2019-01-01 RX ADMIN — ACETAMINOPHEN 1000 MG: 10 INJECTION, SOLUTION INTRAVENOUS at 02:01

## 2019-01-01 RX ADMIN — Medication 0.9 MCG/KG/MIN: at 11:01

## 2019-01-01 RX ADMIN — NYSTATIN 500000 UNITS: 500000 SUSPENSION ORAL at 06:01

## 2019-01-01 RX ADMIN — MIDODRINE HYDROCHLORIDE 5 MG: 5 TABLET ORAL at 08:01

## 2019-01-01 RX ADMIN — CALCIUM GLUCONATE 1000 MG: 98 INJECTION, SOLUTION INTRAVENOUS at 08:01

## 2019-01-01 RX ADMIN — DEXTROSE MONOHYDRATE 25 G: 500 INJECTION PARENTERAL at 06:01

## 2019-01-01 RX ADMIN — CEFAZOLIN 2 G: 1 INJECTION, POWDER, FOR SOLUTION INTRAMUSCULAR; INTRAVENOUS at 07:01

## 2019-01-01 RX ADMIN — VASOPRESSIN 0.04 UNITS/MIN: 20 INJECTION INTRAVENOUS at 08:01

## 2019-01-01 RX ADMIN — SUCCINYLCHOLINE CHLORIDE 120 MG: 20 INJECTION, SOLUTION INTRAMUSCULAR; INTRAVENOUS at 03:01

## 2019-01-01 RX ADMIN — ALBUMIN HUMAN 12.5 G: 0.05 INJECTION, SOLUTION INTRAVENOUS at 07:01

## 2019-01-01 RX ADMIN — FUROSEMIDE 80 MG: 10 INJECTION, SOLUTION INTRAVENOUS at 01:01

## 2019-01-01 RX ADMIN — ALBUTEROL SULFATE 2.5 MG: 2.5 SOLUTION RESPIRATORY (INHALATION) at 01:01

## 2019-01-01 RX ADMIN — METRONIDAZOLE 500 MG: 500 INJECTION, SOLUTION INTRAVENOUS at 03:01

## 2019-01-01 RX ADMIN — OXYCODONE HYDROCHLORIDE AND ACETAMINOPHEN 1 TABLET: 10; 325 TABLET ORAL at 12:01

## 2019-01-01 RX ADMIN — LIDOCAINE HYDROCHLORIDE 5 ML: 10 INJECTION, SOLUTION EPIDURAL; INFILTRATION; INTRACAUDAL at 03:01

## 2019-01-01 RX ADMIN — NYSTATIN 500000 UNITS: 500000 SUSPENSION ORAL at 05:01

## 2019-01-01 RX ADMIN — DEXTROSE MONOHYDRATE 25 G: 25 INJECTION, SOLUTION INTRAVENOUS at 08:01

## 2019-01-01 RX ADMIN — ALBUTEROL SULFATE 2.5 MG: 2.5 SOLUTION RESPIRATORY (INHALATION) at 08:01

## 2019-01-01 RX ADMIN — Medication 0.06 MCG/KG/MIN: at 03:01

## 2019-01-01 RX ADMIN — MIDODRINE HYDROCHLORIDE 5 MG: 5 TABLET ORAL at 10:01

## 2019-01-01 RX ADMIN — Medication 0.7 MCG/KG/MIN: at 09:01

## 2019-01-01 RX ADMIN — CEFAZOLIN 2 G: 1 INJECTION, POWDER, FOR SOLUTION INTRAMUSCULAR; INTRAVENOUS at 08:01

## 2019-01-01 RX ADMIN — DEXTROSE MONOHYDRATE 25 G: 25 INJECTION, SOLUTION INTRAVENOUS at 06:01

## 2019-01-01 RX ADMIN — FUROSEMIDE 160 MG: 10 INJECTION, SOLUTION INTRAVENOUS at 06:01

## 2019-01-01 RX ADMIN — Medication: at 08:01

## 2019-01-01 RX ADMIN — MIDAZOLAM HYDROCHLORIDE 2 MG: 1 INJECTION, SOLUTION INTRAMUSCULAR; INTRAVENOUS at 01:01

## 2019-01-01 RX ADMIN — LIDOCAINE HYDROCHLORIDE 50 MG: 20 INJECTION, SOLUTION INTRAVENOUS at 01:01

## 2019-01-01 RX ADMIN — SUCCINYLCHOLINE CHLORIDE 120 MG: 20 INJECTION INTRAMUSCULAR; INTRAVENOUS at 03:01

## 2019-01-01 RX ADMIN — HYDROCORTISONE SODIUM SUCCINATE 100 MG: 100 INJECTION, POWDER, FOR SOLUTION INTRAMUSCULAR; INTRAVENOUS at 09:01

## 2019-01-01 RX ADMIN — HYDROMORPHONE HYDROCHLORIDE 2 MG: 2 INJECTION INTRAMUSCULAR; INTRAVENOUS; SUBCUTANEOUS at 03:01

## 2019-01-01 RX ADMIN — PHYTONADIONE 10 MG: 10 INJECTION, EMULSION INTRAMUSCULAR; INTRAVENOUS; SUBCUTANEOUS at 04:01

## 2019-01-01 RX ADMIN — ACETAMINOPHEN 1000 MG: 10 INJECTION, SOLUTION INTRAVENOUS at 05:01

## 2019-01-01 RX ADMIN — VASOPRESSIN 0.5 UNITS: 20 INJECTION INTRAVENOUS at 02:01

## 2019-01-01 RX ADMIN — ALBUTEROL SULFATE 2.5 MG: 2.5 SOLUTION RESPIRATORY (INHALATION) at 07:01

## 2019-01-01 RX ADMIN — Medication: at 04:01

## 2019-01-01 RX ADMIN — FUROSEMIDE 160 MG: 10 INJECTION, SOLUTION INTRAVENOUS at 08:01

## 2019-01-01 RX ADMIN — METOPROLOL TARTRATE 50 MG: 50 TABLET ORAL at 09:01

## 2019-01-01 RX ADMIN — MIDODRINE HYDROCHLORIDE 5 MG: 5 TABLET ORAL at 09:01

## 2019-01-01 RX ADMIN — Medication 0.02 MCG/KG/MIN: at 08:01

## 2019-01-01 RX ADMIN — VANCOMYCIN HYDROCHLORIDE 750 MG: 750 INJECTION, POWDER, LYOPHILIZED, FOR SOLUTION INTRAVENOUS at 05:01

## 2019-01-01 RX ADMIN — CEFEPIME 1 G: 1 INJECTION, POWDER, FOR SOLUTION INTRAMUSCULAR; INTRAVENOUS at 05:01

## 2019-01-01 RX ADMIN — ALBUMIN HUMAN 12.5 G: 0.05 INJECTION, SOLUTION INTRAVENOUS at 03:01

## 2019-01-01 RX ADMIN — Medication 50 MCG/HR: at 12:01

## 2019-01-01 RX ADMIN — POTASSIUM CHLORIDE 20 MEQ: 14.9 INJECTION, SOLUTION INTRAVENOUS at 07:01

## 2019-01-01 RX ADMIN — MIDODRINE HYDROCHLORIDE 5 MG: 5 TABLET ORAL at 04:01

## 2019-01-01 RX ADMIN — FUROSEMIDE 40 MG: 10 INJECTION, SOLUTION INTRAVENOUS at 03:01

## 2019-01-01 RX ADMIN — AMIODARONE HYDROCHLORIDE 1 MG/MIN: 1.8 INJECTION, SOLUTION INTRAVENOUS at 04:01

## 2019-01-01 RX ADMIN — EPINEPHRINE 0.02 MCG/KG/MIN: 1 INJECTION INTRAMUSCULAR; INTRAVENOUS; SUBCUTANEOUS at 12:01

## 2019-01-01 RX ADMIN — FUROSEMIDE 160 MG: 10 INJECTION, SOLUTION INTRAVENOUS at 07:01

## 2019-01-01 RX ADMIN — DEXTROSE MONOHYDRATE 25 G: 25 INJECTION, SOLUTION INTRAVENOUS at 05:01

## 2019-01-01 RX ADMIN — DEXTROSE MONOHYDRATE 12.5 G: 25 INJECTION, SOLUTION INTRAVENOUS at 08:01

## 2019-01-01 RX ADMIN — FENTANYL CITRATE 25 MCG: 50 INJECTION INTRAMUSCULAR; INTRAVENOUS at 11:01

## 2019-01-01 RX ADMIN — METOPROLOL TARTRATE 12.5 MG: 25 TABLET, FILM COATED ORAL at 09:01

## 2019-01-01 RX ADMIN — INSULIN HUMAN 10 UNITS: 100 INJECTION, SOLUTION PARENTERAL at 11:01

## 2019-01-01 RX ADMIN — HYDROCORTISONE SODIUM SUCCINATE 100 MG: 100 INJECTION, POWDER, FOR SOLUTION INTRAMUSCULAR; INTRAVENOUS at 03:01

## 2019-01-01 RX ADMIN — INSULIN HUMAN 10 UNITS: 100 INJECTION, SOLUTION PARENTERAL at 06:01

## 2019-01-01 RX ADMIN — SODIUM CHLORIDE, SODIUM GLUCONATE, SODIUM ACETATE, POTASSIUM CHLORIDE, MAGNESIUM CHLORIDE, SODIUM PHOSPHATE, DIBASIC, AND POTASSIUM PHOSPHATE: .53; .5; .37; .037; .03; .012; .00082 INJECTION, SOLUTION INTRAVENOUS at 01:01

## 2019-01-01 RX ADMIN — FENTANYL CITRATE 50 MCG: 50 INJECTION, SOLUTION INTRAMUSCULAR; INTRAVENOUS at 02:01

## 2019-01-01 RX ADMIN — SODIUM CHLORIDE 1 MCG/KG/MIN: 9 INJECTION, SOLUTION INTRAVENOUS at 01:01

## 2019-01-01 RX ADMIN — PHENYLEPHRINE HYDROCHLORIDE 200 MCG: 10 INJECTION INTRAVENOUS at 01:01

## 2019-01-01 RX ADMIN — KETAMINE HYDROCHLORIDE 10 MG: 10 INJECTION, SOLUTION INTRAMUSCULAR; INTRAVENOUS at 01:01

## 2019-01-01 RX ADMIN — OXYCODONE HYDROCHLORIDE AND ACETAMINOPHEN 1 TABLET: 10; 325 TABLET ORAL at 04:01

## 2019-01-01 RX ADMIN — FUROSEMIDE 160 MG: 10 INJECTION, SOLUTION INTRAVENOUS at 10:01

## 2019-01-01 RX ADMIN — MAGNESIUM SULFATE IN WATER 2 G: 40 INJECTION, SOLUTION INTRAVENOUS at 02:01

## 2019-01-01 RX ADMIN — HYDROCORTISONE SODIUM SUCCINATE 100 MG: 100 INJECTION, POWDER, FOR SOLUTION INTRAMUSCULAR; INTRAVENOUS at 06:01

## 2019-01-01 RX ADMIN — BUSPIRONE HYDROCHLORIDE 15 MG: 5 TABLET ORAL at 10:01

## 2019-01-01 RX ADMIN — OXYCODONE HYDROCHLORIDE AND ACETAMINOPHEN 1 TABLET: 10; 325 TABLET ORAL at 01:01

## 2019-01-01 RX ADMIN — LIDOCAINE HYDROCHLORIDE 5 ML: 10 INJECTION INFILTRATION; PERINEURAL at 03:01

## 2019-01-01 RX ADMIN — NYSTATIN 500000 UNITS: 500000 SUSPENSION ORAL at 12:01

## 2019-01-01 RX ADMIN — VASOPRESSIN 0.08 UNITS/MIN: 20 INJECTION INTRAVENOUS at 09:01

## 2019-01-01 RX ADMIN — EPINEPHRINE 0.9 MCG/KG/MIN: 1 INJECTION INTRAMUSCULAR; INTRAVENOUS; SUBCUTANEOUS at 11:01

## 2019-01-01 RX ADMIN — FENTANYL CITRATE 25 MCG: 50 INJECTION INTRAMUSCULAR; INTRAVENOUS at 08:01

## 2019-01-01 RX ADMIN — HYDROMORPHONE HYDROCHLORIDE 1 MG: 1 INJECTION, SOLUTION INTRAMUSCULAR; INTRAVENOUS; SUBCUTANEOUS at 07:01

## 2019-01-01 RX ADMIN — Medication: at 10:01

## 2019-01-01 RX ADMIN — FENTANYL CITRATE 50 MCG: 50 INJECTION INTRAMUSCULAR; INTRAVENOUS at 03:01

## 2019-01-01 RX ADMIN — ALBUMIN HUMAN 12.5 G: 50 SOLUTION INTRAVENOUS at 03:01

## 2019-01-01 RX ADMIN — ALBUMIN HUMAN 25 G: 0.05 INJECTION, SOLUTION INTRAVENOUS at 10:01

## 2019-01-01 RX ADMIN — SODIUM CHLORIDE, SODIUM LACTATE, POTASSIUM CHLORIDE, AND CALCIUM CHLORIDE 1000 ML: .6; .31; .03; .02 INJECTION, SOLUTION INTRAVENOUS at 06:01

## 2019-01-01 RX ADMIN — ACETAMINOPHEN 1000 MG: 10 INJECTION, SOLUTION INTRAVENOUS at 09:01

## 2019-01-01 RX ADMIN — ALBUMIN HUMAN 12.5 G: 50 SOLUTION INTRAVENOUS at 07:01

## 2019-01-01 RX ADMIN — AMIODARONE HYDROCHLORIDE 0.5 MG/MIN: 1.8 INJECTION, SOLUTION INTRAVENOUS at 10:01

## 2019-01-01 RX ADMIN — CALCIUM GLUCONATE 2 G: 98 INJECTION, SOLUTION INTRAVENOUS at 03:01

## 2019-01-01 RX ADMIN — VASOPRESSIN 0.04 UNITS/MIN: 20 INJECTION INTRAVENOUS at 03:01

## 2019-01-01 RX ADMIN — ONDANSETRON 4 MG: 2 INJECTION INTRAMUSCULAR; INTRAVENOUS at 09:01

## 2019-01-01 RX ADMIN — Medication: at 12:01

## 2019-01-01 RX ADMIN — BUSPIRONE HYDROCHLORIDE 15 MG: 5 TABLET ORAL at 08:01

## 2019-01-01 RX ADMIN — ALBUTEROL SULFATE 2.5 MG: 2.5 SOLUTION RESPIRATORY (INHALATION) at 02:01

## 2019-01-01 RX ADMIN — SODIUM CHLORIDE: 0.9 INJECTION, SOLUTION INTRAVENOUS at 01:01

## 2019-01-01 RX ADMIN — HYDROMORPHONE HYDROCHLORIDE 1 MG: 1 INJECTION, SOLUTION INTRAMUSCULAR; INTRAVENOUS; SUBCUTANEOUS at 10:01

## 2019-01-01 RX ADMIN — NYSTATIN 500000 UNITS: 500000 SUSPENSION ORAL at 08:01

## 2019-01-01 RX ADMIN — MAGNESIUM SULFATE IN WATER 2 G: 40 INJECTION, SOLUTION INTRAVENOUS at 04:01

## 2019-01-01 RX ADMIN — CEFAZOLIN 2 G: 1 INJECTION, POWDER, FOR SOLUTION INTRAMUSCULAR; INTRAVENOUS at 11:01

## 2019-01-01 RX ADMIN — FUROSEMIDE 160 MG: 10 INJECTION, SOLUTION INTRAVENOUS at 05:01

## 2019-01-01 RX ADMIN — NICOTINE 1 PATCH: 14 PATCH, EXTENDED RELEASE TRANSDERMAL at 11:01

## 2019-01-01 RX ADMIN — FENTANYL CITRATE 50 MCG: 50 INJECTION, SOLUTION INTRAMUSCULAR; INTRAVENOUS at 01:01

## 2019-01-01 RX ADMIN — Medication 100 MCG: at 02:01

## 2019-01-01 RX ADMIN — KETAMINE HYDROCHLORIDE 10 MG: 10 INJECTION, SOLUTION INTRAMUSCULAR; INTRAVENOUS at 02:01

## 2019-01-01 NOTE — SUBJECTIVE & OBJECTIVE
Interval History:     Patient with INR of >10 and Hb of 6.5. Having several BM. Not eating.     Medications:  Continuous Infusions:  Scheduled Meds:   albuterol sulfate  2.5 mg Nebulization Q6H WAKE    busPIRone  15 mg Oral BID    ceFAZolin (ANCEF) IVPB  2 g Intravenous Q12H    doxazosin  2 mg Oral Daily    furosemide  120 mg Intravenous BID    metoprolol tartrate  50 mg Oral BID    nicotine  1 patch Transdermal Daily    nystatin  500,000 Units Oral QID    phytonadione  2.5 mg Oral Daily    silver nitrate applicators  5 applicator Topical (Top) Once    sodium bicarbonate  1,950 mg Oral TID     PRN Meds:sodium chloride, sodium chloride, sodium chloride, ALPRAZolam, dextrose 50%, glucagon (human recombinant), HYDROmorphone, HYDROmorphone, insulin aspart U-100, ondansetron, ondansetron, oxyCODONE-acetaminophen, promethazine (PHENERGAN) IVPB, sodium chloride 0.9%, sodium chloride 0.9%, zolpidem     Review of patient's allergies indicates:  No Known Allergies  Objective:     Vital Signs (Most Recent):  Temp: 96.1 °F (35.6 °C) (01/01/19 1303)  Pulse: 77 (01/01/19 1307)  Resp: 18 (01/01/19 1307)  BP: (!) 79/44 (01/01/19 1303)  SpO2: (!) 93 % (01/01/19 1307) Vital Signs (24h Range):  Temp:  [96.1 °F (35.6 °C)-98.7 °F (37.1 °C)] 96.1 °F (35.6 °C)  Pulse:  [65-78] 77  Resp:  [14-18] 18  SpO2:  [93 %-100 %] 93 %  BP: ()/(40-63) 79/44     Weight: 69.9 kg (154 lb)  Body mass index is 24.12 kg/m².    Intake/Output - Last 3 Shifts       12/30 0700 - 12/31 0659 12/31 0700 - 01/01 0659 01/01 0700 - 01/02 0659    P.O.  220     I.V. (mL/kg) 900 (12.9)      Blood 700 350     Total Intake(mL/kg) 1600 (22.9) 570 (8.2)     Urine (mL/kg/hr) 45 (0) 290 (0.2)     Emesis/NG output  0     Other 325 240     Stool  0     Blood       Total Output 370 530     Net +1230 +40            Stool Occurrence  2 x 3 x    Emesis Occurrence  0 x           Physical Exam      General: In no acute distress, well appearance.   CV: RRR  Pulm: non  labored breathing, b/l clear breath sounds.   Ext: Bilateral extremities with wound vac in place. SS fluid.       Significant Labs:  CBC:   Recent Labs   Lab 01/01/19  0925   WBC 9.45   RBC 2.43*   HGB 6.5*   HCT 20.1*      MCV 83   MCH 26.7*   MCHC 32.3     CMP:   Recent Labs   Lab 01/01/19  0419 01/01/19  0925   GLU 85 73   CALCIUM 7.0* 7.0*   ALBUMIN 0.9*  --    PROT 3.4*  --     138   K 5.8* 5.7*   CO2 18* 17*    108   * 115*   CREATININE 3.8* 3.9*   ALKPHOS 84  --    ALT <5*  --    AST 12  --    BILITOT 0.2  --

## 2019-01-01 NOTE — SUBJECTIVE & OBJECTIVE
"Principal Problem: Sepsis    Principal Orthopedic Problem: As above s/p BUE I&D / Right wrist I&D / right knee I&D    Interval History: AFVSS. Pain controlled at rest. Denies pain in b/l ankles, knees. Continuing to have significant pain with movement of upper extremities.    Review of patient's allergies indicates:  No Known Allergies    Current Facility-Administered Medications   Medication    0.9%  NaCl infusion (for blood administration)    albuterol sulfate nebulizer solution 2.5 mg    ALPRAZolam tablet 1 mg    busPIRone tablet 15 mg    ceFAZolin injection 2 g    dextrose 50% injection 12.5 g    doxazosin tablet 2 mg    furosemide injection 120 mg    glucagon (human recombinant) injection 1 mg    HYDROmorphone injection 0.2 mg    HYDROmorphone injection 1 mg    insulin aspart U-100 pen 0-5 Units    metoprolol tartrate (LOPRESSOR) tablet 50 mg    nicotine 14 mg/24 hr 1 patch    nystatin 100,000 unit/mL suspension 500,000 Units    ondansetron injection 4 mg    ondansetron injection 4 mg    oxyCODONE-acetaminophen  mg per tablet 1 tablet    phytonadione (VITAMIN K) 1mg/ml oral solution    promethazine (PHENERGAN) 6.25 mg in dextrose 5 % 50 mL IVPB    silver nitrate applicators applicator 5 applicator    sodium bicarbonate tablet 1,300 mg    sodium chloride 0.9% flush 3 mL    sodium chloride 0.9% flush 3 mL    zolpidem tablet 5 mg     Objective:     Vital Signs (Most Recent):  Temp: 96.5 °F (35.8 °C) (01/01/19 0430)  Pulse: 73 (01/01/19 0600)  Resp: 16 (01/01/19 0430)  BP: (!) 96/50 (01/01/19 0430)  SpO2: 99 % (01/01/19 0600) Vital Signs (24h Range):  Temp:  [96.5 °F (35.8 °C)-98.7 °F (37.1 °C)] 96.5 °F (35.8 °C)  Pulse:  [65-87] 73  Resp:  [16-22] 16  SpO2:  [95 %-100 %] 99 %  BP: ()/(39-81) 96/50     Weight: 69.9 kg (154 lb)  Height: 5' 7" (170.2 cm)  Body mass index is 24.12 kg/m².      Intake/Output Summary (Last 24 hours) at 1/1/2019 0728  Last data filed at 1/1/2019 " 0430  Gross per 24 hour   Intake 570 ml   Output 530 ml   Net 40 ml       Ortho/SPM Exam     PE:    AA&O x 4.  NAD  HEENT:  NCAT, sclera nonicteric  Lungs:  Respirations are equal and unlabored.  CV:  2+ bilateral upper and lower extremity pulses.  Skin:  Intact throughout.    MSK:    BUE in dressings, serosanguinous drainage present to RUE dressings, wound vacs in place with serosanguinous drainage in canisters  ROM intact to left hand with intact sensation. Dorsal hand wound with exposed tendons, granulation tissue with fibrous exudate around periphery of wound, no purulence  Pt with limited mobility of right hand 2/2 pain. Weakly flexes and extends digits. Sensation intact throughout right hand. Dorsal hand wound also with exposed tendons, some granulation tissue present around periphery, no purulence    RLE wound clean and intact without erythema or induration. AROM intact without significant pain.      Significant Labs: All pertinent labs within the past 24 hours have been reviewed.    Significant Imaging: I have reviewed all pertinent imaging results/findings.

## 2019-01-01 NOTE — NURSING
"Pt refusing to be cleaned despite laying in a puddle of her own feces. States, "I need to use the bathroom. Change me later." Pt also refusing to take her medications because she "has to use the bathroom."   "

## 2019-01-01 NOTE — PROGRESS NOTES
[facility]  Orthopedics  Progress Note    Patient Name: Misty Khalil  MRN: [id number]  Admission Date: 12/20/2018  Hospital Length of Stay: 12 days  Attending Provider: Javier Kirk MD  Primary Care Provider: JOEL Mccauley  Follow-up For: Procedure(s) (LRB):  DEBRIDEMENT, WOUND (Bilateral)  INSERTION, CATHETER, CENTRAL VENOUS, VORA (Right)    Post-Operative Day: 2 Days Post-Op  Subjective:     Principal Problem: Sepsis    Principal Orthopedic Problem: As above s/p BUE I&D / Right wrist I&D / right knee I&D    Interval History: AFVSS. Pain controlled at rest. Denies pain in b/l ankles, knees. Continuing to have significant pain with movement of upper extremities.    Review of patient's allergies indicates:  No Known Allergies    Current Facility-Administered Medications   Medication    0.9%  NaCl infusion (for blood administration)    albuterol sulfate nebulizer solution 2.5 mg    ALPRAZolam tablet 1 mg    busPIRone tablet 15 mg    ceFAZolin injection 2 g    dextrose 50% injection 12.5 g    doxazosin tablet 2 mg    furosemide injection 120 mg    glucagon (human recombinant) injection 1 mg    HYDROmorphone injection 0.2 mg    HYDROmorphone injection 1 mg    insulin aspart U-100 pen 0-5 Units    metoprolol tartrate (LOPRESSOR) tablet 50 mg    nicotine 14 mg/24 hr 1 patch    nystatin 100,000 unit/mL suspension 500,000 Units    ondansetron injection 4 mg    ondansetron injection 4 mg    oxyCODONE-acetaminophen  mg per tablet 1 tablet    phytonadione (VITAMIN K) 1mg/ml oral solution    promethazine (PHENERGAN) 6.25 mg in dextrose 5 % 50 mL IVPB    silver nitrate applicators applicator 5 applicator    sodium bicarbonate tablet 1,300 mg    sodium chloride 0.9% flush 3 mL    sodium chloride 0.9% flush 3 mL    zolpidem tablet 5 mg     Objective:     Vital Signs (Most Recent):  Temp: 96.5 °F (35.8 °C) (01/01/19 0430)  Pulse: 73 (01/01/19 0600)  Resp: 16  "(01/01/19 0430)  BP: (!) 96/50 (01/01/19 0430)  SpO2: 99 % (01/01/19 0600) Vital Signs (24h Range):  Temp:  [96.5 °F (35.8 °C)-98.7 °F (37.1 °C)] 96.5 °F (35.8 °C)  Pulse:  [65-87] 73  Resp:  [16-22] 16  SpO2:  [95 %-100 %] 99 %  BP: ()/(39-81) 96/50     Weight: 69.9 kg (154 lb)  Height: 5' 7" (170.2 cm)  Body mass index is 24.12 kg/m².      Intake/Output Summary (Last 24 hours) at 1/1/2019 0728  Last data filed at 1/1/2019 0430  Gross per 24 hour   Intake 570 ml   Output 530 ml   Net 40 ml       Ortho/SPM Exam     PE:    AA&O x 4.  NAD  HEENT:  NCAT, sclera nonicteric  Lungs:  Respirations are equal and unlabored.  CV:  2+ bilateral upper and lower extremity pulses.  Skin:  Intact throughout.    MSK:    BUE in dressings, serosanguinous drainage present to RUE dressings, wound vacs in place with serosanguinous drainage in canisters  ROM intact to left hand with intact sensation. Dorsal hand wound with exposed tendons, granulation tissue with fibrous exudate around periphery of wound, no purulence  Pt with limited mobility of right hand 2/2 pain. Weakly flexes and extends digits. Sensation intact throughout right hand. Dorsal hand wound also with exposed tendons, some granulation tissue present around periphery, no purulence    RLE wound clean and intact without erythema or induration. AROM intact without significant pain.      Significant Labs: All pertinent labs within the past 24 hours have been reviewed.    Significant Imaging: I have reviewed all pertinent imaging results/findings.    Assessment/Plan:     Septic arthritis of knee, right    Misty Khalil is a 58 y.o. female s/p BUE I&D with open wounds and right septic knee / right septic wrist    Low clinical suspicion for occult septic joints at this time. No indications for repeat debridements from orthopedic standpoint.  Dressing changes PRN to right knee to keep clean and covered at all times.  Wound vacs b/l upper extremities per general " surgery.  Right hand intra-op cultures growing staph aureus, pan sensitive.  Will discuss coverage for hand wounds with plastics     Dispo: Please call with questions. No plans for orthopedic intervention at this time.           Whit Edmond MD  Orthopedics  [facility]

## 2019-01-01 NOTE — PROGRESS NOTES
Progress Notes    INR of > 10. She had received 2 FFP and 4 pRBCs over past day.  There are no new signs of bruising; she does have a wound vac which had about 150 cc collection of bloody material.     Factor V and VIII are not low. Fibrinogen has been noted to be 365.  PTT also elevated to 52.1. Repeat INR is 9. Check studies for antiphospholipid syndrome given elevation in PT/PTT, and also will check mixing PT and PTT mixing studies as well. There were some orders for oral vitamin K but pt had not received those doses.  Will give one dose of IV vitamin K, discussed low risk of anaphylaxis (reported to be less than 0.1% in some studies) with surgery and also discussed with patient as well.    Discussed with Dr. Aldana and surgery team.     Leighann Chacon MD  Hematology/Oncology Fellow, PGY IV  [facility]

## 2019-01-01 NOTE — PLAN OF CARE
Problem: Adult Inpatient Plan of Care  Goal: Plan of Care Review  Hx:  HTN, microcytic anemia, transaminitis, RA, hx of GI bleed     Dx: necrotizing fascitis     12/21: transfer to SICU, CT of chest/arm/knee, cultures; bilat UE washout/I&D; 1L crystalloid and 250 albumin in OR; LR bolus, cont. LR infusion started, PRBC's x2 units   12/22: LR bolusx3  12/23: LR bolusx1  12/24: 2 unit of PRBC total. OR for wound debridement. 2L LR bolus  12/25: LR continuous at 125. Nephrology consulted. 1L LR bolus   12/26: MRI of lower back, 1L LR bolus    Nursing:  SBP<180  Accucheck Q6                  POC reviewed with patient and pt. Friend. AAOX4, VSS. 2LNC in place. Tele monitor, continuous pulse ox in place, NSR. Wound vac to bilateral Upper extremities intact both draining sanguineous fluid. 1 unit of blood given. Pain remained at a 8-10 on a 1-10 scale throughout shift. Frequent pain meds given. Caro in place with minimal urine output, 230ml of urine throughout shift. Primary team aware. Dextrose, regular insulin given for increased potassium levels. IV lasix given. Generalized edema present, 4+ pitting edema to LE. Foam boots in place. Pt. Refused for nurse to turn pt. Positioned pt. On pillows. Pt. Transferred to bariatric sizewise bed. BG checks completed Q6. Renal diet maintained, pt. Appetite very poor. Encouragement from nurse for pt. To consume diet. Frequent rounding completed. WCTM.

## 2019-01-01 NOTE — ASSESSMENT & PLAN NOTE
Misty Khalil is a 58 y.o. female s/p BUE I&D with open wounds and right septic knee / right septic wrist    Low clinical suspicion for occult septic joints at this time. No indications for repeat debridements from orthopedic standpoint.  Dressing changes PRN to right knee to keep clean and covered at all times.  Wound vacs b/l upper extremities per general surgery.  Right hand intra-op cultures growing staph aureus, pan sensitive.  Will discuss coverage for hand wounds with plastics     Dispo: Please call with questions. No plans for orthopedic intervention at this time.

## 2019-01-01 NOTE — PLAN OF CARE
Problem: Adult Inpatient Plan of Care  Goal: Plan of Care Review  Hx:  HTN, microcytic anemia, transaminitis, RA, hx of GI bleed     Dx: necrotizing fascitis     12/21: transfer to SICU, CT of chest/arm/knee, cultures; bilat UE washout/I&D; 1L crystalloid and 250 albumin in OR; LR bolus, cont. LR infusion started, PRBC's x2 units   12/22: LR bolusx3  12/23: LR bolusx1  12/24: 2 unit of PRBC total. OR for wound debridement. 2L LR bolus  12/25: LR continuous at 125. Nephrology consulted. 1L LR bolus   12/26: MRI of lower back, 1L LR bolus    Nursing:  SBP<180  Accucheck Q6                  Outcome: Ongoing (interventions implemented as appropriate)  POC reviewed with patient who verbalized understanding. AAOx4. VSS on room air. Right knee incision noted with Aquacel dressing CDI. BLE incisions all CDI with wound vac x2 in place @ 125mmHg, serosanguinous output. Right forearm ace wrap dressing CDI. Left hand gauze dressing noted with scant drainage. Sacral Stage 2 pressure ulcer noted, cleaned and barrier cream applied with new sacral dressing applied after BM. Patient had 2 loose incontinent stools this shift, brown in color. Caro in place with low urine output, Dr. Azevedo aware with no new orders placed. Patient +3/+4 pitting edema to entire body and weeping. BLE heel boats in place, no TEDS or SCDS in place. Patient refusing repositioning and breathing treatments, wanted to refuse being cleaned and changed but educated patient on importance of not laying in stool. Denies any nausea, very poor appetite and oral intake. Telemetry being monitored running NSR and continuous pulse oximetry in place and stable. Blood glucose checked Q6 with no coverage needed. C/o pain throughout entire shift, minimal relief with PRN medications. Safety precautions maintained, call light within reach. Remains free from falls and injury. No acute events. No distress noted. Will continue to monitor.

## 2019-01-01 NOTE — PT/OT/SLP DISCHARGE
Occupational Therapy Discharge Summary    Misty Khalil  MRN: [id number]   Principal Problem: Soft tissue infection      Pt seen for OT eval 12/21/18  Pt went to surgery 12/27/18; thus, OT orders d/c'd.   Assessment:     Pt underwent surgery 12/27/18   Objective:     GOALS:   Multidisciplinary Problems     Occupational Therapy Goals        Problem: Occupational Therapy Goal    Goal Priority Disciplines Outcome Interventions   Occupational Therapy Goal     OT, PT/OT Ongoing (interventions implemented as appropriate)    Description:  Goals to be met by: 1/11/19     Patient will increase functional independence with ADLs by performing:    UE Dressing with Moderate Assistance.  LE Dressing with Maximum Assistance and Assistive Devices as needed.  Grooming while EOB with Minimal Assistance.  Toileting from bedside commode with Maximum Assistance for hygiene and clothing management.   Sitting at edge of bed x10 minutes with Minimal Assistance.  Rolling to Bilateral with Minimal Assistance.   Supine to sit with Moderate Assistance.  Toilet transfer to bedside commode with Maximum Assistance.                Multidisciplinary Problems (Resolved)        Problem: Occupational Therapy Goal    Goal Priority Disciplines Outcome Interventions   Occupational Therapy Goal   (Resolved)     OT, PT/OT Outcome(s) achieved    Description:  Goals to be met by: 2 weeks 1/6/19     Patient will increase functional independence with ADLs by performing:    Pt will complete self feeding with SEJAL   Pt will tolerate sitting EOB with Fair balance to allow for progression with ADL and transfer training.  Pt will complete basic g/h skills with MIN A                     Reasons for Discontinuation of Therapy Services  Pt returned to surgery; thus, initial orders d/c'd.       Plan:     Patient Discharged to: pt was d/c 12/27/18 due to surgery    DARREN Mcnally  1/1/2019

## 2019-01-01 NOTE — PROGRESS NOTES
[facility]  General Surgery  Progress Note    Subjective:     History of Present Illness:  No notes on file    Post-Op Info:  Procedure(s) (LRB):  DEBRIDEMENT, WOUND (Bilateral)  INSERTION, CATHETER, CENTRAL VENOUS, VORA (Right)   2 Days Post-Op     Interval History:     Patient with INR of >10 and Hb of 6.5. Having several BM. Not eating.     Medications:  Continuous Infusions:  Scheduled Meds:   albuterol sulfate  2.5 mg Nebulization Q6H WAKE    busPIRone  15 mg Oral BID    ceFAZolin (ANCEF) IVPB  2 g Intravenous Q12H    doxazosin  2 mg Oral Daily    furosemide  120 mg Intravenous BID    metoprolol tartrate  50 mg Oral BID    nicotine  1 patch Transdermal Daily    nystatin  500,000 Units Oral QID    phytonadione  2.5 mg Oral Daily    silver nitrate applicators  5 applicator Topical (Top) Once    sodium bicarbonate  1,950 mg Oral TID     PRN Meds:sodium chloride, sodium chloride, sodium chloride, ALPRAZolam, dextrose 50%, glucagon (human recombinant), HYDROmorphone, HYDROmorphone, insulin aspart U-100, ondansetron, ondansetron, oxyCODONE-acetaminophen, promethazine (PHENERGAN) IVPB, sodium chloride 0.9%, sodium chloride 0.9%, zolpidem     Review of patient's allergies indicates:  No Known Allergies  Objective:     Vital Signs (Most Recent):  Temp: 96.1 °F (35.6 °C) (01/01/19 1303)  Pulse: 77 (01/01/19 1307)  Resp: 18 (01/01/19 1307)  BP: (!) 79/44 (01/01/19 1303)  SpO2: (!) 93 % (01/01/19 1307) Vital Signs (24h Range):  Temp:  [96.1 °F (35.6 °C)-98.7 °F (37.1 °C)] 96.1 °F (35.6 °C)  Pulse:  [65-78] 77  Resp:  [14-18] 18  SpO2:  [93 %-100 %] 93 %  BP: ()/(40-63) 79/44     Weight: 69.9 kg (154 lb)  Body mass index is 24.12 kg/m².    Intake/Output - Last 3 Shifts       12/30 0700 - 12/31 0659 12/31 0700 - 01/01 0659 01/01 0700 - 01/02 0659    P.O.  220     I.V. (mL/kg) 900 (12.9)      Blood 700 350     Total Intake(mL/kg) 1600 (22.9) 570 (8.2)     Urine (mL/kg/hr) 45 (0) 290  (0.2)     Emesis/NG output  0     Other 325 240     Stool  0     Blood       Total Output 370 530     Net +1230 +40            Stool Occurrence  2 x 3 x    Emesis Occurrence  0 x           Physical Exam      General: In no acute distress, well appearance.   CV: RRR  Pulm: non labored breathing, b/l clear breath sounds.   Ext: Bilateral extremities with wound vac in place. SS fluid.       Significant Labs:  CBC:   Recent Labs   Lab 01/01/19  0925   WBC 9.45   RBC 2.43*   HGB 6.5*   HCT 20.1*      MCV 83   MCH 26.7*   MCHC 32.3     CMP:   Recent Labs   Lab 01/01/19  0419 01/01/19  0925   GLU 85 73   CALCIUM 7.0* 7.0*   ALBUMIN 0.9*  --    PROT 3.4*  --     138   K 5.8* 5.7*   CO2 18* 17*    108   * 115*   CREATININE 3.8* 3.9*   ALKPHOS 84  --    ALT <5*  --    AST 12  --    BILITOT 0.2  --            Assessment/Plan:     * Soft tissue infection    58 y.o. female 2 Days Post-Op for Procedure(s) (LRB):  DEBRIDEMENT, WOUND (Bilateral)  INSERTION, CATHETER, CENTRAL VENOUS, VORA (Right)    Severe soft tissue infection with joint involvement.  Continue abx, diet, PT/OT  Will change Wound Vac on Wednesday.  VIDYA worsening stable but decreased UOP, follow up Urine output.   Appreciate Nephrology recs of Lasix 120 mg BID and Bicarb tabs.   Appreciate Hematology recommendations, started on PO Vit K will switch to IV Vit K.   Anemia will proceed with transfusion of 2 pRBC and 1 FFP.   Daily Coags.   Culture growing MSSA on Cefazolin, ID recommended about 4-6 weeks.   Vora catheter placed yesterday for long term IV access.          Sonia Qureshi MD  General Surgery PGY V  Beeper: [phone]

## 2019-01-02 PROBLEM — L89.152 PRESSURE ULCER OF COCCYGEAL REGION, STAGE 2: Status: ACTIVE | Noted: 2019-01-01

## 2019-01-02 NOTE — PROGRESS NOTES
Pt arrived to unit transferred by RN x3, on continuous cardiac monitor and oxygen. Upon arrival to the floor, MAPs in the 40s, Levo gtt initiated. Dr. Florez at bedside assessing pt.

## 2019-01-02 NOTE — CONSULTS
A Wound  Consult was received from RN  for stage 2 bottom,  BUE  The patient was transferred from [facility] with soft tissue infection and septic arthritis of right knee for evaluation of necrotizing fascitis and has a past medical history of HTN, anemia, RA, GiB  Upon assessment, Ms. Khalil is on a speciality ABY bed, has 2 wound vacs to bilateral arms and numerous other injuries being followed by General Surgery.  Ms. Khalil was turned with unit RN Arcelia's assist to assess coccyx.  The skin is very moist, weeping especially to buttocks skin and posterior thighs- urinary catheter in place/patent without leaking.  The buttocks/ gluteal folds have several tears/redness from moisture and a stage 2 pressure injury is noted on the coccyx area ~ 2cm L x 2cm W.  Barrier paste applied to absorb moisture through zinc and coviden blue pads changed. The left hand dressing is saturated with serosanguineous fluid, the wound bed was cleansed with sterile normal saline, old dried blood removed from hand with cleansing cloths, Restore dressing applied over Left hand wound to protect the wound bed and provide antimicrobial protection through the use of Ag, covered with a foam dressing and secured loosely with coban.    The plan of care was discussed with the patient,  verbalized understanding.   The Unit Nurse was notified of the care provided and we discussed the treatment plan.  Dr. SUSAN Miller was notified, however she had removed the wound care consult since general surgery is handling the wounds. She did approve recommendations for moisture control/stage 2 to coccyx wound care.     Consultant Recommendations:  Coccyx/gluteal folds--cleanse with cleansing cloths, apply Triad ointment BID/prn cleansing and use blue pads to wick away excess fluid from skin.      Wound care team signing off.       01/02/19 0945       Incision/Site 12/20/18 5505 Left Hand lower horizontal   Date First Assessed/Time First Assessed: 12/20/18 0905   Present  Prior to Hospital Arrival?: Yes  Side: Left  Location: Hand  Orientation: lower  Incision Type: horizontal  Closure Method: (c) Other (see comments)  Additional Comments: necrotizing fas...   Wound Image    Incision WDL ex   Dressing Appearance Saturated   Drainage Amount Moderate   Drainage Characteristics/Odor Serosanguineous;Clear   Appearance Red;Moist;Maroon;Tan;Tendon   Periwound Area Intact;Ecchymotic;Edematous;Moist   Wound Edges Open;Irregular   Wound Length (cm) 2.5 cm   Wound Width (cm) 4 cm   Wound Depth (cm) 0.3 cm   Wound Volume (cm^3) 3 cm^3   Wound Surface Area (cm^2) 10 cm^2   Care Cleansed with:;Sterile normal saline   Dressing Changed;Silver;Foam   Dressing Change Due 01/04/19       Pressure Injury 01/01/19 0200 Coccyx Stage 2   Date First Assessed/Time First Assessed: 01/01/19 0200   Pressure Injury Present on Admission: no  Location: Coccyx  Is this injury device related?: No  Staging: Stage 2   Wound Image    Staging 2   Dressing Appearance Open to air;Moist drainage   Drainage Amount Moderate   Drainage Characteristics/Odor Clear;Serosanguineous   Appearance Pink;Red;Moist   Tissue loss description Partial thickness   Periwound Area Moist;Excoriated   Wound Edges Open   Wound Length (cm) 2 cm   Wound Width (cm) 2 cm   Wound Depth (cm) 0.2 cm   Wound Volume (cm^3) 0.8 cm^3   Wound Surface Area (cm^2) 4 cm^2   Care Applied:;Skin Barrier   Skin Interventions   Pressure Reduction Devices specialty bed utilized;pressure-redistributing mattress utilized;positioning supports utilized;heel offloading device utilized   Pressure Reduction Techniques frequent weight shift encouraged;heels elevated off bed;positioned off wounds;pressure points protected;weight shift assistance provided   Skin Protection adhesive use limited;drying agents applied;protective footwear used;skin sealant/moisture barrier applied;skin-to-device areas padded;skin-to-skin areas padded;tubing/devices free from skin contact

## 2019-01-02 NOTE — PLAN OF CARE
Problem: Adult Inpatient Plan of Care  Goal: Plan of Care Review  Hx:  HTN, microcytic anemia, transaminitis, RA, hx of GI bleed     Dx: necrotizing fascitis     12/21: transfer to SICU, CT of chest/arm/knee, cultures; bilat UE washout/I&D; 1L crystalloid and 250 albumin in OR; LR bolus, cont. LR infusion started, PRBC's x2 units   12/22: LR bolusx3  12/23: LR bolusx1  12/24: 2 unit of PRBC total. OR for wound debridement. 2L LR bolus  12/25: LR continuous at 125. Nephrology consulted. 1L LR bolus   12/26: MRI of lower back, 1L LR bolus    Nursing:  SBP<180  Accucheck Q6                  Outcome: Ongoing (interventions implemented as appropriate)  poc reviewed with pt and sister at bedside, all questions and concerns addressed. Pt hypotensive throughout shift, HR and O2 sats stable on 2L nasal cannula. Pt refusing all oral intake. 2 units PRBCs and 1 unit plasma infused s/t low H&H and critical INR/aPTT levels. Several liquid bm's throughout the day, c.diff test panel ordered and contact precautions initiated. Pt refuses to be repositioned and yells when touched, turned, or cleaned. Mepalex dressing applied to stage 2 wounds on bottom. Pt perineal area is profusely weeping. Caro catheter intact with minimal output despite several orders of lasix given, urine is dark yellow/green and cloudy. Pt refuses most oral medications. PRN percocet given once, dilaudid given for pain. R & L upper extremity wound vacs both to 125 mmHg suction with + sanguinous output. Tele monitored, NSR with cont pulse ox. Pt K+ shifted with insulin and dextrose due to high potassium, pt refuses to drink kayexalate. All adverse events covered in note. Pt resting with call light in reach though she refuses to use it and sister at bedside. Will continue to monitor.

## 2019-01-02 NOTE — H&P
[facility]  Critical Care - Surgery  History & Physical    Patient Name: Misty Khalil  MRN: [id number]  Admission Date: 12/20/2018  Code Status: Full Code  Attending Physician: Javier Kirk MD   Primary Care Provider: JOEL Mccauley   Principal Problem: Soft tissue infection    Subjective:     HPI: Patient is a 57 yo F w/ hx of HTN, anemia, RA, GiB, back surgery with implanted hardware who is known to SICU service. Initial presentation 12/17 with Right shoulder pain, swelling, and fevers. Found to have bilateral upper extremity ultrasound (Right arm 14.3x4.3x2.7 collection, Left axilla 8.3x5.4x7.7 fluid collection s/p I&D in ED and initiation of vancomycin and zosyn.  She was taken to OR for incision and washout 12/18 of right hand, RUE, and LUE abscess by Dr. Falk (orthopedic surgery) and again on 12/19 for  Repeat washout and additionally found to have left olecranon abscess, left hand abscess. Cultures grew MSSA and she was transitioned to Ancef at some point during her course. Admitted to SICU 12/20, underwent several Debridements, washouts, knee arthroscopy.    12/21/18 Findings:  Arthroscopic irrigation right knee joint for septic arthritis Right wrist arthrotomy with irrigation for septic arthritis. Right knee with limited synovitis, grade II/III chondromalacia medial femoral condyle and tibial plateau.  Right wrist with purulence     12/21/18 1. Irrigation of necrotizing soft tissue infection bilateral upper extremities  2. Debridement of skin, soft tissue, muscle, fascia, bilateral upper extremities  KEY FINDINGS: Purulence coming from soft tissues of bilateral arms, tracking into bilateral axilla       12/24/18 1. Irrigation bilateral upper extremity necrotizing soft tissue infection 2. Debridement skin, soft tissue, muscle, fascia bilateral upper extremities  KEY FINDINGS: Purulence coming from soft tissues of bilateral arms, tracking into bilateral axilla     12/27/18 1.   Intraoperative consultation for left septic shoulder. 2.  Irrigation and debridement of left septic shoulder. 3.  Irrigation and debridement of right hand.  Followed by Irrigation and debridement of bilateral upper extremities and wound VAC placement.     18 PROCEDURE PERFORMED: Placement of right subclavian brewster single lumen catheter.  Irrigation and debridement of bilateral upper extremities   and wound VAC placement.    Hospital/ICU Course:      Follow-up For: Procedure(s) (LRB):  DEBRIDEMENT, WOUND (Bilateral)  INSERTION, CATHETER, CENTRAL VENOUS, BREWSTER (Right)    Post-Operative Day: 3 Days Post-Op     Past Medical History:   Diagnosis Date    Anxiety     Depression     GI bleed     Hypertension     Rheumatoid arthritis        Past Surgical History:   Procedure Laterality Date    APPLICATION, WOUND VAC x 2 Bilateral 2018    [facility]    ARTHROSCOPY, KNEE Right 2018    [facility]    ARTHROSCOPY, KNEE, septic Right 2018    Performed by [facility] Kirk [facility] at [facility] OR [address] [place] [address]    BACK SURGERY       SECTION      COLONOSCOPY N/A 2018    [facility] by [facility]    DEBRIDEMENT, WOUND Bilateral 2018    Performed by Javier Kirk MD at [facility] OR [address] [place] [address]    DEBRIDEMENT, WOUND Bilateral 2018    Performed by Javier Kirk MD at [facility] OR [address] [place] [address]    ESOPHAGOGASTRODUODENOSCOPY (EGD) N/A 2018    Performed by [facility] at [facility]    HYSTERECTOMY      INCISION AND DRAINAGE, UPPER EXTREMITY Bilateral 2018    [facility]    INCISION AND DRAINAGE, UPPER EXTREMITY Bilateral 2018    Performed by Javier Kikr MD at [facility] OR [address] [place] [address]    INSERTION, CATHETER, CENTRAL VENOUS, BREWSTER Right 2018    Performed by J[facility] Kirk MD at [facility] OR [address] [place] [address]    OOPHORECTOMY          Review of patient's allergies indicates:  No Known Allergies    Family History     Problem Relation (Age of Onset)    Breast cancer Paternal Grandmother    Diabetes Mother    Heart attack Mother, Father    Heart disease Mother    Hypertension Father    Ovarian cancer Sister        Tobacco Use    Smoking status: Current Every Day Smoker     Packs/day: 1.00     Types: Cigarettes    Smokeless tobacco: Never Used   Substance and Sexual Activity    Alcohol use: Yes     Comment: occasionally    Drug use: No    Sexual activity: No      Review of Systems  Objective:     Vital Signs (Most Recent):  Temp: 97.8 °F (36.6 °C) (01/02/19 1345)  Pulse: 90 (01/02/19 1700)  Resp: 17 (01/02/19 1700)  BP: (!) 89/44 (01/02/19 1430)  SpO2: (!) 94 % (01/02/19 1700) Vital Signs (24h Range):  Temp:  [96.1 °F (35.6 °C)-97.8 °F (36.6 °C)] 97.8 °F (36.6 °C)  Pulse:  [] 90  Resp:  [17-28] 17  SpO2:  [87 %-99 %] 94 %  BP: ()/(39-67) 89/44  Arterial Line BP: (120-137)/(50-54) 120/54     Weight: 69.9 kg (154 lb)  Body mass index is 24.12 kg/m².      Intake/Output Summary (Last 24 hours) at 1/2/2019 1752  Last data filed at 1/2/2019 1700  Gross per 24 hour   Intake 270 ml   Output 545 ml   Net -275 ml       Physical Exam   Constitutional: She is oriented to person, place, and time. She appears well-developed and well-nourished.   HENT:   Head: Normocephalic and atraumatic.   Eyes: EOM are normal. Pupils are equal, round, and reactive to light.   Neck: Normal range of motion.   Cardiovascular: Normal rate and intact distal pulses.   Pulmonary/Chest: Effort normal. No respiratory distress.   Abdominal: Soft. She exhibits no distension.   Musculoskeletal:   Extensive wounds over bilateral upper extremity, bandaged with serosanguinous discoloration over dressing   Neurological: She is alert and oriented to person, place, and time.   Skin: Skin is warm.   Nursing note and vitals reviewed.      Vents:       Lines/Drains/Airways      Central Venous Catheter Line                 Percutaneous Central Line Insertion/Assessment - single lumen  12/30/18 0928 right subclavian 3 days         Trialysis (Dialysis) Catheter 01/02/19 1457 right internal jugular less than 1 day          Drain                 Urethral Catheter 12/31/18 0000 2 days          Pressure Ulcer                 Negative Pressure Wound Therapy  3 days         Negative Pressure Wound Therapy  3 days         Pressure Injury 01/01/19 0200 Coccyx Stage 2 1 day                Significant Labs:    CBC/Anemia Profile:  Recent Labs   Lab 01/02/19  0018 01/02/19  0040 01/02/19  0400 01/02/19  1410   WBC 13.69*  --  11.26 9.18   HGB 9.4*  --  9.1* 8.1*   HCT 27.6* 23* 27.9* 24.9*     --  148* 132*   MCV 86  --  86 87   RDW 17.2*  --  17.2* 17.4*        Chemistries:  Recent Labs   Lab 01/01/19  0419 01/01/19  0925  01/01/19  2350 01/02/19  0400 01/02/19  0839 01/02/19  1410    138   < > 139 140  140 136  --    K 5.8* 5.7*   < > 5.1 5.4*  5.4* 5.3*  --     108   < > 108 108  108 106  --    CO2 18* 17*   < > 15* 16*  16* 16*  --    * 115*   < > 113* 116*  116* 115*  --    CREATININE 3.8* 3.9*   < > 4.0* 4.0*  4.0* 4.0*  --    CALCIUM 7.0* 7.0*   < > 7.4* 7.5*  7.5* 7.5*  --    ALBUMIN 0.9*  --   --   --  1.1*  --   --    PROT 3.4*  --   --   --  4.0*  --   --    BILITOT 0.2  --   --   --  0.2  --   --    ALKPHOS 84  --   --   --  88  --   --    ALT <5*  --   --   --  <5*  --   --    AST 12  --   --   --  13  --   --    MG 2.0 1.9  --   --  1.8  --  1.8   PHOS 7.5* 7.4*  --   --  6.7*  --  6.8*    < > = values in this interval not displayed.       All pertinent labs within the past 24 hours have been reviewed.    Significant Imaging: I have reviewed all pertinent imaging results/findings within the past 24 hours.    Assessment/Plan:     59 yo F with extensive soft tissue infection (MSSA) readmitted to SICU for hypotension requiring vasopressors.     Neuro: No  history of seizures/strokes.   Pain: Dilaudid , Tylenol     CVS: BHARAT @ OSH negative for vegetation.   Hypotension - Levo titrated to MAP >65, currently off     Pulm: Chronic smoker  Tachypneic on NC saturating mid 90s. Respiratory compensation for metabolic acidosis.   - Continuous pulse oximetry, incentive spirometry.     Renal:   - Net + >20L since admission. Anuric, no response to 160mg Lasix. Metabolic acidosis with respiratory compensation. BUN >100. K 5.3. Nephrology following.   - Q8h BMP, Strict I/O, Caro     FEN/GI  Erosive Esophagitis: Continue PPI BID, TPN  NPO for procedure 12/21, start TPN post-op  IVF: LR @ 150cc/hr     MSK/ ID:   Severe soft tissue infection with joint involvement- MSSA from all cultures. Wound Vac. ID recommended 4-6 weeks Cefazolin. Negative BHARAT.    Heme/Onc. . INR 5, hematology following. Recommend restarting DVT prophylaxis when H/H stable.      PPX:   SCD, restart SQH   Protonix BID  PT/OT     Dispo: Continue ICU care for now  Primary: ACS        Active Diagnoses:    Diagnosis Date Noted POA    PRINCIPAL PROBLEM:  Soft tissue infection [L08.9] 12/20/2018 Yes     Chronic    Pressure ulcer of coccygeal region, stage 2 [L89.152] 01/02/2019 Yes    Coagulopathy [D68.9] 01/01/2019 Unknown    Abnormal coagulation profile [R79.1] 12/31/2018 No    ATN (acute tubular necrosis) [N17.0]  Unknown    VIDYA (acute kidney injury) [N17.9] 12/26/2018 Unknown    Septic arthritis of knee, right [M00.9] 12/21/2018 Yes    Peripheral vascular disease [I73.9] 10/22/2018 Yes    Anxiety [F41.9] 09/11/2018 Yes      Problems Resolved During this Admission:      Critical care was time spent personally by me on the following activities: development of treatment plan with patient or surrogate and bedside caregivers, discussions with consultants, evaluation of patient's response to treatment, examination of patient, ordering and performing treatments and interventions, ordering and review of laboratory  studies, ordering and review of radiographic studies, pulse oximetry, re-evaluation of patient's condition.  This critical care time did not overlap with that of any other provider or involve time for any procedures.     Alton Florez MD  Critical Care - Surgery  [facility]

## 2019-01-02 NOTE — ANESTHESIA POSTPROCEDURE EVALUATION
"Anesthesia Post Evaluation    Patient: Misty Khalil    Procedure(s) Performed: Procedure(s) (LRB):  INCISION AND DRAINAGE, UPPER EXTREMITY (Bilateral)    Final Anesthesia Type: general  Patient location during evaluation: ICU  Patient participation: Yes- Able to Participate  Level of consciousness: awake and alert  Post-procedure vital signs: reviewed and stable  Pain management: adequate  Airway patency: patent  PONV status at discharge: No PONV  Anesthetic complications: no      Cardiovascular status: blood pressure returned to baseline  Respiratory status: unassisted  Hydration status: euvolemic  Follow-up not needed.        Visit Vitals  BP (!) 93/44 (BP Location: Left leg, Patient Position: Lying)   Pulse 91   Temp 35.7 °C (96.3 °F) (Axillary)   Resp (!) 22   Ht 5' 7" (1.702 m)   Wt 69.9 kg (154 lb)   SpO2 95%   Breastfeeding? No   BMI 24.12 kg/m²       Pain/Jimmy Score: Pain Rating Prior to Med Admin: 10 (1/1/2019  9:57 PM)  Pain Rating Post Med Admin: 8 (1/1/2019 10:58 PM)        "

## 2019-01-02 NOTE — PROGRESS NOTES
[facility]  Nephrology  Progress Note    Patient Name: Misty Khalil  MRN: [id number]  Admission Date: 12/20/2018  Hospital Length of Stay: 13 days  Attending Provider: Javier Kirk MD   Primary Care Physician: JOEL Mccauley  Principal Problem:Soft tissue infection    Subjective:     HPI: 59 yo WEF with medical hx of HTN, anemia, RA who was transferred from [facility] overnight for evaluation fo necrotizing fascitis. She initially presented on 12/17 with right shoulder pain, swelling, and fevers and found to have bilateral upper extremity abscesses: right arm 14.3x4.3x2.7 collection, left axilla 8.3x5.4x7.7 fluid collection. She is s/p I&D in ED and initiation of vancomycin and zosyn.  She was taken to OR for incision and washout 12/18 of right hand, RUE, and LUE abscess by Dr. Falk (orthopedic surgery) and again on 12/19 for repeat washout. She was additionally found to have left olecranon abscess, left hand abscess. Cultures grew MSSA and she was transitioned to Ancef at some point during her course. Of note, she has history of back surgery with implanted hardware. Cardiology was consulted as there was an initial concern for endocarditis, but negative BHARAT would seem to make this less likely.    Nephrology consulted for sCr that is elevated and not normalizing with fluid repletion x several days (since transfer from [facility] to [facility]), sCr has remained stable between 1.7 to 2.0 over that time period, per report patient had an sCr of around 1.0 on admit to [facility], also reviewing old [facility] labs, renal function was WNL over the summer.        Interval History: labs ok, oliguric, bicarb 16, K 5.4    Review of patient's allergies indicates:  No Known Allergies  Current Facility-Administered Medications   Medication Frequency    lidocaine (PF) 10 mg/ml (1%) 10 mg/mL (1 %) injection     albuterol sulfate nebulizer solution 2.5 mg Q6H WAKE    ALPRAZolam tablet 0.5 mg BID PRN    busPIRone  tablet 15 mg BID    ceFAZolin injection 2 g Q12H    dextrose 50% injection 12.5 g PRN    dextrose 50% injection 25 g PRN    dextrose 50% injection 25 g PRN    furosemide injection 160 mg BID    glucagon (human recombinant) injection 1 mg PRN    insulin aspart U-100 pen 0-5 Units Q6H PRN    lidocaine HCL 10 mg/ml (1%) injection 5 mL Once    metoprolol tartrate (LOPRESSOR) split tablet 12.5 mg BID    midodrine tablet 5 mg TID    norepinephrine 4 mg in dextrose 5% 250 mL infusion (premix) (titrating) Continuous    norepinephrine bitartrate-D5W 4 mg/250 mL (16 mcg/mL) infusion Soln     nystatin 100,000 unit/mL suspension 500,000 Units QID    ondansetron injection 4 mg Q12H PRN    ondansetron injection 4 mg Once PRN    oxyCODONE-acetaminophen  mg per tablet 1 tablet Q4H PRN    oxyCODONE-acetaminophen 5-325 mg per tablet 1 tablet Q4H PRN    promethazine (PHENERGAN) 6.25 mg in dextrose 5 % 50 mL IVPB Q6H PRN    sodium bicarbonate tablet 1,950 mg TID    sodium chloride 0.9% flush 3 mL PRN    sodium chloride 0.9% flush 3 mL PRN    zolpidem tablet 5 mg Nightly PRN       Objective:     Vital Signs (Most Recent):  Temp: 97.8 °F (36.6 °C) (01/02/19 1345)  Pulse: 79 (01/02/19 1430)  Resp: 20 (01/02/19 1430)  BP: (!) 89/44 (01/02/19 1430)  SpO2: 97 % (01/02/19 1430)  O2 Device (Oxygen Therapy): nasal cannula (01/02/19 1405) Vital Signs (24h Range):  Temp:  [96.1 °F (35.6 °C)-97.8 °F (36.6 °C)] 97.8 °F (36.6 °C)  Pulse:  [] 79  Resp:  [18-28] 20  SpO2:  [87 %-99 %] 97 %  BP: ()/(39-67) 89/44     Weight: 69.9 kg (154 lb) (12/21/18 1145)  Body mass index is 24.12 kg/m².  Body surface area is 1.82 meters squared.    I/O last 3 completed shifts:  In: 1497.6 [P.O.:240; I.V.:50; Blood:1107.6; IV Piggyback:100]  Out: 560 [Urine:190; Other:370]    Physical Exam   HENT:   Head: Atraumatic.   Neck: No JVD present.   Cardiovascular: Normal rate and regular rhythm.   Pulmonary/Chest: Effort normal and  breath sounds normal.   Abdominal: Soft. She exhibits no distension.   Musculoskeletal: She exhibits edema. She exhibits no tenderness.   Neurological: She is alert.   Skin: Skin is warm.       Significant Labs:  BMP:   Recent Labs   Lab 01/02/19  0839 01/02/19  1410   GLU 92  --      --    CO2 16*  --    *  --    CREATININE 4.0*  --    CALCIUM 7.5*  --    MG  --  1.8     All labs within the past 24 hours have been reviewed.         Assessment/Plan:     VIDYA (acute kidney injury)    VIDYA, non-oliguric, on top of previously normal renal function, at the time of consult, sCr has been stable in the 1.7-2.0 range since coming to [facility], initially felt elevated sCr due to pre-renal etiology, but fluid resuscitation over the past several days has not led to an improvement, further noted with a UPC ratio at 5.82, multiple RBCs and WBCs in urine, noted patient has a Caro catheter, clinically an ATN secondary surgery at [facility], possibly with hemodynamic instability and also toxicity from infection, would seem like the most likely etiology, however cannot rule out an active/nephritic urine and differential needs to include GN, specifically would be concerned for an Infection / Immune complex GN vs other, noted endocarditis initially was concern, but negative BHARAT makes less likely.  Noted patient on multiple antibiotics, raising concern for an AIN, absence of eosinophilia and rash would make less likely, but needs to be on differential, multiple WBCs on UA could be consistent, but may be more from Caro UTI    Work up so far:    ALYSSA positive w/ a medium titer  C4 complement low, C3 on low side of normal  Manual urine slide noted for granular casts, RBCs (no obvious dysmorphia or RBC casts), WBCs (not in cast form), urine ctx w/o growth (but seems patient on abxs when obtained)    Renal ultrasound unremarkable, no hydro    ANCA MPO titer positive at 27    sCr +/- stable at 4.0    Repeat manual urine microscopy yesterday  again noted for muddy brown casts and no obvious dysmorphia    Plan/Recommendations:  1) continue follow serial RFPs and strict Is & Os  2) immune markers indeterminant and urine microscopy indicate ATN, however still may need renal biopsy will make definitive recs in a day or two           Thank you for your consult. I will follow-up with patient. Please contact us if you have any additional questions.    Hardik Anand MD  Nephrology  [facility]    ATTENDING PHYSICIAN ATTESTATION  I have personally interviewed and examined the patient. I thoroughly reviewed the demographic, clinical, laboratorial and imaging information available in medical records. I agree with the assessment and recommendations provided by the subspecialty resident. Dr. Anand was under my supervision.

## 2019-01-02 NOTE — SUBJECTIVE & OBJECTIVE
Interval History:     Patient having issues with work of breathing but still satting fine on 2L nc. Coughing.     Medications:  Continuous Infusions:  Scheduled Meds:   albuterol sulfate  2.5 mg Nebulization Q6H WAKE    busPIRone  15 mg Oral BID    ceFAZolin (ANCEF) IVPB  2 g Intravenous Q12H    furosemide  160 mg Intravenous BID    metoprolol tartrate  12.5 mg Oral BID    midodrine  5 mg Oral TID    nystatin  500,000 Units Oral QID    sodium bicarbonate  1,950 mg Oral TID     PRN Meds:ALPRAZolam, dextrose 50%, [COMPLETED] dextrose 50% **AND** dextrose 50% **AND** [COMPLETED] insulin regular, [COMPLETED] dextrose 50% **AND** dextrose 50% **AND** [COMPLETED] insulin regular, glucagon (human recombinant), insulin aspart U-100, ondansetron, ondansetron, oxyCODONE-acetaminophen, oxyCODONE-acetaminophen, promethazine (PHENERGAN) IVPB, sodium chloride 0.9%, sodium chloride 0.9%, zolpidem     Review of patient's allergies indicates:  No Known Allergies  Objective:     Vital Signs (Most Recent):  Temp: 96.1 °F (35.6 °C) (01/02/19 0807)  Pulse: 88 (01/02/19 0807)  Resp: (!) 26 (01/02/19 0807)  BP: 105/63 (01/02/19 0807)  SpO2: 97 % (01/02/19 0807) Vital Signs (24h Range):  Temp:  [96.1 °F (35.6 °C)-96.6 °F (35.9 °C)] 96.1 °F (35.6 °C)  Pulse:  [] 88  Resp:  [18-28] 26  SpO2:  [87 %-99 %] 97 %  BP: ()/(40-67) 105/63     Weight: 69.9 kg (154 lb)  Body mass index is 24.12 kg/m².    Intake/Output - Last 3 Shifts       12/31 0700 - 01/01 0659 01/01 0700 - 01/02 0659 01/02 0700 - 01/03 0659    P.O. 220 120     I.V. (mL/kg)  50 (0.7)     Blood 350 1107.6     IV Piggyback  100     Total Intake(mL/kg) 570 (8.2) 1377.6 (19.7)     Urine (mL/kg/hr) 290 (0.2) 130 (0.1)     Emesis/NG output 0 0     Other 240 280     Stool 0 0     Total Output 530 410     Net +40 +967.6            Stool Occurrence 2 x 4 x     Emesis Occurrence 0 x 0 x           Physical Exam    CV: RRR  Pulm: increased work of breathing, coarse, mild  wheezes  Ext: Bilateral extremities with wound vac in place. SS fluid.   +edema      Significant Labs:  CBC:   Recent Labs   Lab 01/02/19  0400   WBC 11.26   RBC 3.24*   HGB 9.1*   HCT 27.9*   *   MCV 86   MCH 28.1   MCHC 32.6     CMP:   Recent Labs   Lab 01/02/19  0400 01/02/19  0839   GLU 64*  64* 92   CALCIUM 7.5*  7.5* 7.5*   ALBUMIN 1.1*  --    PROT 4.0*  --      140 136   K 5.4*  5.4* 5.3*   CO2 16*  16* 16*     108 106   *  116* 115*   CREATININE 4.0*  4.0* 4.0*   ALKPHOS 88  --    ALT <5*  --    AST 13  --    BILITOT 0.2  --

## 2019-01-02 NOTE — PROGRESS NOTES
Pt admitted to SICU on NC.  Pt sats and resp rate within normal limits at this time.  Pt continuing to have decreced BP.  Dr Florez at bedside.

## 2019-01-02 NOTE — NURSING
Patient only able to take a 2 small sips of Kaexeltye then refused the rest. Patient educated on the importance of taking the medication to decrease Potassium levels. Patient still refusing to take. Dr. Boone made aware and ordered insulin and dextrose to be given.

## 2019-01-02 NOTE — NURSING
"0000-    Patient c/o feeling short of breath and wanting to get up out of bed. Continuing to state, "get me up and out of here." In and out of confusion. O2 saturation 87%, increased oxygen from 2L NC to 5L NC with O2 saturation increasing to 94%. Patient very restless with abdominal muscle use and labored breathing. Breath sounds coarse and wet. Rapid response RN at bedside to assess patient. Dr. Boone notified and ordered STAT chest x-ray , ABG, and CBC. Orders placed and to be carried out.     0100-    Dr. Boone at bedside to assess patient after consulting with upper level resident. Ordered to put patient on BiPAP and administer breathing treatment prior to BiPaP placement. Also ordered to given dose of IV Lasix 80mg once.     0120-    Respiratory at bedside to give breathing treatment and place BiPAP. Patient refusing treatment and not wanting mask on face. O2 saturation 99% on 5LNC at this time. Dr. Boone ok'd to discontinue BiPaP at this time and notify if patient begins desaturation again.     0200-    Patient oxygen decreased to 3.5L NC per Dr. Boone orders, O2 saturation 99%.   Patient asleep at this time. Will continue to monitor.          "

## 2019-01-02 NOTE — PLAN OF CARE
Problem: Adult Inpatient Plan of Care  Goal: Plan of Care Review  Hx:  HTN, microcytic anemia, transaminitis, RA, hx of GI bleed     Dx: necrotizing fascitis     12/21: transfer to SICU, CT of chest/arm/knee, cultures; bilat UE washout/I&D; 1L crystalloid and 250 albumin in OR; LR bolus, cont. LR infusion started, PRBC's x2 units   12/22: LR bolusx3  12/23: LR bolusx1  12/24: 2 unit of PRBC total. OR for wound debridement. 2L LR bolus  12/25: LR continuous at 125. Nephrology consulted. 1L LR bolus   12/26: MRI of lower back, 1L LR bolus    Nursing:  SBP<180  Accucheck Q6                  Outcome: Ongoing (interventions implemented as appropriate)  POC reviewed with patient and sibling who both verbalized understanding. AAOx4, restless, and in and out of confusion. Unstable VSS at midnight, see previous note. BP low this AM, Dr. Boone aware with no interventions ordered. O2 saturation remains 95%-99% on 2L NC despite patient having labored breathing, feeling restless, and wanting to get up out of bed and leave. Patient unable to move on own due to condition, refusing to be turned throughout night. Right knee incision noted with Aquacel dressing CDI. BLE incisions all CDI with wound vac x2 in place @ 125mmHg, serosanguinous/sanganiuous output. Right forearm ace wrap dressing CDI. Left hand gauze dressing noted with scant drainage. Sacral Stage 2 pressure ulcer noted, cleaned and barrier cream applied with new sacral dressing applied after BM. Caro in place with low urine output, IV Lasix given x1. Patient +3/+4 pitting edema to entire body and weeping. BLE heel boats in place, no TEDS or SCDS in place. Patient refusing repositioning, tolerated breathing treatments somewhat better. C/o nausea at beginning of shift relieved with PRN medications. Very poor appetite and oral intake, kept NPO at midnight in prep for procedure to day. Telemetry being monitored running NSR and continuous pulse oximetry in place. Blood  glucose checked Q6 with no coverage needed. Potassium shifted with insulin due to patient refusing Kaeyxelate. Safety precautions maintained, call light within reach. Remains free from falls and injury. Will continue to monitor. Sister at bedside.

## 2019-01-02 NOTE — CARE UPDATE
RN Proactive Rounding Note  Time of Visit: 0013    Admit Date: 2018  LOS: 13  Code Status: Full Code   Date of Visit: 2019  : 1960  Age: 58 y.o.  Sex: female  Race: White  Bed: [facility] A:   MRN: [id number]  Was the patient discharged from an ICU this admission? yes   Was the patient discharged from a PACU within last 24 hours?  no  Did the patient receive conscious sedation/general anesthesia in last 24 hours?  no  Was the patient in the ED within the past 24 hours?  no  Was the patient started on NIPPV within the past 24 hours?  yes  Attending Physician: Javier Kirk MD  Primary Service: Networked reference to record PCT       ASSESSMENT:     Abnormal Vital Signs: desaturation  Clinical Issues: Respiratory     INTERVENTIONS/ RECOMMENDATIONS:     Called to bedside by RN with concern for hypoxia. Upon arrival to room, patient lying in bed, crooked, confused, uncooperative. Oxygen per NC increased from 2L to 5L. SpO2 89%. . Patient repositioned in bed. Call placed to Dr. Boone. STAT ABG with lytes, CBC, CXR done (BMP already previously sent). Suggest initiating CRRT based on chart review, MD states this has been an ongoing discussion with nephrology. Patient has had minimal UOP with lasix challenge on previous shift and current shift.  MD states he will consult with his upper level fellow and follow-up at the bedside.   Upon leaving, patient with SpO2 95% on 5L NC.     Discussed plan of care with Shawanda VELÁZQUEZ. MD to write additional orders.     PHYSICIAN ESCALATION:     Yes/No  yes    Orders received and case discussed with Dr. Boone .    Disposition: Remain in room 1059.    FOLLOW-UP/CONTINGENCY:     Call back the Rapid Response Nurse at x [phone] for additional questions or concerns.

## 2019-01-02 NOTE — PROGRESS NOTES
[facility]  General Surgery  Progress Note    Subjective:     History of Present Illness:  No notes on file    Post-Op Info:  Procedure(s) (LRB):  DEBRIDEMENT, WOUND (Bilateral)  INSERTION, CATHETER, CENTRAL VENOUS, VORA (Right)   3 Days Post-Op     Interval History:     Patient having issues with work of breathing but still satting fine on 2L nc. Coughing.     Medications:  Continuous Infusions:  Scheduled Meds:   albuterol sulfate  2.5 mg Nebulization Q6H WAKE    busPIRone  15 mg Oral BID    ceFAZolin (ANCEF) IVPB  2 g Intravenous Q12H    furosemide  160 mg Intravenous BID    metoprolol tartrate  12.5 mg Oral BID    midodrine  5 mg Oral TID    nystatin  500,000 Units Oral QID    sodium bicarbonate  1,950 mg Oral TID     PRN Meds:ALPRAZolam, dextrose 50%, [COMPLETED] dextrose 50% **AND** dextrose 50% **AND** [COMPLETED] insulin regular, [COMPLETED] dextrose 50% **AND** dextrose 50% **AND** [COMPLETED] insulin regular, glucagon (human recombinant), insulin aspart U-100, ondansetron, ondansetron, oxyCODONE-acetaminophen, oxyCODONE-acetaminophen, promethazine (PHENERGAN) IVPB, sodium chloride 0.9%, sodium chloride 0.9%, zolpidem     Review of patient's allergies indicates:  No Known Allergies  Objective:     Vital Signs (Most Recent):  Temp: 96.1 °F (35.6 °C) (01/02/19 0807)  Pulse: 88 (01/02/19 0807)  Resp: (!) 26 (01/02/19 0807)  BP: 105/63 (01/02/19 0807)  SpO2: 97 % (01/02/19 0807) Vital Signs (24h Range):  Temp:  [96.1 °F (35.6 °C)-96.6 °F (35.9 °C)] 96.1 °F (35.6 °C)  Pulse:  [] 88  Resp:  [18-28] 26  SpO2:  [87 %-99 %] 97 %  BP: ()/(40-67) 105/63     Weight: 69.9 kg (154 lb)  Body mass index is 24.12 kg/m².    Intake/Output - Last 3 Shifts       12/31 0700 - 01/01 0659 01/01 0700 - 01/02 0659 01/02 0700 - 01/03 0659    P.O. 220 120     I.V. (mL/kg)  50 (0.7)     Blood 350 1107.6     IV Piggyback  100     Total Intake(mL/kg) 570 (8.2) 1377.6 (19.7)     Urine (mL/kg/hr)  290 (0.2) 130 (0.1)     Emesis/NG output 0 0     Other 240 280     Stool 0 0     Total Output 530 410     Net +40 +967.6            Stool Occurrence 2 x 4 x     Emesis Occurrence 0 x 0 x           Physical Exam    CV: RRR  Pulm: increased work of breathing, coarse, mild wheezes  Ext: Bilateral extremities with wound vac in place. SS fluid.   +edema      Significant Labs:  CBC:   Recent Labs   Lab 01/02/19  0400   WBC 11.26   RBC 3.24*   HGB 9.1*   HCT 27.9*   *   MCV 86   MCH 28.1   MCHC 32.6     CMP:   Recent Labs   Lab 01/02/19  0400 01/02/19  0839   GLU 64*  64* 92   CALCIUM 7.5*  7.5* 7.5*   ALBUMIN 1.1*  --    PROT 4.0*  --      140 136   K 5.4*  5.4* 5.3*   CO2 16*  16* 16*     108 106   *  116* 115*   CREATININE 4.0*  4.0* 4.0*   ALKPHOS 88  --    ALT <5*  --    AST 13  --    BILITOT 0.2  --            Assessment/Plan:     * Soft tissue infection    58 y.o. female 3 Days Post-Op for Procedure(s) (LRB):  DEBRIDEMENT, WOUND (Bilateral)  INSERTION, CATHETER, CENTRAL VENOUS, VORA (Right)    Severe soft tissue infection with joint involvement.   -Culture growing MSSA on Cefazolin, ID recommended about 4-6 weeks, Vora in place.    Oliguric VIDYA  -not improving    Pulmonary edema  -2/2 renal dysfunction and transfusion requirements  -monitor closely, may require intubation, I have asked the patient to think about her code status and goals of care    Anemia  -stable, no ongoing blood loss  -elevated INR of unknown etiology    Low threshold to transfer to unit today, likely cancel vac change         Kirstin Miller MD  General Surgery  [facility]

## 2019-01-02 NOTE — PT/OT/SLP PROGRESS
Physical Therapy      Patient Name:  Misty Khalil   MRN:  [id number]    Patient not seen today secondary to Other (Comment). PT D/Travis due to pt transferred to ICU due to lethargy, SOB, and edema. PT will await further orders when appropriate.    Blessing Rizzo, PT 1/2/19

## 2019-01-02 NOTE — ASSESSMENT & PLAN NOTE
VIDYA, non-oliguric, on top of previously normal renal function, at the time of consult, sCr has been stable in the 1.7-2.0 range since [place] [facility] initially felt elevated sCr due to pre-renal etiology, but fluid resuscitation over the past several days has not led to an improvement, further noted with a UPC ratio at 5.82, multiple RBCs and WBCs in urine, noted patient has a Caro catheter, clinically an ATN secondary surgery at [facility], possibly with hemodynamic instability and also toxicity from infection, would seem like the most likely etiology, however cannot rule out an active/nephritic urine and differential needs to include GN, specifically would be concerned for an Infection / Immune complex GN vs other, noted endocarditis initially was concern, but negative BHARAT makes less likely.  Noted patient on multiple antibiotics, raising concern for an AIN, absence of eosinophilia and rash would make less likely, but needs to be on differential, multiple WBCs on UA could be consistent, but may be more from Caro UTI    Work up so far:    ALYSSA positive w/ a medium titer  C4 complement low, C3 on low side of normal  Manual urine slide noted for granular casts, RBCs (no obvious dysmorphia or RBC casts), WBCs (not in cast form), urine ctx w/o growth (but seems patient on abxs when obtained)    Renal ultrasound unremarkable, no hydro    ANCA MPO titer positive at 27    sCr +/- stable at 4.0    Repeat manual urine microscopy yesterday again noted for muddy brown casts and no obvious dysmorphia    Plan/Recommendations:  1) continue follow serial RFPs and strict Is & Os  2) immune markers indeterminant and urine microscopy indicate ATN, however still may need renal biopsy will make definitive recs in a day or two

## 2019-01-02 NOTE — CARE UPDATE
Rapid Response Follow-up Note    Followed up with patient for proactive rounding.   No acute issues at this time. Reviewed plan of care with primary RN, Arcelia.  Scheduled for surgical debridement today.  Possible transfer to SICU after surgery.  Will follow up.  Stable vitals noted upon assessment.   Please call Rapid Response RN with any questions or concerns at  [phone].

## 2019-01-02 NOTE — ANESTHESIA PREPROCEDURE EVALUATION
[facility]  Anesthesia Pre-Operative Evaluation         Patient Name: Misty Khalil  YOB: 1960  MRN: [id number]    SUBJECTIVE:     01/01/2019    Pre-operative evaluation for Procedure(s) (LRB):  Debridement of wound and wound vac placement. (N/A)    Misty Khalil is a 58 y.o. female with Anemia, HTN, COPD, RA (on prior prednisone taper) with extensive bilateral upper extremity abscesses now s/p multiple drainage/washouts at OSH who presents for the above procedure.  Hospital course complicated by VIDYA. Not pre-renal in etiology. Neprhology has been consulted and is working up the patient.  Endocarditis ruled out at OSH with negative BHARAT.    Pt requesting that she be put to sleep on her stretcher prior to moving to OR bed as it is very painful for her to move. Last anesthetic was done with an LMA.    Patient now presents for the above procedure(s).      LDA:       Percutaneous Central Line Insertion/Assessment - single lumen  12/30/18 0928 right subclavian (Active)   Number of days: 2            Urethral Catheter 12/31/18 0000 (Active)   Number of days: 1       Previous airway:   Easy Mask  DL with Leonardo #2  ETT 7.0  Grade I View      Drips:   None documented.      Patient Active Problem List   Diagnosis    Hypertension    Hyperlipidemia    Anxiety    Peripheral vascular disease    Soft tissue infection    Septic arthritis of knee, right    VIDYA (acute kidney injury)    Abnormal coagulation profile    ATN (acute tubular necrosis)    Coagulopathy       Review of patient's allergies indicates:  No Known Allergies    Current Inpatient Medications:   albuterol sulfate  2.5 mg Nebulization Q6H WAKE    busPIRone  15 mg Oral BID    ceFAZolin (ANCEF) IVPB  2 g Intravenous Q12H    doxazosin  2 mg Oral Daily    furosemide  160 mg Intravenous BID    metoprolol tartrate  50 mg Oral BID    nicotine  1 patch Transdermal Daily    nystatin  500,000 Units Oral QID    silver nitrate  applicators  5 applicator Topical (Top) Once    sodium bicarbonate  1,950 mg Oral TID    sodium polystyrene  30 g Oral Q4H       No current facility-administered medications on file prior to encounter.      Current Outpatient Medications on File Prior to Encounter   Medication Sig Dispense Refill    albuterol (PROVENTIL) 2.5 mg /3 mL (0.083 %) nebulizer solution Take 3 mLs (2.5 mg total) by nebulization daily as needed for Wheezing. Rescue 1 Box 2    ALPRAZolam (XANAX) 1 MG tablet Take 1 mg by mouth 2 (two) times daily.      busPIRone (BUSPAR) 15 MG tablet Take 1 tablet (15 mg total) by mouth 2 (two) times daily. 60 tablet 2    cloNIDine 0.3 mg/24 hr td ptwk (CATAPRES) 0.3 mg/24 hr Place 1 patch onto the skin every 7 days.      doxazosin (CARDURA) 1 MG tablet Take 2 tablets (2 mg total) by mouth once daily. 60 tablet 2    ezetimibe (ZETIA) 10 mg tablet TAKE 1 TABLET (10 MG TOTAL) BY MOUTH ONCE DAILY. 30 tablet 2    hydrALAZINE (APRESOLINE) 100 MG tablet TAKE 1 TABLET BY MOUTH TWICE DAILY 60 tablet 2    metoprolol tartrate (LOPRESSOR) 50 MG tablet Take 1 tablet (50 mg total) by mouth 2 (two) times daily. 60 tablet 2    oxyCODONE-acetaminophen (PERCOCET) 5-325 mg per tablet Take 1-2 tablets by mouth every 4 (four) hours as needed for Pain. 30 tablet 0    quinapril (ACCUPRIL) 40 MG tablet Take 1 tablet (40 mg total) by mouth every evening. 30 tablet 2    zolpidem (AMBIEN) 10 mg Tab Take 5 mg by mouth nightly as needed.         Past Surgical History:   Procedure Laterality Date    APPLICATION, WOUND VAC x 2 Bilateral 2018    Performed by Javier Kirk MD at [facility] OR 2ND FLR    ARTHROSCOPY, KNEE Right 2018    Performed by Danilo Goyal MD at [facility] OR 2ND FLR    ARTHROSCOPY, KNEE, septic Right 2018    Performed by Javier Kirk MD at [facility] OR 2ND FLR    BACK SURGERY       SECTION      COLONOSCOPY N/A 2018    [facility]     DEBRIDEMENT, WOUND Bilateral 12/30/2018    [facility] at [facility] OR 2ND FLR    DEBRIDEMENT, WOUND Bilateral 12/21/2018    Performed by [facility] at [facility] OR 2ND FLR    ESOPHAGOGASTRODUODENOSCOPY (EGD) N/A 7/16/2018    [facility]    HYSTERECTOMY      INCISION AND DRAINAGE, UPPER EXTREMITY Bilateral 12/27/2018    Performed by [facility] at [facility] OR 2ND FLR    INCISION AND DRAINAGE, UPPER EXTREMITY Bilateral 12/24/2018    Performed by [facility] at [facility] OR [facility] FLR    INSERTION, CATHETER, CENTRAL VENOUS, VORA Right 12/30/2018    Performed by [facility] at [facility] OR [facility] FLR    OOPHORECTOMY         Social History     Socioeconomic History    Marital status:      Spouse name: Not on file    Number of children: Not on file    Years of education: Not on file    Highest education level: Not on file   Social Needs    Financial resource strain: Not on file    Food insecurity - worry: Not on file    Food insecurity - inability: Not on file    Transportation needs - medical: Not on file    Transportation needs - non-medical: Not on file   Occupational History    Not on file   Tobacco Use    Smoking status: Current Every Day Smoker     Packs/day: 1.00     Types: Cigarettes    Smokeless tobacco: Never Used   Substance and Sexual Activity    Alcohol use: Yes     Comment: occasionally    Drug use: No    Sexual activity: No   Other Topics Concern    Not on file   Social History Narrative    Not on file       OBJECTIVE:     Vital Signs Range (Last 24H):  Temp:  [35.6 °C (96.1 °F)-36.3 °C (97.4 °F)]   Pulse:  [65-82]   Resp:  [14-20]   BP: ()/(40-61)   SpO2:  [90 %-100 %]       Significant Labs:  Lab Results   Component Value Date    WBC 12.13 01/01/2019    HGB 7.9 (L) 01/01/2019    HCT 24.5 (L) 01/01/2019     01/01/2019    ALT <5 (L) 01/01/2019    AST 12 01/01/2019     01/01/2019    K  5.8 (H) 01/01/2019     01/01/2019    CREATININE 3.9 (H) 01/01/2019     (H) 01/01/2019    CO2 15 (L) 01/01/2019    INR 9.0 (HH) 01/01/2019    HGBA1C 5.1 12/21/2018         Diagnostic Studies:  CXR (12/30/2018):  There has been interval removal of the right internal jugular central venous line and placement of a right subclavian central venous line which terminates in the right atrium.  There has been interval development of a 5.5 cm opacity which appears to be pleural base along the left lateral chest wall at the superior segment of the lower lobe.  Layering pleural fluid is suspected in the right lung.  There is no pneumothorax.  The cardiomediastinal silhouette, osseous and soft tissue structures are stable for this patient.      Cardiac Studies:  EKG:   Vent. Rate : 070 BPM     Atrial Rate : 070 BPM     P-R Int : 140 ms          QRS Dur : 078 ms      QT Int : 440 ms       P-R-T Axes : 079 054 065 degrees     QTc Int : 475 ms  Normal sinus rhythm  Low voltage QRS  Borderline Abnormal ECG  When compared with ECG of 20-DEC-2018 23:23,  Premature atrial complexes are no longer Present  QRS voltage has decreased  T wave amplitude has decreased in Inferior leads  Confirmed by JESUS LÓPEZ MD ([id number]) on 1/1/2019 4:25:47 PM    2D Echo:  · Concentric left ventricular remodeling.  · Normal left ventricular systolic function. The estimated ejection fraction is 65%  · Normal LV diastolic function.  · No wall motion abnormalities.  · Normal right ventricular systolic function.  · Intermediate central venous pressure (8 mm Hg).       ASSESSMENT/PLAN:     Anesthesia Evaluation    I have reviewed the Patient Summary Reports.    I have reviewed the Nursing Notes.   I have reviewed the Medications.   Steroids Taken In Past Year: Prednisone    Review of Systems  Anesthesia Hx:  History of prior surgery of interest to airway management or planning: Denies Family Hx of Anesthesia complications.   Denies Personal Hx of  Anesthesia complications.   Social:  Smoker, Social Alcohol Use    Hematology/Oncology:     Oncology Normal    -- Anemia:   EENT/Dental:EENT/Dental Normal   Cardiovascular:   Hypertension PVD hyperlipidemia    Pulmonary:   COPD (intermittently uses home O2), moderate    Renal/:   Chronic Renal Disease, ARF    Psych:   anxiety depression          Physical Exam  General:  Well nourished    Airway/Jaw/Neck:  Airway Findings: Mouth Opening: Small, but > 3cm Tongue: Normal  General Airway Assessment: Adult  Mallampati: III  Improves to II with phonation.  TM Distance: Normal, at least 6 cm  Jaw/Neck Findings:     Neck ROM: Normal Extension, Painful  Neck Findings: Normal    Eyes/Ears/Nose:  Eyes/Ears/Nose Findings:    Dental:  Dental Findings: Edentulous   Chest/Lungs:  Chest/Lungs Findings: Normal Respiratory Rate  90-95% on RA     Heart/Vascular:  Heart Findings: Rate: Normal  Rhythm: Regular Rhythm  Vascular Findings:  Vascular Access: Existing Central Line(s)  Edema Locations: BLE, BUE  Edema: +1 or +2       Skin:  Skin Findings:  Open Wound  Multiple wound vacs on BUE     Mental Status:  Mental Status Findings:  Cooperative, Alert and Oriented         Anesthesia Plan  Type of Anesthesia, risks & benefits discussed:  Anesthesia Type:  general  Patient's Preference:   Intra-op Monitoring Plan: standard ASA monitors  Intra-op Monitoring Plan Comments:   Post Op Pain Control Plan: IV/PO Opioids PRN, per primary service following discharge from PACU and multimodal analgesia  Post Op Pain Control Plan Comments:   Induction:   IV  Beta Blocker:  Patient is on a Beta-Blocker and has received one dose within the past 24 hours (No further documentation required).       Informed Consent: Patient understands risks and agrees with Anesthesia plan.  Questions answered. Anesthesia consent signed with patient representative.  ASA Score: 3     Day of Surgery Review of History & Physical:    H&P update referred to the surgeon.      Anesthesia Plan Notes: Consent signed with patient's sister at the bedside as patient is unable to sign due to multiple BUE wounds        Ready For Surgery From Anesthesia Perspective.

## 2019-01-02 NOTE — PROGRESS NOTES
Culture data from [facility] received and reviewed, results as follows:    12/18 L arm wound + heavy growth MSSA  12/18 R hand + heavy growth MSSA  12/18 R arm + heavy growth MSSA  12/18 blood culture + MSSA (1/2)  12/16 L arm abscess + MSSA  12/16 R arm abscess + MSSA  12/16 Blood culture + MSSA (2/2)    Sensitivities uploaded to media tab.    Please call general ID for any questions.       Afia Acevedo DO  Infectious Disease Fellow  P: [phone]

## 2019-01-02 NOTE — PLAN OF CARE
Patient transferred to ICU with hypotension, increased work of breathing with low threshold worsening condition.  Patient's OR for wound vac change cancelled.  Patient not medically stable for discharge.  Will continue to follow for needs.       01/02/19 8333   Discharge Reassessment   Assessment Type Discharge Planning Reassessment   Discharge Plan A Long-term acute care facility (LTAC)   Discharge Plan B Skilled Nursing Facility

## 2019-01-02 NOTE — EICU
To room for new admission with noted profound hypotension (69/43).  RNs at bedside.  MD Florez present. Levophed drip initiated, increased to 0.12 mcg/kg/min per MD order.  100 mcg neosynephrine bump given IVP.   1412 - B/P 92/50  1415 - B/P 108/65  1428 - B/P 87/50 - levophed increased to 0.18 per MD verbal order.  Pt noted with one IV access only.  MD Florez attempts (L) EJ access without success.  MD discusses trialysis catheter placement with pt, and verbal consent given by patient for line placement.  1454 - trialysis catheter placed to (R) IJ per MD Florez using u/s guidance.  Good blood return noted per all 3 ports.  Lines flush easily.  Pt tolerated procedure well.  PCXR ordered.

## 2019-01-02 NOTE — SIGNIFICANT EVENT
Artificial Intelligence Sonia      Admit Date: 2018  LOS: 13  Code Status: Full Code   Date of Consult: 2019  : 1960  Age: 58 y.o.  Weight:   Wt Readings from Last 1 Encounters:   18 69.9 kg (154 lb)     Sex: female  Bed: [facility]9/[facility] A:   MRN: [id number]  Attending Physician: Javier Kirk MD  Primary Service: Networked reference to record PCT   Time AI Alert Received:  0942 am  Time at Bedside: 1130 am           Patient found resting in bed, somewhat lethargic but easily aroused and oriented.  Chest exam with bilateral  Bubbly crackles and tachypnea.  SaO2 93% on Nasal Cannula Oxygen 2 lpm after coaching to cough.   Complaining of pain to bilat UE's where wounds/woundvacs are placed. Chart reviewed, hemodynamics stable. VIDYA with hyperkalemia (5.3), and poor response to lasix noted. Nephrology is following. Discussed AIert with Dr.Jessica Miller, General Surgery (primary team),  and recommended  ECG, repeating renal function panel, possible shifting medication.  Also recommending discussing need for ICU and Dialysis with Staff and Nephrology.  Please notify MICU team if consult requested          Vital Signs (Most Recent):  Temp: 96.1 °F (35.6 °C) (19 0807)  Pulse: 81 (19 1100)  Resp: (!) 26 (19 0807)  BP: 105/63 (19 0807)  SpO2: 98 % (19 1100) Vital Signs (24h Range):  Temp:  [96.1 °F (35.6 °C)-96.6 °F (35.9 °C)] 96.1 °F (35.6 °C)  Pulse:  [] 81  Resp:  [18-28] 26  SpO2:  [87 %-99 %] 98 %  BP: ()/(44-67) 105/63         This is an  Artificial Intelligence Notification.     Artificial Intelligence alert discussed with Primary team:  Dr. Kirstin Miller    Please place a Critical Care consult if evaluation/consultation is needed  The Critical Care Fellow can be reached at [phone]

## 2019-01-02 NOTE — SUBJECTIVE & OBJECTIVE
Interval History: labs ok, oliguric, bicarb 16, K 5.4    Review of patient's allergies indicates:  No Known Allergies  Current Facility-Administered Medications   Medication Frequency    lidocaine (PF) 10 mg/ml (1%) 10 mg/mL (1 %) injection     albuterol sulfate nebulizer solution 2.5 mg Q6H WAKE    ALPRAZolam tablet 0.5 mg BID PRN    busPIRone tablet 15 mg BID    ceFAZolin injection 2 g Q12H    dextrose 50% injection 12.5 g PRN    dextrose 50% injection 25 g PRN    dextrose 50% injection 25 g PRN    furosemide injection 160 mg BID    glucagon (human recombinant) injection 1 mg PRN    insulin aspart U-100 pen 0-5 Units Q6H PRN    lidocaine HCL 10 mg/ml (1%) injection 5 mL Once    metoprolol tartrate (LOPRESSOR) split tablet 12.5 mg BID    midodrine tablet 5 mg TID    norepinephrine 4 mg in dextrose 5% 250 mL infusion (premix) (titrating) Continuous    norepinephrine bitartrate-D5W 4 mg/250 mL (16 mcg/mL) infusion Soln     nystatin 100,000 unit/mL suspension 500,000 Units QID    ondansetron injection 4 mg Q12H PRN    ondansetron injection 4 mg Once PRN    oxyCODONE-acetaminophen  mg per tablet 1 tablet Q4H PRN    oxyCODONE-acetaminophen 5-325 mg per tablet 1 tablet Q4H PRN    promethazine (PHENERGAN) 6.25 mg in dextrose 5 % 50 mL IVPB Q6H PRN    sodium bicarbonate tablet 1,950 mg TID    sodium chloride 0.9% flush 3 mL PRN    sodium chloride 0.9% flush 3 mL PRN    zolpidem tablet 5 mg Nightly PRN       Objective:     Vital Signs (Most Recent):  Temp: 97.8 °F (36.6 °C) (01/02/19 1345)  Pulse: 79 (01/02/19 1430)  Resp: 20 (01/02/19 1430)  BP: (!) 89/44 (01/02/19 1430)  SpO2: 97 % (01/02/19 1430)  O2 Device (Oxygen Therapy): nasal cannula (01/02/19 1405) Vital Signs (24h Range):  Temp:  [96.1 °F (35.6 °C)-97.8 °F (36.6 °C)] 97.8 °F (36.6 °C)  Pulse:  [] 79  Resp:  [18-28] 20  SpO2:  [87 %-99 %] 97 %  BP: ()/(39-67) 89/44     Weight: 69.9 kg (154 lb) (12/21/18 1145)  Body mass  index is 24.12 kg/m².  Body surface area is 1.82 meters squared.    I/O last 3 completed shifts:  In: 1497.6 [P.O.:240; I.V.:50; Blood:1107.6; IV Piggyback:100]  Out: 560 [Urine:190; Other:370]    Physical Exam   HENT:   Head: Atraumatic.   Neck: No JVD present.   Cardiovascular: Normal rate and regular rhythm.   Pulmonary/Chest: Effort normal and breath sounds normal.   Abdominal: Soft. She exhibits no distension.   Musculoskeletal: She exhibits edema. She exhibits no tenderness.   Neurological: She is alert.   Skin: Skin is warm.       Significant Labs:  BMP:   Recent Labs   Lab 01/02/19  0839 01/02/19  1410   GLU 92  --      --    CO2 16*  --    *  --    CREATININE 4.0*  --    CALCIUM 7.5*  --    MG  --  1.8     All labs within the past 24 hours have been reviewed.

## 2019-01-03 NOTE — SUBJECTIVE & OBJECTIVE
Interval History/Significant Events:   Respiratory acidosis, increased work of breathing this morning, patient reportedly more responsive and interactive today.     Follow-up For: Procedure(s) (LRB):  DEBRIDEMENT, WOUND (Bilateral)  INSERTION, CATHETER, CENTRAL VENOUS, VORA (Right)    Post-Operative Day: 4 Days Post-Op    Objective:     Vital Signs (Most Recent):  Temp: 98 °F (36.7 °C) (01/03/19 0701)  Pulse: 91 (01/03/19 0858)  Resp: (!) 28 (01/03/19 0858)  BP: (!) 103/48 (01/03/19 0701)  SpO2: 96 % (01/03/19 0858) Vital Signs (24h Range):  Temp:  [93.9 °F (34.4 °C)-98 °F (36.7 °C)] 98 °F (36.7 °C)  Pulse:  [78-92] 91  Resp:  [17-33] 28  SpO2:  [94 %-99 %] 96 %  BP: ()/(39-61) 103/48  Arterial Line BP: ()/(43-60) 103/48     Weight: 69.9 kg (154 lb)  Body mass index is 24.12 kg/m².      Intake/Output Summary (Last 24 hours) at 1/3/2019 1110  Last data filed at 1/3/2019 0700  Gross per 24 hour   Intake 253 ml   Output 595 ml   Net -342 ml       Physical Exam   Constitutional: She is oriented to person, place, and time. She appears well-developed.   HENT:   Head: Normocephalic and atraumatic.   Eyes: EOM are normal. Pupils are equal, round, and reactive to light.   Neck: Normal range of motion.   Cardiovascular: Normal rate, regular rhythm and normal heart sounds.   Pulmonary/Chest: Breath sounds normal.   Neurological: She is oriented to person, place, and time.   Skin: Skin is warm.   Psychiatric: She has a normal mood and affect. Her behavior is normal. Thought content normal.   Nursing note and vitals reviewed.      Vents:  Oxygen Concentration (%): 32 (01/02/19 2043)    Lines/Drains/Airways     Central Venous Catheter Line                 Percutaneous Central Line Insertion/Assessment - single lumen  12/30/18 0928 right subclavian 4 days         Trialysis (Dialysis) Catheter 01/02/19 1457 right internal jugular less than 1 day          Drain                 Urethral Catheter 12/31/18 0000 3 days           Arterial Line                 Arterial Line 01/02/19 1500 Right Femoral less than 1 day          Pressure Ulcer                 Negative Pressure Wound Therapy  4 days         Negative Pressure Wound Therapy  4 days         Pressure Injury 01/01/19 0200 Coccyx Stage 2 2 days                Significant Labs:    CBC/Anemia Profile:  Recent Labs   Lab 01/02/19  0400 01/02/19  1410 01/03/19  0336   WBC 11.26 9.18 8.13   HGB 9.1* 8.1* 7.3*   HCT 27.9* 24.9* 22.8*   * 132* 120*   MCV 86 87 88   RDW 17.2* 17.4* 17.6*        Chemistries:  Recent Labs   Lab 01/02/19  0400  01/02/19  1410 01/02/19  2345 01/03/19  0336 01/03/19  0829     140   < > 139 138 140 140   K 5.4*  5.4*   < > 5.4* 5.4* 5.4* 5.1     108   < > 107 107 109 109   CO2 16*  16*   < > 15* 14* 14* 15*   *  116*   < > 118* 127* 122* 118*   CREATININE 4.0*  4.0*   < > 4.1* 4.2* 4.3* 4.0*   CALCIUM 7.5*  7.5*   < > 7.6* 7.7* 7.8* 7.3*   ALBUMIN 1.1*  --   --   --  1.1*  --    PROT 4.0*  --   --   --  3.9*  --    BILITOT 0.2  --   --   --  0.2  --    ALKPHOS 88  --   --   --  98  --    ALT <5*  --   --   --  <5*  --    AST 13  --   --   --  15  --    MG 1.8  --  1.8  --  2.0  --    PHOS 6.7*  --  6.8*  --  7.2*  --     < > = values in this interval not displayed.     All pertinent labs within the past 24 hours have been reviewed.    Significant Imaging:  I have reviewed and interpreted all pertinent imaging results/findings within the past 24 hours.

## 2019-01-03 NOTE — PHYSICIAN QUERY
PT Name: Misty Khalil  MR #: [id number]    Physician Query Form - Respiratory Condition Clarification      Winnie Oconnor RN, CCDS  Desk # [phone]; sridhar # [phone] [email or web address]      This form is a permanent document in the medical record.    Query Date: January 3, 2019    By submitting this query, we are merely seeking further clarification of documentation. Please utilize your independent clinical judgment when addressing the question(s) below.    The Medical record contains the following   Indicators   Supporting Clinical Findings Location in Medical Record   x SOB, CARLSON, Wheezing, Productive Cough, Use of Accessory Muscles, etc. coarse, mild wheezes  Feeling sob  Breath sounds coarse and wet.   Patient very restless with abdominal muscle use and labored breathing Gen Sx Pn 1/2  RN Nsg 1/2   x   Acute/Chronic Illness Pulmonary edema  -2/2 renal dysfunction and transfusion requirements  -monitor closely, may require intubation, I have asked the patient to think about her code status and goals of care    VIDYA  Oliguric  ATN Gen Sx Pn 1/2              1/2 RN Pn    x   Radiology Findings Chest exam with bilateral  Bubbly crackles and tachypnea.   1/2 cc sig event   x   Respiratory Distress or Failure having issues with work of breathing but still satting fine on 2L nc. Coughing.   Pulm: increased work of breathing, coarse, mild wheezes Gen Sx Pn 1/2      Hypoxia or Hypercapnia     x   RR         ABGs         O2 sat RR: 18 - 28  HR: 74 - 107  Sats: 87-98% on 2 - 3 lpm nasal cannula  BP: 63/44 - 126/67  T: 96.1 - 97.8     1/2/2019 00:29   POC PH 7.329 (L)   POC PCO2 27.2 (LL)   POC PO2 65 (L)   POC BE -12   POC HCO3 14.3 (L)   POC SATURATED O2 91 (L)   POC TCO2 15 (L)   Flow 5   Sample ARTERIAL   DelSys Nasal Can   Allens Test Pass   Site LR   Mode SPONT    VS Flow Sheet 1/2-1/3                    ABG's   x BiPAP/Intubation Bipap RN Nsg 1/2   x Supplemental O2 2 - 3 lpm nasal cannula VS Flow Sheet  1/2-1/3    Home O2, Oxygen Dependence     x   Treatment SaO2 93% on Nasal Cannula Oxygen 2 lpm after coaching to cough  poor response to lasix noted  Also recommending discussing need for ICU and Dialysis with Staff and Nephrology  · ABG, CBC, Breathing treatment  · BIPAP  · O2 1/2 cc sig event          RN Nsg 1/2    Other       Respiratory failure can be acute, chronic or both. It is generally further specified as hypoxic, hypercapnic or both. Lastly, it is important to identify an etiology, if known or suspected.   References:: https://www.acphospitalist.org/archives/2013/10/coding; htm; http://[employer]/acute-respiratory-failure-know    The clinical guidelines noted below are only system guidelines, and do not replace the providers clinical judgment.    Provider, please specify diagnosis or diagnoses associated with above clinical findings.   [ x  ] Acute Respiratory Insufficiency -   Generally describes less severe respiratory symptoms and measurements (pO2, SpO2, pH, and pCO2) NOT meeting criteria for respiratory failure     [   ] Acute Respiratory Distress -   Generally describes less severe respiratory symptoms (tachypnea, in respiratory distress, increased work of breathing, unable to speak in complete sentences, labored breathing, use of accessory muscles, RR> 24, cyanosis, dyspnea, wheezing, stridor, lethargy) without sufficient measurements (pO2, SpO2, pH, and pCO2) to meet criteria for respiratory failure    [   ] Hypoxia Only   [   ] Other Respiratory Diagnosis (please specify): _________________________________   [   ]  Clinically Undetermined       Please document in your progress notes daily for the duration of treatment until resolved and include in your discharge summary.

## 2019-01-03 NOTE — SUBJECTIVE & OBJECTIVE
Interval History: mental status seems +/- returned to her baseline, not making much urine    Review of patient's allergies indicates:  No Known Allergies  Current Facility-Administered Medications   Medication Frequency    0.9%  NaCl infusion (for blood administration) Q24H PRN    albuterol sulfate nebulizer solution 2.5 mg Q6H WAKE    ALPRAZolam tablet 0.5 mg BID PRN    busPIRone tablet 15 mg BID    ceFAZolin injection 2 g Q12H    dextrose 50% injection 12.5 g PRN    fentaNYL injection 25 mcg Q1H PRN    furosemide injection 160 mg BID    glucagon (human recombinant) injection 1 mg PRN    insulin aspart U-100 pen 0-5 Units Q6H PRN    metoprolol tartrate (LOPRESSOR) split tablet 12.5 mg BID    midodrine tablet 5 mg TID    norepinephrine 4 mg in dextrose 5% 250 mL infusion (premix) (titrating) Continuous    nystatin 100,000 unit/mL suspension 500,000 Units QID    ondansetron injection 4 mg Q12H PRN    ondansetron injection 4 mg Once PRN    oxyCODONE-acetaminophen  mg per tablet 1 tablet Q4H PRN    oxyCODONE-acetaminophen 5-325 mg per tablet 1 tablet Q4H PRN    promethazine (PHENERGAN) 6.25 mg in dextrose 5 % 50 mL IVPB Q6H PRN    sodium bicarbonate 1 mEq/mL (8.4 %) 150 mEq in dextrose 5 % 1,000 mL infusion Continuous    sodium chloride 0.9% flush 3 mL PRN    sodium chloride 0.9% flush 3 mL PRN    zolpidem tablet 5 mg Nightly PRN       Objective:     Vital Signs (Most Recent):  Temp: 97.6 °F (36.4 °C) (01/03/19 1200)  Pulse: 78 (01/03/19 1423)  Resp: (!) 23 (01/03/19 1423)  BP: (!) 103/48 (01/03/19 0701)  SpO2: 97 % (01/03/19 1423)  O2 Device (Oxygen Therapy): nasal cannula (01/03/19 1423) Vital Signs (24h Range):  Temp:  [93.9 °F (34.4 °C)-98 °F (36.7 °C)] 97.6 °F (36.4 °C)  Pulse:  [78-91] 78  Resp:  [16-33] 23  SpO2:  [94 %-98 %] 97 %  BP: ()/(48-61) 103/48  Arterial Line BP: ()/(43-72) 149/72     Weight: 69.9 kg (154 lb) (12/21/18 1145)  Body mass index is 24.12 kg/m².  Body  surface area is 1.82 meters squared.    I/O last 3 completed shifts:  In: 423 [P.O.:120; I.V.:253; IV Piggyback:50]  Out: 755 [Urine:275; Other:480]    Physical Exam   HENT:   Head: Atraumatic.   Neck: No JVD present.   Cardiovascular: Normal rate and regular rhythm.   Pulmonary/Chest: Effort normal. She has rales (at bases).   Abdominal: Soft. She exhibits no distension.   Musculoskeletal: She exhibits edema. She exhibits no tenderness.   Neurological: She is alert.   Skin: Skin is warm.       Significant Labs:  CMP:   Recent Labs   Lab 01/03/19  0336 01/03/19  0829   GLU 74 183*   CALCIUM 7.8* 7.3*   ALBUMIN 1.1*  --    PROT 3.9*  --     140   K 5.4* 5.1   CO2 14* 15*    109   * 118*   CREATININE 4.3* 4.0*   ALKPHOS 98  --    ALT <5*  --    AST 15  --    BILITOT 0.2  --      All labs within the past 24 hours have been reviewed.

## 2019-01-03 NOTE — PLAN OF CARE
Problem: Adult Inpatient Plan of Care  Goal: Plan of Care Review  Hx:  HTN, microcytic anemia, transaminitis, RA, hx of GI bleed     Dx: necrotizing fascitis     12/21: transfer to SICU, CT of chest/arm/knee, cultures; bilat UE washout/I&D; 1L crystalloid and 250 albumin in OR; LR bolus, cont. LR infusion started, PRBC's x2 units   12/22: LR bolusx3  12/23: LR bolusx1  12/24: 2 unit of PRBC total. OR for wound debridement. 2L LR bolus  12/25: LR continuous at 125. Nephrology consulted. 1L LR bolus   12/26: MRI of lower back, 1L LR bolus    Nursing:  SBP<180  Accucheck Q6                  Outcome: Ongoing (interventions implemented as appropriate)  Pt AAOx3, needs to be re oriented to situation. NSR in 80s, MAP >65. 3L o2, sating >94%. Dilaudid given for pain.  NPO. Blood glucose in 60s, D50 given. Q 1 accuchecks. Dr. Florez notified of all lab values and pt condition. Fem arterial line and R IJ trialysis inserted today. All questions answered, concerns addressed. PoC updated with family and pt. See flowsheet for full assessment. VSS at this time, will continue to monitor.

## 2019-01-03 NOTE — PHYSICIAN QUERY
PT Name: Misty Khalil  MR #: [id number]     Physician Query Form - Documentation Clarification      Winnie Oconnor RN, CCDS  Desk # [phone]; sridhar # [phone] [email or web address]      This form is a permanent document in the medical record.     Query Date: January 3, 2019    By submitting this query, we are merely seeking further clarification of documentation. Please utilize your independent clinical judgment when addressing the question(s) below.    The Medical record reflects the following:    Supporting Clinical Findings Location in Medical Record   Pressure Injury; coccyx; stage 2; present on admit = no RN Assessment 1/1/19   Pressure Injury 01/01/19 0200 Coccyx Stage 2   Date First Assessed/Time First Assessed: 01/01/19 0200     Pressure Injury Present on Admission: no    Location: Coccyx  Is this injury device related?: No  Staging: Stage 2   Wound Image    Staging 2   Dressing Appearance Open to air;Moist drainage   Drainage Amount Moderate   Drainage Characteristics  /Odor Clear;Serosanguineous   Appearance Pink;Red;Moist   Tissue loss description Partial thickness   Periwound Area Moist;Excoriated   Wound Edges Open   Wound Length (cm) 2 cm   Wound Width (cm) 2 cm   Wound Depth (cm) 0.2 cm   Wound Volume (cm^3) 0.8 cm^3   Wound Surface Area (cm^2) 4 cm^2   Care Applied:;Skin Barrier   Skin Interventions   Pressure Reduction Devices specialty bed utilized;pressure-redistributing mattress utilized;positioning supports utilized;heel offloading device utilized   Pressure Reduction Techniques frequent weight shift encouraged;heels elevated off bed;positioned off wounds;pressure points protected;weight shift assistance provided   Skin Protection adhesive use limited;drying agents applied;protective footwear used;skin sealant/moisture barrier applied;skin-to-device areas padded;skin-to-skin areas padded;tubing/devices free from skin contact    Wound Care CN 1/2                                                                           Doctor, Please specify diagnosis or diagnoses associated with above clinical findings.    Provider Use Only    (  ) Stage 2 pressure injury; Coccyx Area; Not present on admit    (  ) Stage 2 pressure injury; Coccyx area - specify Present on admit status:                      (  ) yes present on admit                     (  ) not present on admit                     (  ) undeterminable if present on admit    (  ) Other diagnosis - specify: ___________________                                                                                                               [ x ] Clinically Undetermined

## 2019-01-03 NOTE — PHYSICIAN QUERY
"PT Name: Misty Khalil  MR #: [id number]     PHYSICIAN QUERY -  ACUITY OF CONDITION CLARIFICATION      Winnie Oconnor RN, CCDS  Desk # [phone]; [facility] # [phone] [email or web address]    This form is a permanent document in the medical record.     Query Date: January 3, 2019    By submitting this query, we are merely seeking further clarification of documentation to reflect the severity of illness of your patient. Please utilize your independent clinical judgment when addressing the question(s) below.    The Medical record reflects the following:     Indicators Supporting Clinical Findings Location in Medical Record   x Documentation of condition   Pulmonary edema  -2/2 renal dysfunction and transfusion requirements  -monitor closely, may require intubation, I have asked the patient to think about her code status and goals of care Gen Sx PN   pod 3 1/2    Lab Value(s)     x Radiology Findings · Chest exam with bilateral  Bubbly crackles and tachypnea. 1/2 cc sig event   x Treatment                                 Medication Low threshold to transfer to unit today, likely cancel vac change  · SaO2 93% on Nasal Cannula Oxygen 2 lpm after coaching to cough  · poor response to lasix noted  · recommending discussing need for ICU and Dialysis with Staff and Nephrology  Trialysis Cath & Bhumika placed  Gen Sx PN   pod 3 1/2  1/2 cc sig event               x Other having issues with work of breathing but still satting fine on 2L nc. Coughing.   Pulm: increased work of breathing, coarse, mild wheezes Gen Sx PN   pod 3 1/2     Provider, please specify the acuity/chronicity of  "Pulmonary Edema":    [ x  ] Acute   [   ] Chronic   [   ] Acute and/on chronic   [   ]  Clinically Undetermined       Please document in your progress notes daily for the duration of treatment until resolved, and include in your discharge summary.  "

## 2019-01-03 NOTE — ANESTHESIA PREPROCEDURE EVALUATION
[facility]  Anesthesia Pre-Operative Evaluation         Patient Name: Misty Khalil  YOB: 1960  MRN: [id number]    SUBJECTIVE:     Pre-operative evaluation for Procedure(s) (LRB):  DEBRIDEMENT, WOUND AND VAC CHANGE (Bilateral)     01/03/2019    Misty Khalil is a 58 y.o. female w/ a significant PMHx of COPD (ion home O2), HTN, HLD, PVD, Anemia, RA, GIB transferred from [place] for multiple upper extremity abscesses.  necrotizing fascitis eval. Initial presentation 12/17 with Right shoulder pain and found to have 14.3 x 4.3x 2.7 cm Collection RUE and Left axilla 8.3 x5.4x 7.7 cm fluid collection.     Patient is now s/p 5 I+D with Debridement and Wound Vac Placement at [facility].  Hospital Stay complicated by Oliguric VIDYA and metabolic acidosis.  Being treated for MSSA bacteremia on Ancef as per ID    Current Drips:  -Bicarbonate 150 mEq @ 125 cc/hr  -Lasix 160 mg IV q12 hours    LDA:        Percutaneous Central Line Insertion/Assessment - single lumen  12/30/18 0928 right subclavian (Active)   Dressing biopatch in place 1/3/2019  7:01 AM   Securement catheter securement device utilized 1/3/2019  7:01 AM   Patency/Care normal saline locked 1/3/2019  7:01 AM   Dressing Change Due 01/06/18 1/3/2019  7:01 AM   Daily Line Review Performed 1/3/2019  7:01 AM   Number of days: 4            Trialysis (Dialysis) Catheter 01/02/19 1457 right internal jugular (Active)   IV Device Securement sutures 1/3/2019  7:01 AM   Additional Site Signs no erythema;no warmth;no edema;no pain;no palpable cord;no streak formation;no drainage 1/3/2019  7:01 AM   Patency/Care flushed w/o difficulty 1/3/2019  7:01 AM   Waveform other (see comments) 1/3/2019  7:01 AM   Site Assessment Clean;Dry;Intact 1/3/2019  7:01 AM   Status Deaccessed 1/3/2019  7:01 AM   Dressing Intervention Dressing reinforced 1/3/2019  7:01 AM   Dressing Status Clean;Dry;Intact 1/3/2019   "7:01 AM   Dressing Change Due 01/09/18 1/3/2019  7:01 AM   Verification by X-ray Yes 1/3/2019  7:01 AM   Site Condition No complications 1/3/2019  7:01 AM   Dressing Occlusive 1/3/2019  7:01 AM   Daily Line Review Performed 1/3/2019  7:01 AM   Number of days: 1            Arterial Line 01/02/19 1500 Right Femoral (Active)   Site Assessment Clean;Dry;Intact;No redness;No swelling 1/3/2019  7:01 AM   Line Status Pulsatile blood flow 1/3/2019  7:01 AM   Art Line Waveform Appropriate 1/3/2019  7:01 AM   Arterial Line Interventions Zeroed and calibrated;Flushed per protocol 1/3/2019  7:01 AM   Color/Movement/Sensation Capillary refill greater than 3 sec 1/3/2019  7:01 AM   Dressing Type Transparent 1/3/2019  7:01 AM   Dressing Status Clean;Dry;Intact 1/3/2019  7:01 AM   Dressing Intervention Dressing reinforced 1/3/2019  7:01 AM   Dressing Change Due 01/09/18 1/3/2019  7:01 AM   Number of days: 0            Urethral Catheter 12/31/18 0000 (Active)   Site Assessment Clean;Intact 1/3/2019  7:01 AM   Collection Container Urimeter 1/3/2019  7:01 AM   Securement Method secured to top of thigh w/ adhesive device 1/3/2019  7:01 AM   Catheter Care Performed yes 1/3/2019  7:01 AM   Reason for Continuing Urinary Catheterization Critically ill in ICU requiring intensive monitoring 1/3/2019  7:01 AM   CAUTI Prevention Bundle StatLock in place w 1" slack;Intact seal between catheter & drainage tubing;Drainage bag off the floor;Green sheeting clip in use;No dependent loops or kinks;Drainage bag not overfilled (<2/3 full);Drainage bag below bladder 1/3/2019  7:01 AM   Output (mL) 20 mL 1/3/2019  2:00 PM   Number of days: 3       Prev airway:     LMA    DL with Leonardo #2  ETT 7.0   Grade 1 view    DL with Mac #3  ETT 7.0  Grade 1 view    Drips: None documented.   norepinephrine bitartrate-D5W 0.02 mcg/kg/min (01/03/19 0843)    sodium bicarbonate drip 125 mL/hr at 01/03/19 1400       Patient Active Problem List   Diagnosis    " Hypertension    Hyperlipidemia    Anxiety    Peripheral vascular disease    Soft tissue infection    Septic arthritis of knee, right    VIDYA (acute kidney injury)    Abnormal coagulation profile    ATN (acute tubular necrosis)    Coagulopathy    Pressure ulcer of coccygeal region, stage 2       Review of patient's allergies indicates:  No Known Allergies    Current Inpatient Medications:   acetaminophen  1,000 mg Intravenous Q8H    albuterol sulfate  2.5 mg Nebulization Q6H WAKE    busPIRone  15 mg Oral BID    ceFAZolin (ANCEF) IVPB  2 g Intravenous Q12H    furosemide  160 mg Intravenous BID    metoprolol tartrate  12.5 mg Oral BID    midodrine  5 mg Oral TID    nystatin  500,000 Units Oral QID       No current facility-administered medications on file prior to encounter.      Current Outpatient Medications on File Prior to Encounter   Medication Sig Dispense Refill    albuterol (PROVENTIL) 2.5 mg /3 mL (0.083 %) nebulizer solution Take 3 mLs (2.5 mg total) by nebulization daily as needed for Wheezing. Rescue 1 Box 2    ALPRAZolam (XANAX) 1 MG tablet Take 1 mg by mouth 2 (two) times daily.      busPIRone (BUSPAR) 15 MG tablet Take 1 tablet (15 mg total) by mouth 2 (two) times daily. 60 tablet 2    cloNIDine 0.3 mg/24 hr td ptwk (CATAPRES) 0.3 mg/24 hr Place 1 patch onto the skin every 7 days.      doxazosin (CARDURA) 1 MG tablet Take 2 tablets (2 mg total) by mouth once daily. 60 tablet 2    ezetimibe (ZETIA) 10 mg tablet TAKE 1 TABLET (10 MG TOTAL) BY MOUTH ONCE DAILY. 30 tablet 2    hydrALAZINE (APRESOLINE) 100 MG tablet TAKE 1 TABLET BY MOUTH TWICE DAILY 60 tablet 2    metoprolol tartrate (LOPRESSOR) 50 MG tablet Take 1 tablet (50 mg total) by mouth 2 (two) times daily. 60 tablet 2    oxyCODONE-acetaminophen (PERCOCET) 5-325 mg per tablet Take 1-2 tablets by mouth every 4 (four) hours as needed for Pain. 30 tablet 0    quinapril (ACCUPRIL) 40 MG tablet Take 1 tablet (40 mg total) by  mouth every evening. 30 tablet 2    zolpidem (AMBIEN) 10 mg Tab Take 5 mg by mouth nightly as needed.         Past Surgical History:   Procedure Laterality Date    APPLICATION, WOUND VAC x 2 Bilateral 2018    Performed by J[facility] Kirk [facility] at [facility] 2ND F[facility]    ARTHROSCOPY, KNEE Right 2018    Performed by Danilo Goyal MD at [facility] OR 2ND [facility]LR    ARTHROSCOPY, KNEE, septic Right 2018    [facility]    BACK SURGERY       SECTION      COLONOSCOPY N/A 2018    [facility] by [facility]O    Debridement of wound and wound vac placement. N/A 2019    Performed by J[facility] Kirk MD at [facility]LR    DEBRIDEMENT, WOUND Bilateral 2018    Performed by Javier Kirk MD at [facility]LR    DEBRIDEMENT, WOUND Bilateral 2018    Performed by J[facility] Kirk MD at [facility]    ESOPHAGOGASTRODUODENOSCOPY (EGD) N/A 2018    Performed by [facility] at [facility] ENDO    HYSTERECTOMY      INCISION AND DRAINAGE, UPPER EXTREMITY Bilateral 2018    Performed by J[facility] Kirk [facility] at [facility]LR    INCISION AND DRAINAGE, UPPER EXTREMITY Bilateral 2018    Performed by J[facility] Kirk MD at [facility]LR    INSERTION, CATHETER, CENTRAL VENOUS, VORA Right 2018    [facility]    OOPHORECTOMY         Social History     Socioeconomic History    Marital status:      Spouse name: Not on file    Number of children: Not on file    Years of education: Not on file    Highest education level: Not on file   Social Needs    Financial resource strain: Not on file    Food insecurity - worry: Not on file    Food insecurity - inability: Not on file    Transportation needs - medical: Not on file    Transportation needs - non-medical: Not on file   Occupational History    Not on file   Tobacco Use    Smoking  status: Current Every Day Smoker     Packs/day: 1.00     Types: Cigarettes    Smokeless tobacco: Never Used   Substance and Sexual Activity    Alcohol use: Yes     Comment: occasionally    Drug use: No    Sexual activity: No   Other Topics Concern    Not on file   Social History Narrative    Not on file       OBJECTIVE:     Vital Signs Range (Last 24H):  Temp:  [34.4 °C (93.9 °F)-36.7 °C (98 °F)]   Pulse:  [78-92]   Resp:  [16-33]   BP: ()/(48-61)   SpO2:  [94 %-99 %]   Arterial Line BP: ()/(43-72)       Significant Labs:  Lab Results   Component Value Date    WBC 8.13 01/03/2019    HGB 7.3 (L) 01/03/2019    HCT 22.8 (L) 01/03/2019     (L) 01/03/2019    ALT <5 (L) 01/03/2019    AST 15 01/03/2019     01/03/2019    K 5.1 01/03/2019     01/03/2019    CREATININE 4.0 (H) 01/03/2019     (H) 01/03/2019    CO2 15 (L) 01/03/2019    INR 1.0 01/03/2019    HGBA1C 5.1 12/21/2018       Diagnostic Studies: No relevant studies.    · EKG: Concentric left ventricular remodeling.  · Normal left ventricular systolic function. The estimated ejection fraction is 65%  · Normal LV diastolic function.  · No wall motion abnormalities.  · Normal right ventricular systolic function.  · Intermediate central venous pressure (8 mm Hg).       2D ECHO:  Vent. Rate : 070 BPM     Atrial Rate : 070 BPM     P-R Int : 140 ms          QRS Dur : 078 ms      QT Int : 440 ms       P-R-T Axes : 079 054 065 degrees     QTc Int : 475 ms    Normal sinus rhythm  Low voltage QRS  Borderline Abnormal ECG  When compared with ECG of 20-DEC-2018 23:23,  Premature atrial complexes are no longer Present  QRS voltage has decreased  T wave amplitude has decreased in Inferior leads  Confirmed by JESUS LÓPEZ MD ([id number]) on 1/1/2019 4:25:47 PM      ASSESSMENT/PLAN:         Anesthesia Evaluation    I have reviewed the Patient Summary Reports.    I have reviewed the Nursing Notes.   I have reviewed the Medications.     Review of  Systems  Anesthesia Hx:  History of prior surgery of interest to airway management or planning: Denies Family Hx of Anesthesia complications.   Denies Personal Hx of Anesthesia complications.       Physical Exam  General:  Well nourished    Airway/Jaw/Neck:  Airway Findings: Mouth Opening: Small, but > 3cm Tongue: Normal  Mallampati: I  Improves to I with phonation.      Dental:  Dental Findings: Edentulous   Chest/Lungs:  Chest/Lungs Findings: (Right IJ TLC) Tachypnea, Rales, Basilar        Skin:  Skin Findings: (Bilateral Upper Extremities wounds with Wound Vacs)     Mental Status:  Mental Status Findings:  Confusion, Somnolent         Anesthesia Plan  Type of Anesthesia, risks & benefits discussed:  Anesthesia Type:  MAC, general  Patient's Preference:   Intra-op Monitoring Plan: arterial line, standard ASA monitors and central line  Intra-op Monitoring Plan Comments:   Post Op Pain Control Plan: multimodal analgesia, IV/PO Opioids PRN and per primary service following discharge from PACU  Post Op Pain Control Plan Comments:   Induction:   rapid sequence and IV  Beta Blocker:  Patient is on a Beta-Blocker and has received one dose within the past 24 hours (No further documentation required).       Informed Consent: Patient representative understands risks and agrees with Anesthesia plan.  Questions answered. Anesthesia consent signed with patient representative.  ASA Score: 3     Day of Surgery Review of History & Physical:        Anesthesia Plan Notes: Patient currently with Arterial Catheter and Right IJ TLC  Currently off pressors and on a Bicarbonate Drip  As per nephrology, no need for CRRT at this time.         Ready For Surgery From Anesthesia Perspective.

## 2019-01-03 NOTE — PLAN OF CARE
Problem: SLP Goal  Goal: SLP Goal  Speech-Language Pathology Goals  Goals to be met by 1/10/19  1. Patient will participate in ongoing swallow assessment to determine appropriateness of diet initiation.   2. Educate patient and family regarding risks/warning signs of aspiration.    Patient seen for bedside swallow eval. SLP recommending strict NPO. Patient coughing on secretions and is at risk for aspiration with PO intake at this time.     Curtis Engel CF-SLP  Speech-Language Pathology  Pager: [phone]

## 2019-01-03 NOTE — ASSESSMENT & PLAN NOTE
-recommend IV bicarb drip D5W 3 amps of bicarb at 75ccs per hour and once NG placed can change to 1900mg PO TID through NG and stop IV drip to avoid volume overload

## 2019-01-03 NOTE — PROGRESS NOTES
Notified Dr. Miller and Dr. Florez of both wound vacs with blockage alarms. Canisters changed without any change in alarm. OK'd to turn off vacuum therapy at this time.

## 2019-01-03 NOTE — PROCEDURES
"Misty Khalil is a 58 y.o. female patient.    Temp: (!) 93.9 °F (34.4 °C) (19 0315)  Pulse: 82 (19 0600)  Resp: 18 (19 0600)  BP: 90/61 (19 1900)  SpO2: 96 % (19 0600)  Weight: 69.9 kg (154 lb) (18 1145)  Height: 5' 7" (170.2 cm) (18 1145)       Arterial Line  Date/Time: 1/3/2019 7:12 AM  Location procedure was performed: [facility] CRITICAL CARE SURGERY  Performed by: Alton Florez MD  Authorized by: Alton Florez MD   Assisting provider: Santi Mohan MD  Pre-op Diagnosis: Shock  Post-operative diagnosis: Shock  Consent Done: Yes  Consent: Verbal consent obtained.  Risks and benefits: risks, benefits and alternatives were discussed  Consent given by: patient  Patient understanding: patient states understanding of the procedure being performed  Patient consent: the patient's understanding of the procedure matches consent given  Procedure consent: procedure consent matches procedure scheduled  Relevant documents: relevant documents present and verified  Test results: test results available and properly labeled  Site marked: the operative site was marked  Required items: required blood products, implants, devices, and special equipment available  Patient identity confirmed: , name, verbally with patient and provided demographic data  Time out: Immediately prior to procedure a "time out" was called to verify the correct patient, procedure, equipment, support staff and site/side marked as required.  Preparation: Patient was prepped and draped in the usual sterile fashion.  Indications: multiple ABGs and hemodynamic monitoring  Location: right femoral  Anesthesia: local infiltration    Anesthesia:  Local Anesthetic: lidocaine 1% without epinephrine  Anesthetic total: 5 mL  Patient sedated: no  Needle gauge: 18  Seldinger technique: Seldinger technique used  Number of attempts: 1  Estimated blood loss (mL): 5  Post-procedure: line sutured and dressing " applied  Post-procedure CMS: normal  Patient tolerance: Patient tolerated the procedure well with no immediate complications          Alton Florez  1/3/2019

## 2019-01-03 NOTE — PT/OT/SLP EVAL
Speech Language Pathology Evaluation  Bedside Swallow    Patient Name:  Misty Khalil   MRN:  [id number]  Admitting Diagnosis: Soft tissue infection    Recommendations:                 General Recommendations:  Dysphagia therapy  Diet recommendations:  NPO, NPO   Aspiration Precautions: Alternate means of nutrition/hydration and Strict aspiration precautions   General Precautions: Standard, aspiration, NPO, fall  Communication strategies:  none    History:     Past Medical History:   Diagnosis Date    Anxiety     Depression     GI bleed     Hypertension     Rheumatoid arthritis        Past Surgical History:   Procedure Laterality Date    APPLICATION, WOUND VAC x 2 Bilateral 2018    Performed by Javier Kirk MD at [facility] OR 2ND FLR    ARTHROSCOPY, KNEE Right 2018    [facility]    ARTHROSCOPY, KNEE, septic Right 2018    Performed by Javier Kirk MD at [facility] OR [address] [place] [address]    BACK SURGERY       SECTION      COLONOSCOPY N/A 2018    Performed by Brian Shaver MD at [facility] ENDO    Debridement of wound and wound vac placement. N/A 2019    Performed by Javier Kirk MD at [facility] OR [facility] FLR    DEBRIDEMENT, WOUND Bilateral 2018    [facility]    DEBRIDEMENT, WOUND Bilateral 2018    [facility]    ESOPHAGOGASTRODUODENOSCOPY (EGD) N/A 2018    Performed by Brian Shaver MD at [facility] ENDO    HYSTERECTOMY      INCISION AND DRAINAGE, UPPER EXTREMITY Bilateral 2018    [facility]    INCISION AND DRAINAGE, UPPER EXTREMITY Bilateral 2018    [facility]    INSERTION, CATHETER, CENTRAL VENOUS, VORA Right 2018    Performed by Javier Kirk MD at [facility] OR [address] [place] [address]    OOPHORECTOMY       Prior Intubation HX:  Intubated for 3  "hours on 12/27/18 for procedure    Modified Barium Swallow: None on file    Chest X-Rays 1/2/19: Tip of the central venous catheter overlies the SVC.  Heart size is similar.  There is some increase in interstitial markings.  No pneumothorax seen.  There may be some pleural fluid layering on the left.    Prior diet: soft/thin.    Subjective     Patient declined PO trials on SLP's first attempt  Patient awake but agitated during session  "I'm done with this. Lie me flat!"  Communicated with nurse prior to session    Pain/Comfort:  · Pain Rating 1: (patient did not rate)  · Location - Orientation 1: generalized  · Location 1: back  · Pain Addressed 1: Nurse notified, Reposition, Distraction  · Pain Rating Post-Intervention 1: (pain subsided when HOB was declined)    Objective:     Oral Musculature Evaluation  · Oral Musculature: unable to assess due to poor participation/comprehension  · Dentition: edentulous, rarely or never uses dentures to eat  · Secretion Management: coughing on secretions  · Mucosal Quality: dry  · Mandibular Strength and Mobility: WFL  · Oral Labial Strength and Mobility: (unable to properly assess 2' to poor participation)  · Lingual Strength and Mobility: (unable to properly assess 2' to poor participation)  · Volitional Cough: elicited  · Volitional Swallow: mildly delayed; reduced laryngeal elevation  · Voice Prior to PO Intake: mildly hoarse    Bedside Swallow Eval:   Consistencies Assessed:  · Ice chips x2 (via tsp)    Oral Phase:   · Patient exhibited moderate oral holding of ice chips, despite max cueing from SLP for timely oral transit    Pharyngeal Phase:   · coughing/choking  · delayed swallow initation  · throat clearing  · wet vocal quality after swallow    Compensatory Strategies  · None    Treatment: Patient seen for bedside swallow evaluation. She was sitting up in bed with with sister and nurse in room during session. Patient initially resisting PO trials, but agreeable to move " forward after encouraging from sister and SLP. Prior to PO trials, patient with wet vocal quality and consistent coughing. Throat clear was ineffective in improving vocal quality. Throughout PO trials, patient was asking for the bed to be reclined and required max cueing to participate in assessment. Given her impaired attention and multiple risk factors, further PO trials were held. SLP recommending alternate forms of medication/nutrition/hydration at this time. SLP educated patient and sister regarding diet recs, but they would benefit from further instruction. Whiteboard updated. Patient left in bed with call light within reach and nurse in room.     Assessment:     Misty Khalil is a 58 y.o. female with an SLP diagnosis of Dysphagia.      Goals:   Multidisciplinary Problems     SLP Goals        Problem: SLP Goal    Goal Priority Disciplines Outcome   SLP Goal     SLP    Description:  Speech-Language Pathology Goals  Goals to be met by 1/10/19  1. Patient will participate in ongoing swallow assessment to determine appropriateness of diet initiation.   2. Educate patient and family regarding risks/warning signs of aspiration.                    Plan:     · Patient to be seen:  5 x/week   · Plan of Care expires:  02/02/19  · Plan of Care reviewed with:  patient, sibling   · SLP Follow-Up:  Yes       Discharge recommendations:  nursing facility, skilled   Barriers to Discharge:  Level of Skilled Assistance Needed     Time Tracking:     SLP Treatment Date:   01/03/19  Speech Start Time:  1157  Speech Stop Time:  1208     Speech Total Time (min):  11 min    Billable Minutes: Eval Swallow and Oral Function 11    Curtis Engel CF-SLP  Speech-Language Pathology  Pager: [phone]   01/03/2019

## 2019-01-03 NOTE — ANESTHESIA POSTPROCEDURE EVALUATION
"Anesthesia Post Evaluation    Patient: Misty Khalil    Procedure(s) Performed: Procedure(s) (LRB):  INCISION AND DRAINAGE, UPPER EXTREMITY (Bilateral)  APPLICATION, WOUND VAC x 2 (Bilateral)    [facility] Anesthesia Post Op Evaluation    Visit Vitals  BP (!) 103/48   Pulse 78   Temp 36.5 °C (97.7 °F) (Oral)   Resp (!) 23   Ht 5' 7" (1.702 m)   Wt 69.9 kg (154 lb)   SpO2 97%   Breastfeeding? No   BMI 24.12 kg/m²       Pain/Jimmy Score: Pain Rating Prior to Med Admin: 10 (1/3/2019  1:04 PM)  Pain Rating Post Med Admin: 3 (1/3/2019  1:34 PM)        "

## 2019-01-03 NOTE — ASSESSMENT & PLAN NOTE
58 y.o. female 4 Days Post-Op for Procedure(s) (LRB):  DEBRIDEMENT, WOUND (Bilateral)  INSERTION, CATHETER, CENTRAL VENOUS, VORA (Right)    Severe soft tissue infection with joint involvement.   -Culture growing MSSA on Cefazolin, ID recommended about 4-6 weeks, Vora in place.    Oliguric VIDYA  -not improving  - Appreciate Nephrology notes.    Pulmonary edema  -2/2 renal dysfunction and transfusion requirements  -monitor closely, doing better this morning.     Anemia  - went down from 9.3 to 7.3, repeat PM.   -elevated INR now resolved after FFP and x1 IV Vit K.     Wound VAC to be changed today with Ketamine.

## 2019-01-03 NOTE — PROGRESS NOTES
[facility]  Nephrology  Progress Note    Patient Name: Misty Khalil  MRN: [id number]  Admission Date: 12/20/2018  Hospital Length of Stay: 14 days  Attending Provider: Javier Kirk MD   Primary Care Physician: JOEL Mccauley  Principal Problem:Soft tissue infection    Subjective:     HPI: 57 yo WEF with medical hx of HTN, anemia, RA who was transferred from [facility] overnight for evaluation fo necrotizing fascitis. She initially presented on 12/17 with right shoulder pain, swelling, and fevers and found to have bilateral upper extremity abscesses: right arm 14.3x4.3x2.7 collection, left axilla 8.3x5.4x7.7 fluid collection. She is s/p I&D in ED and initiation of vancomycin and zosyn.  She was taken to OR for incision and washout 12/18 of right hand, RUE, and LUE abscess by Dr. Falk (orthopedic surgery) and again on 12/19 for repeat washout. She was additionally found to have left olecranon abscess, left hand abscess. Cultures grew MSSA and she was transitioned to Ancef at some point during her course. Of note, she has history of back surgery with implanted hardware. Cardiology was consulted as there was an initial concern for endocarditis, but negative BHARAT would seem to make this less likely.    Nephrology consulted for sCr that is elevated and not normalizing with fluid repletion x several days (since transfer from [facility] to [facility]), sCr has remained stable between 1.7 to 2.0 over that time period, per report patient had an sCr of around 1.0 on admit to [facility], also reviewing old [facility] labs, renal function was WNL over the summer.        Interval History: mental status seems +/- returned to her baseline, not making much urine    Review of patient's allergies indicates:  No Known Allergies  Current Facility-Administered Medications   Medication Frequency    0.9%  NaCl infusion (for blood administration) Q24H PRN    albuterol sulfate nebulizer solution 2.5 mg Q6H WAKE    ALPRAZolam  tablet 0.5 mg BID PRN    busPIRone tablet 15 mg BID    ceFAZolin injection 2 g Q12H    dextrose 50% injection 12.5 g PRN    fentaNYL injection 25 mcg Q1H PRN    furosemide injection 160 mg BID    glucagon (human recombinant) injection 1 mg PRN    insulin aspart U-100 pen 0-5 Units Q6H PRN    metoprolol tartrate (LOPRESSOR) split tablet 12.5 mg BID    midodrine tablet 5 mg TID    norepinephrine 4 mg in dextrose 5% 250 mL infusion (premix) (titrating) Continuous    nystatin 100,000 unit/mL suspension 500,000 Units QID    ondansetron injection 4 mg Q12H PRN    ondansetron injection 4 mg Once PRN    oxyCODONE-acetaminophen  mg per tablet 1 tablet Q4H PRN    oxyCODONE-acetaminophen 5-325 mg per tablet 1 tablet Q4H PRN    promethazine (PHENERGAN) 6.25 mg in dextrose 5 % 50 mL IVPB Q6H PRN    sodium bicarbonate 1 mEq/mL (8.4 %) 150 mEq in dextrose 5 % 1,000 mL infusion Continuous    sodium chloride 0.9% flush 3 mL PRN    sodium chloride 0.9% flush 3 mL PRN    zolpidem tablet 5 mg Nightly PRN       Objective:     Vital Signs (Most Recent):  Temp: 97.6 °F (36.4 °C) (01/03/19 1200)  Pulse: 78 (01/03/19 1423)  Resp: (!) 23 (01/03/19 1423)  BP: (!) 103/48 (01/03/19 0701)  SpO2: 97 % (01/03/19 1423)  O2 Device (Oxygen Therapy): nasal cannula (01/03/19 1423) Vital Signs (24h Range):  Temp:  [93.9 °F (34.4 °C)-98 °F (36.7 °C)] 97.6 °F (36.4 °C)  Pulse:  [78-91] 78  Resp:  [16-33] 23  SpO2:  [94 %-98 %] 97 %  BP: ()/(48-61) 103/48  Arterial Line BP: ()/(43-72) 149/72     Weight: 69.9 kg (154 lb) (12/21/18 1145)  Body mass index is 24.12 kg/m².  Body surface area is 1.82 meters squared.    I/O last 3 completed shifts:  In: 423 [P.O.:120; I.V.:253; IV Piggyback:50]  Out: 755 [Urine:275; Other:480]    Physical Exam   HENT:   Head: Atraumatic.   Neck: No JVD present.   Cardiovascular: Normal rate and regular rhythm.   Pulmonary/Chest: Effort normal. She has rales (at bases).   Abdominal: Soft. She  exhibits no distension.   Musculoskeletal: She exhibits edema. She exhibits no tenderness.   Neurological: She is alert.   Skin: Skin is warm.       Significant Labs:  CMP:   Recent Labs   Lab 01/03/19  0336 01/03/19  0829   GLU 74 183*   CALCIUM 7.8* 7.3*   ALBUMIN 1.1*  --    PROT 3.9*  --     140   K 5.4* 5.1   CO2 14* 15*    109   * 118*   CREATININE 4.3* 4.0*   ALKPHOS 98  --    ALT <5*  --    AST 15  --    BILITOT 0.2  --      All labs within the past 24 hours have been reviewed.         Assessment/Plan:     Metabolic acidosis    -recommend IV bicarb drip D5W 3 amps of bicarb at 75ccs per hour and once NG placed can change to 1900mg PO TID through NG and stop IV drip to avoid volume overload     ATN (acute tubular necrosis)    -see Vidya section     VIDYA (acute kidney injury)    VIDYA, etiology with definite ATN, component, but cannot completely r/u a GN, multiple considerations.    Work up so far:    ALYSSA positive w/ a medium titer  C4 complement low, C3 on low side of normal  Manual urine slide noted for granular casts, RBCs (no obvious dysmorphia or RBC casts), WBCs (not in cast form), urine ctx w/o growth (but seems patient on abxs when obtained)    Renal ultrasound unremarkable, no hydro    ANCA MPO titer positive at 27    sCr remaining +/- stable at 4.0    Repeat manual urine microscopy within the past 48hrs again noted for muddy brown casts and no obvious dysmorphia    Plan/Recommendations:  1) continue follow serial RFPs and strict Is & Os  2) immune markers indeterminant and urine microscopy indicate ATN, however still may need renal biopsy will make definitive recs in another day or two           Thank you for your consult. I will follow-up with patient. Please contact us if you have any additional questions.    Hardik Anand MD  Nephrology  [facility]    ATTENDING PHYSICIAN ATTESTATION  I have personally interviewed and examined the patient. I thoroughly  reviewed the demographic, clinical, laboratorial and imaging information available in medical records. I agree with the assessment and recommendations provided by the subspecialty resident. Dr. Anand was under my supervision.

## 2019-01-03 NOTE — PROGRESS NOTES
[facility]  General Surgery  Progress Note    Subjective:     History of Present Illness:  No notes on file    Post-Op Info:  Procedure(s) (LRB):  DEBRIDEMENT, WOUND (Bilateral)  INSERTION, CATHETER, CENTRAL VENOUS, VORA (Right)   4 Days Post-Op     Interval History:     Patient transferred to the ICU. Not on HD, still with increasing Cr and BUN, more acidotic. Hypoglycemic. H&H still trending down. INR normalized.     Medications:  Continuous Infusions:   norepinephrine bitartrate-D5W Stopped (01/02/19 1900)     Scheduled Meds:   acetaminophen  1,000 mg Intravenous Q8H    albuterol sulfate  2.5 mg Nebulization Q6H WAKE    busPIRone  15 mg Oral BID    ceFAZolin (ANCEF) IVPB  2 g Intravenous Q12H    furosemide  160 mg Intravenous BID    metoprolol tartrate  12.5 mg Oral BID    midodrine  5 mg Oral TID    nystatin  500,000 Units Oral QID    sodium bicarbonate  1,950 mg Oral TID     PRN Meds:ALPRAZolam, dextrose 50%, glucagon (human recombinant), insulin aspart U-100, ondansetron, ondansetron, oxyCODONE-acetaminophen, oxyCODONE-acetaminophen, promethazine (PHENERGAN) IVPB, sodium chloride 0.9%, sodium chloride 0.9%, zolpidem     Review of patient's allergies indicates:  No Known Allergies  Objective:     Vital Signs (Most Recent):  Temp: 98 °F (36.7 °C) (01/03/19 0701)  Pulse: 82 (01/03/19 0701)  Resp: 18 (01/03/19 0701)  BP: (!) 103/48 (01/03/19 0701)  SpO2: 96 % (01/03/19 0701) Vital Signs (24h Range):  Temp:  [93.9 °F (34.4 °C)-98 °F (36.7 °C)] 98 °F (36.7 °C)  Pulse:  [78-92] 82  Resp:  [17-33] 18  SpO2:  [94 %-99 %] 96 %  BP: ()/(39-61) 103/48  Arterial Line BP: ()/(43-60) 103/48     Weight: 69.9 kg (154 lb)  Body mass index is 24.12 kg/m².    Intake/Output - Last 3 Shifts       01/01 0700 - 01/02 0659 01/02 0700 - 01/03 0659 01/03 0700 - 01/04 0659    P.O. 120      I.V. (mL/kg) 50 (0.7) 253 (3.6)     Blood 1107.6      IV Piggyback 100      Total Intake(mL/kg) 1377.6  (19.7) 253 (3.6)     Urine (mL/kg/hr) 130 (0.1) 180 (0.1) 15 (0.1)    Emesis/NG output 0      Other 280 400     Stool 0      Total Output 410 580 15    Net +967.6 -327 -15           Stool Occurrence 4 x      Emesis Occurrence 0 x            Physical Exam       CV: RRR  Pulm: non work of breathing.   Ext: Bilateral extremities with wound vac in place. SS fluid.   +edema    Significant Labs:    CBC:   Recent Labs   Lab 01/03/19  0336   WBC 8.13   RBC 2.59*   HGB 7.3*   HCT 22.8*   *   MCV 88   MCH 28.2   MCHC 32.0     CMP:   Recent Labs   Lab 01/03/19  0336   GLU 74   CALCIUM 7.8*   ALBUMIN 1.1*   PROT 3.9*      K 5.4*   CO2 14*      *   CREATININE 4.3*   ALKPHOS 98   ALT <5*   AST 15   BILITOT 0.2           Assessment/Plan:     * Soft tissue infection    58 y.o. female 4 Days Post-Op for Procedure(s) (LRB):  DEBRIDEMENT, WOUND (Bilateral)  INSERTION, CATHETER, CENTRAL VENOUS, VORA (Right)    Severe soft tissue infection with joint involvement.   -Culture growing MSSA on Cefazolin, ID recommended about 4-6 weeks, Vora in place.    Oliguric VIDYA  -not improving  - Appreciate Nephrology notes.    Pulmonary edema  -2/2 renal dysfunction and transfusion requirements  -monitor closely, doing better this morning.     Anemia  - went down from 9.3 to 7.3, repeat PM.   -elevated INR now resolved after FFP and x1 IV Vit K.     Wound VAC to be changed today with Ketamine.          Sonia Qureshi MD  General Surgery PGY V  Beeper: [phone]

## 2019-01-03 NOTE — PLAN OF CARE
Problem: Adult Inpatient Plan of Care  Goal: Plan of Care Review  Hx:  HTN, microcytic anemia, transaminitis, RA, hx of GI bleed     Dx: necrotizing fascitis     12/21: transfer to SICU, CT of chest/arm/knee, cultures; bilat UE washout/I&D; 1L crystalloid and 250 albumin in OR; LR bolus, cont. LR infusion started, PRBC's x2 units   12/22: LR bolusx3  12/23: LR bolusx1  12/24: 2 unit of PRBC total. OR for wound debridement. 2L LR bolus  12/25: LR continuous at 125. Nephrology consulted. 1L LR bolus   12/26: MRI of lower back, 1L LR bolus    Nursing:  SBP<180  Accucheck Q6                  Outcome: Ongoing (interventions implemented as appropriate)  No acute events this shift, VSS. Pt AAOx3, confused to situation.  Pt withdrawn and sad throughout shift, very reluctant to nursing care  NC @ 2L, SpO2 > 95%  Levo gtt remains off overnight, MAP > 65.   L wound vac remains in place, 125 mmHg; 100 mL sanguineous output  R wound vac turned off due to blockage, Dr. Florez and Dr. Miller aware  Pt resistant to skin care and turns, reinforced importance of hygiene and pressure ulcer risk, patient understands despite education  UO marginal overnight  POC reviewed with pt and sibling. Will continue to monitor.

## 2019-01-03 NOTE — PROGRESS NOTES
[facility]  Critical Care - Surgery  Progress Note    Patient Name: Misty Khalil  MRN: [id number]  Admission Date: 12/20/2018  Hospital Length of Stay: 14 days  Code Status: Full Code  Attending Provider: Javier Kirk MD  Primary Care Provider: JOEL Mccauley   Principal Problem: Soft tissue infection    Subjective:     Hospital/ICU Course:  No notes on file    Interval History/Significant Events:   Respiratory acidosis, increased work of breathing this morning, patient reportedly more responsive and interactive today.     Follow-up For: Procedure(s) (LRB):  DEBRIDEMENT, WOUND (Bilateral)  INSERTION, CATHETER, CENTRAL VENOUS, VORA (Right)    Post-Operative Day: 4 Days Post-Op    Objective:     Vital Signs (Most Recent):  Temp: 98 °F (36.7 °C) (01/03/19 0701)  Pulse: 91 (01/03/19 0858)  Resp: (!) 28 (01/03/19 0858)  BP: (!) 103/48 (01/03/19 0701)  SpO2: 96 % (01/03/19 0858) Vital Signs (24h Range):  Temp:  [93.9 °F (34.4 °C)-98 °F (36.7 °C)] 98 °F (36.7 °C)  Pulse:  [78-92] 91  Resp:  [17-33] 28  SpO2:  [94 %-99 %] 96 %  BP: ()/(39-61) 103/48  Arterial Line BP: ()/(43-60) 103/48     Weight: 69.9 kg (154 lb)  Body mass index is 24.12 kg/m².      Intake/Output Summary (Last 24 hours) at 1/3/2019 1110  Last data filed at 1/3/2019 0700  Gross per 24 hour   Intake 253 ml   Output 595 ml   Net -342 ml       Physical Exam   Constitutional: She is oriented to person, place, and time. She appears well-developed.   HENT:   Head: Normocephalic and atraumatic.   Eyes: EOM are normal. Pupils are equal, round, and reactive to light.   Neck: Normal range of motion.   Cardiovascular: Normal rate, regular rhythm and normal heart sounds.   Pulmonary/Chest: Breath sounds normal.   Neurological: She is oriented to person, place, and time.   Skin: Skin is warm.   Psychiatric: She has a normal mood and affect. Her behavior is normal. Thought content normal.   Nursing note and vitals  reviewed.      Vents:  Oxygen Concentration (%): 32 (01/02/19 2043)    Lines/Drains/Airways     Central Venous Catheter Line                 Percutaneous Central Line Insertion/Assessment - single lumen  12/30/18 0928 right subclavian 4 days         Trialysis (Dialysis) Catheter 01/02/19 1457 right internal jugular less than 1 day          Drain                 Urethral Catheter 12/31/18 0000 3 days          Arterial Line                 Arterial Line 01/02/19 1500 Right Femoral less than 1 day          Pressure Ulcer                 Negative Pressure Wound Therapy  4 days         Negative Pressure Wound Therapy  4 days         Pressure Injury 01/01/19 0200 Coccyx Stage 2 2 days                Significant Labs:    CBC/Anemia Profile:  Recent Labs   Lab 01/02/19  0400 01/02/19  1410 01/03/19  0336   WBC 11.26 9.18 8.13   HGB 9.1* 8.1* 7.3*   HCT 27.9* 24.9* 22.8*   * 132* 120*   MCV 86 87 88   RDW 17.2* 17.4* 17.6*        Chemistries:  Recent Labs   Lab 01/02/19  0400  01/02/19  1410 01/02/19  2345 01/03/19  0336 01/03/19  0829     140   < > 139 138 140 140   K 5.4*  5.4*   < > 5.4* 5.4* 5.4* 5.1     108   < > 107 107 109 109   CO2 16*  16*   < > 15* 14* 14* 15*   *  116*   < > 118* 127* 122* 118*   CREATININE 4.0*  4.0*   < > 4.1* 4.2* 4.3* 4.0*   CALCIUM 7.5*  7.5*   < > 7.6* 7.7* 7.8* 7.3*   ALBUMIN 1.1*  --   --   --  1.1*  --    PROT 4.0*  --   --   --  3.9*  --    BILITOT 0.2  --   --   --  0.2  --    ALKPHOS 88  --   --   --  98  --    ALT <5*  --   --   --  <5*  --    AST 13  --   --   --  15  --    MG 1.8  --  1.8  --  2.0  --    PHOS 6.7*  --  6.8*  --  7.2*  --     < > = values in this interval not displayed.     All pertinent labs within the past 24 hours have been reviewed.    Significant Imaging:  I have reviewed and interpreted all pertinent imaging results/findings within the past 24 hours.    Assessment/Plan:     * Soft tissue infection    57yo F w/ hx of Anemia, HTN,  RA (on prior prednisone taper) with extensive bilateral upper extremity abscesses now s/p two drainage/washouts at OSH.     Neuro: No history of seizures/strokes.   Pain: Dilaudid , Tylenol     CVS: Hemodynamically stable without inotrope/pressor requirement. BHARAT @ OSH negative for vegetation.   Cardiology consulted - they reviewed BHARAT and agree there is low suspicion of endocarditis at this time  History of HTN: holding home antihypertensives in setting of hypotension     Pulm: Chronic smoker  - On room air  - Continuous pulse oximetry, incentive spirometry.     Renal:   - Oliguric VIDYA , Cr now 1.9, significant insensible losses through multiple wounds, Intravascular depletion in the setting of SIRS.  - acidotic - driving tachypnea  - bicarb infusion initiated 150 meq/L at 125 ml/hr per nephrology  - would like to initiate CRRT  - Daily BMP, Strict I/O (+Caro)     FEN/GI  Erosive Esophagitis: Continue PPI BID, TPN  Place ashok tube - initiate tube feeding  IVF: LR @ 125cc/hr     MSK/ ID:   Staph bacteremia manifesting as necrotizing fascitis of bilateral upper extremity, now septic arthritis of right knee. BHARAT @OSH negative for endocarditis. Presentation concerning for seeding from back hardware. Orthopedics consulted for septic knee already, appreciate input.  Infectious disease consulted for antibiotic duration management.  - Continue high dose Ancef 2g q8h (4-6 weeks)  - Repeat cultures obtained  - MRI L-spine ordered, renal function stable.  - afebrile 1/2     Heme/Onc.   - s/p 2U pRBCs 12/24   - worsening anemia today - administer 1 unit PRBCs    PPX:   SCD, SQH  Protonix BID  PT/OT     Dispo: continue ICU care  Primary: ACS           Critical secondary to Patient has a condition that poses threat to life and bodily function: Acute Renal Failure     Critical care was time spent personally by me on the following activities: development of treatment plan with patient or surrogate and bedside caregivers,  discussions with consultants, evaluation of patient's response to treatment, examination of patient, ordering and performing treatments and interventions, ordering and review of laboratory studies, ordering and review of radiographic studies, pulse oximetry, re-evaluation of patient's condition.  This critical care time did not overlap with that of any other provider or involve time for any procedures.     Jordan Crowe MD  Critical Care - Surgery  [facility]

## 2019-01-03 NOTE — PROGRESS NOTES
Dr. Florez notified of [facility]. See chart for orders.

## 2019-01-03 NOTE — ASSESSMENT & PLAN NOTE
VIDYA, etiology with definite ATN, component, but cannot completely r/u a GN, multiple considerations.    Work up so far:    ALYSSA positive w/ a medium titer  C4 complement low, C3 on low side of normal  Manual urine slide noted for granular casts, RBCs (no obvious dysmorphia or RBC casts), WBCs (not in cast form), urine ctx w/o growth (but seems patient on abxs when obtained)    Renal ultrasound unremarkable, no hydro    ANCA MPO titer positive at 27    sCr remaining +/- stable at 4.0    Repeat manual urine microscopy within the past 48hrs again noted for muddy brown casts and no obvious dysmorphia    Plan/Recommendations:  1) continue follow serial RFPs and strict Is & Os  2) immune markers indeterminant and urine microscopy indicate ATN, however still may need renal biopsy will make definitive recs in another day or two

## 2019-01-03 NOTE — ASSESSMENT & PLAN NOTE
57yo F w/ hx of Anemia, HTN, RA (on prior prednisone taper) with extensive bilateral upper extremity abscesses now s/p two drainage/washouts at OSH.     Neuro: No history of seizures/strokes.   Pain: Dilaudid , Tylenol     CVS: Hemodynamically stable without inotrope/pressor requirement. BHARAT @ OSH negative for vegetation.   Cardiology consulted - they reviewed BHARAT and agree there is low suspicion of endocarditis at this time  History of HTN: holding home antihypertensives in setting of hypotension     Pulm: Chronic smoker  - On room air  - Continuous pulse oximetry, incentive spirometry.     Renal:   - Oliguric VIDYA , Cr now 1.9, significant insensible losses through multiple wounds, Intravascular depletion in the setting of SIRS.  - acidotic - driving tachypnea  - bicarb infusion initiated 150 meq/L at 125 ml/hr per nephrology  - would like to initiate CRRT  - Daily BMP, Strict I/O (+Caro)     FEN/GI  Erosive Esophagitis: Continue PPI BID, TPN  Place ashok tube - initiate tube feeding  IVF: LR @ 125cc/hr     MSK/ ID:   Staph bacteremia manifesting as necrotizing fascitis of bilateral upper extremity, now septic arthritis of right knee. BHARAT @OSH negative for endocarditis. Presentation concerning for seeding from back hardware. Orthopedics consulted for septic knee already, appreciate input.  Infectious disease consulted for antibiotic duration management.  - Continue high dose Ancef 2g q8h (4-6 weeks)  - Repeat cultures obtained  - MRI L-spine ordered, renal function stable.  - afebrile 1/2     Heme/Onc.   - s/p 2U pRBCs 12/24   - worsening anemia today - administer 1 unit PRBCs    PPX:   SCD, SQH  Protonix BID  PT/OT     Dispo: continue ICU care  Primary: ACS

## 2019-01-04 PROBLEM — R79.89 AZOTEMIA: Status: ACTIVE | Noted: 2019-01-01

## 2019-01-04 NOTE — PROGRESS NOTES
[facility]  General Surgery  Progress Note    Subjective:     History of Present Illness:  No notes on file    Post-Op Info:  Procedure(s) (LRB):  DEBRIDEMENT, WOUND (Bilateral)  INSERTION, CATHETER, CENTRAL VENOUS, VORA (Right)   5 Days Post-Op     Interval History:     Neither vac working, to OR today.    Medications:  Continuous Infusions:   sodium bicarbonate drip 125 mL/hr at 01/04/19 0600     Scheduled Meds:   albuterol sulfate  2.5 mg Nebulization Q6H WAKE    busPIRone  15 mg Oral BID    ceFAZolin (ANCEF) IVPB  2 g Intravenous Q12H    furosemide  160 mg Intravenous BID    metoprolol tartrate  12.5 mg Oral BID    midodrine  5 mg Oral TID    nystatin  500,000 Units Oral QID     PRN Meds:sodium chloride, ALPRAZolam, dextrose 50%, fentaNYL, glucagon (human recombinant), insulin aspart U-100, ondansetron, ondansetron, oxyCODONE-acetaminophen, oxyCODONE-acetaminophen, promethazine (PHENERGAN) IVPB, sodium chloride 0.9%, sodium chloride 0.9%, zolpidem     Review of patient's allergies indicates:  No Known Allergies  Objective:     Vital Signs (Most Recent):  Temp: 98.1 °F (36.7 °C) (01/04/19 0600)  Pulse: 79 (01/04/19 0600)  Resp: 18 (01/04/19 0600)  BP: 133/61 (01/04/19 0315)  SpO2: (!) 94 % (01/04/19 0600) Vital Signs (24h Range):  Temp:  [94.7 °F (34.8 °C)-98.1 °F (36.7 °C)] 98.1 °F (36.7 °C)  Pulse:  [78-91] 79  Resp:  [15-28] 18  SpO2:  [93 %-99 %] 94 %  BP: (118-134)/(55-64) 133/61  Arterial Line BP: (105-149)/(48-72) 123/56     Weight: 69.9 kg (154 lb)  Body mass index is 24.12 kg/m².    Intake/Output - Last 3 Shifts       01/02 0700 - 01/03 0659 01/03 0700 - 01/04 0659 01/04 0700 - 01/05 0659    P.O.       I.V. (mL/kg) 253 (3.6) 2562 (36.7)     Blood  215     NG/GT  60     IV Piggyback       Total Intake(mL/kg) 253 (3.6) 2837 (40.6)     Urine (mL/kg/hr) 180 (0.1) 840 (0.5)     Emesis/NG output       Other 400 250     Stool       Total Output 580 1090     Net -327 +0009                   Physical Exam         CV: RRR  Pulm: non work of breathing.   Ext: Bilateral extremities with wound vac in place. SS fluid.   +edema    Significant Labs:    CBC:   Recent Labs   Lab 01/04/19  0320   WBC 7.41   RBC 2.79*   HGB 8.0*   HCT 24.2*   PLT 91*   MCV 87   MCH 28.7   MCHC 33.1     CMP:   Recent Labs   Lab 01/04/19  0320      CALCIUM 7.3*   ALBUMIN 1.0*   PROT 3.7*      K 4.4   CO2 16*      *   CREATININE 4.0*   ALKPHOS 99   ALT <5*   AST 10   BILITOT 0.2           Assessment/Plan:     * Soft tissue infection    58 y.o. female 5 Days Post-Op for Procedure(s) (LRB):  DEBRIDEMENT, WOUND (Bilateral)  INSERTION, CATHETER, CENTRAL VENOUS, VORA (Right)    Severe soft tissue infection with joint involvement.   -Culture growing MSSA on Cefazolin, ID recommended about 4-6 weeks, Vora in place.    Oliguric VIDYA  -not improving  - Appreciate Nephrology notes.    Pulmonary edema/COPD  -2/2 renal dysfunction and transfusion requirements  -monitor closely, doing better this morning.     Anemia  -elevated INR unknown etiology    Wound VAC to be changed today in OR          Kirstin Miller MD  General Surgery  [facility]

## 2019-01-04 NOTE — OP NOTE
DATE OF PROCEDURE: 1/4/2019    PREOPERATIVE DIAGNOSIS: Soft tissue infection    POSTOPERATIVE DIAGNOSIS: Soft tissue infection     PROCEDURES PERFORMED:  1. Irrigation bilateral upper extremities bilateral upper extremities   2. Debridement skin and soft tissue Left upper extremity 6x6cm area  3. Wound vac placement bilateral upper extremities     ATTENDING SURGEON: Javier Kirk MD     RESIDENT: Jj Coombs MD     ANESTHESIA: GETA    KEY FINDINGS: Bilateral upper extremity wounds with granulation tissue.  No further purulence seen    ESTIMATED BLOOD LOSS: 50 ml    DRAINS: None    COMPLICATIONS: None    INDICATIONS FOR PROCEDURE: The patient is a 58 y.o. female   who was transferred to [facility] for further management of soft tissue infection of bilateral upper extremities. She has undergone multiple debridements, dressing changes, and wound vac placement.  She was due for a wound vac change which she could not tolerate at bedside in the ICU. Based on this surgery was indicated.    PROCEDURE IN DETAIL: After informed consent was obtained, the patient was brought to the Operative Theater and placed in in the supine position. After adequate anesthesia the patient was prepped and draped in the usual sterile fashion. A timeout procedure was performed.  The old wound vacs were taken down from bilateral upper extremities.  The wounds all appeared healthy with developing granulation tissue bilaterally.  There was no purulence expressed.  We debrided a piece of skin and soft tissue on the left upper extremity where a penrose drain was previously placed.  The orthopedic surgery team was present to evaluate the hands, this will be dictated in a separate report.  Excellent hemostasis was achieved. Wound vacs were placed on bilateral upper extremities and placed to 125mmHg suction with no leak.  All counts were correct.  Dr Kirk was present during the entire case.  The patient tolerated the procedure well and was transported to  the ICU post operatively in stable condition.

## 2019-01-04 NOTE — PROGRESS NOTES
[facility]  Critical Care - Surgery  Progress Note    Patient Name: Misty Khalil  MRN: [id number]  Admission Date: 12/20/2018  Hospital Length of Stay: 15 days  Code Status: Full Code  Attending Provider: Javier Kirk MD  Primary Care Provider: JOEL Mccauley   Principal Problem: Soft tissue infection    Subjective:     Hospital/ICU Course:  No notes on file    Interval History/Significant Events:   NAEO. AF, HDS. AVINASH tube placed overnight. UOP improving this am Plan to return to OR today for washout    Follow-up For: Procedure(s) (LRB):  DEBRIDEMENT, WOUND (Bilateral)  INSERTION, CATHETER, CENTRAL VENOUS, VORA (Right)      Objective:     Vital Signs (Most Recent):  Temp: 98.1 °F (36.7 °C) (01/04/19 0600)  Pulse: 79 (01/04/19 0600)  Resp: 18 (01/04/19 0600)  BP: 133/61 (01/04/19 0315)  SpO2: (!) 94 % (01/04/19 0600) Vital Signs (24h Range):  Temp:  [94.7 °F (34.8 °C)-98.1 °F (36.7 °C)] 98.1 °F (36.7 °C)  Pulse:  [78-91] 79  Resp:  [15-28] 18  SpO2:  [93 %-99 %] 94 %  BP: (118-134)/(55-64) 133/61  Arterial Line BP: (105-149)/(48-72) 123/56     Weight: 69.9 kg (154 lb)  Body mass index is 24.12 kg/m².      Intake/Output Summary (Last 24 hours) at 1/4/2019 0823  Last data filed at 1/4/2019 0600  Gross per 24 hour   Intake 2837 ml   Output 1055 ml   Net 1782 ml       Physical Exam   Constitutional: She is oriented to person, place, and time. She appears well-developed.   HENT:   Head: Normocephalic and atraumatic.   Eyes: EOM are normal. Pupils are equal, round, and reactive to light.   Neck: Normal range of motion.   Cardiovascular: Normal rate, regular rhythm and normal heart sounds.   Pulmonary/Chest: Breath sounds normal.   Musculoskeletal:   RUE and LUE wound vacs in place, not sealed, turned off   Neurological: She is oriented to person, place, and time.   Skin: Skin is warm.   Psychiatric: She has a normal mood and affect. Her behavior is normal. Thought content normal.   Nursing  note and vitals reviewed.      Vents:  Oxygen Concentration (%): 32 (01/02/19 2043)    Lines/Drains/Airways     Central Venous Catheter Line                 Percutaneous Central Line Insertion/Assessment - single lumen  12/30/18 0928 right subclavian 4 days         Trialysis (Dialysis) Catheter 01/02/19 1457 right internal jugular 1 day          Drain                 Urethral Catheter 12/31/18 0000 4 days         Trans Pyloric Feeding Tube 01/03/19 1500 Left nostril less than 1 day          Arterial Line                 Arterial Line 01/02/19 1500 Right Femoral 1 day          Pressure Ulcer                 Negative Pressure Wound Therapy  4 days         Negative Pressure Wound Therapy  4 days         Pressure Injury 01/01/19 0200 Coccyx Stage 2 3 days                Significant Labs:    CBC/Anemia Profile:  Recent Labs   Lab 01/03/19  0336 01/03/19  2319 01/04/19  0320   WBC 8.13 8.09 7.41   HGB 7.3* 7.9* 8.0*   HCT 22.8* 23.8* 24.2*   * 96* 91*   MCV 88 85 87   RDW 17.6* 16.8* 16.9*        Chemistries:  Recent Labs   Lab 01/02/19  1410  01/03/19  0336  01/03/19  1558 01/03/19  2319 01/04/19  0320      < > 140   < > 139 140 142   K 5.4*   < > 5.4*   < > 4.9 4.5 4.4      < > 109   < > 109 106 106   CO2 15*   < > 14*   < > 14* 16* 16*   *   < > 122*   < > 124* 119* 126*   CREATININE 4.1*   < > 4.3*   < > 4.1* 4.0* 4.0*   CALCIUM 7.6*   < > 7.8*   < > 7.4* 7.4* 7.3*   ALBUMIN  --   --  1.1*  --   --   --  1.0*   PROT  --   --  3.9*  --   --   --  3.7*   BILITOT  --   --  0.2  --   --   --  0.2   ALKPHOS  --   --  98  --   --   --  99   ALT  --   --  <5*  --   --   --  <5*   AST  --   --  15  --   --   --  10   MG 1.8  --  2.0  --   --  1.7 1.6   PHOS 6.8*  --  7.2*  --   --   --  6.8*    < > = values in this interval not displayed.     All pertinent labs within the past 24 hours have been reviewed.    Significant Imaging:  I have reviewed and interpreted all pertinent imaging results/findings  within the past 24 hours.    Assessment/Plan:     * Soft tissue infection    57yo F w/ hx of Anemia, HTN, RA (on prior prednisone taper) with extensive bilateral upper extremity abscesses now s/p two drainage/washouts at OSH with additional washouts and wound vac placements since presentation here.     Neuro: No history of seizures/strokes.   Pain: Dilaudid , Tylenol     CVS: Hemodynamically stable without inotrope/pressor requirement. BHARAT @ OSH negative for vegetation.   Cardiology consulted - they reviewed BHARAT and agree there is low suspicion of endocarditis at this time  History of HTN: holding home antihypertensives in setting of hypotension     Pulm: Chronic smoker  - On room air  - Continuous pulse oximetry, incentive spirometry.     Renal:   - Oliguric VIDYA , Cr 4.0  - acidotic - driving tachypnea  - 160 IV lasix bid, UOP improved to 40-70cc/hr overnight  - bicarb infusion initiated 150 meq/L at 125 ml/hr per nephrology  - Daily BMP, Strict I/O (+Caro)     FEN/GI  Erosive Esophagitis: Continue PPI BID, TPN  Placed ashok tube yesterday - initiate tube feeding today  IVF: LR @ 125cc/hr     MSK/ ID:   Staph bacteremia manifesting as necrotizing fascitis of bilateral upper extremity, now septic arthritis of right knee. BHARAT @OSH negative for endocarditis. Presentation concerning for seeding from back hardware. Orthopedics consulted for septic knee already, appreciate input.  Infectious disease consulted for antibiotic duration management.  - Continue high dose Ancef 2g q8h (4-6 weeks) per ID recs  - Repeat cultures obtained  - MRI L-spine ordered, renal function stable.  - afebrile 1/3     Heme/Onc.   - s/p 2U pRBCs 12/24   - administered 1 unit PRBCs 1/3    PPX:   SCD, SQH    Dispo: continue ICU care. To OR today for washout.   Primary: ACS              Critical care was time spent personally by me on the following activities: development of treatment plan with patient or surrogate and bedside caregivers,  discussions with consultants, evaluation of patient's response to treatment, examination of patient, ordering and performing treatments and interventions, ordering and review of laboratory studies, ordering and review of radiographic studies, pulse oximetry, re-evaluation of patient's condition.  This critical care time did not overlap with that of any other provider or involve time for any procedures.     Naveen Gallardo MD  Critical Care - Surgery  [facility]

## 2019-01-04 NOTE — PT/OT/SLP PROGRESS
Speech Language Pathology      Misty Khalil  MRN: [id number]    Patient not seen today secondary to MD hold (pt with procedure during PM today). ST will f/u per POC.    PARAMJIT Sanchez-SLP  Speech-Language Pathology  Pager: [phone]

## 2019-01-04 NOTE — ASSESSMENT & PLAN NOTE
Misty LUIGI Khalil is a 58 y.o. female s/p BUE I&D with open wounds and right septic knee / right septic wrist    - orthopedics will be available to evaluate hand wounds in OR, will likely need hand specialist or plastic surgery

## 2019-01-04 NOTE — PROGRESS NOTES
[facility]  Nephrology  Progress Note    Patient Name: Misty Khalil  MRN: [id number]  Admission Date: 12/20/2018  Hospital Length of Stay: 15 days  Attending Provider: Javier Kirk MD   Primary Care Physician: JOEL Mccauley  Principal Problem:Soft tissue infection    Subjective:     HPI: 59 yo WEF with medical hx of HTN, anemia, RA who was transferred from [facility] overnight for evaluation fo necrotizing fascitis. She initially presented on 12/17 with right shoulder pain, swelling, and fevers and found to have bilateral upper extremity abscesses: right arm 14.3x4.3x2.7 collection, left axilla 8.3x5.4x7.7 fluid collection. She is s/p I&D in ED and initiation of vancomycin and zosyn.  She was taken to OR for incision and washout 12/18 of right hand, RUE, and LUE abscess by Dr. Falk (orthopedic surgery) and again on 12/19 for repeat washout. She was additionally found to have left olecranon abscess, left hand abscess. Cultures grew MSSA and she was transitioned to Ancef at some point during her course. Of note, she has history of back surgery with implanted hardware. Cardiology was consulted as there was an initial concern for endocarditis, but negative BHARAT would seem to make this less likely.    Nephrology consulted for sCr that is elevated and not normalizing with fluid repletion x several days (since transfer from [facility] to [facility]), sCr has remained stable between 1.7 to 2.0 over that time period, per report patient had an sCr of around 1.0 on admit to [facility], also reviewing old [facility] labs, renal function was WNL over the summer.        Interval History: mental status stable from yesterday, labs also stable, making ok urine, 825ccs past 24hrs    Review of patient's allergies indicates:  No Known Allergies  Current Facility-Administered Medications   Medication Frequency    0.9%  NaCl infusion (for blood administration) Q24H PRN    albuterol sulfate nebulizer solution 2.5 mg Q6H WAKE     ALPRAZolam tablet 0.5 mg BID PRN    busPIRone tablet 15 mg BID    ceFAZolin injection 2 g Q12H    dextrose 50% injection 12.5 g PRN    fentaNYL injection 25 mcg Q1H PRN    furosemide injection 160 mg BID    glucagon (human recombinant) injection 1 mg PRN    insulin aspart U-100 pen 0-5 Units Q6H PRN    metoprolol tartrate (LOPRESSOR) split tablet 12.5 mg BID    midodrine tablet 5 mg TID    nystatin 100,000 unit/mL suspension 500,000 Units QID    ondansetron injection 4 mg Q12H PRN    ondansetron injection 4 mg Once PRN    oxyCODONE-acetaminophen  mg per tablet 1 tablet Q4H PRN    oxyCODONE-acetaminophen 5-325 mg per tablet 1 tablet Q4H PRN    promethazine (PHENERGAN) 6.25 mg in dextrose 5 % 50 mL IVPB Q6H PRN    sodium bicarbonate 1 mEq/mL (8.4 %) 150 mEq in dextrose 5 % 1,000 mL infusion Continuous    sodium chloride 0.9% flush 3 mL PRN    sodium chloride 0.9% flush 3 mL PRN    zolpidem tablet 5 mg Nightly PRN       Objective:     Vital Signs (Most Recent):  Temp: 98.1 °F (36.7 °C) (01/04/19 0600)  Pulse: 80 (01/04/19 0823)  Resp: 18 (01/04/19 0823)  BP: 133/61 (01/04/19 0315)  SpO2: (!) 92 % (01/04/19 0823)  O2 Device (Oxygen Therapy): room air (01/04/19 0823) Vital Signs (24h Range):  Temp:  [94.7 °F (34.8 °C)-98.1 °F (36.7 °C)] 98.1 °F (36.7 °C)  Pulse:  [78-87] 80  Resp:  [15-26] 18  SpO2:  [92 %-99 %] 92 %  BP: (118-134)/(55-64) 133/61  Arterial Line BP: (105-149)/(48-72) 123/56     Weight: 69.9 kg (154 lb) (12/21/18 1145)  Body mass index is 24.12 kg/m².  Body surface area is 1.82 meters squared.    I/O last 3 completed shifts:  In: 3090 [I.V.:2815; Blood:215; NG/GT:60]  Out: 1345 [Urine:995; Other:350]    Physical Exam   HENT:   Head: Atraumatic.   Neck: No JVD present.   Cardiovascular: Normal rate and regular rhythm.   Pulmonary/Chest: Effort normal. She has rales (at bases).   Abdominal: Soft. She exhibits no distension.   Musculoskeletal: She exhibits edema. She exhibits no  tenderness.   Neurological: She is alert.   Skin: Skin is warm.       Significant Labs:  CMP:   Recent Labs   Lab 01/04/19  0320      CALCIUM 7.3*   ALBUMIN 1.0*   PROT 3.7*      K 4.4   CO2 16*      *   CREATININE 4.0*   ALKPHOS 99   ALT <5*   AST 10   BILITOT 0.2     All labs within the past 24 hours have been reviewed.       Assessment/Plan:     * Soft tissue infection    Managed by surgical service     Azotemia    Azotemia, but do not feel patient is clinically uremic, no urgent RRT indication     Metabolic acidosis    -recommend IV bicarb drip D5W 3 amps of bicarb at 75ccs per hour and once NG placed can change to 1900mg PO TID through NG and stop IV drip to avoid volume overload     VIDYA (acute kidney injury)    VIDYA, etiology with definite ATN, component, but cannot completely r/u a GN, multiple considerations.    Work up so far:    ALYSSA positive w/ a medium titer  C4 complement low, C3 on low side of normal  Manual urine slide noted for granular casts, RBCs (no obvious dysmorphia or RBC casts), WBCs (not in cast form), urine ctx w/o growth (but seems patient on abxs when obtained)    Renal ultrasound unremarkable, no hydro    ANCA MPO titer positive at 27    sCr remaining +/- stable at 4.0    Repeat manual urine microscopy within the past 48hrs again noted for muddy brown casts and no obvious dysmorphia    Plan/Recommendations:  1) continue follow serial RFPs and strict Is & Os  2) immune markers indeterminant and urine microscopy indicate ATN, however still may need renal biopsy, however currently she does not appear to be stable enough for this and considering sCr has remained stable over past several days it would seems unlikely biopsy results would change current management  3) no urgent indications for RRT           Thank you for your consult. I will follow-up with patient. Please contact us if you have any additional questions.    Hardik Anand MD  Nephrology  [facility]    ATTENDING PHYSICIAN ATTESTATION  I have personally interviewed and examined the patient. I thoroughly reviewed the demographic, clinical, laboratorial and imaging information available in medical records. I agree with the assessment and recommendations provided by the subspecialty resident. Dr. Anand was under my supervision. Stable VIDYA from iATN, not recovering. Non-oliguric on forced diuresis with high dose Lasix. Despite azotemia, no uremic encephalopathy. No hyperkalemia. Earlier today, hypervolemic and anasarca, but unremarkable CXR and unchanged low O2 requirements, laying flat. Biggest concern is persistent metabolic acidosis and concomitant respiratory alkalosis and work of breathing. Had recommended po NaHCOs 1950 mg tid. An ABG had shown improvement in bicarb to 20 mmol/L and a pH 7.44, showing respiratory alkalosis as primary disorder.     Reportedly, pt returned from I/D surgery in resp distress, subsequent ABG showed a pH 7.28.  Pt got intubated. Latest AB.26/46/21, mixed acidosis. Worsened CXR. Reportedly received close to 1 L IVF in the OR. Unresponsive (likely still from anesthesia). UOP 5 ml/hr despite IV Lasix high dose. Will initiate CRRT (SLED) to control metabolic component of acidosis and correct hypervolemia.

## 2019-01-04 NOTE — PROGRESS NOTES
Pt to OR for washout/debriedment of BUE wounds; both sx and anesthesia consent with pt.  VSS.  Care given to anesthesia at this time.

## 2019-01-04 NOTE — ASSESSMENT & PLAN NOTE
58 y.o. female 5 Days Post-Op for Procedure(s) (LRB):  DEBRIDEMENT, WOUND (Bilateral)  INSERTION, CATHETER, CENTRAL VENOUS, VORA (Right)    Severe soft tissue infection with joint involvement.   -Culture growing MSSA on Cefazolin, ID recommended about 4-6 weeks, Vora in place.    Oliguric VIDYA  -not improving  - Appreciate Nephrology notes.    Pulmonary edema/COPD  -2/2 renal dysfunction and transfusion requirements  -monitor closely, doing better this morning.     Anemia  -elevated INR unknown etiology    Wound VAC to be changed today in OR

## 2019-01-04 NOTE — ASSESSMENT & PLAN NOTE
59yo F w/ hx of Anemia, HTN, RA (on prior prednisone taper) with extensive bilateral upper extremity abscesses now s/p two drainage/washouts at OSH with additional washouts and wound vac placements since presentation here.     Neuro: No history of seizures/strokes.   Pain: Dilaudid , Tylenol     CVS: Hemodynamically stable without inotrope/pressor requirement. BHARAT @ OSH negative for vegetation.   Cardiology consulted - they reviewed BHARAT and agree there is low suspicion of endocarditis at this time  History of HTN: holding home antihypertensives in setting of hypotension     Pulm: Chronic smoker  - On room air  - Continuous pulse oximetry, incentive spirometry.     Renal:   - Oliguric VIDYA , Cr 4.0  - acidotic - driving tachypnea  - 160 IV lasix bid, UOP improved to 40-70cc/hr overnight  - bicarb infusion initiated 150 meq/L at 125 ml/hr per nephrology  - Daily BMP, Strict I/O (+Caro)     FEN/GI  Erosive Esophagitis: Continue PPI BID, TPN  Placed ashok tube yesterday - initiate tube feeding today  IVF: LR @ 125cc/hr     MSK/ ID:   Staph bacteremia manifesting as necrotizing fascitis of bilateral upper extremity, now septic arthritis of right knee. BHARAT @OSH negative for endocarditis. Presentation concerning for seeding from back hardware. Orthopedics consulted for septic knee already, appreciate input.  Infectious disease consulted for antibiotic duration management.  - Continue high dose Ancef 2g q8h (4-6 weeks) per ID recs  - Repeat cultures obtained  - MRI L-spine ordered, renal function stable.  - afebrile 1/3     Heme/Onc.   - s/p 2U pRBCs 12/24   - administered 1 unit PRBCs 1/3    PPX:   SCD, SQH    Dispo: continue ICU care. To OR today for washout.   Primary: ACS

## 2019-01-04 NOTE — SUBJECTIVE & OBJECTIVE
Interval History:     Neither vac working, to OR today.    Medications:  Continuous Infusions:   sodium bicarbonate drip 125 mL/hr at 01/04/19 0600     Scheduled Meds:   albuterol sulfate  2.5 mg Nebulization Q6H WAKE    busPIRone  15 mg Oral BID    ceFAZolin (ANCEF) IVPB  2 g Intravenous Q12H    furosemide  160 mg Intravenous BID    metoprolol tartrate  12.5 mg Oral BID    midodrine  5 mg Oral TID    nystatin  500,000 Units Oral QID     PRN Meds:sodium chloride, ALPRAZolam, dextrose 50%, fentaNYL, glucagon (human recombinant), insulin aspart U-100, ondansetron, ondansetron, oxyCODONE-acetaminophen, oxyCODONE-acetaminophen, promethazine (PHENERGAN) IVPB, sodium chloride 0.9%, sodium chloride 0.9%, zolpidem     Review of patient's allergies indicates:  No Known Allergies  Objective:     Vital Signs (Most Recent):  Temp: 98.1 °F (36.7 °C) (01/04/19 0600)  Pulse: 79 (01/04/19 0600)  Resp: 18 (01/04/19 0600)  BP: 133/61 (01/04/19 0315)  SpO2: (!) 94 % (01/04/19 0600) Vital Signs (24h Range):  Temp:  [94.7 °F (34.8 °C)-98.1 °F (36.7 °C)] 98.1 °F (36.7 °C)  Pulse:  [78-91] 79  Resp:  [15-28] 18  SpO2:  [93 %-99 %] 94 %  BP: (118-134)/(55-64) 133/61  Arterial Line BP: (105-149)/(48-72) 123/56     Weight: 69.9 kg (154 lb)  Body mass index is 24.12 kg/m².    Intake/Output - Last 3 Shifts       01/02 0700 - 01/03 0659 01/03 0700 - 01/04 0659 01/04 0700 - 01/05 0659    P.O.       I.V. (mL/kg) 253 (3.6) 2562 (36.7)     Blood  215     NG/GT  60     IV Piggyback       Total Intake(mL/kg) 253 (3.6) 2837 (40.6)     Urine (mL/kg/hr) 180 (0.1) 840 (0.5)     Emesis/NG output       Other 400 250     Stool       Total Output 580 1090     Net -327 +1747                  Physical Exam         CV: RRR  Pulm: non work of breathing.   Ext: Bilateral extremities with wound vac in place. SS fluid.   +edema    Significant Labs:    CBC:   Recent Labs   Lab 01/04/19  0320   WBC 7.41   RBC 2.79*   HGB 8.0*   HCT 24.2*   PLT 91*   MCV 87    MCH 28.7   MCHC 33.1     CMP:   Recent Labs   Lab 01/04/19  0320      CALCIUM 7.3*   ALBUMIN 1.0*   PROT 3.7*      K 4.4   CO2 16*      *   CREATININE 4.0*   ALKPHOS 99   ALT <5*   AST 10   BILITOT 0.2

## 2019-01-04 NOTE — PT/OT/SLP PROGRESS
Occupational Therapy      Patient Name:  Misty Khalil   MRN:  [id number]    Patient not seen today secondary to New orders needed 2* t/f to ICU on 1/2 after orders written that morning. Pt to go to OR 1/4 for washout    . Will follow-up with new MD orders.    DARREN Porter  1/4/2019

## 2019-01-04 NOTE — PROGRESS NOTES
[facility]  Orthopedics  Progress Note    Patient Name: Misty Khalil  MRN: [id number]  Admission Date: 12/20/2018  Hospital Length of Stay: 15 days  Attending Provider: Javier Kirk MD  Primary Care Provider: JOEL Mccauley  Follow-up For: Procedure(s) (LRB):  DEBRIDEMENT, WOUND (Bilateral)  INSERTION, CATHETER, CENTRAL VENOUS, VORA (Right)    Post-Operative Day: 5 Days Post-Op  Subjective:     Principal Problem: Sepsis    Principal Orthopedic Problem: As above s/p BUE I&D / Right wrist I&D / right knee I&D    Interval History: The patient was seen and examined this morning at the bedside. Moved to ICU 2 days ago.  Going to OR today with gen surg      Review of patient's allergies indicates:  No Known Allergies    Current Facility-Administered Medications   Medication    0.9%  NaCl infusion (for blood administration)    albuterol sulfate nebulizer solution 2.5 mg    ALPRAZolam tablet 0.5 mg    busPIRone tablet 15 mg    ceFAZolin injection 2 g    dextrose 50% injection 12.5 g    fentaNYL injection 25 mcg    furosemide injection 160 mg    glucagon (human recombinant) injection 1 mg    insulin aspart U-100 pen 0-5 Units    metoprolol tartrate (LOPRESSOR) split tablet 12.5 mg    midodrine tablet 5 mg    norepinephrine 4 mg in dextrose 5% 250 mL infusion (premix) (titrating)    nystatin 100,000 unit/mL suspension 500,000 Units    ondansetron injection 4 mg    ondansetron injection 4 mg    oxyCODONE-acetaminophen  mg per tablet 1 tablet    oxyCODONE-acetaminophen 5-325 mg per tablet 1 tablet    promethazine (PHENERGAN) 6.25 mg in dextrose 5 % 50 mL IVPB    sodium bicarbonate 1 mEq/mL (8.4 %) 150 mEq in dextrose 5 % 1,000 mL infusion    sodium chloride 0.9% flush 3 mL    sodium chloride 0.9% flush 3 mL    zolpidem tablet 5 mg     Objective:     Vital Signs (Most Recent):  Temp: 98.1 °F (36.7 °C) (01/04/19 0600)  Pulse: 79 (01/04/19 0600)  Resp: 18 (01/04/19  "0600)  BP: 133/61 (01/04/19 0315)  SpO2: (!) 94 % (01/04/19 0600) Vital Signs (24h Range):  Temp:  [94.7 °F (34.8 °C)-98.1 °F (36.7 °C)] 98.1 °F (36.7 °C)  Pulse:  [78-91] 79  Resp:  [15-28] 18  SpO2:  [93 %-99 %] 94 %  BP: (103-134)/(48-64) 133/61  Arterial Line BP: ()/(43-72) 123/56     Weight: 69.9 kg (154 lb)  Height: 5' 7" (170.2 cm)  Body mass index is 24.12 kg/m².      Intake/Output Summary (Last 24 hours) at 1/4/2019 0623  Last data filed at 1/4/2019 0600  Gross per 24 hour   Intake 2837 ml   Output 1090 ml   Net 1747 ml       Ortho/SPM Exam     PE:    AA&O x 4.  NAD  HEENT:  NCAT, sclera nonicteric  Lungs:  Respirations are equal and unlabored.  CV:  2+ bilateral upper and lower extremity pulses.  Skin:  Intact throughout.    MSK:    BUE in dressings, wound vacs in place with serosanguinous drainage in canisters  ROM intact to left hand with intact sensation.   Pt with limited mobility of right hand 2/2 pain. Weakly flexes and extends digits. Sensation intact throughout right hand.     RLE wound clean and intact without erythema or induration. AROM intact without significant pain.      Significant Labs:   CBC:   Recent Labs   Lab 01/03/19  0336 01/03/19 2319 01/04/19  0320   WBC 8.13 8.09 7.41   HGB 7.3* 7.9* 8.0*   HCT 22.8* 23.8* 24.2*   * 96* 91*     CMP:   Recent Labs   Lab 01/03/19  0336  01/03/19  1558 01/03/19 2319 01/04/19  0320      < > 139 140 142   K 5.4*   < > 4.9 4.5 4.4      < > 109 106 106   CO2 14*   < > 14* 16* 16*   GLU 74   < > 102 115* 110   *   < > 124* 119* 126*   CREATININE 4.3*   < > 4.1* 4.0* 4.0*   CALCIUM 7.8*   < > 7.4* 7.4* 7.3*   PROT 3.9*  --   --   --  3.7*   ALBUMIN 1.1*  --   --   --  1.0*   BILITOT 0.2  --   --   --  0.2   ALKPHOS 98  --   --   --  99   AST 15  --   --   --  10   ALT <5*  --   --   --  <5*   ANIONGAP 17*   < > 16 18* 20*   EGFRNONAA 10.7*   < > 11.3* 11.6* 11.6*    < > = values in this interval not displayed. "     Coagulation:   Recent Labs   Lab 01/02/19  1410 01/03/19  0336 01/04/19  0320   LABPROT 11.5 10.9 12.9*   INR 1.1 1.0 1.3*   APTT 31.6  --   --      All pertinent labs within the past 24 hours have been reviewed.    Significant Imaging: I have reviewed all pertinent imaging results/findings.    Assessment/Plan:     Septic arthritis of knee, right    Misty Khalil is a 58 y.o. female s/p BUE I&D with open wounds and right septic knee / right septic wrist    - orthopedics will be available to evaluate hand wounds in OR, will likely need hand specialist or plastic surgery           Javier West MD  Orthopedics  [facility] [address] [facility]    Att Note:  I agree with the resident's assessment and plan.    Danilo Goyal MD

## 2019-01-04 NOTE — ASSESSMENT & PLAN NOTE
VIDYA, etiology with definite ATN, component, but cannot completely r/u a GN, multiple considerations.    Work up so far:    ALYSSA positive w/ a medium titer  C4 complement low, C3 on low side of normal  Manual urine slide noted for granular casts, RBCs (no obvious dysmorphia or RBC casts), WBCs (not in cast form), urine ctx w/o growth (but seems patient on abxs when obtained)    Renal ultrasound unremarkable, no hydro    ANCA MPO titer positive at 27    sCr remaining +/- stable at 4.0    Repeat manual urine microscopy within the past 48hrs again noted for muddy brown casts and no obvious dysmorphia    Plan/Recommendations:  1) continue follow serial RFPs and strict Is & Os  2) immune markers indeterminant and urine microscopy indicate ATN, however still may need renal biopsy, however currently she does not appear to be stable enough for this and considering sCr has remained stable over past several days it would seems unlikely biopsy results would change current management  3) no urgent indications for RRT

## 2019-01-04 NOTE — PROGRESS NOTES
Notified Dr. Crowe of HR sustained in 150s for about 2 min. Now returned to NSR in 80s at this time. VSS. Pt in no apparent distress. Orders to obtain BMP and CBC at midnight. Will carry out and continue to monitor.

## 2019-01-04 NOTE — PROGRESS NOTES
Notified Dr. Crowe of L wound vac with blockage alarm. Therapy turned off at this time. Pt going to OR today for wound debridement.

## 2019-01-04 NOTE — TRANSFER OF CARE
"Anesthesia Transfer of Care Note    Patient: Misty Khalil    Procedure(s) Performed: Procedure(s) (LRB):  DEBRIDEMENT, WOUND AND VAC CHANGE (Bilateral)    Patient location: ICU    Anesthesia Type: general    Transport from OR: Transported from OR intubated on 100% O2 by AMBU with adequate controlled ventilation    Post pain: adequate analgesia    Post assessment: no apparent anesthetic complications    Post vital signs: stable    Level of consciousness: sedated    Nausea/Vomiting: no nausea/vomiting    Complications: respiratory complications    Transfer of care protocol was followedComments: Pt reintubated in ICU      Last vitals:   Visit Vitals  /60   Pulse 71   Temp 36.6 °C (97.8 °F)   Resp 19   Ht 5' 7" (1.702 m)   Wt 69.9 kg (154 lb)   SpO2 96%   Breastfeeding? No   BMI 24.12 kg/m²     "

## 2019-01-04 NOTE — PLAN OF CARE
Problem: Adult Inpatient Plan of Care  Goal: Plan of Care Review  Hx:  HTN, microcytic anemia, transaminitis, RA, hx of GI bleed     Dx: necrotizing fascitis     12/21: transfer to SICU, CT of chest/arm/knee, cultures; bilat UE washout/I&D; 1L crystalloid and 250 albumin in OR; LR bolus, cont. LR infusion started, PRBC's x2 units   12/22: LR bolusx3  12/23: LR bolusx1  12/24: 2 unit of PRBC total. OR for wound debridement. 2L LR bolus  12/25: LR continuous at 125. Nephrology consulted. 1L LR bolus   12/26: MRI of lower back, 1L LR bolus    Nursing:  SBP<180  Accucheck Q6                  Outcome: Ongoing (interventions implemented as appropriate)  No acute events this shift, VSS. Pt AAOx3, confused to situation.  Pt withdrawn and sad throughout shift, very reluctant to nursing care, PRN fentanyl given with turning/hygiene/nursing care  Pt on RA at this time, SpO2 > 94%  MAP maintained > 65 throughout shift  L wound vac remains in place, 125 mmHg; 200 mL sanguineous output  R wound vac turned off due to blockage, Dr. Florez and Dr. Miller aware  UO more responsive tonight, ~30-40 mL/hr  POC reviewed with pt and sibling. Plan for OR today for wound debridement. Will continue to monitor.

## 2019-01-04 NOTE — SUBJECTIVE & OBJECTIVE
"Principal Problem: Sepsis    Principal Orthopedic Problem: As above s/p BUE I&D / Right wrist I&D / right knee I&D    Interval History: The patient was seen and examined this morning at the bedside. Moved to ICU 2 days ago.  Going to OR today with gen surg      Review of patient's allergies indicates:  No Known Allergies    Current Facility-Administered Medications   Medication    0.9%  NaCl infusion (for blood administration)    albuterol sulfate nebulizer solution 2.5 mg    ALPRAZolam tablet 0.5 mg    busPIRone tablet 15 mg    ceFAZolin injection 2 g    dextrose 50% injection 12.5 g    fentaNYL injection 25 mcg    furosemide injection 160 mg    glucagon (human recombinant) injection 1 mg    insulin aspart U-100 pen 0-5 Units    metoprolol tartrate (LOPRESSOR) split tablet 12.5 mg    midodrine tablet 5 mg    norepinephrine 4 mg in dextrose 5% 250 mL infusion (premix) (titrating)    nystatin 100,000 unit/mL suspension 500,000 Units    ondansetron injection 4 mg    ondansetron injection 4 mg    oxyCODONE-acetaminophen  mg per tablet 1 tablet    oxyCODONE-acetaminophen 5-325 mg per tablet 1 tablet    promethazine (PHENERGAN) 6.25 mg in dextrose 5 % 50 mL IVPB    sodium bicarbonate 1 mEq/mL (8.4 %) 150 mEq in dextrose 5 % 1,000 mL infusion    sodium chloride 0.9% flush 3 mL    sodium chloride 0.9% flush 3 mL    zolpidem tablet 5 mg     Objective:     Vital Signs (Most Recent):  Temp: 98.1 °F (36.7 °C) (01/04/19 0600)  Pulse: 79 (01/04/19 0600)  Resp: 18 (01/04/19 0600)  BP: 133/61 (01/04/19 0315)  SpO2: (!) 94 % (01/04/19 0600) Vital Signs (24h Range):  Temp:  [94.7 °F (34.8 °C)-98.1 °F (36.7 °C)] 98.1 °F (36.7 °C)  Pulse:  [78-91] 79  Resp:  [15-28] 18  SpO2:  [93 %-99 %] 94 %  BP: (103-134)/(48-64) 133/61  Arterial Line BP: ()/(43-72) 123/56     Weight: 69.9 kg (154 lb)  Height: 5' 7" (170.2 cm)  Body mass index is 24.12 kg/m².      Intake/Output Summary (Last 24 hours) at 1/4/2019 " 0623  Last data filed at 1/4/2019 0600  Gross per 24 hour   Intake 2837 ml   Output 1090 ml   Net 1747 ml       Ortho/SPM Exam     PE:    AA&O x 4.  NAD  HEENT:  NCAT, sclera nonicteric  Lungs:  Respirations are equal and unlabored.  CV:  2+ bilateral upper and lower extremity pulses.  Skin:  Intact throughout.    MSK:    BUE in dressings, wound vacs in place with serosanguinous drainage in canisters  ROM intact to left hand with intact sensation.   Pt with limited mobility of right hand 2/2 pain. Weakly flexes and extends digits. Sensation intact throughout right hand.     RLE wound clean and intact without erythema or induration. AROM intact without significant pain.      Significant Labs:   CBC:   Recent Labs   Lab 01/03/19  0336 01/03/19 2319 01/04/19 0320   WBC 8.13 8.09 7.41   HGB 7.3* 7.9* 8.0*   HCT 22.8* 23.8* 24.2*   * 96* 91*     CMP:   Recent Labs   Lab 01/03/19  0336  01/03/19  1558 01/03/19 2319 01/04/19 0320      < > 139 140 142   K 5.4*   < > 4.9 4.5 4.4      < > 109 106 106   CO2 14*   < > 14* 16* 16*   GLU 74   < > 102 115* 110   *   < > 124* 119* 126*   CREATININE 4.3*   < > 4.1* 4.0* 4.0*   CALCIUM 7.8*   < > 7.4* 7.4* 7.3*   PROT 3.9*  --   --   --  3.7*   ALBUMIN 1.1*  --   --   --  1.0*   BILITOT 0.2  --   --   --  0.2   ALKPHOS 98  --   --   --  99   AST 15  --   --   --  10   ALT <5*  --   --   --  <5*   ANIONGAP 17*   < > 16 18* 20*   EGFRNONAA 10.7*   < > 11.3* 11.6* 11.6*    < > = values in this interval not displayed.     Coagulation:   Recent Labs   Lab 01/02/19  1410 01/03/19  0336 01/04/19  0320   LABPROT 11.5 10.9 12.9*   INR 1.1 1.0 1.3*   APTT 31.6  --   --      All pertinent labs within the past 24 hours have been reviewed.    Significant Imaging: I have reviewed all pertinent imaging results/findings.

## 2019-01-04 NOTE — SUBJECTIVE & OBJECTIVE
Interval History: mental status stable from yesterday, labs also stable, making ok urine, 825ccs past 24hrs    Review of patient's allergies indicates:  No Known Allergies  Current Facility-Administered Medications   Medication Frequency    0.9%  NaCl infusion (for blood administration) Q24H PRN    albuterol sulfate nebulizer solution 2.5 mg Q6H WAKE    ALPRAZolam tablet 0.5 mg BID PRN    busPIRone tablet 15 mg BID    ceFAZolin injection 2 g Q12H    dextrose 50% injection 12.5 g PRN    fentaNYL injection 25 mcg Q1H PRN    furosemide injection 160 mg BID    glucagon (human recombinant) injection 1 mg PRN    insulin aspart U-100 pen 0-5 Units Q6H PRN    metoprolol tartrate (LOPRESSOR) split tablet 12.5 mg BID    midodrine tablet 5 mg TID    nystatin 100,000 unit/mL suspension 500,000 Units QID    ondansetron injection 4 mg Q12H PRN    ondansetron injection 4 mg Once PRN    oxyCODONE-acetaminophen  mg per tablet 1 tablet Q4H PRN    oxyCODONE-acetaminophen 5-325 mg per tablet 1 tablet Q4H PRN    promethazine (PHENERGAN) 6.25 mg in dextrose 5 % 50 mL IVPB Q6H PRN    sodium bicarbonate 1 mEq/mL (8.4 %) 150 mEq in dextrose 5 % 1,000 mL infusion Continuous    sodium chloride 0.9% flush 3 mL PRN    sodium chloride 0.9% flush 3 mL PRN    zolpidem tablet 5 mg Nightly PRN       Objective:     Vital Signs (Most Recent):  Temp: 98.1 °F (36.7 °C) (01/04/19 0600)  Pulse: 80 (01/04/19 0823)  Resp: 18 (01/04/19 0823)  BP: 133/61 (01/04/19 0315)  SpO2: (!) 92 % (01/04/19 0823)  O2 Device (Oxygen Therapy): room air (01/04/19 0823) Vital Signs (24h Range):  Temp:  [94.7 °F (34.8 °C)-98.1 °F (36.7 °C)] 98.1 °F (36.7 °C)  Pulse:  [78-87] 80  Resp:  [15-26] 18  SpO2:  [92 %-99 %] 92 %  BP: (118-134)/(55-64) 133/61  Arterial Line BP: (105-149)/(48-72) 123/56     Weight: 69.9 kg (154 lb) (12/21/18 1145)  Body mass index is 24.12 kg/m².  Body surface area is 1.82 meters squared.    I/O last 3 completed shifts:  In:  3090 [I.V.:2815; Blood:215; NG/GT:60]  Out: 1345 [Urine:995; Other:350]    Physical Exam   HENT:   Head: Atraumatic.   Neck: No JVD present.   Cardiovascular: Normal rate and regular rhythm.   Pulmonary/Chest: Effort normal. She has rales (at bases).   Abdominal: Soft. She exhibits no distension.   Musculoskeletal: She exhibits edema. She exhibits no tenderness.   Neurological: She is alert.   Skin: Skin is warm.       Significant Labs:  CMP:   Recent Labs   Lab 01/04/19  0320      CALCIUM 7.3*   ALBUMIN 1.0*   PROT 3.7*      K 4.4   CO2 16*      *   CREATININE 4.0*   ALKPHOS 99   ALT <5*   AST 10   BILITOT 0.2     All labs within the past 24 hours have been reviewed.

## 2019-01-04 NOTE — PLAN OF CARE
"Problem: Adult Inpatient Plan of Care  Goal: Plan of Care Review  Hx:  HTN, microcytic anemia, transaminitis, RA, hx of GI bleed     Dx: necrotizing fascitis     12/21: transfer to SICU, CT of chest/arm/knee, cultures; bilat UE washout/I&D; 1L crystalloid and 250 albumin in OR; LR bolus, cont. LR infusion started, PRBC's x2 units   12/22: LR bolusx3  12/23: LR bolusx1  12/24: 2 unit of PRBC total. OR for wound debridement. 2L LR bolus  12/25: LR continuous at 125. Nephrology consulted. 1L LR bolus   12/26: MRI of lower back, 1L LR bolus    Nursing:  SBP<180  Accucheck Q6                  Outcome: Ongoing (interventions implemented as appropriate)  Dx: nec fasc    Shift Events: 1 U PRBCs, AVINASH tube placement. Plan for wound vac exchange tomorrow in OR. SLP visited, failed swallow. Will follow up.     Neuro: Moves all extremities, but reluctant to do so due to pain.  Disoriented to situation and place.    Cardiac:     Vital Signs: BP (!) 103/48   Pulse 80   Temp 97.6 °F (36.4 °C) (Oral)   Resp 16   Ht 5' 7" (1.702 m)   Wt 69.9 kg (154 lb)   SpO2 (!) 94%   Breastfeeding? No   BMI 24.12 kg/m²     Intake/Gtts/Diet: Sodium bicarb @ 125.    Output: 10-20cc/hr. L woundvac output=50. See flow sheet for full record.     Pain Management:: Fentanyl q 1 for pain.      Labs: BMP q 8, accuchecks q 6.     Skin: Wound care performed per order on buttocks. Via wound care, Dr El said they were managing hand dressings. Wound vacs to bilateral arms, R currently off due to blockage. Team aware. L output=50cc. Sanguinous output.     PoC discussed with pt and family. All questions answered and concerns addressed. Weight shift assistance provided q 2 to prevent breakdown. VSS.            "

## 2019-01-04 NOTE — PROGRESS NOTES
[facility]  General Surgery  Progress Note    Subjective:     History of Present Illness:  No notes on file    Post-Op Info:  Procedure(s) (LRB):  DEBRIDEMENT, WOUND (Bilateral)  INSERTION, CATHETER, CENTRAL VENOUS, VORA (Right)   5 Days Post-Op     No new subjective & objective note has been filed under this hospital service since the last note was generated.    Assessment/Plan:     * Soft tissue infection    58 y.o. female 5 Days Post-Op for Procedure(s) (LRB):  DEBRIDEMENT, WOUND (Bilateral)  INSERTION, CATHETER, CENTRAL VENOUS, VORA (Right)    Severe soft tissue infection with joint involvement.   -Culture growing MSSA on Cefazolin, ID recommended about 4-6 weeks, Vora in place.    Oliguric VIDYA  -not improving  - Appreciate Nephrology notes.    Pulmonary edema/COPD  -2/2 renal dysfunction and transfusion requirements  -monitor closely, doing better this morning.     Anemia  -elevated INR unknown etiology    Wound VAC to be changed today in OR          Kirstin Miller MD  General Surgery  [facility]

## 2019-01-04 NOTE — SUBJECTIVE & OBJECTIVE
Interval History:     Both vacs malfunctioning, to OR today.    Medications:  Continuous Infusions:   norepinephrine bitartrate-D5W Stopped (01/03/19 1100)    sodium bicarbonate drip 125 mL/hr at 01/04/19 0600     Scheduled Meds:   albuterol sulfate  2.5 mg Nebulization Q6H WAKE    busPIRone  15 mg Oral BID    ceFAZolin (ANCEF) IVPB  2 g Intravenous Q12H    furosemide  160 mg Intravenous BID    metoprolol tartrate  12.5 mg Oral BID    midodrine  5 mg Oral TID    nystatin  500,000 Units Oral QID     PRN Meds:sodium chloride, ALPRAZolam, dextrose 50%, fentaNYL, glucagon (human recombinant), insulin aspart U-100, ondansetron, ondansetron, oxyCODONE-acetaminophen, oxyCODONE-acetaminophen, promethazine (PHENERGAN) IVPB, sodium chloride 0.9%, sodium chloride 0.9%, zolpidem     Review of patient's allergies indicates:  No Known Allergies  Objective:     Vital Signs (Most Recent):  Temp: 98.1 °F (36.7 °C) (01/04/19 0600)  Pulse: 79 (01/04/19 0600)  Resp: 18 (01/04/19 0600)  BP: 133/61 (01/04/19 0315)  SpO2: (!) 94 % (01/04/19 0600) Vital Signs (24h Range):  Temp:  [94.7 °F (34.8 °C)-98.1 °F (36.7 °C)] 98.1 °F (36.7 °C)  Pulse:  [78-91] 79  Resp:  [15-28] 18  SpO2:  [93 %-99 %] 94 %  BP: (118-134)/(55-64) 133/61  Arterial Line BP: (105-149)/(48-72) 123/56     Weight: 69.9 kg (154 lb)  Body mass index is 24.12 kg/m².    Intake/Output - Last 3 Shifts       01/02 0700 - 01/03 0659 01/03 0700 - 01/04 0659 01/04 0700 - 01/05 0659    P.O.       I.V. (mL/kg) 253 (3.6) 2562 (36.7)     Blood  215     NG/GT  60     IV Piggyback       Total Intake(mL/kg) 253 (3.6) 2837 (40.6)     Urine (mL/kg/hr) 180 (0.1) 840 (0.5)     Emesis/NG output       Other 400 250     Stool       Total Output 580 1090     Net -327 +1747                  Physical Exam     Neuro: not as alert today  CV: RRR  Pulm: work of breathing improved   Ext: Bilateral extremities with wound vac in place. SS fluid.   +edema    Significant Labs:    CBC:   Recent  Labs   Lab 01/04/19 0320   WBC 7.41   RBC 2.79*   HGB 8.0*   HCT 24.2*   PLT 91*   MCV 87   MCH 28.7   MCHC 33.1     CMP:   Recent Labs   Lab 01/04/19 0320      CALCIUM 7.3*   ALBUMIN 1.0*   PROT 3.7*      K 4.4   CO2 16*      *   CREATININE 4.0*   ALKPHOS 99   ALT <5*   AST 10   BILITOT 0.2

## 2019-01-04 NOTE — ANESTHESIA PROCEDURE NOTES
Intubation    Diagnosis: hypoxic respiratory failure  Patient location during procedure: ICU  Procedure start time: 1/4/2019 3:50 PM  Timeout: 1/4/2019 3:50 PM  Procedure end time: 1/4/2019 3:50 PM  Staffing  Anesthesiologist: Jj Summers MD  Resident/CRNA: Eva Marx MD  Performed: resident/CRNA   Anesthesiologist was present at the time of the procedure.  Preanesthetic Checklist  Completed: patient identified, site marked, surgical consent, pre-op evaluation, timeout performed, IV checked, risks and benefits discussed, monitors and equipment checked and anesthesia consent given  Intubation  Indication: respiratory distress, hypoxemia  Pre-oxygenation. Induction: intravenous, mask ventilation: easy mask.  Intubation: postinduction, laryngoscopy direct, Rizwana 3.  Endotracheal Tube: oral, 7.0 mm ID, cuffed (inflated to minimal occlusive pressure)  Attempts: 1, Grade I - full view of cords  Complicating Factors: none  Tube secured at 21 cm at the lips.  Findings post-intubation: bilateral breath sounds, positive ETCO2, atraumatic / condition of teeth unchanged

## 2019-01-04 NOTE — SUBJECTIVE & OBJECTIVE
Interval History/Significant Events:   NAEO. AF, HDS. AVINASH tube placed overnight. UOP improving this am Plan to return to OR today for washout    Follow-up For: Procedure(s) (LRB):  DEBRIDEMENT, WOUND (Bilateral)  INSERTION, CATHETER, CENTRAL VENOUS, VORA (Right)      Objective:     Vital Signs (Most Recent):  Temp: 98.1 °F (36.7 °C) (01/04/19 0600)  Pulse: 79 (01/04/19 0600)  Resp: 18 (01/04/19 0600)  BP: 133/61 (01/04/19 0315)  SpO2: (!) 94 % (01/04/19 0600) Vital Signs (24h Range):  Temp:  [94.7 °F (34.8 °C)-98.1 °F (36.7 °C)] 98.1 °F (36.7 °C)  Pulse:  [78-91] 79  Resp:  [15-28] 18  SpO2:  [93 %-99 %] 94 %  BP: (118-134)/(55-64) 133/61  Arterial Line BP: (105-149)/(48-72) 123/56     Weight: 69.9 kg (154 lb)  Body mass index is 24.12 kg/m².      Intake/Output Summary (Last 24 hours) at 1/4/2019 0823  Last data filed at 1/4/2019 0600  Gross per 24 hour   Intake 2837 ml   Output 1055 ml   Net 1782 ml       Physical Exam   Constitutional: She is oriented to person, place, and time. She appears well-developed.   HENT:   Head: Normocephalic and atraumatic.   Eyes: EOM are normal. Pupils are equal, round, and reactive to light.   Neck: Normal range of motion.   Cardiovascular: Normal rate, regular rhythm and normal heart sounds.   Pulmonary/Chest: Breath sounds normal.   Musculoskeletal:   RUE and LUE wound vacs in place, not sealed, turned off   Neurological: She is oriented to person, place, and time.   Skin: Skin is warm.   Psychiatric: She has a normal mood and affect. Her behavior is normal. Thought content normal.   Nursing note and vitals reviewed.      Vents:  Oxygen Concentration (%): 32 (01/02/19 2043)    Lines/Drains/Airways     Central Venous Catheter Line                 Percutaneous Central Line Insertion/Assessment - single lumen  12/30/18 0928 right subclavian 4 days         Trialysis (Dialysis) Catheter 01/02/19 1457 right internal jugular 1 day          Drain                 Urethral Catheter  12/31/18 0000 4 days         Trans Pyloric Feeding Tube 01/03/19 1500 Left nostril less than 1 day          Arterial Line                 Arterial Line 01/02/19 1500 Right Femoral 1 day          Pressure Ulcer                 Negative Pressure Wound Therapy  4 days         Negative Pressure Wound Therapy  4 days         Pressure Injury 01/01/19 0200 Coccyx Stage 2 3 days                Significant Labs:    CBC/Anemia Profile:  Recent Labs   Lab 01/03/19  0336 01/03/19  2319 01/04/19  0320   WBC 8.13 8.09 7.41   HGB 7.3* 7.9* 8.0*   HCT 22.8* 23.8* 24.2*   * 96* 91*   MCV 88 85 87   RDW 17.6* 16.8* 16.9*        Chemistries:  Recent Labs   Lab 01/02/19  1410  01/03/19  0336  01/03/19  1558 01/03/19 2319 01/04/19  0320      < > 140   < > 139 140 142   K 5.4*   < > 5.4*   < > 4.9 4.5 4.4      < > 109   < > 109 106 106   CO2 15*   < > 14*   < > 14* 16* 16*   *   < > 122*   < > 124* 119* 126*   CREATININE 4.1*   < > 4.3*   < > 4.1* 4.0* 4.0*   CALCIUM 7.6*   < > 7.8*   < > 7.4* 7.4* 7.3*   ALBUMIN  --   --  1.1*  --   --   --  1.0*   PROT  --   --  3.9*  --   --   --  3.7*   BILITOT  --   --  0.2  --   --   --  0.2   ALKPHOS  --   --  98  --   --   --  99   ALT  --   --  <5*  --   --   --  <5*   AST  --   --  15  --   --   --  10   MG 1.8  --  2.0  --   --  1.7 1.6   PHOS 6.8*  --  7.2*  --   --   --  6.8*    < > = values in this interval not displayed.     All pertinent labs within the past 24 hours have been reviewed.    Significant Imaging:  I have reviewed and interpreted all pertinent imaging results/findings within the past 24 hours.

## 2019-01-05 PROBLEM — I47.10 SVT (SUPRAVENTRICULAR TACHYCARDIA): Status: ACTIVE | Noted: 2019-01-01

## 2019-01-05 PROBLEM — I47.19 AVNRT (AV NODAL RE-ENTRY TACHYCARDIA): Status: ACTIVE | Noted: 2019-01-01

## 2019-01-05 NOTE — SUBJECTIVE & OBJECTIVE
Interval History/Significant Events:   POD1 for debridement and wound vac exchange. LMA used intraoperatively, removed postop and transported to ICU on facemask sats 93%, subsequently dropped to ~86. Intubated emergently by anesthesia. 4-5 hours later patient with pressor requirements, started on levo and later vaso added. 500cc albumin given, MAPs improved, pressor requirements 0.04 vaso, 0.04 levo. CRRT was started but R IJ failed, replaced R IJ over wire. CRRT subsequently restarted. Pressor requirements increased to 0.08 levo 0.04 vaso. After 40 minutes of CRRT, pressor requirements increased, CRRT stopped. Patient then became tachycardic, in and out of SVT ~180s, levo 0.16, vaso 0.04, MAPs in the 50s. Decision made to cardiovert emergently - DCCV x3. MAPs improved, still in SVT following last shock, patient later converted to ST. Received 2u prbc, 1u plt, broadened antibiotics cefepime vanc, replenished lytes, calcium 2g,  consulted cardiology started amio gtt and fluid resuscitation. 500 albumin, 1L LR given. This morning, levo at 0.08, vaso 0.04, amio gtt infusing, increased oxygen requirements now at 60% FiO2, peep 8, patient pulling ~400 Vt, rate 34, sat 94%.     Follow-up For: Procedure(s) (LRB):  DEBRIDEMENT, WOUND (Bilateral)  INSERTION, CATHETER, CENTRAL VENOUS, VORA (Right)      Objective:     Vital Signs (Most Recent):  Temp: 99.5 °F (37.5 °C) (01/05/19 0530)  Pulse: 105 (01/05/19 0600)  Resp: (!) 36 (01/05/19 0600)  BP: (!) 94/52 (01/04/19 2030)  SpO2: 96 % (01/05/19 0600) Vital Signs (24h Range):  Temp:  [91.8 °F (33.2 °C)-99.5 °F (37.5 °C)] 99.5 °F (37.5 °C)  Pulse:  [] 105  Resp:  [16-42] 36  SpO2:  [88 %-100 %] 96 %  BP: ()/(52-68) 94/52  Arterial Line BP: ()/(39-65) 112/46     Weight: 69.9 kg (154 lb)  Body mass index is 24.12 kg/m².      Intake/Output Summary (Last 24 hours) at 1/5/2019 0725  Last data filed at 1/5/2019 0600  Gross per 24 hour   Intake 3077.5 ml   Output  698 ml   Net 2379.5 ml       Physical Exam   Constitutional: She is oriented to person, place, and time. She appears well-developed.   HENT:   Head: Normocephalic and atraumatic.   Eyes: EOM are normal. Pupils are equal, round, and reactive to light.   Neck: Normal range of motion.   Cardiovascular: Normal rate, regular rhythm and normal heart sounds.   Pulmonary/Chest: Breath sounds normal.   Musculoskeletal:   RUE and LUE wound vacs in place, not sealed, turned off   Neurological: She is oriented to person, place, and time.   Skin: Skin is warm.   Psychiatric: She has a normal mood and affect. Her behavior is normal. Thought content normal.   Nursing note and vitals reviewed.      Vents:  Vent Mode: A/C (01/05/19 0528)  Ventilator Initiated: Yes (01/04/19 1528)  Set Rate: 16 bmp (01/05/19 0528)  Vt Set: 410 mL (01/05/19 0528)  PEEP/CPAP: 5 cmH20 (01/05/19 0528)  Oxygen Concentration (%): 61 (01/05/19 0600)  Peak Airway Pressure: 24 cmH2O (01/05/19 0528)  Plateau Pressure: 23 cmH20 (01/05/19 0528)  Total Ve: 15.5 mL (01/05/19 0528)  F/VT Ratio<105 (RSBI): (!) 94.76 (01/05/19 0528)    Lines/Drains/Airways     Central Venous Catheter Line                 Percutaneous Central Line Insertion/Assessment - single lumen  12/30/18 0928 right subclavian 5 days         Trialysis (Dialysis) Catheter 01/02/19 1457 right internal jugular 2 days          Drain                 Urethral Catheter 12/31/18 0000 5 days         Trans Pyloric Feeding Tube 01/03/19 1500 Left nostril 1 day          Airway                 Airway - Non-Surgical 01/04/19 1526 Endotracheal Tube less than 1 day          Arterial Line                 Arterial Line 01/02/19 1500 Right Femoral 2 days          Pressure Ulcer                 Negative Pressure Wound Therapy  5 days         Negative Pressure Wound Therapy  5 days         Pressure Injury 01/01/19 0200 Coccyx Stage 2 4 days                Significant Labs:    CBC/Anemia Profile:  Recent Labs   Lab  01/04/19 1924 01/05/19 0229 01/05/19  0540   WBC 6.02 2.80* 1.82*   HGB 7.5* 6.9* 7.5*   HCT 23.1* 21.1* 23.3*   PLT 63* 48* 44*   MCV 87 88 88   RDW 17.4* 17.2* 16.0*        Chemistries:  Recent Labs   Lab 01/04/19 1924 01/05/19 0006 01/05/19 0229 01/05/19  0540    143 143 141   K 3.9 4.0 4.0 3.9    105 104 102   CO2 19* 17* 21* 18*   * 119* 106* 100*   CREATININE 3.8* 3.9* 3.1* 3.0*   CALCIUM 6.8* 6.9* 7.2* 7.5*   ALBUMIN 1.0*  --  1.3* 2.0*   PROT 3.4*  --  3.6* 3.9*   BILITOT 0.2  --  0.2 0.4   ALKPHOS 78  --  63 44*   ALT <5*  --  <5* <5*   AST 10  --  11 13   MG 1.5*  --  1.5* 2.1   PHOS 6.9*  --  5.6* 5.6*     All pertinent labs within the past 24 hours have been reviewed.    Significant Imaging:  I have reviewed and interpreted all pertinent imaging results/findings within the past 24 hours.

## 2019-01-05 NOTE — ASSESSMENT & PLAN NOTE
57yo F w/ hx of Anemia, HTN, RA (on prior prednisone taper) with extensive bilateral upper extremity abscesses now s/p two drainage/washouts at OSH with additional washouts and wound vac placements since presentation here. Following wound vac exchange on 1/4, patient intubated in ICU post-operatively for hypoxic respiratory failure. Overnight runs of unstable SVT s/p DCCV x3, increasing pressor requirements, increasing FiO2 requirements, now on amio gtt.     Neuro:   - No history of seizures/strokes.   - Pain: Dilaudid , Tylenol  - Sedation: propofol @ 20 mcg/kg/min     CVS:   - Initially hemodynamically stable without inotrope/pressor requirement. BHARAT @ OSH negative for vegetation.   - Cardiology consulted - they reviewed BHARAT and agree there is low suspicion of endocarditis at this time  - History of HTN: holding home antihypertensives in setting of hypotension  - 1/5: now with pressor requirements, on norepi 0.08, vaso 0.04  - 1/5: s/p DCCV x3 for unstable SVT, started amio gtt, consulted EP - rec amio gtt for no more than 24 hours, fluid resuscitation     Pulm:   - Chronic smoker  - Continuous pulse oximetry, incentive spirometry.  - intubated 1/4 post-operatively for hypoxic respiratory failure  - increasing FiO2 requirements, now at 60% FiO2, peep 8  - daily CXR     Renal:   - Oliguric VIDYA , Cr 4.0  - acidotic - driving tachypnea  - 160 IV lasix bid, UOP improved to 40-70cc/hr overnight  - bicarb infusion initiated 150 meq/L at 125 ml/hr per nephrology  - Daily BMP, Strict I/O (+Caro)  - CRRT started overnight following intubation. R IJ failed, replaced over wire, started CRRT, stopped after 40 minutes due to hemodynamic instability     FEN/GI  Erosive Esophagitis: Continue PPI BID, TPN  Placed [facility] tube - on tube feeds  IVF:      MSK/ ID:   Staph bacteremia manifesting as necrotizing fascitis of bilateral upper extremity, now septic arthritis of right knee. BHARAT @OSH negative for endocarditis. Presentation  concerning for seeding from back hardware. Orthopedics consulted for septic knee already, appreciate input.  Infectious disease consulted for antibiotic duration management.  - Continue high dose Ancef 2g q8h (4-6 weeks) per ID recs  - Repeat cultures obtained  - MRI L-spine ordered, renal function stable.  - afebrile 1/3  - broadened antibiotics on 1/5 following unstable SVT to cefepime/vanc     Heme/Onc.   - s/p 2U pRBCs 12/24   - administered 1 unit PRBCs 1/3  - 2u prbc 1/5    PPX:   SCD, SQH    Dispo: continue ICU care  Primary: ACS

## 2019-01-05 NOTE — PLAN OF CARE
Problem: Adult Inpatient Plan of Care  Goal: Plan of Care Review  Hx:  HTN, microcytic anemia, transaminitis, RA, hx of GI bleed     Dx: necrotizing fascitis     12/21: transfer to SICU, CT of chest/arm/knee, cultures; bilat UE washout/I&D; 1L crystalloid and 250 albumin in OR; LR bolus, cont. LR infusion started, PRBC's x2 units   12/22: LR bolusx3  12/23: LR bolusx1  12/24: 2 unit of PRBC total. OR for wound debridement. 2L LR bolus  12/25: LR continuous at 125. Nephrology consulted. 1L LR bolus   12/26: MRI of lower back, 1L LR bolus    Nursing:  SBP<180  Accucheck Q6                  Outcome: Ongoing (interventions implemented as appropriate)  Patient ventilated 60% and 8 of PEEP, MAPs maintained above 60 per MD orders, temp controlled via Anny hugger, see flowsheet for details. See  flowsheet for details on heartrate and other vitals. Urinary output minimal, see flowsheet for details, MDs aware. 1.5L of albumin and 1L LR given through shift. Amio started, see flowsheet for details on drips. CRRT stopped after patient tolerated poorly. All questions answered by RN, will continue to monitor through shift.

## 2019-01-05 NOTE — ASSESSMENT & PLAN NOTE
Atrial tachycardia (less likely atypical AVNRT) in the setting of septic shock on levophed and persisting hypovolemia based on echocardiography and IVC parameters.    - replenish volume aggressively  - wean pressors as tolerated as inotropes increase the tendency for SVT  - for suppression can start amiodarone IV load protocol, and continue 200mg via OGT or PO thereafter until acute illness resolves  - an optimal medication regimen would be beta blocker, but given septic shock this is not an option  - will continue to follow with you

## 2019-01-05 NOTE — PROGRESS NOTES
[facility]  Critical Care - Surgery  Progress Note    Patient Name: Misty Khalil  MRN: [id number]  Admission Date: 12/20/2018  Hospital Length of Stay: 16 days  Code Status: Full Code  Attending Provider: Javier Kirk MD  Primary Care Provider: JOEL Mccauley   Principal Problem: Soft tissue infection    Subjective:     Hospital/ICU Course:  No notes on file    Interval History/Significant Events:   POD1 for debridement and wound vac exchange. LMA used intraoperatively, removed postop and transported to ICU on facemask sats 93%, subsequently dropped to ~86. Intubated emergently by anesthesia. 4-5 hours later patient with pressor requirements, started on levo and later vaso added. 500cc albumin given, MAPs improved, pressor requirements 0.04 vaso, 0.04 levo. CRRT was started but R IJ failed, replaced R IJ over wire. CRRT subsequently restarted. Pressor requirements increased to 0.08 levo 0.04 vaso. After 40 minutes of CRRT, pressor requirements increased, CRRT stopped. Patient then became tachycardic, in and out of SVT ~180s, levo 0.16, vaso 0.04, MAPs in the 50s. Decision made to cardiovert emergently - DCCV x3. MAPs improved, still in SVT following last shock, patient later converted to ST. Received 2u prbc, 1u plt, broadened antibiotics cefepime vanc, replenished lytes, calcium 2g,  consulted cardiology started amio gtt and fluid resuscitation. 500 albumin, 1L LR given. This morning, levo at 0.08, vaso 0.04, amio gtt infusing, increased oxygen requirements now at 60% FiO2, peep 8, patient pulling ~400 Vt, rate 34, sat 94%.     Follow-up For: Procedure(s) (LRB):  DEBRIDEMENT, WOUND (Bilateral)  INSERTION, CATHETER, CENTRAL VENOUS, VORA (Right)      Objective:     Vital Signs (Most Recent):  Temp: 99.5 °F (37.5 °C) (01/05/19 0530)  Pulse: 105 (01/05/19 0600)  Resp: (!) 36 (01/05/19 0600)  BP: (!) 94/52 (01/04/19 2030)  SpO2: 96 % (01/05/19 0600) Vital Signs (24h Range):  Temp:   [91.8 °F (33.2 °C)-99.5 °F (37.5 °C)] 99.5 °F (37.5 °C)  Pulse:  [] 105  Resp:  [16-42] 36  SpO2:  [88 %-100 %] 96 %  BP: ()/(52-68) 94/52  Arterial Line BP: ()/(39-65) 112/46     Weight: 69.9 kg (154 lb)  Body mass index is 24.12 kg/m².      Intake/Output Summary (Last 24 hours) at 1/5/2019 0725  Last data filed at 1/5/2019 0600  Gross per 24 hour   Intake 3077.5 ml   Output 698 ml   Net 2379.5 ml       Physical Exam   Constitutional: She is oriented to person, place, and time. She appears well-developed.   HENT:   Head: Normocephalic and atraumatic.   Eyes: EOM are normal. Pupils are equal, round, and reactive to light.   Neck: Normal range of motion.   Cardiovascular: Normal rate, regular rhythm and normal heart sounds.   Pulmonary/Chest: Breath sounds normal.   Musculoskeletal:   RUE and LUE wound vacs in place, not sealed, turned off   Neurological: She is oriented to person, place, and time.   Skin: Skin is warm.   Psychiatric: She has a normal mood and affect. Her behavior is normal. Thought content normal.   Nursing note and vitals reviewed.      Vents:  Vent Mode: A/C (01/05/19 0528)  Ventilator Initiated: Yes (01/04/19 1528)  Set Rate: 16 bmp (01/05/19 0528)  Vt Set: 410 mL (01/05/19 0528)  PEEP/CPAP: 5 cmH20 (01/05/19 0528)  Oxygen Concentration (%): 61 (01/05/19 0600)  Peak Airway Pressure: 24 cmH2O (01/05/19 0528)  Plateau Pressure: 23 cmH20 (01/05/19 0528)  Total Ve: 15.5 mL (01/05/19 0528)  F/VT Ratio<105 (RSBI): (!) 94.76 (01/05/19 0528)    Lines/Drains/Airways     Central Venous Catheter Line                 Percutaneous Central Line Insertion/Assessment - single lumen  12/30/18 0928 right subclavian 5 days         Trialysis (Dialysis) Catheter 01/02/19 1457 right internal jugular 2 days          Drain                 Urethral Catheter 12/31/18 0000 5 days         Trans Pyloric Feeding Tube 01/03/19 1500 Left nostril 1 day          Airway                 Airway - Non-Surgical  01/04/19 1526 Endotracheal Tube less than 1 day          Arterial Line                 Arterial Line 01/02/19 1500 Right Femoral 2 days          Pressure Ulcer                 Negative Pressure Wound Therapy  5 days         Negative Pressure Wound Therapy  5 days         Pressure Injury 01/01/19 0200 Coccyx Stage 2 4 days                Significant Labs:    CBC/Anemia Profile:  Recent Labs   Lab 01/04/19 1924 01/05/19 0229 01/05/19  0540   WBC 6.02 2.80* 1.82*   HGB 7.5* 6.9* 7.5*   HCT 23.1* 21.1* 23.3*   PLT 63* 48* 44*   MCV 87 88 88   RDW 17.4* 17.2* 16.0*        Chemistries:  Recent Labs   Lab 01/04/19 1924 01/05/19  0006 01/05/19 0229 01/05/19  0540    143 143 141   K 3.9 4.0 4.0 3.9    105 104 102   CO2 19* 17* 21* 18*   * 119* 106* 100*   CREATININE 3.8* 3.9* 3.1* 3.0*   CALCIUM 6.8* 6.9* 7.2* 7.5*   ALBUMIN 1.0*  --  1.3* 2.0*   PROT 3.4*  --  3.6* 3.9*   BILITOT 0.2  --  0.2 0.4   ALKPHOS 78  --  63 44*   ALT <5*  --  <5* <5*   AST 10  --  11 13   MG 1.5*  --  1.5* 2.1   PHOS 6.9*  --  5.6* 5.6*     All pertinent labs within the past 24 hours have been reviewed.    Significant Imaging:  I have reviewed and interpreted all pertinent imaging results/findings within the past 24 hours.    Assessment/Plan:     * Soft tissue infection    59yo F w/ hx of Anemia, HTN, RA (on prior prednisone taper) with extensive bilateral upper extremity abscesses now s/p two drainage/washouts at OSH with additional washouts and wound vac placements since presentation here. Following wound vac exchange on 1/4, patient intubated in ICU post-operatively for hypoxic respiratory failure. Overnight runs of unstable SVT s/p DCCV x3, increasing pressor requirements, increasing FiO2 requirements, now on amio gtt.     Neuro:   - No history of seizures/strokes.   - Pain: Dilaudid , Tylenol  - Sedation: propofol @ 20 mcg/kg/min     CVS:   - Initially hemodynamically stable without inotrope/pressor requirement. BHARAT @  OSH negative for vegetation.   - Cardiology consulted - they reviewed BHARAT and agree there is low suspicion of endocarditis at this time  - History of HTN: holding home antihypertensives in setting of hypotension  - 1/5: now with pressor requirements, on norepi 0.08, vaso 0.04  - 1/5: s/p DCCV x3 for unstable SVT, started amio gtt, consulted EP - rec amio gtt for no more than 24 hours, fluid resuscitation     Pulm:   - Chronic smoker  - Continuous pulse oximetry, incentive spirometry.  - intubated 1/4 post-operatively for hypoxic respiratory failure  - increasing FiO2 requirements, now at 60% FiO2, peep 8  - daily CXR     Renal:   - Oliguric VIDYA , Cr 4.0  - acidotic - driving tachypnea  - 160 IV lasix bid, UOP improved to 40-70cc/hr overnight  - bicarb infusion initiated 150 meq/L at 125 ml/hr per nephrology  - Daily BMP, Strict I/O (+Caro)  - CRRT started overnight following intubation. R IJ failed, replaced over wire, started CRRT, stopped after 40 minutes due to hemodynamic instability     FEN/GI  Erosive Esophagitis: Continue PPI BID, TPN  Placed [facility] tube - on tube feeds  IVF:      MSK/ ID:   Staph bacteremia manifesting as necrotizing fascitis of bilateral upper extremity, now septic arthritis of right knee. BHARAT @OSH negative for endocarditis. Presentation concerning for seeding from back hardware. Orthopedics consulted for septic knee already, appreciate input.  Infectious disease consulted for antibiotic duration management.  - Continue high dose Ancef 2g q8h (4-6 weeks) per ID recs  - Repeat cultures obtained  - MRI L-spine ordered, renal function stable.  - afebrile 1/3  - broadened antibiotics on 1/5 following unstable SVT to cefepime/vanc     Heme/Onc.   - s/p 2U pRBCs 12/24   - administered 1 unit PRBCs 1/3  - 2u prbc 1/5    PPX:   SCD, SQH    Dispo: continue ICU care  Primary: ACS              Critical care was time spent personally by me on the following activities: development of treatment plan with  patient or surrogate and bedside caregivers, discussions with consultants, evaluation of patient's response to treatment, examination of patient, ordering and performing treatments and interventions, ordering and review of laboratory studies, ordering and review of radiographic studies, pulse oximetry, re-evaluation of patient's condition.  This critical care time did not overlap with that of any other provider or involve time for any procedures.     Luis Miguel To MD  Critical Care - Surgery  [facility]

## 2019-01-05 NOTE — PT/OT/SLP PROGRESS
Speech Language Pathology  Discharge     Misty Khalil  MRN: [id number]    Patient not seen today secondary to Unavailable (Comment), Other (Comment)(pt intubated at this time ). Orders discontinued. Please reconsult Skilled Speech services when medically appropriate.  Thank you.    Jessica Shaw, HEATHER-SLP   1/5/2019

## 2019-01-05 NOTE — PROGRESS NOTES
0252: HR in 180's.  Sergio Gallardo and Brian at bedside.  Synchronized cardioversion at 120 joules done with HR resulting in 100's.      0256:  's.  Sergio Gallardo and Brian at bedside.  Synchronized cardioversion 120 joules with HR to 109.      See flow sheet for all vitals.

## 2019-01-05 NOTE — CONSULTS
[facility]  Cardiac Electrophysiology  Consult Note    Admission Date: 12/20/2018  Code Status: Full Code   Attending Provider: Javier Kirk MD  Consulting Provider: Steven Allen MD  Principal Problem:Soft tissue infection    Inpatient consult to Electrophysiology  Consult performed by: Steven Allen MD  Consult ordered by: Naveen Gallardo MD        Subjective:     Reason for consult: Unstable Tachycardia    HPI:   We are consulted for evaluation of unstable SVT in this 58F with medical hx of HTN, anemia, RA who was transferred from [facility] 12/25 for evaluation fo necrotizing fascitis. She initially presented on 12/17 with right shoulder pain, swelling, and fevers and found to have bilateral upper extremity abscesses: right arm 14.3x4.3x2.7 collection, left axilla 8.3x5.4x7.7 fluid collection. She is s/p I&D in ED and initiation of vancomycin and zosyn.  She was taken to OR for incision and washout 12/18 of right hand, RUE, and LUE abscess by Dr. Falk (orthopedic surgery) and again on 12/19 for repeat washout. She was additionally found to have left olecranon abscess, left hand abscess. Cultures grew MSSA and she was transitioned to Ancef at some point during her course. Of note, she has history of back surgery with implanted hardware.      Her course was complicated by anemia (s/p 2u pRBC) and concern for mitral valve vegetation on TTE 12/18. BHARAT performed at OSH was negative for any vegetation on valves. She had normal systolic function. She was evaluated by cardiology 12/26 for endocarditis and found to have no vegetations on imaging. Since then she has had a septic knee, septic shoulder, necrotizing infection of the upper extremities s/p multiple debridements and wound vac placement for recurrent MSSA infections. Also has had acidosis and renal injury requiring RRT. In the last 24 hours she has deteriorated requiring intubated, and pressors due to  intermittent hypotension. Most recently restarted on pressors this evening and now has had 3 episodes of unstable SVT requiring electrical cardioversion on a background of sinus tachycardia at a rate of 100s.        Past Medical History:   Diagnosis Date    Anxiety     Depression     GI bleed     Hypertension     Rheumatoid arthritis        Past Surgical History:   Procedure Laterality Date    APPLICATION, WOUND VAC x 2 Bilateral 2018    Performed by [facility] at [facility] OR 2ND FLR    ARTHROSCOPY, KNEE Right 2018    [facility]    ARTHROSCOPY, KNEE, septic Right 2018    Performed by [facility] at [facility] OR [facility] FLR    BACK SURGERY       SECTION      COLONOSCOPY N/A 2018    [facility]formed [facility]    Debridement of wound and wound vac placement. N/A 2019    Performed by [facility] at [facility] OR [facility] FLR    DEBRIDEMENT, WOUND Bilateral 2018    Performed by [facility] at [facility] FLR    DEBRIDEMENT, WOUND Bilateral 2018    Performed by Jaiver [facility] at [facility] OR [facility]LR    ESOPHAGOGASTRODUODENOSCOPY (EGD) N/A 2018    Performed by [facility]aver [facility] at [facility]O    HYSTERECTOMY      INCISION AND DRAINAGE, UPPER EXTREMITY Bilateral 2018    Performed by [facility] at [facility] OR [facility] FLR    INCISION AND DRAINAGE, UPPER EXTREMITY Bilateral 2018    Performed by [facility] Kirk [facility] at [facility] OR [facility] FLR    INSERTION, CATHETER, CENTRAL VENOUS, VORA Right 2018    [facility]    OOPHORECTOMY         Review of patient's allergies indicates:  No Known Allergies    No current facility-administered medications on file prior to encounter.      Current Outpatient Medications on File Prior to Encounter   Medication Sig    albuterol (PROVENTIL) 2.5 mg /3 mL (0.083 %) nebulizer solution  Take 3 mLs (2.5 mg total) by nebulization daily as needed for Wheezing. Rescue    ALPRAZolam (XANAX) 1 MG tablet Take 1 mg by mouth 2 (two) times daily.    busPIRone (BUSPAR) 15 MG tablet Take 1 tablet (15 mg total) by mouth 2 (two) times daily.    cloNIDine 0.3 mg/24 hr td ptwk (CATAPRES) 0.3 mg/24 hr Place 1 patch onto the skin every 7 days.    doxazosin (CARDURA) 1 MG tablet Take 2 tablets (2 mg total) by mouth once daily.    ezetimibe (ZETIA) 10 mg tablet TAKE 1 TABLET (10 MG TOTAL) BY MOUTH ONCE DAILY.    hydrALAZINE (APRESOLINE) 100 MG tablet TAKE 1 TABLET BY MOUTH TWICE DAILY    metoprolol tartrate (LOPRESSOR) 50 MG tablet Take 1 tablet (50 mg total) by mouth 2 (two) times daily.    oxyCODONE-acetaminophen (PERCOCET) 5-325 mg per tablet Take 1-2 tablets by mouth every 4 (four) hours as needed for Pain.    quinapril (ACCUPRIL) 40 MG tablet Take 1 tablet (40 mg total) by mouth every evening.    zolpidem (AMBIEN) 10 mg Tab Take 5 mg by mouth nightly as needed.     Family History     Problem Relation (Age of Onset)    Breast cancer Paternal Grandmother    Diabetes Mother    Heart attack Mother, Father    Heart disease Mother    Hypertension Father    Ovarian cancer Sister        Tobacco Use    Smoking status: Current Every Day Smoker     Packs/day: 1.00     Types: Cigarettes    Smokeless tobacco: Never Used   Substance and Sexual Activity    Alcohol use: Yes     Comment: occasionally    Drug use: No    Sexual activity: No     Review of Systems   Unable to perform ROS: intubated     Objective:     Vital Signs (Most Recent):  Temp: 98.2 °F (36.8 °C) (01/05/19 0310)  Pulse: 106 (01/05/19 0310)  Resp: (!) 35 (01/05/19 0310)  BP: (!) 94/52 (01/04/19 2030)  SpO2: 97 % (01/05/19 0310) Vital Signs (24h Range):  Temp:  [91.8 °F (33.2 °C)-98.6 °F (37 °C)] 98.2 °F (36.8 °C)  Pulse:  [] 106  Resp:  [16-38] 35  SpO2:  [91 %-100 %] 97 %  BP: ()/(52-68) 94/52  Arterial Line BP: ()/(39-65) 129/54        Weight: 69.9 kg (154 lb)  Body mass index is 24.12 kg/m².    SpO2: 97 %  O2 Device (Oxygen Therapy): ventilator    Physical Exam   Constitutional: She is sedated and intubated.   HENT:   Head: Normocephalic and atraumatic.   Eyes: Pupils are equal, round, and reactive to light.   Neck: No JVD present.   Cardiovascular: Regular rhythm. Tachycardia present.   Pulses:       Radial pulses are 1+ on the right side, and 1+ on the left side.   Pulmonary/Chest: Effort normal and breath sounds normal. She is intubated.   Defibrillation pads on chest   Abdominal: Soft.   Musculoskeletal: She exhibits no edema.   Wounds on bilateral arms with wound vac   Skin: Skin is warm and dry.   Bilateral arm wounds  Coccyx ulcer, dressed       Significant Labs:   EP:   Recent Labs   Lab 01/04/19  0320  01/04/19  1924 01/05/19  0006 01/05/19  0229      < > 143 143 143   K 4.4   < > 3.9 4.0 4.0      < > 105 105 104   CO2 16*   < > 19* 17* 21*      < > 127* 85 73   *   < > 118* 119* 106*   CREATININE 4.0*   < > 3.8* 3.9* 3.1*   CALCIUM 7.3*   < > 6.8* 6.9* 7.2*   PROT 3.7*  --  3.4*  --  3.6*   ALBUMIN 1.0*  --  1.0*  --  1.3*   BILITOT 0.2  --  0.2  --  0.2   ALKPHOS 99  --  78  --  63   AST 10  --  10  --  11   ALT <5*  --  <5*  --  <5*   ANIONGAP 20*   < > 19* 21* 18*   ESTGFRAFRICA 13.4*   < > 14.3* 13.8* 18.3*   EGFRNONAA 11.6*   < > 12.4* 12.0* 15.9*   WBC 7.41  --  6.02  --  2.80*   HGB 8.0*  --  7.5*  --  6.9*   HCT 24.2*   < > 23.1*  --  21.1*   PLT 91*  --  63*  --  48*   INR 1.3*  --   --   --  2.0*    < > = values in this interval not displayed.       Significant Imaging:   ECHO 12/21  · Concentric left ventricular remodeling.  · Normal left ventricular systolic function. The estimated ejection fraction is 65%  · Normal LV diastolic function.  · No wall motion abnormalities.  · Normal right ventricular systolic function.  · Intermediate central venous pressure (8 mm Hg).    Tele: 4 episodes of  AVNRT with rates at 180. Last episode lasted 45 minutes and required 3 DCCV. AVNRT has retrograde P just preceding the QRS in each episode.    EKG: 3:40 AM -- AVNRT , retrograde P waves with diffuse T-wave inversions without ST-T changes.    LIMITED ECHO at BEDSIDE:  Subcostal: Trivial pericardial effusion. No signs of atrial collapse, hyperdynamic EF, collapsible IVC despite mechanical positive pressure ventilation.              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    - replenish volume aggressively  - wean pressors as tolerated as inotropes increase the tendency for SVT  - for suppression can start amiodarone IV load protocol, and continue 200mg via OGT or PO thereafter until acute illness resolves  - an optimal medication regimen would be beta blocker, but given septic shock this is not an option  - will continue to follow with you         Thank you for your consult. I will follow-up with patient. Please contact us if you have any additional questions.    Steven Allen MD  Cardiac Electrophysiology  [facility]

## 2019-01-05 NOTE — HPI
We are consulted for evaluation of unstable SVT in this 58F with medical hx of HTN, anemia, RA who was transferred from [facility] 12/25 for evaluation fo necrotizing fascitis. She initially presented on 12/17 with right shoulder pain, swelling, and fevers and found to have bilateral upper extremity abscesses: right arm 14.3x4.3x2.7 collection, left axilla 8.3x5.4x7.7 fluid collection. She is s/p I&D in ED and initiation of vancomycin and zosyn.  She was taken to OR for incision and washout 12/18 of right hand, RUE, and LUE abscess by Dr. Falk (orthopedic surgery) and again on 12/19 for repeat washout. She was additionally found to have left olecranon abscess, left hand abscess. Cultures grew MSSA and she was transitioned to Ancef at some point during her course. Of note, she has history of back surgery with implanted hardware.      Her course was complicated by anemia (s/p 2u pRBC) and concern for mitral valve vegetation on TTE 12/18. BHARAT performed at OSH was negative for any vegetation on valves. She had normal systolic function. She was evaluated by cardiology 12/26 for endocarditis and found to have no vegetations on imaging. Since then she has had a septic knee, septic shoulder, necrotizing infection of the upper extremities s/p multiple debridements and wound vac placement for recurrent MSSA infections. Also has had acidosis and renal injury requiring RRT. In the last 24 hours she has deteriorated requiring intubated, and pressors due to intermittent hypotension. Most recently restarted on pressors this evening and now has had 3 episodes of unstable SVT requiring electrical cardioversion on a background of sinus tachycardia at a rate of 100s.

## 2019-01-05 NOTE — PROGRESS NOTES
Unable to aspirate arterial or venous port of cvc, will dwell alteplase prior to continuing CRRT.

## 2019-01-05 NOTE — PROGRESS NOTES
CRRT:      ACCESS: newly inserted right IJ trialysis, verified by X-ray, with ready to use order.  SLED initiated. UF rate set to 350ml/hr

## 2019-01-05 NOTE — PROCEDURES
"Misty Khalil is a 58 y.o. female patient.    Temp: 98.4 °F (36.9 °C) (01/05/19 0015)  Pulse: 89 (01/05/19 0015)  Resp: (!) 32 (01/05/19 0015)  BP: (!) 94/52 (01/04/19 2030)  SpO2: 99 % (01/05/19 0015)  Weight: 69.9 kg (154 lb) (12/21/18 1145)  Height: 5' 7" (170.2 cm) (12/21/18 1145)       Central Line  Date/Time: 1/5/2019 12:39 AM  Performed by: Nixon Mondragon MD  Consent Done: Yes  Indications: hemodialysis  Preparation: skin prepped with ChloraPrep  Skin prep agent dried: skin prep agent completely dried prior to procedure  Sterile barriers: all five maximum sterile barriers used - cap, mask, sterile gown, sterile gloves, and large sterile sheet  Hand hygiene: hand hygiene performed prior to central venous catheter insertion  Location details: right internal jugular  Catheter size: 12 Fr  Catheter Length: 15cm    Assessment: placement verified by x-ray  Technical procedures used: Right IJ trialysis line exchanged over a wire using sterile techique. The patient tolerated the procedure well without complication.  Estimated blood loss (mL): 3          Nixon Mondragon  1/5/2019  "

## 2019-01-05 NOTE — SUBJECTIVE & OBJECTIVE
Past Medical History:   Diagnosis Date    Anxiety     Depression     GI bleed     Hypertension     Rheumatoid arthritis        Past Surgical History:   Procedure Laterality Date    APPLICATION, WOUND VAC x 2 Bilateral 2018    Performed by Javier Kirk MD at [facility]    ARTHROSCOPY, KNEE Right 2018    [facility]    ARTHROSCOPY, KNEE, septic Right 2018    [facility]    BACK SURGERY       SECTION      COLONOSCOPY N/A 2018    Performed by Brian Shaver MD at [facility]    Debridement of wound and wound vac placement. N/A 2019    Performed by Javier Kirk MD at [facility]    DEBRIDEMENT, WOUND Bilateral 2018    [facility]    DEBRIDEMENT, WOUND Bilateral 2018    [facility]    ESOPHAGOGASTRODUODENOSCOPY (EGD) N/A 2018    Performed by Brian Shaver MD at [facility] END[facility]    HYSTERECTOMY      INCISION AND DRAINAGE, UPPER EXTREMITY Bilateral 2018    Performed by Javier Kirk MD at [facility]    INCISION AND DRAINAGE, UPPER EXTREMITY Bilateral 2018    [facility]    INSERTION, CATHETER, CENTRAL VENOUS, VORA Right 2018    Per[facility]    OOPHORECTOMY         Review of patient's allergies indicates:  No Known Allergies    No current facility-administered medications on file prior to encounter.      Current Outpatient Medications on File Prior to Encounter   Medication Sig    albuterol (PROVENTIL) 2.5 mg /3 mL (0.083 %) nebulizer solution Take 3 mLs (2.5 mg total) by nebulization daily as needed for Wheezing. Rescue    ALPRAZolam (XANAX) 1 MG tablet Take 1 mg by mouth 2 (two) times daily.    busPIRone (BUSPAR) 15 MG tablet Take 1 tablet (15 mg total)  by mouth 2 (two) times daily.    cloNIDine 0.3 mg/24 hr td ptwk (CATAPRES) 0.3 mg/24 hr Place 1 patch onto the skin every 7 days.    doxazosin (CARDURA) 1 MG tablet Take 2 tablets (2 mg total) by mouth once daily.    ezetimibe (ZETIA) 10 mg tablet TAKE 1 TABLET (10 MG TOTAL) BY MOUTH ONCE DAILY.    hydrALAZINE (APRESOLINE) 100 MG tablet TAKE 1 TABLET BY MOUTH TWICE DAILY    metoprolol tartrate (LOPRESSOR) 50 MG tablet Take 1 tablet (50 mg total) by mouth 2 (two) times daily.    oxyCODONE-acetaminophen (PERCOCET) 5-325 mg per tablet Take 1-2 tablets by mouth every 4 (four) hours as needed for Pain.    quinapril (ACCUPRIL) 40 MG tablet Take 1 tablet (40 mg total) by mouth every evening.    zolpidem (AMBIEN) 10 mg Tab Take 5 mg by mouth nightly as needed.     Family History     Problem Relation (Age of Onset)    Breast cancer Paternal Grandmother    Diabetes Mother    Heart attack Mother, Father    Heart disease Mother    Hypertension Father    Ovarian cancer Sister        Tobacco Use    Smoking status: Current Every Day Smoker     Packs/day: 1.00     Types: Cigarettes    Smokeless tobacco: Never Used   Substance and Sexual Activity    Alcohol use: Yes     Comment: occasionally    Drug use: No    Sexual activity: No     Review of Systems   Unable to perform ROS: intubated     Objective:     Vital Signs (Most Recent):  Temp: 98.2 °F (36.8 °C) (01/05/19 0310)  Pulse: 106 (01/05/19 0310)  Resp: (!) 35 (01/05/19 0310)  BP: (!) 94/52 (01/04/19 2030)  SpO2: 97 % (01/05/19 0310) Vital Signs (24h Range):  Temp:  [91.8 °F (33.2 °C)-98.6 °F (37 °C)] 98.2 °F (36.8 °C)  Pulse:  [] 106  Resp:  [16-38] 35  SpO2:  [91 %-100 %] 97 %  BP: ()/(52-68) 94/52  Arterial Line BP: ()/(39-65) 129/54       Weight: 69.9 kg (154 lb)  Body mass index is 24.12 kg/m².    SpO2: 97 %  O2 Device (Oxygen Therapy): ventilator    Physical Exam   Constitutional: She is sedated and intubated.   HENT:   Head: Normocephalic and  atraumatic.   Eyes: Pupils are equal, round, and reactive to light.   Neck: No JVD present.   Cardiovascular: Regular rhythm. Tachycardia present.   Pulses:       Radial pulses are 1+ on the right side, and 1+ on the left side.   Pulmonary/Chest: Effort normal and breath sounds normal. She is intubated.   Defibrillation pads on chest   Abdominal: Soft.   Musculoskeletal: She exhibits no edema.   Wounds on bilateral arms with wound vac   Skin: Skin is warm and dry.   Bilateral arm wounds  Coccyx ulcer, dressed       Significant Labs:   EP:   Recent Labs   Lab 01/04/19  0320  01/04/19  1924 01/05/19  0006 01/05/19  0229      < > 143 143 143   K 4.4   < > 3.9 4.0 4.0      < > 105 105 104   CO2 16*   < > 19* 17* 21*      < > 127* 85 73   *   < > 118* 119* 106*   CREATININE 4.0*   < > 3.8* 3.9* 3.1*   CALCIUM 7.3*   < > 6.8* 6.9* 7.2*   PROT 3.7*  --  3.4*  --  3.6*   ALBUMIN 1.0*  --  1.0*  --  1.3*   BILITOT 0.2  --  0.2  --  0.2   ALKPHOS 99  --  78  --  63   AST 10  --  10  --  11   ALT <5*  --  <5*  --  <5*   ANIONGAP 20*   < > 19* 21* 18*   ESTGFRAFRICA 13.4*   < > 14.3* 13.8* 18.3*   EGFRNONAA 11.6*   < > 12.4* 12.0* 15.9*   WBC 7.41  --  6.02  --  2.80*   HGB 8.0*  --  7.5*  --  6.9*   HCT 24.2*   < > 23.1*  --  21.1*   PLT 91*  --  63*  --  48*   INR 1.3*  --   --   --  2.0*    < > = values in this interval not displayed.       Significant Imaging:   ECHO 12/21  · Concentric left ventricular remodeling.  · Normal left ventricular systolic function. The estimated ejection fraction is 65%  · Normal LV diastolic function.  · No wall motion abnormalities.  · Normal right ventricular systolic function.  · Intermediate central venous pressure (8 mm Hg).    Tele: 4 episodes of AVNRT with rates at 180. Last episode lasted 45 minutes and required 3 DCCV. AVNRT has retrograde P just preceding the QRS in each episode.    EKG: 3:40 AM -- AVNRT , retrograde P waves with diffuse T-wave  inversions without ST-T changes.    LIMITED ECHO at BEDSIDE:  Subcostal: Trivial pericardial effusion. No signs of atrial collapse, hyperdynamic EF, collapsible IVC despite mechanical positive pressure ventilation.

## 2019-01-05 NOTE — ASSESSMENT & PLAN NOTE
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    - replenish volume aggressively  - wean pressors as able  - for suppression can start amiodarone IV load protocol, would not continue past 24 hours    Discussed with Dr. Chi

## 2019-01-06 NOTE — DISCHARGE SUMMARY
[facility]  Critical Care - Surgery  Discharge Summary      Patient Name: Misty Khalil  MRN: [id number]  Admission Date: 12/20/2018  Hospital Length of Stay: 17 days  Discharge Date and Time:  01/06/2019 3:10 AM  Attending Physician: Javier Kirk MD   Discharging Provider: Luis Miguel To MD  Primary Care Provider: JOEL Mccauley    HPI:  Patient is a 59 yo F w/ hx of HTN, anemia, RA, GiB, back surgery with implanted hardware who is known to SICU service. Initial presentation 12/17 with Right shoulder pain, swelling, and fevers. Found to have bilateral upper extremity ultrasound (Right arm 14.3x4.3x2.7 collection, Left axilla 8.3x5.4x7.7 fluid collection s/p I&D in ED and initiation of vancomycin and zosyn.  She was taken to OR for incision and washout 12/18 of right hand, RUE, and LUE abscess by Dr. Falk (orthopedic surgery) and again on 12/19 for  Repeat washout and additionally found to have left olecranon abscess, left hand abscess. Cultures grew MSSA and she was transitioned to Ancef at some point during her course. Admitted to SICU 12/20, underwent several Debridements, washouts, knee arthroscopy.     12/21/18 Findings:  Arthroscopic irrigation right knee joint for septic arthritis Right wrist arthrotomy with irrigation for septic arthritis. Right knee with limited synovitis, grade II/III chondromalacia medial femoral condyle and tibial plateau.  Right wrist with purulence     12/21/18 1. Irrigation of necrotizing soft tissue infection bilateral upper extremities  2. Debridement of skin, soft tissue, muscle, fascia, bilateral upper extremities  KEY FINDINGS: Purulence coming from soft tissues of bilateral arms, tracking into bilateral axilla       12/24/18 1. Irrigation bilateral upper extremity necrotizing soft tissue infection 2. Debridement skin, soft tissue, muscle, fascia bilateral upper extremities  KEY FINDINGS: Purulence coming from soft tissues of bilateral arms,  tracking into bilateral axilla      12/27/18 1.  Intraoperative consultation for left septic shoulder. 2.  Irrigation and debridement of left septic shoulder. 3.  Irrigation and debridement of right hand.  Followed by Irrigation and debridement of bilateral upper extremities and wound VAC placement.     12/30/18 PROCEDURE PERFORMED: Placement of right subclavian brewster single lumen catheter.  Irrigation and debridement of bilateral upper extremities   and wound VAC placement.        Procedure(s) (LRB):  DEBRIDEMENT, WOUND AND VAC CHANGE (Bilateral)    Hospital Course:  Patient's metabolic acidosis continued to worsen along with worsening renal function. Following wound vac exchange on 1/4, patient intubated in ICU post-operatively for hypoxic respiratory failure. Overnight runs of unstable SVT s/p DCCV x3, increasing pressor requirements, increasing FiO2 requirements, and started amio gtt. Unable to tolerate CRRT. Throughout the day of 1/5/19, pressor requirements increasing, ventilator requirements increasing. Overnight 1/5-1/6, patient maxed out on norepinephrine, vasopressin, epinephrine, max vent settings with subsequent bradycardia leading to PEA. Time of death 0233 1/6/19.    Consults (From admission, onward)        Status Ordering Provider     Inpatient consult to Cardiology  Once     Provider:  (Not yet assigned)    Completed ZULLY MCLAUGHLIN     Inpatient consult to Electrophysiology  Once     Provider:  (Not yet assigned)    Completed EDGAR MYERS     Inpatient consult to Hematology  Once     Provider:  (Not yet assigned)    Completed JACK PERALTA     Inpatient consult to Infectious Diseases  Once     Provider:  (Not yet assigned)    Completed ZULLY MCLAUGHLIN     Inpatient consult to Nephrology  Once     Provider:  (Not yet assigned)    Completed DREAD DE LA CRUZ     Inpatient consult to Orthopedics  Once     Provider:  (Not yet assigned)    Completed ZULLY MCLAUGHLIN           Significant Labs:  All pertinent labs within the past 24 hours have been reviewed.    Significant Imaging:  I have reviewed all pertinent imaging results/findings within the past 24 hours.    Pending Diagnostic Studies:     Procedure Component Value Units Date/Time    Basic metabolic panel [[id number]] Collected:  19 1406    Order Status:  Sent Lab Status:  In process Updated:  19 140    Specimen:  Blood     Beta 2 Glycoprotein I Antibody, IgA [[id number]] Collected:  19 144    Order Status:  Sent Lab Status:  In process Updated:  19 1521    Specimen:  Blood     DRVVT [[id number]] Collected:  19 144    Order Status:  Sent Lab Status:  In process Updated:  19 150    Specimen:  Blood     Magnesium [[id number]] Collected:  18    Order Status:  Sent Lab Status:  In process Updated:  18    Specimen:  Blood     X-Ray Chest 1 View [[id number]]     Order Status:  Sent Lab Status:  No result         Final Active Diagnoses:    Diagnosis Date Noted POA    PRINCIPAL PROBLEM:  Soft tissue infection [L08.9] 2018 Yes     Chronic    SVT (supraventricular tachycardia) [I47.1] 2019 No    Azotemia [R79.89] 2019 Unknown    Metabolic acidosis [E87.2]  Unknown    Pressure ulcer of coccygeal region, stage 2 [L89.152] 2019 Yes    Coagulopathy [D68.9] 2019 Unknown    Abnormal coagulation profile [R79.1] 2018 No    ATN (acute tubular necrosis) [N17.0]  Unknown    VIDYA (acute kidney injury) [N17.9] 2018 Unknown    Septic arthritis of knee, right [M00.9] 2018 Yes    Peripheral vascular disease [I73.9] 10/22/2018 Yes    Anxiety [F41.9] 2018 Yes      Problems Resolved During this Admission:       Discharged Condition:     Disposition:  home    Follow Up: None    Patient Instructions:   No discharge procedures on file.  Medications:  None (patient  at medical facility)    Luis Miguel To MD  Critical  Care - Surgery  [facility]

## 2019-01-06 NOTE — PROGRESS NOTES
[facility] notified of patient death, MD pronounced time of death at 0233, Transport received patient at 0600.

## 2019-01-06 NOTE — CHAPLAIN
"The  offered grief support and spiritual care to the patient's family at her death, facilitating life review and praying with them at bedside. The family shared that Misty had "loved lying in the sun at the beach", and believed she'd rejoin her  twin sister Gisselle, whom her daughter described as  "the coolest aunt"; her daughter  shared what she had learned from her mom included "take good care of yourself". The family seemed sad, but accepting, of the the patient's death, and thanked the staff for care. The  will continue to keep this grieving family in prayer.  "

## 2019-01-06 NOTE — ANESTHESIA POSTPROCEDURE EVALUATION
"Anesthesia Post Evaluation    Patient: Misty Khalil    Procedure(s) Performed: Procedure(s) (LRB):  DEBRIDEMENT, WOUND AND VAC CHANGE (Bilateral)    Final Anesthesia Type: general  Patient location during evaluation: ICU  Patient participation: No - Unable to Participate, Coma/Other Inability to Communicate  Level of consciousness: confused  Post-procedure vital signs: reviewed and not stable  Pain management: adequate  Airway patency: patent  PONV status at discharge: No PONV  Anesthetic complications: yes  Perioperative Events: reintubation    Cardiovascular status: blood pressure returned to baseline  Respiratory status: intubated  Hydration status: euvolemic  Follow-up not needed.        Visit Vitals  BP (!) 94/52   Pulse (!) 0   Temp (!) 35.2 °C (95.4 °F)   Resp (!) 0   Ht 5' 7" (1.702 m)   Wt 69.9 kg (154 lb)   SpO2 (!) 61%   Breastfeeding? No   BMI 24.12 kg/m²       Pain/Jimmy Score: Pain Rating Prior to Med Admin: 0 (1/5/2019 10:00 PM)  Pain Rating Post Med Admin: 0 (1/5/2019  7:00 AM)        "

## 2019-01-06 NOTE — LOPA/MORA/SWTA/AOC/AEB
[facility] [facility] ([facility])  On-Site Evaluation  [facility] Contact # [phone]        Thank you for the referral of this patient to determine suitability for organ, tissue, and eye donation.  A chart review has been conducted (date): 01/ 06 /2019  at (time) 00 :30 .  [facility] findings are as follows:    ? Potential candidate for organ donation - [facility] following patient. Any changes in patients condition, discussion of withdrawing the vent or brain death exams, Immediately call [phone].    ?   [facility] Representative:  Matilde Mcgovern RN, BSN          [facility] Referral Number:   [id number]

## 2019-01-06 NOTE — SIGNIFICANT EVENT
Time of Death Note    Called to patient bedside for PEA.    Patient family sitting at bedside  Patient examined for 2 minutes with the follow:    No spontaneous breath sounds  No cardiac sounds heard on auscultation  No radial or carotid pulse felt on palpation  No pupillary response to light  No response to pain x4 extremities      Time of death: 0233 on 1/6/19    Cause of death: Cardiac arrest secondary to sepsis    Condolences given to Family at bedside    Luis Miguel To MD  PGY2 Anesthesiology  SICU

## 2019-01-06 NOTE — HPI
Patient is a 59 yo F w/ hx of HTN, anemia, RA, GiB, back surgery with implanted hardware who is known to SICU service. Initial presentation 12/17 with Right shoulder pain, swelling, and fevers. Found to have bilateral upper extremity ultrasound (Right arm 14.3x4.3x2.7 collection, Left axilla 8.3x5.4x7.7 fluid collection s/p I&D in ED and initiation of vancomycin and zosyn.  She was taken to OR for incision and washout 12/18 of right hand, RUE, and LUE abscess by Dr. Falk (orthopedic surgery) and again on 12/19 for  Repeat washout and additionally found to have left olecranon abscess, left hand abscess. Cultures grew MSSA and she was transitioned to Ancef at some point during her course. Admitted to SICU 12/20, underwent several Debridements, washouts, knee arthroscopy.     12/21/18 Findings:  Arthroscopic irrigation right knee joint for septic arthritis Right wrist arthrotomy with irrigation for septic arthritis. Right knee with limited synovitis, grade II/III chondromalacia medial femoral condyle and tibial plateau.  Right wrist with purulence     12/21/18 1. Irrigation of necrotizing soft tissue infection bilateral upper extremities  2. Debridement of skin, soft tissue, muscle, fascia, bilateral upper extremities  KEY FINDINGS: Purulence coming from soft tissues of bilateral arms, tracking into bilateral axilla       12/24/18 1. Irrigation bilateral upper extremity necrotizing soft tissue infection 2. Debridement skin, soft tissue, muscle, fascia bilateral upper extremities  KEY FINDINGS: Purulence coming from soft tissues of bilateral arms, tracking into bilateral axilla      12/27/18 1.  Intraoperative consultation for left septic shoulder. 2.  Irrigation and debridement of left septic shoulder. 3.  Irrigation and debridement of right hand.  Followed by Irrigation and debridement of bilateral upper extremities and wound VAC placement.     12/30/18 PROCEDURE PERFORMED: Placement of right subclavian brewster  single lumen catheter.  Irrigation and debridement of bilateral upper extremities   and wound VAC placement.

## 2019-01-06 NOTE — PROGRESS NOTES
01/06/2019 1:14 AM: Patient is currently maxed on norepinephrine, epinephrine, vasopressin. Vent settings 100% FiO2, peep 17. Heart rate declining, currently 52. Patient's daughter Imelda updated throughout the night and is currently driving in from out of state. Given her hemodynamic instability on max pressors, I talked to her about withdrawal of pressors and keeping the patient comfortable vs continuing with pressors. As she is currently 70 miles away and would like to see her mother, she requests to proceed with pressors knowing that there is nothing more to offer should her pressure decrease. Will continue with current course. Family in room updated.     Luis Miguel To MD, PGY2  SICU

## 2019-01-06 NOTE — HOSPITAL COURSE
Patient's metabolic acidosis continued to worsen along with worsening renal function. Following wound vac exchange on 1/4, patient intubated in ICU post-operatively for hypoxic respiratory failure. Overnight runs of unstable SVT s/p DCCV x3, increasing pressor requirements, increasing FiO2 requirements, and started amio gtt. Unable to tolerate CRRT. Throughout the day of 1/5/19, pressor requirements increasing, ventilator requirements increasing. Overnight 1/5-1/6, patient maxed out on norepinephrine, vasopressin, epinephrine, max vent settings with subsequent bradycardia leading to PEA. Time of death 0233 1/6/19.

## 2019-01-07 LAB
APTT HEX PL PPP: POSITIVE S
LA PPP-IMP: POSITIVE

## 2019-01-07 NOTE — OP NOTE
DATE OF PROCEDURE:  01/04/2019    We were assistant surgeons to Dr. Kirk with General Surgery.    SURGEON:  Danilo Goyal M.D.    ASSISTANT:  Javier West M.D. (RES)    PROCEDURE PERFORMED:  Irrigation and debridement of bilateral dorsal hands.    INDICATIONS FOR PROCEDURE:  The patient is a well-established with our service   with a post diagnosis of necrotizing fasciitis and open wounds to the bilateral   upper extremities.  Most of this had been managed by the General Surgery team;   however, the dorsal hand has been managed by us with Orthopedics.  This was a   scheduled procedure for a repeat irrigation and debridement as well as a   dressing change.  The risks, benefits, and alternatives were explained to the   patient and family.  Informed consent was obtained.    PROCEDURE IN DETAIL:  The patient was properly identified in the ICU and was   taken to the Operating Room where she was placed in supine position.  Bilateral   upper extremity was prepped and draped in standard sterile fashion.  Timeout was   undertaken to confirm the patient, site, surgery, surgeon and administration of   preoperative antibiotics.  All was agreed upon and we proceeded.  The previous   dressings were removed prior to prepping.  The left dorsal hand wound was   circular with approximately a 4-cm diameter with exposed tendon and muscle   belly.  There was no purulent material here.  This was irrigated with saline   solution.  Some nonviable skin approximately 2 square cm was removed from the   proximal aspect of this.  This was irrigated with approximately 1 liter of   saline solution.  We moved to the right dorsal hand wound, which was   longitudinal in fashion and was about 10 cm.  The skin was unable to be apposed   to each other.  This was gently debrided using a combination of Ray-Isauro and a   [place].  There is no purulent material, but there was some gummy granulation   tissue present on the radial aspect of the first  webspace.  After irrigation   with saline solution that was removed.  After irrigation and debridement, both   dorsal hands were covered in layered fashion with Xeroform, 4 x 4s, cast padding   and wrapped loosely with an Ace wrap.  The remainder of the wounds was attended   to by the General Surgery team.  Please see their operative note.    Dr. Danilo Goyal was present for the entirety of this procedure.    DICTATED BY: Javier West MD (RES)    Attg Note:  I was present for the procedure.  Will likely need coverage on the dorsum of the hand.    Danilo Goyal MD    BG/VITA  dd: 01/06/2019 08:11:29 (CST)  td: 01/06/2019 15:51:15 (CST)  Doc ID   #[id number]  Job ID #[id number]    CC:

## 2019-01-08 NOTE — PHYSICIAN QUERY
PT Name: Misty Khalil  MR #: [id number]  Physician Query Form - Respiratory Condition Clarification     Winnie Oconnor RN, CCDS  Desk # [phone]; [facility] # [phone] [email or web address]    This form is a permanent document in the medical record.     Query Date: January 8, 2019    By submitting this query, we are merely seeking further clarification of documentation. Please utilize your independent clinical judgment when addressing the question(s) below.    The medical record contains the following:  Indicators Supporting Clinical Findings Location in Medical Record   x Surgery or Procedure performed:  PROCEDURES PERFORMED:  1. Irrigation and debridement of bilateral upper extremities   2. Wound vac placement bilateral upper extremities  Op Note 1/4; file 1/7   x Anesthesia type: ANESTHESIA: GETA  LMA used intraoperatively Op Note 1/4, file 1/5 ([place])  1/5 CC PN    x Respiratory Failure documented · pt returned from I/D surgery in resp distress  · Following wound vac exchange on 1/4, patient intubated in ICU post-operatively for hypoxic respiratory failure  · intubated in ICU post-operatively for hypoxic respiratory failure Renal Pn 1/4      1/5 CC PN    Mechanical Ventilation:        Difficulty weaning or Prolonged Weaning documented      Reintubation documented     x BiPAP, CPAP, or Oxygen administration post-  extubation  LMA used intraoperatively, removed postop and transported to ICU on facemask sats 93%, subsequently dropped to ~86.   Intubated emergently by anesthesia.  1/5 CC PN    x Treatments: Oxygen  Face mask  Intubated    Continuous pulse oximetry, incentive spirometry.  - intubated 1/4 post-operatively for hypoxic respiratory failure  - increasing FiO2 requirements, now at 60% FiO2, peep 8  - daily CXR  acidotic - driving tachypnea Orders            Pulm PN 1/5   x Other: - increasing FiO2 requirements, now at 60% FiO2, peep 8  - daily CXR  acidotic - driving tachypnea    pt returned  "from I/D surgery in resp distress, subsequent ABG showed a pH 7.28.  Pt got intubated. Latest AB.26/46/21, mixed acidosis. Worsened CXR.  Pulm PN             Renal Pn    x  mixed acidosis.   Stable VIDYA from iATN, not recovering  Hypervolemia  · AT or atypical AVNRT, most likely  Hypotension.  · unstable SVT requiring electrical cardioversion   · background of sinus tachycardia   Necrotizing Soft Tissue Infection  SVT  Azotemia  VIDYA; ATN  Metabolic Acidosis  Stage 2 Pressue Ulcer Coccyx  Coagulopathy; Abn Coagulation profile  Septic Arthritis of R Knee; R wrist; L shoulder  PVD  Anxiety  Post Op Hypoxic Respiratory Failure     bradycardia leading to PEA.     Extremely sick patient with severe septic shock and multisystem failure. Renal Pn               Arrhythmia CN      DC Summary                                         Arrhythmia consult:      Respiratory failure post-op, due to, or complicating a procedure, is classified as one of the most severe, life-threatening surgical complications a patient can have.                         If the pulmonary dysfunction is expected (inherent to the procedure), it is considered usual mechanical ventilation and not post-op respiratory failure.      If there is acute pulmonary dysfunction requiring non-routine aggressive measures and it is NOT inherent to the procedure, thus unexpected or unusual; it is considered post op respiratory failure.        The clinical guidelines noted below are only system guidelines, and do not replace the providers clinical judgment.                        Please clarify if the  "hypoxic respiratory failure"  is:     [   ] Inherent to the procedure: Expected or usual for that procedure                                         [ x  ] Due to a condition other than surgery Unexpected or unusual for that procedure BUT a pre-existing medical condition is present and suspected to be contributing.                    If so, " specify condition: ______________             Common examples:  COPD, CHF, a neuromuscular disorder or degenerative neurological disorder   [   ]   Complication of surgery or procedure  Unexpected or unusual for that procedure    [   ]   Complication of anesthesia   [   ]   Other explanation with details: ______________________   [   ]    Clinically Undetermined                                           Please document in your progress notes daily for the duration of treatment, until resolved, and include in your discharge summary.

## 2019-01-08 NOTE — PHYSICIAN QUERY
"  PT Name: Misty Khalil  MR #: [id number]     Physician Query Form - Procedure Clarification      This form is a permanent document in the medical record.            Query Date: January 8, 2019  By submitting this query, we are merely seeking further clarification of documentation. Please utilize your independent clinical judgment when addressing the question(s) below.     The Medical record contains the following:       Indicators    Supporting Clinical Findings Location in Medical Record     Documentation of "Debridement"    PROCEDURE PERFORMED:  Irrigation and debridement of bilateral dorsal hands.    right dorsal hand wound    The skin was unable to be apposed to each other.  This was gently debrided using a combination of Ray-Isauro and a [place].  There is no purulent material, but there was some gummy granulation tissue present on the radial aspect of the first webspace.  After irrigation with saline solution that was removed.     After irrigation and debridement, both dorsal hands were covered in layered fashion with Xeroform, 4 x 4s, cast padding  and wrapped loosely with an Ace wrap. Op Note 1/4; file 1/7     Documentation of "I & D"         EBL =         Other:          Excisional debridement is a surgical removal of  nonvitalized tissue, necrosis or slough. The use of a sharp instrument does not always indicate that an excisional debridement was performed.  Non excisional debridement is the scraping, washing, irrigating, brushing away or removal of loose tissue fragments.     Provider, please specify type of procedure(s) performed "Right Dorsal Hand":                   [ x   ]  Excisional Debridement (Specify site and depth of tissue removed)      *Depth of tissue excised:        [   x ] Skin [  x  ] SubcutaneousTissue/Fascia [    ] Muscle [    ] Tendon [    ] Bone   [    ]  Non-excisional Debridement      *Depth of tissue excised:        [    ] Skin [    ] SubcutaneousTissue/Fascia [    ] Muscle [    ] " Tendon [    ] Bone   [    ] Other Procedure (Specify) ________   [  ] Clinically Undetermined

## 2019-01-08 NOTE — PHYSICIAN QUERY
"PT Name: Misty Khalil  MR #: [id number]    Physician Query Form - Procedure Clarification     Winnie Oconnor RN, CCDS  Desk # [phone]; [facility] # [phone] [email or web address]    This form is a permanent document in the medical record.     Query Date: January 8, 2019  By submitting this query, we are merely seeking further clarification of documentation. Please utilize your independent clinical judgment when addressing the question(s) below.    The Medical record contains the following:   Indicators     Supporting Clinical Findings Location in Medical Record   x Documentation of "Debridement"   PROCEDURE PERFORMED:  Irrigation and debridement of bilateral dorsal hands.     left dorsal hand wound with exposed tendon and muscle belly.  There was no purulent material here.    This was irrigated with saline solution.    Some nonviable skin approximately 2 square cm was removed from the proximal aspect of this.    This was irrigated with approximately 1 liter of saline solution.     After irrigation and debridement, both dorsal hands were covered in layered fashion with Xeroform, 4 x 4s, cast padding  and wrapped loosely with an Ace wrap. Ortho Op Note Proc date 1/4; file date 1/7    Documentation of "I & D"      EBL =      Other:       Excisional debridement is a surgical removal of  nonvitalized tissue, necrosis or slough. The use of a sharp instrument does not always indicate that an excisional debridement was performed.  Non excisional debridement is the scraping, washing, irrigating, brushing away or removal of loose tissue fragments.    Provider, please specify type of procedure(s) performed on "Left Dorsal Hand" on 1/4/19.    [  x  ]  Excisional Debridement  (Specify site and depth of tissue removed)     *Depth of tissue excised:       [   x ] Skin [  x  ] SubcutaneousTissue/Fascia [    ] Muscle [    ] Tendon [    ] Bone   [    ]    Non-excisional Debridement     *Depth of tissue:       [    ] Skin [    ] " SubcutaneousTissue/Fascia [    ] Muscle [    ] Tendon [    ] Bone   [    ] Other Procedure (Specify) ________   [  ] Clinically Undetermined

## 2019-01-08 NOTE — PHYSICIAN QUERY
PT Name: Misty Khalil  MR #: [id number]    Physician Query Form - Respiratory Condition Clarification      Winnie Oconnor RN, CCDS  Desk # [phone]; [facility] # [phone] [email or web address]      This form is a permanent document in the medical record.    Query Date: January 8, 2019    By submitting this query, we are merely seeking further clarification of documentation. Please utilize your independent clinical judgment when addressing the question(s) below.    The Medical record contains the following   Indicators  Supporting Clinical Findings Location in Medical Record   x SOB, CARLSON, Wheezing, Productive Cough, Use of Accessory Muscles, etc. Intubation  Indication: respiratory distress, hypoxemia     Anesthesia Chart 1/4   x   Acute/Chronic Illness mixed acidosis.   Stable VIDYA from iATN, not recovering  Hypervolemia  · AT or atypical AVNRT, most likely  Hypotension.  · unstable SVT requiring electrical cardioversion   · background of sinus tachycardia   Necrotizing Soft Tissue Infection  SVT  Azotemia  VIDYA; ATN  Metabolic Acidosis  Stage 2 Pressue Ulcer Coccyx  Coagulopathy; Abn Coagulation profile  Septic Arthritis of R Knee; R wrist; L shoulder  PVD  Anxiety  Post Op Hypoxic Respiratory Failure     bradycardia leading to PEA.    PROCEDURES PERFORMED:  1. Irrigation and debridement of bilateral upper extremities   2. Wound vac placement bilateral upper extremities     Extremely sick patient with severe septic shock and multisystem failure. Renal Pn 1/4          Arrhythmia CN 1/5    DC Summary                              Op Note 1/4; file 1/7 01/05 Arrhythmia consult:    x Radiology Findings  Worsened CXR. Renal Pn 1/4   x Respiratory Distress or Failure pt returned from I/D surgery in resp distress  Following wound vac exchange on 1/4, patient intubated in ICU post-operatively for hypoxic respiratory failure Renal Pn 1/4 1/5 CC PN   x Hypoxia or Hypercapnia intubated in ICU post-operatively for hypoxic  "respiratory failure DC Summary   x   RR         ABGs         O2 sat subsequent ABG showed a pH 7.28.   Latest AB. Renal Pn    x   BiPAP/Intubation - intubated  post-operatively for hypoxic respiratory failure  - increasing FiO2 requirements, now at 60% FiO2, peep 8  - daily CXR  acidotic - driving tachypnea    LMA used intraoperatively, removed postop and transported to ICU on facemask sats 93%, subsequently dropped to ~86.   Intubated emergently by anesthesia.  Pulm PN 1/            1/5  CC PN    x   Supplemental O2 increased oxygen requirements now at 60% FiO2, peep 8, patient pulling ~400 Vt, rate 34, sat 94%.   Face mask  intubated 1/5 CC PN    CC Pn /    Home O2, Oxygen Dependence     x   Treatment Intubated  Continuous pulse ox, IS  Daily CXR Pulm PN /   x   Other pt returned from I/D surgery in resp distress, subsequent ABG showed a pH 7.28.  Pt got intubated. Latest AB., mixed acidosis. Worsened CXR. Renal Pn      Respiratory failure can be acute, chronic or both. It is generally further specified as hypoxic, hypercapnic or both. Lastly, it is important to identify an etiology, if known or suspected.   References:: https://www.acphospitalist.org/archives/2013/10/coding; htm; http://[employer]GetThis/acute-respiratory-failure-know  The clinical guidelines noted below are only system guidelines, and do not replace the providers clinical judgment.  Provider, please specify diagnosis or diagnoses associated with above clinical findings.        Provider, please further specify "hypoxic respiratory failure".       [x   ] Acute Respiratory Failure with Hypoxia - ABG pO2 < 60 mmHg or O2 sat of 88% on RA and respiratory symptoms documented   [   ] Other Acute Respiratory Failure     [   ] Other Respiratory Diagnosis (please specify): _________________________________   [   ]  Clinically Undetermined       Please document in your progress notes daily for the duration of treatment " until resolved and include in your discharge summary.

## 2019-01-10 NOTE — PHYSICIAN QUERY
"PT Name: Misty Khalil  MR #: [id number]    Physician Query Form - Procedure Clarification     Winnie Oconnor RN, CCDS  Desk # [phone]; [facility] # [phone] [email or web address]    This form is a permanent document in the medical record.     Query Date: January 10, 2019  By submitting this query, we are merely seeking further clarification of documentation. Please utilize your independent clinical judgment when addressing the question(s) below.    The Medical record contains the following:   Indicators     Supporting Clinical Findings Location in Medical Record   x Documentation of "Debridement" PROCEDURES PERFORMED:  1. Irrigation bilateral upper extremities bilateral upper extremities   2. Debridement skin and soft tissue Left upper extremity 6x6cm area  3. Wound vac placement bilateral upper extremities       KEY FINDINGS: Bilateral upper extremity wounds with granulation tissue.  No further purulence seen    We debrided a piece of skin and soft tissue on the left upper extremity where a penrose drain was previously placed [facility] Op Note 1/4; file 1/5    Documentation of "I & D"      EBL =      Other:       Excisional debridement is a surgical removal of  nonvitalized tissue, necrosis or slough. The use of a sharp instrument does not always indicate that an excisional debridement was performed.  Non excisional debridement is the scraping, washing, irrigating, brushing away or removal of loose tissue fragments.    Provider, please specify type of procedure(s) performed:  "[facility]  [facility]    [  x  ]  Excisional Debridement (Specify site and depth of tissue removed)     * Site: (Specify) ______     *Depth of tissue excised:       [   x ] Skin [ x   ] SubcutaneousTissue/Fascia [    ] Muscle [    ] Tendon [    ] Bone   [    ]    Non-excisional Debridement    *Site : (Specify): _______________     *Depth of tissue excised:       [    ] Skin [    ] SubcutaneousTissue/Fascia [    ] " Muscle [    ] Tendon [    ] Bone   [    ] Other Procedure (Specify) ________   [  ] Clinically Undetermined

## 2019-01-22 LAB
FUNGUS SPEC CULT: NORMAL
FUNGUS SPEC CULT: NORMAL

## 2019-01-29 LAB
FUNGUS SPEC CULT: NORMAL
FUNGUS SPEC CULT: NORMAL

## 2019-02-22 LAB
ACID FAST MOD KINY STN SPEC: NORMAL
ACID FAST MOD KINY STN SPEC: NORMAL
MYCOBACTERIUM SPEC QL CULT: NORMAL
MYCOBACTERIUM SPEC QL CULT: NORMAL

## 2019-02-25 LAB
ACID FAST MOD KINY STN SPEC: NORMAL
MYCOBACTERIUM SPEC QL CULT: NORMAL

## 2019-04-18 ENCOUNTER — ANESTHESIA EVENT (OUTPATIENT)
Dept: ANESTHESIOLOGY | Facility: HOSPITAL | Age: 59
End: 2019-04-18

## 2019-04-18 ENCOUNTER — ANESTHESIA (OUTPATIENT)
Dept: ANESTHESIOLOGY | Facility: HOSPITAL | Age: 59
End: 2019-04-18
Payer: MEDICARE

## 2019-05-13 NOTE — ADDENDUM NOTE
Addendum  created 05/13/19 0756 by Eva Marx MD    Intraprocedure Blocks edited, Sign clinical note

## 2019-06-28 NOTE — ADDENDUM NOTE
Addendum  created 06/28/19 1525 by Eva Marx MD    Delete clinical note, Intraprocedure Blocks edited, Order Canceled from Note

## 2019-06-28 NOTE — ADDENDUM NOTE
Addendum  created 06/28/19 1523 by Eva Marx MD    Child order released for a procedure order, Intraprocedure Blocks edited, Pend clinical note, Sign clinical note

## 2019-06-28 NOTE — ANESTHESIA PROCEDURE NOTES
Intubation    Diagnosis: hypoxic respiratory failure  Patient location during procedure: ICU  Procedure start time: 1/4/2019 3:40 PM  Timeout: 1/4/2019 3:40 PM  Procedure end time: 1/4/2019 3:45 PMAuthorizing Provider: ALCIDES Melgozaerforming Provider: Eva Marx MD  Anesthesiologist was present at the time of the procedure.  Intubation  Indication: respiratory failure  Pre-oxygenation. Induction: intravenous rapid sequence, mask ventilation: n/a., Rizwana 3.  Endotracheal Tube: 7.0 mm ID, cuffed (inflated to minimal occlusive pressure)  Attempts: 1, Grade I - full view of cords  Complicating Factors: none  Tube secured at 21 cm at the lips.  Findings post-intubation: bilateral breath sounds, positive ETCO2, atraumatic / condition of teeth unchanged  Position Confirmation: auscultation

## 2021-03-12 NOTE — PROGRESS NOTES
Chief Complaint  Chief Complaint   Patient presents with    Medication Refill     bp meds       HPI  Misty Khalil is a 58 y.o. female with medical diagnoses as listed within the medical history and problem list that presents for establishment with new provider. She is in need of medication refills today. She notes that she is also followed by Nephrology. She states that she also needs Xanax and Ambien refilled but she only takes PRN. She reports having labs recently with previous PCP. She is due to see Dr. Shaver to review CT abd and pelvis due to her weight loss recently. She has no real concerns today. Mostly needing BP medications.     PAST MEDICAL HISTORY:  Past Medical History:   Diagnosis Date    Anxiety     Depression     GI bleed     Hypertension        PAST SURGICAL HISTORY:  Past Surgical History:   Procedure Laterality Date    BACK SURGERY       SECTION      COLONOSCOPY N/A 2018    Procedure: COLONOSCOPY;  Surgeon: Brian Shaver MD;  Location: [facility];  Service: Endoscopy;  Laterality: N/A;    COLONOSCOPY N/A 2018    Performed by Brian Shaver MD at [facility]    ESOPHAGOGASTRODUODENOSCOPY N/A 2018    Procedure: ESOPHAGOGASTRODUODENOSCOPY (EGD);  Surgeon: Brian Shaver MD;  Location: [facility];  Service: Endoscopy;  Laterality: N/A;    ESOPHAGOGASTRODUODENOSCOPY (EGD) N/A 2018    Performed by Brian Shaver MD at [facility]    HYSTERECTOMY      OOPHORECTOMY         SOCIAL HISTORY:  Social History     Socioeconomic History    Marital status:      Spouse name: Not on file    Number of children: Not on file    Years of education: Not on file    Highest education level: Not on file   Social Needs    Financial resource strain: Not on file    Food insecurity - worry: Not on file    Food insecurity - inability: Not on file    Transportation needs - medical: Not on file    Transportation needs - non-medical: Not on  Patient being evaluated by Dr. Perez at bedside. file   Occupational History    Not on file   Tobacco Use    Smoking status: Current Every Day Smoker    Smokeless tobacco: Never Used   Substance and Sexual Activity    Alcohol use: Yes     Comment: occasionally    Drug use: No    Sexual activity: No   Other Topics Concern    Not on file   Social History Narrative    Not on file       FAMILY HISTORY:  Family History   Problem Relation Age of Onset    Heart disease Mother     Diabetes Mother     Heart attack Mother     Hypertension Father     Heart attack Father     Ovarian cancer Sister     Breast cancer Paternal Grandmother        ALLERGIES AND MEDICATIONS: updated and reviewed.  Review of patient's allergies indicates:  No Known Allergies  Current Outpatient Medications   Medication Sig Dispense Refill    cloNIDine 0.3 mg/24 hr td ptwk (CATAPRES) 0.3 mg/24 hr Place 1 patch onto the skin every 7 days.      ezetimibe (ZETIA) 10 mg tablet Take 1 tablet (10 mg total) by mouth once daily. 30 tablet 2    albuterol (PROVENTIL) 2.5 mg /3 mL (0.083 %) nebulizer solution Take 3 mLs (2.5 mg total) by nebulization daily as needed for Wheezing. Rescue 1 Box 2    ALPRAZolam (XANAX) 1 MG tablet Take 1 mg by mouth 2 (two) times daily.      busPIRone (BUSPAR) 15 MG tablet Take 1 tablet (15 mg total) by mouth 2 (two) times daily. 60 tablet 2    doxazosin (CARDURA) 1 MG tablet Take 2 tablets (2 mg total) by mouth once daily. 60 tablet 2    hydrALAZINE (APRESOLINE) 100 MG tablet Take 1 tablet (100 mg total) by mouth 2 (two) times daily. 60 tablet 2    metoprolol tartrate (LOPRESSOR) 50 MG tablet Take 1 tablet (50 mg total) by mouth 2 (two) times daily. 60 tablet 2    pantoprazole (PROTONIX) 40 MG tablet Take 1 tablet (40 mg total) by mouth once daily. 30 tablet 6    quinapril (ACCUPRIL) 40 MG tablet Take 1 tablet (40 mg total) by mouth every evening. 30 tablet 2    sucralfate (CARAFATE) 1 gram tablet Take 1 tablet (1 g total) by mouth 4 (four) times daily.  120 tablet 0    zolpidem (AMBIEN) 10 mg Tab Take 5 mg by mouth nightly as needed.       No current facility-administered medications for this visit.          ROS  Review of Systems   Constitutional: Positive for appetite change and unexpected weight change. Negative for activity change and fatigue.   Respiratory: Negative for cough, chest tightness and shortness of breath.    Cardiovascular: Negative for chest pain and palpitations.   Musculoskeletal: Negative for arthralgias, back pain, gait problem and neck pain.   Skin: Negative for color change.   Neurological: Negative for dizziness, light-headedness and headaches.   Psychiatric/Behavioral: Negative for behavioral problems. The patient is nervous/anxious.            PHYSICAL EXAM  Vitals:    09/10/18 1307   BP: (!) 140/80   BP Location: Right arm   Patient Position: Sitting   BP Method: Large (Automatic)   Pulse: 61   Temp: 97.5 °F (36.4 °C)   TempSrc: Tympanic   SpO2: 97%   Weight: 58.1 kg (128 lb)    Body mass index is 20.05 kg/m².  Weight: 58.1 kg (128 lb)           Physical Exam   Constitutional: She is oriented to person, place, and time. She appears well-developed and well-nourished.   HENT:   Head: Normocephalic and atraumatic.   Eyes: EOM are normal. Pupils are equal, round, and reactive to light.   Neck: Normal range of motion. Neck supple.   Cardiovascular: Normal rate, regular rhythm and normal heart sounds.   No murmur heard.  Pulmonary/Chest: Effort normal and breath sounds normal.   Abdominal: Soft. There is no tenderness.   Musculoskeletal: Normal range of motion.   Lymphadenopathy:     She has no cervical adenopathy.   Neurological: She is alert and oriented to person, place, and time.   Skin: Skin is warm.   Psychiatric: She has a normal mood and affect. Her behavior is normal. Thought content normal.         Health Maintenance       Date Due Completion Date    Hepatitis C Screening 1960 ---    Lipid Panel 1960 ---    TETANUS  VACCINE 03/06/1978 ---    Pneumococcal PPSV23 (Medium Risk) (1) 03/06/1978 ---    Influenza Vaccine 08/01/2018 ---    Mammogram 09/10/2020 9/10/2018    Colonoscopy 07/16/2028 7/16/2018               Assessment & Plan    Misty was seen today for medication refill.    Diagnoses and all orders for this visit:    Hypertension, unspecified type  -     quinapril (ACCUPRIL) 40 MG tablet; Take 1 tablet (40 mg total) by mouth every evening.  -     metoprolol tartrate (LOPRESSOR) 50 MG tablet; Take 1 tablet (50 mg total) by mouth 2 (two) times daily.  -     hydrALAZINE (APRESOLINE) 100 MG tablet; Take 1 tablet (100 mg total) by mouth 2 (two) times daily.  -     doxazosin (CARDURA) 1 MG tablet; Take 2 tablets (2 mg total) by mouth once daily.  -     albuterol (PROVENTIL) 2.5 mg /3 mL (0.083 %) nebulizer solution; Take 3 mLs (2.5 mg total) by nebulization daily as needed for Wheezing. Rescue  Will continue current medication therapy. Will request records from previous provider and add work up as warranted.     Hyperlipidemia, unspecified hyperlipidemia type  -     ezetimibe (ZETIA) 10 mg tablet; Take 1 tablet (10 mg total) by mouth once daily.    Anxiety  -     busPIRone (BUSPAR) 15 MG tablet; Take 1 tablet (15 mg total) by mouth 2 (two) times daily.  Patient refused drug screen today. She states that she is no longer going to take her Xanax and Ambien. I discussed risks of withdrawal with patient and she declined concern. She notes not taking them daily and is ready to get off of them. I have reviewed her  and she was recently filled on 8/10/18.       Follow-up: Follow-up in about 3 months (around 12/10/2018).

## 2021-06-17 NOTE — PROCEDURES
"Misty Khalil is a 58 y.o. female patient.    Temp: (!) 93.9 °F (34.4 °C) (01/03/19 0315)  Pulse: 80 (01/03/19 0700)  Resp: 17 (01/03/19 0700)  BP: 90/61 (01/02/19 1900)  SpO2: 96 % (01/03/19 0700)  Weight: 69.9 kg (154 lb) (12/21/18 1145)  Height: 5' 7" (170.2 cm) (12/21/18 1145)       Central Line  Date/Time: 1/3/2019 7:16 AM  Location procedure was performed: [facility]ERY  Performed by: Alton Florez MD  Assisting provider: Santi Mohan MD  Consent Done: Yes  Time out: Immediately prior to procedure a "time out" was called to verify the correct patient, procedure, equipment, support staff and site/side marked as required.  Indications: med administration, hemodialysis, vascular access and hemodynamic monitoring  Anesthesia: local infiltration    Anesthesia:  Local Anesthetic: lidocaine 1% without epinephrine  Anesthetic total: 5 mL  Preparation: skin prepped with ChloraPrep  Skin prep agent dried: skin prep agent completely dried prior to procedure  Sterile barriers: all five maximum sterile barriers used - cap, mask, sterile gown, sterile gloves, and large sterile sheet  Hand hygiene: hand hygiene performed prior to central venous catheter insertion  Location details: right internal jugular  Catheter type: trialysis  Catheter size: 12 Fr  Catheter Length: 16cm    Ultrasound guidance: yes  Vessel Caliber: large, patent, compressibility normal  Needle advanced into vessel with real time Ultrasound guidance.  Guidewire confirmed in vessel.  Sterile sheath used.  Manometry: No   Number of attempts: 1  Assessment: placement verified by x-ray,  no pneumothorax on x-ray,  verified by fluoroscopy,  tip termination and successful placement  Complications: none  Estimated blood loss (mL): 5  Specimens: No  Implants: No  Post-procedure: line sutured,  sterile dressing applied,  chlorhexidine patch and blood return through all ports  Complications: No          Alton Florez  1/3/2019  " Back pain

## 2022-11-07 NOTE — ASSESSMENT & PLAN NOTE
58 y.o. female 3 Days Post-Op for Procedure(s) (LRB):  DEBRIDEMENT, WOUND (Bilateral)  INSERTION, CATHETER, CENTRAL VENOUS, VORA (Right)    Severe soft tissue infection with joint involvement.   -Culture growing MSSA on Cefazolin, ID recommended about 4-6 weeks, Vora in place.    Oliguric VIDYA  -not improving    Pulmonary edema  -2/2 renal dysfunction and transfusion requirements  -monitor closely, may require intubation, I have asked the patient to think about her code status and goals of care    Anemia  -stable, no ongoing blood loss  -elevated INR of unknown etiology    Low threshold to transfer to unit today, likely cancel vac change   Non-Graft Cartilage Fenestration Text: The cartilage was fenestrated with a 2mm punch biopsy to help facilitate healing.

## 2023-01-01 NOTE — [employer] PATIENT INSTRUCTIONS
Discharge Summary/Instructions after an Endoscopic Procedure  Patient Name: Misty Khalil  Patient MRN: [id number]  Patient YOB: 1960  Monday, July 16, 2018  Brian Shaver MD  RESTRICTIONS:  During your procedure today, you received medications for sedation.  These   medications may affect your judgment, balance and coordination.  Therefore,   for 24 hours, you have the following restrictions:   - DO NOT drive a car, operate machinery, make legal/financial decisions,   sign important papers or drink alcohol.    ACTIVITY:  Today: no heavy lifting, straining or running due to procedural   sedation/anesthesia.  The following day: return to full activity including work.  DIET:  Eat and drink normally unless instructed otherwise.     TREATMENT FOR COMMON SIDE EFFECTS:  - Mild abdominal pain, nausea, belching, bloating or excessive gas:  rest,   eat lightly and use a heating pad.  - Sore Throat: treat with throat lozenges and/or gargle with warm salt   water.  - Because air was used during the procedure, expelling large amounts of air   from your rectum or belching is normal.  - If a bowel prep was taken, you may not have a bowel movement for 1-3 days.    This is normal.  SYMPTOMS TO WATCH FOR AND REPORT TO YOUR PHYSICIAN:  1. Abdominal pain or bloating, other than gas cramps.  2. Chest pain.  3. Back pain.  4. Signs of infection such as: chills or fever occurring within 24 hours   after the procedure.  5. Rectal bleeding, which would show as bright red, maroon, or black stools.   (A tablespoon of blood from the rectum is not serious, especially if   hemorrhoids are present.)  6. Vomiting.  7. Weakness or dizziness.  GO DIRECTLY TO THE NEAREST EMERGENCY ROOM IF YOU HAVE ANY OF THE FOLLOWING:      Difficulty breathing              Chills and/or fever over 101 F   Persistent vomiting and/or vomiting blood   Severe abdominal pain   Severe chest pain   Black, tarry stools   Bleeding- more than one  tablespoon   Any other symptom or condition that you feel may need urgent attention  Your doctor recommends these additional instructions:  If any biopsies were taken, your doctors clinic will contact you in 1 to 2   weeks with any results.  - Await pathology results.   - Return to my office in 2 weeks.   - Recommend CT A/P with PO contrast due to the turtosity of the duodenum   given the J shaped stomach to r/o lesions in the distal 2nd portion of   duodenum and the remainder of the small bowel  - Discharge patient to home (ambulatory).  For questions, problems or results please call your physician - Brian Shaver MD at Work:  [phone].  [facility] EMERGENCY ROOM PHONE NUMBER: [phone]  IF A COMPLICATION OR EMERGENCY SITUATION ARISES AND YOU ARE UNABLE TO REACH   YOUR PHYSICIAN - GO DIRECTLY TO THE EMERGENCY ROOM.  MD Brian Guardado MD  7/16/2018 11:17:48 AM  This report has been verified and signed electronically.  [employer]   Statement Selected

## (undated) DEVICE — SUT ETHILON 3/0 18IN PS-1

## (undated) DEVICE — DRAPE EMERALD 87X114.75X113

## (undated) DEVICE — SUT 4-0 VICRYL / SH

## (undated) DEVICE — ELECTRODE REM PLYHSV RETURN 9

## (undated) DEVICE — NDL HYPO REG 25G X 1 1/2

## (undated) DEVICE — PAD CAST SPECIALIST STRL 4

## (undated) DEVICE — GAUZE DERMACEA 3 PLY 6IN 4YD

## (undated) DEVICE — SUT ETHILON 3-0 PS2 18 BLK

## (undated) DEVICE — SET IRR URLGY 2LINE UNIV SPIKE

## (undated) DEVICE — SOL NACL 0.9% INJ PF/50151

## (undated) DEVICE — SPONGE DERMACEA 4X4IN 12PLY

## (undated) DEVICE — SUT MCRYL PLUS 4-0 PS2 27IN

## (undated) DEVICE — APPLICATOR CHLORAPREP ORN 26ML

## (undated) DEVICE — ADHESIVE MASTISOL VIAL 48/BX

## (undated) DEVICE — TOURNIQUET SB QC DP 24X4IN

## (undated) DEVICE — SUT VICRYL PLUS 3-0 SH 18IN

## (undated) DEVICE — TEGADERM IV

## (undated) DEVICE — SEE MEDLINE ITEM 146271

## (undated) DEVICE — SET DECANTER MEDICHOICE

## (undated) DEVICE — NDL SPINAL 18GX3.5 SPINOCAN

## (undated) DEVICE — BLADE SURG STAINLESS STEEL #10

## (undated) DEVICE — SUT VICRYL CTD 2-0 GI 27 SH

## (undated) DEVICE — KIT ENDO CARRY ON PROCEDURE

## (undated) DEVICE — SHEET DRAPE FAN-FOLDED 3/4

## (undated) DEVICE — DRAPE THYROID WITH ARMBOARD

## (undated) DEVICE — DRESSING INFOVAC MED RND BLK

## (undated) DEVICE — CONNECTOR Y T.R.A.C.

## (undated) DEVICE — BLADE SURG STAINLESS STEEL #15

## (undated) DEVICE — SEE MEDLINE ITEM 146372

## (undated) DEVICE — CANISTER SUCTION 3000CC

## (undated) DEVICE — DRESSING XEROFORM FOIL PK 1X8

## (undated) DEVICE — DRAPE PLASTIC U 60X72

## (undated) DEVICE — SUT SILK 2-0 PS 18IN BLACK

## (undated) DEVICE — BANDAGE ESMARK 6X12

## (undated) DEVICE — DRAPE C ARM 42 X 120 10/BX

## (undated) DEVICE — DRAPE STERI U-SHAPED 47X51IN

## (undated) DEVICE — DRESSING SPONGE 8PLY 4X4 STRL

## (undated) DEVICE — CONNECTOR Y VAC TRAC

## (undated) DEVICE — Device

## (undated) DEVICE — BANDAGE ACE ELASTIC 6"

## (undated) DEVICE — CANISTER INFOV.A.C WOUND 500ML

## (undated) DEVICE — GAUZE VASELINE PETRO 3X9

## (undated) DEVICE — SOL IRR NACL .9% 3000ML

## (undated) DEVICE — TUBE SET INFLOW/OUTFLOW

## (undated) DEVICE — SYR DISP LL 5CC

## (undated) DEVICE — DRESSING TRANS 4X4 TEGADERM

## (undated) DEVICE — SEE MEDLINE ITEM 146345

## (undated) DEVICE — DRESSING XEROFORM GAUZE 5X9

## (undated) DEVICE — BLADE SURG CARBON STEEL SZ11

## (undated) DEVICE — SUT SILK 2-0 SH 18IN BLACK

## (undated) DEVICE — PADDING CAST SPECIALIST 6X4YD

## (undated) DEVICE — TUBE PENROSE DRAIN 12IN X 5/8I

## (undated) DEVICE — TRAY MINOR GEN SURG

## (undated) DEVICE — SUT VICRYL 3-0 27 SH

## (undated) DEVICE — PAD ABD 8X10 STERILE

## (undated) DEVICE — DRESSING INFOVAC LARGE BLK

## (undated) DEVICE — TAPE SURG DURAPORE 2 X10YD

## (undated) DEVICE — SEE MEDLINE ITEM 157117

## (undated) DEVICE — SPONGE DERMACEA GAUZE 4X4

## (undated) DEVICE — SUT PDS II 2-0 CT1

## (undated) DEVICE — DRESSING N ADH OIL EMUL 3X8

## (undated) DEVICE — TRAY ARTHROSCOPY 2/CS

## (undated) DEVICE — GAUZE SPONGE 4X4 12PLY

## (undated) DEVICE — DRESSING ANTIMICROBIAL 1 INCH

## (undated) DEVICE — SEE MEDLINE ITEM 146417

## (undated) DEVICE — SPONGE LAP 18X18 PREWASHED

## (undated) DEVICE — SEE MEDLINE ITEM 157148

## (undated) DEVICE — DRESSING GRANUFOAM LARGE VAC

## (undated) DEVICE — SOL IRRI STRL WATER 1000ML

## (undated) DEVICE — SHAVER ULTRAFFR 4.2MM